# Patient Record
Sex: FEMALE | Race: BLACK OR AFRICAN AMERICAN | Employment: PART TIME | URBAN - METROPOLITAN AREA
[De-identification: names, ages, dates, MRNs, and addresses within clinical notes are randomized per-mention and may not be internally consistent; named-entity substitution may affect disease eponyms.]

---

## 2017-02-21 ENCOUNTER — TELEPHONE (OUTPATIENT)
Dept: INTERNAL MEDICINE CLINIC | Age: 57
End: 2017-02-21

## 2017-02-21 ENCOUNTER — OFFICE VISIT (OUTPATIENT)
Dept: INTERNAL MEDICINE CLINIC | Age: 57
End: 2017-02-21

## 2017-02-21 VITALS
HEART RATE: 70 BPM | BODY MASS INDEX: 34.37 KG/M2 | HEIGHT: 67 IN | DIASTOLIC BLOOD PRESSURE: 85 MMHG | RESPIRATION RATE: 19 BRPM | TEMPERATURE: 97.8 F | SYSTOLIC BLOOD PRESSURE: 138 MMHG | OXYGEN SATURATION: 98 % | WEIGHT: 219 LBS

## 2017-02-21 DIAGNOSIS — E78.2 MIXED HYPERLIPIDEMIA: ICD-10-CM

## 2017-02-21 DIAGNOSIS — M54.9 UPPER BACK PAIN: ICD-10-CM

## 2017-02-21 DIAGNOSIS — G89.18 CHEST WALL PAIN FOLLOWING SURGERY: Primary | ICD-10-CM

## 2017-02-21 DIAGNOSIS — R07.89 CHEST WALL PAIN FOLLOWING SURGERY: Primary | ICD-10-CM

## 2017-02-21 DIAGNOSIS — E55.9 VITAMIN D DEFICIENCY: ICD-10-CM

## 2017-02-21 DIAGNOSIS — R51.9 FACIAL PAIN: Primary | ICD-10-CM

## 2017-02-21 DIAGNOSIS — E03.4 HYPOTHYROIDISM DUE TO ACQUIRED ATROPHY OF THYROID: ICD-10-CM

## 2017-02-21 DIAGNOSIS — R51.9 FACIAL PAIN: ICD-10-CM

## 2017-02-21 RX ORDER — FLUTICASONE PROPIONATE 50 MCG
2 SPRAY, SUSPENSION (ML) NASAL DAILY
Qty: 1 BOTTLE | Refills: 2 | Status: SHIPPED | OUTPATIENT
Start: 2017-02-21 | End: 2018-09-06 | Stop reason: SDUPTHER

## 2017-02-21 RX ORDER — HYDROCODONE BITARTRATE AND ACETAMINOPHEN 5; 325 MG/1; MG/1
TABLET ORAL
Qty: 60 TAB | Refills: 0 | Status: SHIPPED | OUTPATIENT
Start: 2017-02-21 | End: 2017-04-19 | Stop reason: SDUPTHER

## 2017-02-21 NOTE — PROGRESS NOTES
Reviewed record in preparation for visit and have obtained necessary documentation. Identified pt with two pt identifiers(name and ).     Chief Complaint   Patient presents with    Head Pain     6 month f/u c/o of HA x 2 weeks pt states \"mother  of brain tumor\" wants HA checked     Extremity Weakness     c/o of bilateral weakness, c/o swelling and pain  under armpits x 1 month            Coordination of Care Questionnaire:  :     1) Have you been to an emergency room, urgent care clinic since your last visit? no   Hospitalized since your last visit? no             2) Have you seen or consulted any other health care providers outside of [address] [place] [address] since your last visit? no  (Include any pap smears or colon screenings in this section.)

## 2017-02-21 NOTE — PATIENT INSTRUCTIONS

## 2017-02-21 NOTE — MR AVS SNAPSHOT
Visit Information Date & Time Provider Department Dept. Phone Encounter #  
 2/21/2017 10:30 AM Austin Swann, [address] [place] [address],[address] [phone] [id number] Follow-up Instructions Return for followup pending labs. Lucy Reyna Upcoming Health Maintenance Date Due DTaP/Tdap/Td series (1 - Tdap) 6/1/1983 FOBT Q 1 YEAR AGE 50-75 6/1/2012 PAP AKA CERVICAL CYTOLOGY 3/28/2015 BREAST CANCER SCRN MAMMOGRAM 4/9/2017 Allergies as of 2/21/2017  Review Complete On: 2/21/2017 By: Austin Swann MD  
  
 Severity Noted Reaction Type Reactions Penicillins  04/25/2012   Systemic Hives Pt states I am not allergic to penicillin Percocet [Oxycodone-acetaminophen]  11/23/2012    Itching Sulfa (Sulfonamide Antibiotics)  04/25/2012   Systemic Other (comments) Mouth blistering Current Immunizations  Never Reviewed No immunizations on file. Not reviewed this visit You Were Diagnosed With   
  
 Codes Comments Chest wall pain following surgery    -  Primary ICD-10-CM: R52.12 
ICD-9-CM: 338.12 Mixed hyperlipidemia     ICD-10-CM: E78.2 ICD-9-CM: 272.2 Hypothyroidism due to acquired atrophy of thyroid     ICD-10-CM: E03.4 ICD-9-CM: 244.8, 246.8 Vitamin D deficiency     ICD-10-CM: E55.9 ICD-9-CM: 268.9 Upper back pain     ICD-10-CM: M54.9 ICD-9-CM: 724.5 Facial pain     ICD-10-CM: D64 ICD-9-CM: 776. 0 Vitals BP Pulse Temp Resp Height(growth percentile) Weight(growth percentile) 138/85 (BP 1 Location: Left arm, BP Patient Position: Sitting) 70 97.8 °F (36.6 °C) (Oral) 19 5' 7\" (1.702 m) 219 lb (99.3 kg) SpO2 BMI OB Status Smoking Status 98% 34.3 kg/m2 Hysterectomy Former Smoker BMI and BSA Data Body Mass Index Body Surface Area  
 34.3 kg/m 2 2.17 m 2 Preferred Pharmacy Pharmacy Name Phone [address] Chaudhari, [address] [facility] [phone] Your Updated Medication List  
  
   
This list is accurate as of: 17 11:59 AM.  Always use your most recent med list.  
  
  
  
  
 atorvastatin 20 mg tablet Commonly known as:  LIPITOR Take 1 Tab by mouth daily. fluticasone 50 mcg/actuation nasal spray Commonly known as:  Shirley Fryer 2 Sprays by Both Nostrils route daily. glucose blood VI test strips strip Commonly known as:  Ascensia CONTOUR Test your blood sugar 3-4 times weekly, before breakfast.  
  
 HYDROcodone-acetaminophen 5-325 mg per tablet Commonly known as:  Odalys Platt Take 1-2 tablet every 6 hours as needed for chest wall pain IBUPROFEN PO Take  by mouth. Lancets Misc Commonly known as: One Touch Rico Papa Check your blood sugar 3-4 times weekly  
  
 levothyroxine 125 mcg tablet Commonly known as:  SYNTHROID Take 2 Tabs by mouth Daily (before breakfast). For thyroid replacement. meclizine 25 mg tablet Commonly known as:  ANTIVERT Take 1/2-1 tablets three times a day as needed for vertigo. May cause sedation. Prescriptions Printed Refills HYDROcodone-acetaminophen (NORCO) 5-325 mg per tablet 0 Sig: Take 1-2 tablet every 6 hours as needed for chest wall pain  
 Class: Print Prescriptions Sent to Pharmacy Refills  
 fluticasone (FLONASE) 50 mcg/actuation nasal spray 2 Si Sprays by Both Nostrils route daily. Class: Normal  
 Pharmacy: [address] Chaudhari, [address] [facility] Katharina Navarro Ph #: [phone] Route: Both Nostrils We Performed the Following LIPID PANEL [86379 CPT(R)] METABOLIC PANEL, COMPREHENSIVE [03637 CPT(R)] REFERRAL TO PHYSICAL THERAPY [DOX48 Custom] Comments:  
 Aquatic therapy for ongoing upper back and chest wall pain since bilateral mastectomy. TSH 3RD GENERATION [40113 CPT(R)] VITAMIN D, 25 HYDROXY [id number] CPT(R)] Follow-up Instructions Return for followup pending labs. Lucy Reyna To-Do List   
 02/21/2017 Imaging:  XR SINUSES PARANASAL MIN 3 V Referral Information Referral ID Referred By Referred To  
  
 [id number] Sarah [facility] 1   
   [address], [address] Phone: [phone] Visits Status Start Date End Date 1 New Request 2/21/17 2/21/18 If your referral has a status of pending review or denied, additional information will be sent to support the outcome of this decision. Patient Instructions Headache: Care Instructions Your Care Instructions Headaches have many possible causes. Most headaches aren't a sign of a more serious problem, and they will get better on their own. Home treatment may help you feel better faster. The doctor has checked you carefully, but problems can develop later. If you notice any problems or new symptoms, get medical treatment right away. Follow-up care is a key part of your treatment and safety. Be sure to make and go to all appointments, and call your doctor if you are having problems. It's also a good idea to know your test results and keep a list of the medicines you take. How can you care for yourself at home? · Do not drive if you have taken a prescription pain medicine. · Rest in a quiet, dark room until your headache is gone. Close your eyes and try to relax or go to sleep. Don't watch TV or read. · Put a cold, moist cloth or cold pack on the painful area for 10 to 20 minutes at a time. Put a thin cloth between the cold pack and your skin. · Use a warm, moist towel or a heating pad set on low to relax tight shoulder and neck muscles. · Have someone gently massage your neck and shoulders. · Take pain medicines exactly as directed. ¨ If the doctor gave you a prescription medicine for pain, take it as prescribed.  
¨ If you are not taking a prescription pain medicine, ask your doctor if you can take an over-the-counter medicine. · Be careful not to take pain medicine more often than the instructions allow, because you may get worse or more frequent headaches when the medicine wears off. · Do not ignore new symptoms that occur with a headache, such as a fever, weakness or numbness, vision changes, or confusion. These may be signs of a more serious problem. To prevent headaches · Keep a headache diary so you can figure out what triggers your headaches. Avoiding triggers may help you prevent headaches. Record when each headache began, how long it lasted, and what the pain was like (throbbing, aching, stabbing, or dull). Write down any other symptoms you had with the headache, such as nausea, flashing lights or dark spots, or sensitivity to bright light or loud noise. Note if the headache occurred near your period. List anything that might have triggered the headache, such as certain foods (chocolate, cheese, wine) or odors, smoke, bright light, stress, or lack of sleep. · Find healthy ways to deal with stress. Headaches are most common during or right after stressful times. Take time to relax before and after you do something that has caused a headache in the past. 
· Try to keep your muscles relaxed by keeping good posture. Check your jaw, face, neck, and shoulder muscles for tension, and try relaxing them. When sitting at a desk, change positions often, and stretch for 30 seconds each hour. · Get plenty of sleep and exercise. · Eat regularly and well. Long periods without food can trigger a headache. · Treat yourself to a massage. Some people find that regular massages are very helpful in relieving tension. · Limit caffeine by not drinking too much coffee, tea, or soda. But don't quit caffeine suddenly, because that can also give you headaches.  
· Reduce eyestrain from computers by blinking frequently and looking away from the computer screen every so often. Make sure you have proper eyewear and that your monitor is set up properly, about an arm's length away. · Seek help if you have depression or anxiety. Your headaches may be linked to these conditions. Treatment can both prevent headaches and help with symptoms of anxiety or depression. When should you call for help? Call 911 anytime you think you may need emergency care. For example, call if: 
· You have signs of a stroke. These may include: 
¨ Sudden numbness, paralysis, or weakness in your face, arm, or leg, especially on only one side of your body. ¨ Sudden vision changes. ¨ Sudden trouble speaking. ¨ Sudden confusion or trouble understanding simple statements. ¨ Sudden problems with walking or balance. ¨ A sudden, severe headache that is different from past headaches. Call your doctor now or seek immediate medical care if: 
· You have a new or worse headache. · Your headache gets much worse. Where can you learn more? Go to http://brynn-shivani.info/. Enter M271 in the search box to learn more about \"Headache: Care Instructions. \" Current as of: February 19, 2016 Content Version: 11.1 © 2779-1084 [email or web address]. Care instructions adapted under license by [employer] (which disclaims liability or warranty for this information). If you have questions about a medical condition or this instruction, always ask your healthcare professional. [address] any warranty or liability for your use of this information. Ibuprofen 2-4 tablets up to three times a day as needed or aleve 1-2 twice a day as needed. Treat rhinitis with flonase 2 sprays each side once a day nasally. Introducing [facility]! Amada Zhong introduces Diana patient portal. Now you can access parts of your medical record, email your doctor's office, and request medication refills online.    
 
1. In your internet browser, go to [email or web address] 2. Click on the First Time User? Click Here link in the Sign In box. You will see the New Member Sign Up page. 3. Enter your DealCloud Access Code exactly as it appears below. You will not need to use this code after youve completed the sign-up process. If you do not sign up before the expiration date, you must request a new code. · DealCloud Access Code: [email or web address] Expires: 5/22/2017 11:58 AM 
 
4. Enter the last four digits of your Social Security Number (xxxx) and Date of Birth (mm/dd/yyyy) as indicated and click Submit. You will be taken to the next sign-up page. 5. Create a [employer] ID. This will be your DealCloud login ID and cannot be changed, so think of one that is secure and easy to remember. 6. Create a DealCloud password. You can change your password at any time. 7. Enter your Password Reset Question and Answer. This can be used at a later time if you forget your password. 8. Enter your e-mail address. You will receive e-mail notification when new information is available in [address] 9. Click Sign Up. You can now view and download portions of your medical record. 10. Click the Download Summary menu link to download a portable copy of your medical information. If you have questions, please visit the Frequently Asked Questions section of the [employer] website. Remember, DealCloud is NOT to be used for urgent needs. For medical emergencies, dial 911. Now available from your iPhone and Android! Please provide this summary of care documentation to your next provider. Your primary care clinician is listed as Hernan Henley. If you have any questions after today's visit, please call [phone].

## 2017-02-21 NOTE — PROGRESS NOTES
Janice Adhikari is a 49 y.o. female who presents for followup on medications. In 2016, TSH elevated to 22. She had been off thyroid medication and has since resumed medication taking it regularly. She has ongoing chest wall and upper back pain since bilateral mastectomy. She is no longer followed by her breast surgeon. She has been in lymphedema therapy, which has been somewhat helpful. Per patient, the lymphedema therapist recommended an MRI of her chest to see if there is scar tissue. She is also interested in therapy. Is concerned about ongoing headaches for past 2 weeks. Reports facial pain bilaterally. No posterior head pain. Took ibuprofen 400mg, no relief. Has some nasal congestion, no discolored mucous. Is concerned as mother  of glioblastoma. Normal BP. Past Medical History:   Diagnosis Date    Cancer [facility])     breast cancer    H/O total mastectomy 2015    Other ill-defined conditions(799.89)     completed radiation     Thyroid disease     hypothyroidism       Family History   Problem Relation Age of Onset    No Known Problems Mother     No Known Problems Father     No Known Problems Sister     No Known Problems Brother     Thyroid Disease Maternal Grandmother     MS Sister        Social History     Social History    Marital status: SINGLE     Spouse name: N/A    Number of children: N/A    Years of education: N/A     Occupational History    Not on file. Social History Main Topics    Smoking status: Former Smoker     Packs/day: 0.50     Quit date: 2015    Smokeless tobacco: Not on file    Alcohol use Yes      Comment: occasional    Drug use: No    Sexual activity: Not on file     Other Topics Concern    Not on file     Social History Narrative       Current Outpatient Prescriptions on File Prior to Visit   Medication Sig Dispense Refill    atorvastatin (LIPITOR) 20 mg tablet Take 1 Tab by mouth daily.  30 Tab 3    levothyroxine (SYNTHROID) 125 mcg tablet Take 2 Tabs by mouth Daily (before breakfast). For thyroid replacement. 180 Tab 3    meclizine (ANTIVERT) 25 mg tablet Take 1/2-1 tablets three times a day as needed for vertigo. May cause sedation. 30 Tab 0    glucose blood VI test strips (ASCENSIA CONTOUR) strip Test your blood sugar 3-4 times weekly, before breakfast. 50 Strip 6    Lancets (ONE TOUCH DELICA) misc Check your blood sugar 3-4 times weekly 1 Package 11     No current facility-administered medications on file prior to visit. Review of Systems  Pertinent items are noted in HPI. Objective:     Visit Vitals    /85 (BP 1 Location: Left arm, BP Patient Position: Sitting)    Pulse 70    Temp 97.8 °F (36.6 °C) (Oral)    Resp 19    Ht 5' 7\" (1.702 m)    Wt 219 lb (99.3 kg)    SpO2 98%    BMI 34.3 kg/m2     Gen: well appearing female in NAD  HEENT:   PERRL,normal conjunctiva. External ear and canals normal, TMs no opacification or erythema,  Swollen pale nasal turbinates, OP no erythema, no exudates, MMM  Neck:  Supple. Thyroid normal size, nontender, without nodules. No masses or LAD  Resp:  No wheezing, no rhonchi, no rales. Chest: no chest wall swelling or bruising, tender on light touch  CV:  RRR, normal S1S2, no murmur. Assessment/Plan:     1. Chest wall pain following surgery    - HYDROcodone-acetaminophen (NORCO) 5-325 mg per tablet; Take 1-2 tablet every 6 hours as needed for chest wall pain  Dispense: 60 Tab; Refill: 0  - REFERRAL TO PHYSICAL THERAPY    2. Mixed hyperlipidemia- Recommend AHA diet and regular cardiovascular exercise. Continue statin therapy. - METABOLIC PANEL, COMPREHENSIVE  - LIPID PANEL    3. Hypothyroidism due to acquired atrophy of thyroid- Recommend taking thyroid medication daily on empty stomach and regular follow up.    - TSH 3RD GENERATION    4. Vitamin D deficiency    - VITAMIN D, 25 HYDROXY    5. Upper back pain    - REFERRAL TO PHYSICAL THERAPY    6.  Facial pain-recommend ibuprofen for pain and flonase for rhinitis, check xray for sinus infection. - XR SINUSES PARANASAL MIN 3 V; Future    Follow-up Disposition:  Return for followup pending labs.  Alpesh Vásquez MD

## 2017-02-22 ENCOUNTER — TELEPHONE (OUTPATIENT)
Dept: INTERNAL MEDICINE CLINIC | Age: 57
End: 2017-02-22

## 2017-02-22 LAB
25(OH)D3+25(OH)D2 SERPL-MCNC: 6.5 NG/ML (ref 30–100)
ALBUMIN SERPL-MCNC: 4.7 G/DL (ref 3.5–5.5)
ALBUMIN/GLOB SERPL: 1.6 {RATIO} (ref 1.1–2.5)
ALP SERPL-CCNC: 46 IU/L (ref 39–117)
ALT SERPL-CCNC: 19 IU/L (ref 0–32)
AST SERPL-CCNC: 31 IU/L (ref 0–40)
BILIRUB SERPL-MCNC: 0.4 MG/DL (ref 0–1.2)
BUN SERPL-MCNC: 22 MG/DL (ref 6–24)
BUN/CREAT SERPL: 20 (ref 9–23)
CALCIUM SERPL-MCNC: 9.6 MG/DL (ref 8.7–10.2)
CHLORIDE SERPL-SCNC: 100 MMOL/L (ref 96–106)
CHOLEST SERPL-MCNC: 531 MG/DL (ref 100–199)
CO2 SERPL-SCNC: 24 MMOL/L (ref 18–29)
CREAT SERPL-MCNC: 1.1 MG/DL (ref 0.57–1)
GLOBULIN SER CALC-MCNC: 3 G/DL (ref 1.5–4.5)
GLUCOSE SERPL-MCNC: 102 MG/DL (ref 65–99)
HDLC SERPL-MCNC: 76 MG/DL
LDLC SERPL CALC-MCNC: 413 MG/DL (ref 0–99)
POTASSIUM SERPL-SCNC: 4.7 MMOL/L (ref 3.5–5.2)
PROT SERPL-MCNC: 7.7 G/DL (ref 6–8.5)
SODIUM SERPL-SCNC: 141 MMOL/L (ref 134–144)
TRIGL SERPL-MCNC: 209 MG/DL (ref 0–149)
TSH SERPL DL<=0.005 MIU/L-ACNC: 21.13 UIU/ML (ref 0.45–4.5)
VLDLC SERPL CALC-MCNC: 42 MG/DL (ref 5–40)

## 2017-02-23 ENCOUNTER — TELEPHONE (OUTPATIENT)
Dept: INTERNAL MEDICINE CLINIC | Age: 57
End: 2017-02-23

## 2017-02-23 NOTE — TELEPHONE ENCOUNTER
Advise patient that I reviewed the form from . This is a form that she will need to complete. The provider section asks if she is still receiving treatment for breast cancer, which she is not. She is done with treatment since bilateral mastectomy, no radiation, no chemotherapy. Having physical therapy for her chest wall pain is not active treatment, so I cannot sign this form.

## 2017-02-24 NOTE — TELEPHONE ENCOUNTER
Spoke with patient after verifying name and  regarding Dr. Librado Koo recommendations. Writer informed patient of Dr. Librado Koo recommendations. Patient given an opportunity to ask questions, repeated information, and verbalized understanding.

## 2017-02-24 NOTE — TELEPHONE ENCOUNTER
Spoke with patient after verifying name and  regarding Dr. Remi Nevarez recommendations. Writer informed patient of Dr. Remi Nevarez recommendations. Patient stated she needs an order from her PCP for water therapy for lymphedema. Will discuss with Dr. Hank Johnson. Patient given an opportunity to ask questions, repeated information, and verbalized understanding.

## 2017-02-26 NOTE — PROGRESS NOTES
Notify patient her thyroid is not controlled. She is off her thyroid medication, please ask her why? She is very low thyroid. Resume her levothyroxine 125mcg once a day. Also, her cholesterol is extremely high at 535. She is not taking her lipitor. Why not? She is at increased risk of heart attack and stroke having such high cholesterol. Advise patient to resume lipitor. Her vitamin D is very low. See if she is willing to take a loading dose of vitamin D and let me know. Follow up in 2 months with fasting labs on return.

## 2017-02-27 ENCOUNTER — TELEPHONE (OUTPATIENT)
Dept: INTERNAL MEDICINE CLINIC | Age: 57
End: 2017-02-27

## 2017-02-27 DIAGNOSIS — E03.4 HYPOTHYROIDISM DUE TO ACQUIRED ATROPHY OF THYROID: ICD-10-CM

## 2017-02-27 DIAGNOSIS — E78.2 MIXED HYPERLIPIDEMIA: Primary | ICD-10-CM

## 2017-02-27 DIAGNOSIS — E55.9 VITAMIN D DEFICIENCY: ICD-10-CM

## 2017-02-27 RX ORDER — ATORVASTATIN CALCIUM 20 MG/1
20 TABLET, FILM COATED ORAL DAILY
Qty: 30 TAB | Refills: 3 | Status: SHIPPED | OUTPATIENT
Start: 2017-02-27 | End: 2017-04-19 | Stop reason: SDUPTHER

## 2017-02-27 RX ORDER — LEVOTHYROXINE SODIUM 125 UG/1
250 TABLET ORAL
Qty: 180 TAB | Refills: 3 | Status: SHIPPED | OUTPATIENT
Start: 2017-02-27 | End: 2017-04-19 | Stop reason: SDUPTHER

## 2017-02-27 RX ORDER — ERGOCALCIFEROL 1.25 MG/1
CAPSULE ORAL
Qty: 8 CAP | Refills: 2 | Status: SHIPPED | OUTPATIENT
Start: 2017-02-27 | End: 2017-04-19 | Stop reason: SDUPTHER

## 2017-02-27 NOTE — PROGRESS NOTES
Called and spoke with patient regarding labs and PCP recommendations. Patient stated she is taking the levothyroxine and Lipitor patient admits she forgets to take them on some days. Advised patient the importance of taking her medication every day cholesterol is very high which puts her at increased risk for heart attack and stroke. Verbalized understanding. Patient agreed to taking the vitamin D loading dose would like it sent to [facility] on file. Opportunity given to ask questions patient had no further questions or concerns.

## 2017-04-19 ENCOUNTER — OFFICE VISIT (OUTPATIENT)
Dept: INTERNAL MEDICINE CLINIC | Age: 57
End: 2017-04-19

## 2017-04-19 VITALS
DIASTOLIC BLOOD PRESSURE: 82 MMHG | BODY MASS INDEX: 33.81 KG/M2 | TEMPERATURE: 98.1 F | WEIGHT: 215.4 LBS | RESPIRATION RATE: 18 BRPM | HEART RATE: 72 BPM | OXYGEN SATURATION: 97 % | SYSTOLIC BLOOD PRESSURE: 119 MMHG | HEIGHT: 67 IN

## 2017-04-19 DIAGNOSIS — E78.2 MIXED HYPERLIPIDEMIA: ICD-10-CM

## 2017-04-19 DIAGNOSIS — E55.9 VITAMIN D DEFICIENCY: ICD-10-CM

## 2017-04-19 DIAGNOSIS — H81.90 VESTIBULAR DIZZINESS, UNSPECIFIED LATERALITY: ICD-10-CM

## 2017-04-19 DIAGNOSIS — E03.4 HYPOTHYROIDISM DUE TO ACQUIRED ATROPHY OF THYROID: Primary | ICD-10-CM

## 2017-04-19 DIAGNOSIS — G89.18 CHEST WALL PAIN FOLLOWING SURGERY: ICD-10-CM

## 2017-04-19 DIAGNOSIS — R07.89 CHEST WALL PAIN FOLLOWING SURGERY: ICD-10-CM

## 2017-04-19 RX ORDER — MECLIZINE HYDROCHLORIDE 25 MG/1
TABLET ORAL
Qty: 30 TAB | Refills: 3 | Status: SHIPPED | OUTPATIENT
Start: 2017-04-19 | End: 2018-10-25

## 2017-04-19 RX ORDER — ATORVASTATIN CALCIUM 20 MG/1
20 TABLET, FILM COATED ORAL DAILY
Qty: 30 TAB | Refills: 3 | Status: CANCELLED | OUTPATIENT
Start: 2017-04-19

## 2017-04-19 RX ORDER — ATORVASTATIN CALCIUM 20 MG/1
20 TABLET, FILM COATED ORAL DAILY
Qty: 90 TAB | Refills: 3 | Status: SHIPPED | OUTPATIENT
Start: 2017-04-19 | End: 2018-10-25 | Stop reason: SDUPTHER

## 2017-04-19 RX ORDER — HYDROCODONE BITARTRATE AND ACETAMINOPHEN 5; 325 MG/1; MG/1
TABLET ORAL
Qty: 60 TAB | Refills: 0 | Status: SHIPPED | OUTPATIENT
Start: 2017-04-19 | End: 2017-05-16 | Stop reason: SDUPTHER

## 2017-04-19 RX ORDER — LEVOTHYROXINE SODIUM 125 UG/1
250 TABLET ORAL
Qty: 180 TAB | Refills: 3 | Status: SHIPPED | OUTPATIENT
Start: 2017-04-19 | End: 2018-05-04 | Stop reason: SDUPTHER

## 2017-04-19 RX ORDER — ERGOCALCIFEROL 1.25 MG/1
CAPSULE ORAL
Qty: 8 CAP | Refills: 2 | Status: ON HOLD | OUTPATIENT
Start: 2017-04-19 | End: 2020-05-26 | Stop reason: CLARIF

## 2017-04-19 NOTE — PROGRESS NOTES
Arleen Hutton is a 49 y.o. female who presents for followup. TSH was deficient. Is taking her thyroid medication about 3 days a week, will forget. Taking the thyroid medication at night. She thinks that she will remember better at 10am.   Is supposed to be on 2 tablets of the 125mcg once a day. Feeling less fatigued. Is not compliant with cholesterol medication. Does not want to take it. Cholesterol 531, . Is trying to follow diet. Vitamin D was very low, 6.5. Did not take the loading dose. Taking hydrocodone 1/2-1 tablet in evening for ongoing chest wall pain from prior radical mastectomy. Is in aquatics. Past Medical History:   Diagnosis Date    Cancer [facility])     breast cancer    H/O total mastectomy 11/30/2015    Other ill-defined conditions     completed radiation 9/12    Thyroid disease     hypothyroidism       Family History   Problem Relation Age of Onset    No Known Problems Mother     No Known Problems Father     No Known Problems Sister     No Known Problems Brother     Thyroid Disease Maternal Grandmother     MS Sister        Social History     Social History    Marital status: SINGLE     Spouse name: N/A    Number of children: N/A    Years of education: N/A     Occupational History    Not on file. Social History Main Topics    Smoking status: Former Smoker     Packs/day: 0.50     Quit date: 1/28/2015    Smokeless tobacco: Not on file    Alcohol use Yes      Comment: occasional    Drug use: No    Sexual activity: Not on file     Other Topics Concern    Not on file     Social History Narrative       Current Outpatient Prescriptions on File Prior to Visit   Medication Sig Dispense Refill    IBUPROFEN PO Take  by mouth.  fluticasone (FLONASE) 50 mcg/actuation nasal spray 2 Sprays by Both Nostrils route daily.  1 Bottle 2    glucose blood VI test strips (ASCENSIA CONTOUR) strip Test your blood sugar 3-4 times weekly, before breakfast. 50 Strip 6    Lancets (ONE TOUCH DELICA) misc Check your blood sugar 3-4 times weekly 1 Package 11     No current facility-administered medications on file prior to visit. Review of Systems  Pertinent items are noted in HPI. Objective:     Visit Vitals    /82 (BP 1 Location: Left arm, BP Patient Position: Sitting)    Pulse 72    Temp 98.1 °F (36.7 °C) (Oral)    Resp 18    Ht 5' 7\" (1.702 m)    Wt 215 lb 6.4 oz (97.7 kg)    SpO2 97%    BMI 33.74 kg/m2     Gen: well appearing female  HEENT:   PERRL,normal conjunctiva. External ear and canals normal, TMs no opacification or erythema,  OP no erythema, no exudates, MMM  Neck:  Supple. Thyroid normal size, nontender, without nodules. No masses or LAD  Resp:  No wheezing, no rhonchi, no rales. CV:  RRR, normal S1S2, no murmur. GI: soft, nontender, without masses. No hepatosplenomegaly. Extrem:  +2 pulses, no edema, warm distally      Assessment/Plan:     1. Chest wall pain following surgery    - HYDROcodone-acetaminophen (NORCO) 5-325 mg per tablet; Take 1-2 tablet every 6 hours as needed for chest wall pain  Dispense: 60 Tab; Refill: 0    2. Mixed hyperlipidemia-has not been compliant with medication or diet. Defer labs. - atorvastatin (LIPITOR) 20 mg tablet; Take 1 Tab by mouth daily. Dispense: 90 Tab; Refill: 3    3. Hypothyroidism due to acquired atrophy of thyroid-slightly more compliant since last visit with thyroid medication. Check TSH    - levothyroxine (SYNTHROID) 125 mcg tablet; Take 2 Tabs by mouth Daily (before breakfast). For thyroid replacement. Dispense: 180 Tab; Refill: 3  - TSH 3RD GENERATION    4. Vestibular dizziness, unspecified laterality    - meclizine (ANTIVERT) 25 mg tablet; Take 1/2-1 tablets three times a day as needed for vertigo. May cause sedation. Dispense: 30 Tab; Refill: 3    5. Vitamin D deficiency    - ergocalciferol (ERGOCALCIFEROL) 50,000 unit capsule;  Take one capsule twice a week  Dispense: 8 Cap; Refill: 2    Follow-up Disposition:  Return in about 3 months (around 7/19/2017) for follow up on medications.  Daphnie Tavera MD

## 2017-04-19 NOTE — LETTER
5/23/2017 3:02 PM 
 
Ms. Rosalva Adame [address] [id number] Dear Rosalva Soni: 
 
Please find your most recent results below. Resulted Orders TSH 3RD GENERATION Result Value Ref Range TSH 17.780 (H) 0.450 - 4.500 uIU/mL Narrative Performed at:  [address]  [id number] : Mary Aguiar MD, Phone:  [phone] RECOMMENDATIONS: Thyroid is only a little better. You remain low in thyroid hormone, and as we discussed, please be sure you are taking the medication every day.  Plan follow up 3 months with fasting labs. Please call me if you have any questions: [phone] Sincerely, 
 
 
Golden Webster MD

## 2017-04-19 NOTE — PATIENT INSTRUCTIONS
Change the thyroid medication to 10am.  Resume the cholesterol medication daily. Take the vitamin D loading dose.

## 2017-04-19 NOTE — MR AVS SNAPSHOT
Visit Information Date & Time Provider Department Dept. Phone Encounter #  
 4/19/2017 10:00 AM Sissy Soler, [address] [place] [address],[address] [phone] [id number] Follow-up Instructions Return in about 3 months (around 7/19/2017) for follow up on medications. Chales Minus Upcoming Health Maintenance Date Due DTaP/Tdap/Td series (1 - Tdap) 6/1/1983 FOBT Q 1 YEAR AGE 50-75 6/1/2012 PAP AKA CERVICAL CYTOLOGY 3/28/2015 BREAST CANCER SCRN MAMMOGRAM 4/9/2017 Allergies as of 4/19/2017  Review Complete On: 4/19/2017 By: Sissy Soler MD  
  
 Severity Noted Reaction Type Reactions Penicillins  04/25/2012   Systemic Hives Pt states I am not allergic to penicillin Percocet [Oxycodone-acetaminophen]  11/23/2012    Itching Sulfa (Sulfonamide Antibiotics)  04/25/2012   Systemic Other (comments) Mouth blistering Current Immunizations  Never Reviewed No immunizations on file. Not reviewed this visit You Were Diagnosed With   
  
 Codes Comments Hypothyroidism due to acquired atrophy of thyroid    -  Primary ICD-10-CM: E03.4 ICD-9-CM: 244.8, 246.8 Chest wall pain following surgery     ICD-10-CM: G89.12 
ICD-9-CM: 338.12 Mixed hyperlipidemia     ICD-10-CM: E78.2 ICD-9-CM: 272.2 Vestibular dizziness, unspecified laterality     ICD-10-CM: H81.90 ICD-9-CM: 386. 9 Vitamin D deficiency     ICD-10-CM: E55.9 ICD-9-CM: 268.9 Vitals BP Pulse Temp Resp Height(growth percentile) Weight(growth percentile) 119/82 (BP 1 Location: Left arm, BP Patient Position: Sitting) 72 98.1 °F (36.7 °C) (Oral) 18 5' 7\" (1.702 m) 215 lb 6.4 oz (97.7 kg) SpO2 BMI OB Status Smoking Status 97% 33.74 kg/m2 Hysterectomy Former Smoker BMI and BSA Data Body Mass Index Body Surface Area 33.74 kg/m 2 2.15 m 2 Preferred Pharmacy Pharmacy Name Phone  [address] [facility],[address], [address] [facility] [address] AT Florencia Mckee [phone] Your Updated Medication List  
  
   
This list is accurate as of: 4/19/17 10:24 AM.  Always use your most recent med list.  
  
  
  
  
 atorvastatin 20 mg tablet Commonly known as:  LIPITOR Take 1 Tab by mouth daily. ergocalciferol 50,000 unit capsule Commonly known as:  ERGOCALCIFEROL Take one capsule twice a week  
  
 fluticasone 50 mcg/actuation nasal spray Commonly known as:  Olya [place] 2 Sprays by Both Nostrils route daily. glucose blood VI test strips strip Commonly known as:  Ascensia CONTOUR Test your blood sugar 3-4 times weekly, before breakfast.  
  
 HYDROcodone-acetaminophen 5-325 mg per tablet Commonly known as:  Mesfin Kilts Take 1-2 tablet every 6 hours as needed for chest wall pain IBUPROFEN PO Take  by mouth. Lancets Misc Commonly known as: One Touch Reeda Oms Check your blood sugar 3-4 times weekly  
  
 levothyroxine 125 mcg tablet Commonly known as:  SYNTHROID Take 2 Tabs by mouth Daily (before breakfast). For thyroid replacement. meclizine 25 mg tablet Commonly known as:  ANTIVERT Take 1/2-1 tablets three times a day as needed for vertigo. May cause sedation. Prescriptions Printed Refills HYDROcodone-acetaminophen (NORCO) 5-325 mg per tablet 0 Sig: Take 1-2 tablet every 6 hours as needed for chest wall pain  
 Class: Print Prescriptions Sent to Pharmacy Refills  
 levothyroxine (SYNTHROID) 125 mcg tablet 3 Sig: Take 2 Tabs by mouth Daily (before breakfast). For thyroid replacement. Class: Normal  
 Pharmacy: [facility] [address] #: [phone] Route: Oral  
 meclizine (ANTIVERT) 25 mg tablet 3 Sig: Take 1/2-1 tablets three times a day as needed for vertigo. May cause sedation.   
 Class: Normal  
 Pharmacy: [facility] [address] AT [address] [place] [address] Ph #: [phone]  
 atorvastatin (LIPITOR) 20 mg tablet 3 Sig: Take 1 Tab by mouth daily. Class: Normal  
 Pharmacy: [facility] [place] [address] Ph #: [phone] Route: Oral  
 ergocalciferol (ERGOCALCIFEROL) 50,000 unit capsule 2 Sig: Take one capsule twice a week Class: Normal  
 Pharmacy: [facility] [place] [address] Ph #: [phone] We Performed the Following TSH 3RD GENERATION [35018 CPT(R)] Follow-up Instructions Return in about 3 months (around 7/19/2017) for follow up on medications. .  
  
  
Patient Instructions Change the thyroid medication to 10am.  Resume the cholesterol medication daily. Take the vitamin D loading dose. Introducing [facility]! [employer] introduces [employer] patient portal. Now you can access parts of your medical record, email your doctor's office, and request medication refills online. 1. In your internet browser, go to [email or web address] 2. Click on the First Time User? Click Here link in the Sign In box. You will see the New Member Sign Up page. 3. Enter your [employer] Access Code exactly as it appears below. You will not need to use this code after youve completed the sign-up process. If you do not sign up before the expiration date, you must request a new code. · [employer] Access Code: [email or web address] Expires: 5/22/2017 12:58 PM 
 
4. Enter the last four digits of your Social Security Number (xxxx) and Date of Birth (mm/dd/yyyy) as indicated and click Submit. You will be taken to the next sign-up page. 5. Create a [employer] ID. This will be your [employer] login ID and cannot be changed, so think of one that is secure and easy to remember. 6. Create a [facility] password. You can change your password at any time. 7. Enter your Password Reset Question and Answer. This can be used at a later time if you forget your password. 8. Enter your e-mail address. You will receive e-mail notification when new information is available in [address] 9. Click Sign Up. You can now view and download portions of your medical record. 10. Click the Download Summary menu link to download a portable copy of your medical information. If you have questions, please visit the Frequently Asked Questions section of the [employer] website. Remember, [facility] is NOT to be used for urgent needs. For medical emergencies, dial 911. Now available from your iPhone and Android! Please provide this summary of care documentation to your next provider. Your primary care clinician is listed as Albaro Ewing. If you have any questions after today's visit, please call [phone].

## 2017-04-19 NOTE — PROGRESS NOTES
Reviewed record in preparation for visit and have obtained necessary documentation. Identified pt with two pt identifiers(name and ). Coordination of Care Questionnaire:  :     1) Have you been to an emergency room, urgent care clinic since your last visit?  Yes [facility]   Hospitalized since your last visit? no             2) Have you seen or consulted any other health care providers outside of [address] [place] [address] since your last visit? no  (Include any pap smears or colon screenings in this section.)

## 2017-04-20 LAB — TSH SERPL DL<=0.005 MIU/L-ACNC: 17.78 UIU/ML (ref 0.45–4.5)

## 2017-04-20 NOTE — PROGRESS NOTES
Notify patient thyroid is only a little better. She remains low in thyroid hormone and as we discussed, she needs to take the medication every day. Plan follow up 3 months with fasting labs.

## 2017-05-01 ENCOUNTER — TELEPHONE (OUTPATIENT)
Dept: INTERNAL MEDICINE CLINIC | Age: 57
End: 2017-05-01

## 2017-05-01 NOTE — TELEPHONE ENCOUNTER
----- Message from Reeves Hamman, MD sent at 4/20/2017  1:16 PM EDT -----  Notify patient thyroid is only a little better. She remains low in thyroid hormone and as we discussed, she needs to take the medication every day. Plan follow up 3 months with fasting labs.

## 2017-05-16 ENCOUNTER — OFFICE VISIT (OUTPATIENT)
Dept: INTERNAL MEDICINE CLINIC | Age: 57
End: 2017-05-16

## 2017-05-16 VITALS
BODY MASS INDEX: 32.49 KG/M2 | SYSTOLIC BLOOD PRESSURE: 120 MMHG | WEIGHT: 207 LBS | HEIGHT: 67 IN | DIASTOLIC BLOOD PRESSURE: 79 MMHG | HEART RATE: 83 BPM | TEMPERATURE: 98 F

## 2017-05-16 DIAGNOSIS — Z85.3 HISTORY OF BREAST CANCER: ICD-10-CM

## 2017-05-16 DIAGNOSIS — G89.29 CHEST WALL PAIN, CHRONIC: Primary | ICD-10-CM

## 2017-05-16 DIAGNOSIS — F11.90 CHRONIC NARCOTIC USE: ICD-10-CM

## 2017-05-16 DIAGNOSIS — R07.89 CHEST WALL PAIN, CHRONIC: Primary | ICD-10-CM

## 2017-05-16 DIAGNOSIS — I89.0 LYMPHEDEMA: ICD-10-CM

## 2017-05-16 RX ORDER — HYDROCODONE BITARTRATE AND ACETAMINOPHEN 5; 325 MG/1; MG/1
TABLET ORAL
Qty: 60 TAB | Refills: 0 | Status: SHIPPED | OUTPATIENT
Start: 2017-05-16 | End: 2017-05-31

## 2017-05-16 NOTE — PROGRESS NOTES
Reviewed record in preparation for visit and have obtained necessary documentation. Identified pt with two pt identifiers(name and ).     Chief Complaint   Patient presents with    Breast pain     c/o of pain at mastectomy x 1 week requesting refills on hydrocodone           Coordination of Care Questionnaire:  :     1) Have you been to an emergency room, urgent care clinic since your last visit? no   Hospitalized since your last visit? no             2) Have you seen or consulted any other health care providers outside of [address] [place] [address] since your last visit? no  (Include any pap smears or colon screenings in this section.)

## 2017-05-16 NOTE — PROGRESS NOTES
Salazar Olivares is a 49 y.o. female who presents with complaints of ongoing chest wall pain since bilateral radical mastectomy in 2015. Has had lymphedema and has going to therapy. Has had aquatic therapy. Has tried NSAIDS without relief. Has been taking 1/2-1 hydrocodone a day in the evening. rx hydrocodone 5mg #60 on 4/19. She self titrated to one twice a day and is using ice packs at work. She reports aquatics helped temporarily. The right sided chest wall pain is worse, noted a swelling for 2-3 weeks. Past Medical History:   Diagnosis Date    Cancer [facility])     breast cancer    H/O total mastectomy 11/30/2015    Other ill-defined conditions     completed radiation 9/12    Thyroid disease     hypothyroidism       Family History   Problem Relation Age of Onset    No Known Problems Mother     No Known Problems Father     No Known Problems Sister     No Known Problems Brother     Thyroid Disease Maternal Grandmother     MS Sister        Social History     Social History    Marital status: SINGLE     Spouse name: N/A    Number of children: N/A    Years of education: N/A     Occupational History    Not on file. Social History Main Topics    Smoking status: Former Smoker     Packs/day: 0.50     Quit date: 1/28/2015    Smokeless tobacco: Not on file    Alcohol use Yes      Comment: occasional    Drug use: No    Sexual activity: Not on file     Other Topics Concern    Not on file     Social History Narrative       Current Outpatient Prescriptions on File Prior to Visit   Medication Sig Dispense Refill    levothyroxine (SYNTHROID) 125 mcg tablet Take 2 Tabs by mouth Daily (before breakfast). For thyroid replacement. 180 Tab 3    meclizine (ANTIVERT) 25 mg tablet Take 1/2-1 tablets three times a day as needed for vertigo. May cause sedation. 30 Tab 3    atorvastatin (LIPITOR) 20 mg tablet Take 1 Tab by mouth daily.  90 Tab 3    ergocalciferol (ERGOCALCIFEROL) 50,000 unit capsule Take one capsule twice a week 8 Cap 2    IBUPROFEN PO Take  by mouth.  fluticasone (FLONASE) 50 mcg/actuation nasal spray 2 Sprays by Both Nostrils route daily. 1 Bottle 2    glucose blood VI test strips (ASCENSIA CONTOUR) strip Test your blood sugar 3-4 times weekly, before breakfast. 50 Strip 6    Lancets (ONE TOUCH DELICA) misc Check your blood sugar 3-4 times weekly 1 Package 11     No current facility-administered medications on file prior to visit. Review of Systems  Pertinent items are noted in HPI. Objective:     Visit Vitals    /79 (BP 1 Location: Left arm, BP Patient Position: Sitting)    Pulse 83    Temp 98 °F (36.7 °C) (Oral)    Ht 5' 7\" (1.702 m)    Wt 207 lb (93.9 kg)    BMI 32.42 kg/m2     Gen: well appearing female  Resp:  No wheezing, no rhonchi, no rales. CV:  RRR, normal S1S2  Chest: bilateral mastectomy, tender swelling at right chest wall scar. Extrem:  +2 pulses, no edema, warm distally      Assessment/Plan:     1. Chest wall pain, chronic    - HYDROcodone-acetaminophen (NORCO) 5-325 mg per tablet; Take 1-2 tablet every 6 hours as needed for chest wall pain  Dispense: 60 Tab; Refill: 0    2. Lymphedema-prior lymphedema therapy       3. History of breast cancer    - US CHEST; Future    4. Chronic narcotic use- we discussed the terms of the controlled substance agreement. We discussed the importance of using the minimal amount of medication needed to improve function.     - COMPLIANCE DRUG SCREEN/PRESCRIPTION MONITORING    Follow-up Disposition:  Return for follow up as scheduled.  Stephanie Benites MD

## 2017-05-16 NOTE — LETTER
AGREEMENT for controlled medication treatment Figueroa Hooker, have agreed to a course of treatment that includes taking controlled medication. For this treatment I designate _____Dr. Garcia________________________________ as the Designated Provider.  The purpose of this agreement is to prevent misunderstandings about controlled medications I may be taking for treatment, and to comply with applicable laws. I understand this agreement is essential to the trust and confidence necessary in a provider-patient relationship and that the designated provider will treat me in accordance with the statements below. As part of my treatment I agree to the followin.  USE 
a. I will take the controlled medication as instructed by the designated provider and avoid improper use of controlled medications. b.   I will not share, sell or trade controlled medication with anyone as this is a criminal offense.  
c.   I will not use any illegal controlled substance, including marijuana, cocaine, etc. as this is a criminal offense. 2.  PROVIDERS 
a. I will only obtain controlled medication from the designated provider. b.   I will not attempt to obtain the same or similar controlled medications, such as opioid pain medication, stimulants, anti-anxiety or hypnotics from any other provider as this is a criminal offense. 
c.   I will inform the designated provider about all other licensed professionals providing medical care to me and authorize communication between all providers to coordinate care, particularly prescribing or dispensing of controlled medications. 3.  PHARMACY 
a.   I will only fill controlled medication prescriptions at the approved pharmacy as listed below. 4.  OFFICE VISITS 
a. I agree to attend scheduled office visit appointments. b.   I am aware my office visits may be monthly or as determined necessary by the designated provider. c.   I will communicate fully with the designated provider about the character and intensity of my symptoms, the effect of the symptoms on my daily life, and how well the controlled medication is helping to relieve the cause of my symptoms. 5.  REFILLS OR CALL-IN PRESCRIPTIONS OF CONTROLLED MEDICATIONS 
a. I agree that refills of my prescriptions for controlled medications will be made at the time of an office visit during regular office hours. No refills will be available during evenings or on weekends. Abusive or inappropriate behavior related to medication refills will not be tolerated. b.   I will not call the office repeatedly to inquire about my controlled medication. I understand that medications will be written on the due date and not before. Calling the office repeatedly will be considered harassment, and I may be discharged from the practice. c.   Controlled medications may not be called in for refill, but doing so is at the discretion of the designated provider. d.   I am aware that only I must  prescriptions for controlled medications at the office but the designated provider may allow a designee to  the prescription from the office under very specific circumstance that may develop. 6.  TOXICOLOGY SCREENING 
a. I agree to random urine toxicology screenings in order to comply with government and [address] [place] [address] regulations. I understand that I will be financially responsible for any charges incurred by this office, Vanita Lubin of [facility] laboratory, [employer], or [facility] for the urine toxicology screening, which may not be covered by my insurance. Failure to do so will be considered non-compliance and I may be discharged. b. In some cases an oral swab or hair sample may be substituted for a urine screen. This is at the discretion of the designated provider. I understand that I will be financially responsible for any charges incurred as well. c.   I understand that if the urine toxicology does not show my medications prescribed to me by the designated provider, or it shows any illegal substance or any other medications NOT prescribed by the designated providers, I may be discharged from this practice at the discretion of the designated provider and no further controlled medications will be prescribed or follow-up appointments scheduled. Also, if any illegal substances are detected on the urine toxicology, this information may be provided to local law enforcement. 7. PILL COUNTS 
a. I am aware I may be called at any time to come into the office for a count of all my remaining controlled medications in order to help the designated provider understand the rate at which I use my controlled medications and to more effectively adjust dosage. b. I agree that I will use my medication at a rate NO greater than the prescribed rate and that use of my medication at a greater rate will result in my being without medication for the period of time until next expected due date. c. I will bring in the containers with the medication prescribed by all providers, including the designated provider, to each office visit even if there is no medication remaining. All controlled medication will be in the original containers from the pharmacy for each medication. Failure to do so will be considered non-compliance and I may be discharged from the practice. 8.  LOSS OR THEFT OF MEDICATION 
a. I will safeguard my controlled medication from loss or theft. b.   Lost or stolen controlled medication may not be replaced. This includes a prescription that has not yet been filled at the pharmacy. c.   In the event my controlled medications are stolen or lost, I will notify the designated providers office immediately.   If such event occurs during the night, weekend or holiday, I will leave a detailed message on the answering machine or answering service at the number listed above. 
d.   I will file and produce an official police report for any effort to replace controlled medications prescribed. 9.  AGENCY COLLABORATION I authorize the designated provider and the authorized pharmacy/pharmacist to cooperate fully with any city, state, or federal law enforcement agency in the investigation of any possible misuse, sale or other diversion of my controlled medication. 10.  TREATMENT I understand that if I violate any of the conditions, my controlled medication and/or treatment will be terminated. If the violation involves obtaining controlled substances and/or dangerous drugs from another source, the incident may be reported to other medical facilities and authorities, including law enforcement. In this case, the designated provider may taper off the medication over a period of several days, as necessary, to avoid withdrawal symptoms or will suggest alternate treatment facilities. Also, a drug dependence treatment program may be recommended. 11.  AGREEMENT I agree to follow these guidelines that have been fully explained to me. All of my questions and concerns regarding treatment have been adequately answered. A copy of this document has been given to me. I agree to use ______________________________ Authorized Pharmacy located at _________________________________________________________________ Telephone ________________________________for filling ALL controlled medication prescriptions. This agreement is entered into on 5/16/2017 Patient signature__________________________________________________________ Legal Guardian signature___________________________________________________ Provider signature_________________________________________________________ Witness signature_________________________________________________________

## 2017-05-16 NOTE — MR AVS SNAPSHOT
Visit Information Date & Time Provider Department Dept. Phone Encounter #  
 5/16/2017 11:45 AM Josse Hutson, [address] [place] [address],[address] [phone] [id number] Follow-up Instructions Return for follow up as scheduled. .  
  
Your Appointments 7/19/2017 10:00 AM  
ROUTINE CARE with Josse Hutson MD  
[facility] [address]) Appt Note: 3 month follow up  
 [address] [facility] [address] [address]  
[address] [facility] [address] [place] [address] Upcoming Health Maintenance Date Due DTaP/Tdap/Td series (1 - Tdap) 6/1/1983 FOBT Q 1 YEAR AGE 50-75 6/1/2012 PAP AKA CERVICAL CYTOLOGY 3/28/2015 BREAST CANCER SCRN MAMMOGRAM 4/9/2017 INFLUENZA AGE 9 TO ADULT 8/1/2017 Allergies as of 5/16/2017  Review Complete On: 5/16/2017 By: Josse Hutson MD  
  
 Severity Noted Reaction Type Reactions Penicillins  04/25/2012   Systemic Hives Pt states I am not allergic to penicillin Percocet [Oxycodone-acetaminophen]  11/23/2012    Itching Sulfa (Sulfonamide Antibiotics)  04/25/2012   Systemic Other (comments) Mouth blistering Current Immunizations  Never Reviewed No immunizations on file. Not reviewed this visit You Were Diagnosed With   
  
 Codes Comments Chest wall pain, chronic    -  Primary ICD-10-CM: R07.89, G89.29 ICD-9-CM: 786.52, 338.29 Lymphedema     ICD-10-CM: I89.0 ICD-9-CM: 391.1 History of breast cancer     ICD-10-CM: Z85.3 ICD-9-CM: V10.3 Chronic narcotic use     ICD-10-CM: F11.90 ICD-9-CM: 305.50 Vitals BP Pulse Temp Height(growth percentile) Weight(growth percentile) BMI  
 120/79 (BP 1 Location: Left arm, BP Patient Position: Sitting) 83 98 °F (36.7 °C) (Oral) 5' 7\" (1.702 m) 207 lb (93.9 kg) 32.42 kg/m2 OB Status Smoking Status Hysterectomy Former Smoker Vitals History BMI and BSA Data Body Mass Index Body Surface Area  
 32.42 kg/m 2 2.11 m 2 Preferred Pharmacy Pharmacy Name Phone [address] [facility]  [place] [phone] Your Updated Medication List  
  
   
This list is accurate as of: 5/16/17  1:01 PM.  Always use your most recent med list.  
  
  
  
  
 atorvastatin 20 mg tablet Commonly known as:  LIPITOR Take 1 Tab by mouth daily. ergocalciferol 50,000 unit capsule Commonly known as:  ERGOCALCIFEROL Take one capsule twice a week  
  
 fluticasone 50 mcg/actuation nasal spray Commonly known as:  Layne Bough 2 Sprays by Both Nostrils route daily. glucose blood VI test strips strip Commonly known as:  Ascensia CONTOUR Test your blood sugar 3-4 times weekly, before breakfast.  
  
 HYDROcodone-acetaminophen 5-325 mg per tablet Commonly known as:  Sin Fryer Take 1-2 tablet every 6 hours as needed for chest wall pain IBUPROFEN PO Take  by mouth. Lancets Misc Commonly known as: One Touch Jannette Broach Check your blood sugar 3-4 times weekly  
  
 levothyroxine 125 mcg tablet Commonly known as:  SYNTHROID Take 2 Tabs by mouth Daily (before breakfast). For thyroid replacement. meclizine 25 mg tablet Commonly known as:  ANTIVERT Take 1/2-1 tablets three times a day as needed for vertigo. May cause sedation. Prescriptions Printed Refills HYDROcodone-acetaminophen (NORCO) 5-325 mg per tablet 0 Sig: Take 1-2 tablet every 6 hours as needed for chest wall pain  
 Class: Print We Performed the Following [address] [place] [address] Street MONITORING [[id number] Custom] Follow-up Instructions Return for follow up as scheduled. Salbador Weeks To-Do List   
 05/16/2017 Imaging:  US CHEST Introducing hospitals & HEALTH SERVICES!    
 [facility] [employer] introduces [employer] patient portal. Now you can access parts of your medical record, email your doctor's office, and request medication refills online. 1. In your internet browser, go to [email or web address] 2. Click on the First Time User? Click Here link in the Sign In box. You will see the New Member Sign Up page. 3. Enter your [email or web address] Access Code exactly as it appears below. You will not need to use this code after youve completed the sign-up process. If you do not sign up before the expiration date, you must request a new code. · [email or web address] Access Code: [email or web address] Expires: 5/22/2017 12:58 PM 
 
4. Enter the last four digits of your Social Security Number (xxxx) and Date of Birth (mm/dd/yyyy) as indicated and click Submit. You will be taken to the next sign-up page. 5. Create a [email or web address] ID. This will be your [email or web address] login ID and cannot be changed, so think of one that is secure and easy to remember. 6. Create a [email or web address] password. You can change your password at any time. 7. Enter your Password Reset Question and Answer. This can be used at a later time if you forget your password. 8. Enter your e-mail address. You will receive e-mail notification when new information is available in [address] 9. Click Sign Up. You can now view and download portions of your medical record. 10. Click the Download Summary menu link to download a portable copy of your medical information. If you have questions, please visit the Frequently Asked Questions section of the [email or web address] website. Remember, [email or web address] is NOT to be used for urgent needs. For medical emergencies, dial 911. Now available from your iPhone and Android! Please provide this summary of care documentation to your next provider. Your primary care clinician is listed as Sonny Tidwell. If you have any questions after today's visit, please call [phone].

## 2017-05-19 ENCOUNTER — HOSPITAL ENCOUNTER (OUTPATIENT)
Dept: ULTRASOUND IMAGING | Age: 57
Discharge: HOME | End: 2017-05-19
Attending: FAMILY MEDICINE
Payer: MEDICAID

## 2017-05-19 DIAGNOSIS — Z85.3 HISTORY OF BREAST CANCER: ICD-10-CM

## 2017-05-19 PROCEDURE — 76604 US EXAM CHEST: CPT

## 2017-05-20 ENCOUNTER — TELEPHONE (OUTPATIENT)
Dept: INTERNAL MEDICINE CLINIC | Age: 57
End: 2017-05-20

## 2017-05-20 NOTE — TELEPHONE ENCOUNTER
Advise patient that the ultrasound did not show a definite area of concern at the spot of her pain. I think she needs to go see her surgeon again about the continued chest wall pain before any further testing is done.

## 2017-05-22 NOTE — TELEPHONE ENCOUNTER
Informed [facility] radiology we are in receipt of the report, however, have been unable to reach patient.

## 2017-05-23 LAB — DRUGS UR: NORMAL

## 2017-05-24 ENCOUNTER — TELEPHONE (OUTPATIENT)
Dept: INTERNAL MEDICINE CLINIC | Age: 57
End: 2017-05-24

## 2017-05-24 NOTE — PROGRESS NOTES
Advise patient that she did not pass the drug test.  She is prescribed hydrocodone, but no hydrocodone was found in her system. I cannot prescribe narcotics for her pain. I can give her an alternative medication for pain.

## 2017-05-24 NOTE — TELEPHONE ENCOUNTER
Returned call to patient. Will have Dr. Edward De Leon review report and advise. Need copies of ultrasound report sent to Dr. Chris Akbar at [facility]. She has an upcoming appointment there.

## 2017-05-25 NOTE — TELEPHONE ENCOUNTER
MD Adela García LPN        Caller: Unspecified Kaylene Dodge, 10:36 AM)                     Reviewed on May 20. william tried to reach her already. Note   Advise patient that the ultrasound did not show a definite area of concern at the spot of her pain. I think she needs to go see her surgeon again about the continued chest wall pain before any further testing is done. Also, she failed drug test.  No hydrocodone in her system despite stating that she is taking it BID.  I can no longer rx narcotics for her.  Will rx a different medication for pain.

## 2017-05-31 ENCOUNTER — TELEPHONE (OUTPATIENT)
Dept: INTERNAL MEDICINE CLINIC | Age: 57
End: 2017-05-31

## 2017-05-31 RX ORDER — NABUMETONE 750 MG/1
750 TABLET, FILM COATED ORAL
Qty: 60 TAB | Refills: 2 | Status: SHIPPED | OUTPATIENT
Start: 2017-05-31 | End: 2018-10-25

## 2017-05-31 NOTE — TELEPHONE ENCOUNTER
Spoke with patient and notified of results. She states She was not taking them every day at the time of testing. States she was taking 1/2 to 1 tab every few days as needed. States she is taking more regularly now since pain has gotten worse. Explained to patient would still show up in urine testing even at that frequency, and that per Dr. Kody Perez she would not be prescribing any more narcotics for her pain. She has appointment to see surgeon today for continued chest wall pain despite normal ultrasound. Patient is requesting new rx for pain. Request sent to Dr. Kody Perez through result note.

## 2017-05-31 NOTE — PROGRESS NOTES
Spoke with patient and notified of results. She states She was not taking them every day at the time of testing. States she was taking 1/2 to 1 tab every few days as needed. States she is taking more regularly now since pain has gotten worse. Explained to patient would still show up in urine testing even at that frequency, and that per Dr. Gunnar Alston she would not be prescribing any more narcotics for her pain. She has appointment to see surgeon today for continued chest wall pain despite normal ultrasound. Patient is requesting new rx for pain. Please advise.

## 2017-12-11 ENCOUNTER — DOCUMENTATION ONLY (OUTPATIENT)
Dept: INTERNAL MEDICINE CLINIC | Age: 57
End: 2017-12-11

## 2017-12-11 NOTE — PROGRESS NOTES
Ms. Junaid Mistry dropped off Medicaid Redetermination form for Dr. Darvin Macario. Turnaround time and potential fee was discussed. Form was placed in Dr. Fransisco Suarez box.

## 2017-12-12 ENCOUNTER — TELEPHONE (OUTPATIENT)
Dept: INTERNAL MEDICINE CLINIC | Age: 57
End: 2017-12-12

## 2017-12-12 NOTE — TELEPHONE ENCOUNTER
Patient state she needs a call back to get status of forms dropped off as patient needs these completed for her to get insurance/Medicaid as she has none right now & needs to see her cancer doctors & other physicians. Please call to advise on status.  Thank you

## 2017-12-12 NOTE — TELEPHONE ENCOUNTER
Patient would like a return call about papers she dropped off in the office. Her number is [phone].         Message received & copied from [facility]

## 2017-12-12 NOTE — TELEPHONE ENCOUNTER
Called patient. Two patient identifiers verified. Advised forms completed and faxed today. Patient verbalized understanding.

## 2018-02-09 ENCOUNTER — NURSE NAVIGATOR (OUTPATIENT)
Dept: ONCOLOGY | Age: 58
End: 2018-02-09

## 2018-02-09 ENCOUNTER — OFFICE VISIT (OUTPATIENT)
Dept: ONCOLOGY | Age: 58
End: 2018-02-09

## 2018-02-09 ENCOUNTER — DOCUMENTATION ONLY (OUTPATIENT)
Dept: ONCOLOGY | Age: 58
End: 2018-02-09

## 2018-02-09 VITALS
HEIGHT: 67 IN | BODY MASS INDEX: 33.27 KG/M2 | WEIGHT: 212 LBS | RESPIRATION RATE: 20 BRPM | DIASTOLIC BLOOD PRESSURE: 83 MMHG | HEART RATE: 71 BPM | SYSTOLIC BLOOD PRESSURE: 125 MMHG | OXYGEN SATURATION: 98 % | TEMPERATURE: 97.9 F

## 2018-02-09 DIAGNOSIS — E03.4 HYPOTHYROIDISM DUE TO ACQUIRED ATROPHY OF THYROID: ICD-10-CM

## 2018-02-09 DIAGNOSIS — R07.89 CHEST WALL PAIN, CHRONIC: ICD-10-CM

## 2018-02-09 DIAGNOSIS — G89.29 CHEST WALL PAIN, CHRONIC: ICD-10-CM

## 2018-02-09 DIAGNOSIS — Z85.3 HISTORY OF BREAST CANCER: Primary | ICD-10-CM

## 2018-02-09 DIAGNOSIS — G89.18 CHEST WALL PAIN FOLLOWING SURGERY: ICD-10-CM

## 2018-02-09 DIAGNOSIS — R07.89 CHEST WALL PAIN FOLLOWING SURGERY: ICD-10-CM

## 2018-02-09 DIAGNOSIS — Z90.13 H/O MASTECTOMY, BILATERAL: ICD-10-CM

## 2018-02-09 RX ORDER — GABAPENTIN 300 MG/1
CAPSULE ORAL
Refills: 10 | COMMUNITY
Start: 2017-11-27 | End: 2018-09-06 | Stop reason: SDUPTHER

## 2018-02-09 RX ORDER — KETOTIFEN FUMARATE 0.35 MG/ML
SOLUTION/ DROPS OPHTHALMIC
Refills: 4 | COMMUNITY
Start: 2017-11-15 | End: 2018-09-06

## 2018-02-09 NOTE — PROGRESS NOTES
ONCOLOGY NURSE NAVIGATOR    Appreciate referral from Dr. Allen Beasley to see patient today  Bárbara Navas is here, referred by Dr. Opal Pizano, to establish care with Dr. Allen Beasley  She has been established for breast cancer care with [facility]/Dr. Karolina Varghese and Dr. Opal Pizano (surgery). Has received RT at [facility] (Dr. Serena Fermin) -- her past providers are no longer in network with her insurance    Bárbara Navas is 57 yo presenting with chest wall discomfort and right arm edema  She tells me she had right breast cancer first in 2012 -- had lumpectomy and RT. Cancer recurred locally in right breast in 2015 -- she then had bilateral mastectomies without reconstruction. She took hormonal therapy for a short period but is not taking it now. Did not tolerate it very well. Describes tingling and burning chest wall pain over both mastectomy sites and into axillae -- pain is greater on right side. She's had chronic fluid collection/seroma and tells me she needs frequent draining to relieve pain -- says she has seen Dr. Opal Pizano as frequently as weekly at times for palliative draining. Fluid collection is worse on right side. Dr. Cassi Stafford prescribed gabapentin 300 mg three times daily. Bárbara Navas only takes it at [facility]. Has never tried to take it as directed. Worries about being drowsy at work. She says gabapentin does not help much but it helps some. She has refills through 6/2018. She also uses heat and cold compresses, sometimes wearing a compress during hours she works. She says compression helps the pain a bit and wears a tight camisole. She is not wearing or using prostheses. Both hands and arms are swollen -- edema is greater in right hand and arm. She has full ROM in arms and hands bilat. She has seen lymphedema therapy at [address] [place] [address]. Now not sure where she can be seen due to insurance. Says Lymphotouch did not help. Insurance refused to cover compression vest garment. Bárbara Navas is single.   No children. Lives alone. Mother  one year ago. Sister in [address] has just completed chemo and RT for breast cancer and is doing well. She is tearful about her mother's death and sister's cancer. Says she keeps her feelings bottled up and tries to be strong. She works at [employer] in [address]. A van service provides transportation to her appts. She does not have a . She is in contact with her [facility] care coordinator, Olga Jerniganr, who helped her get appt with Dr. Cristóbal Snyder. She called Shant Lopez while in the office, but did not make a connection. Dr. Cristóbal Snyder recommends:  1. Referral to DR ANSHUL AGUILAR [facility] surgeon for eval and to establish care  2. RTC in one year for surveillance or sooner as needed  3. See PCP to manage analgesics and consider seeking pain mgt through pain clinic/provider (referral placed)    ONN actions/plan:    Lengthy meeting with [facility], 60 minutes, following appt with Dr. Cristóbal Snyder. Reviewed Dr. Grecia Daily recommendations. Acknowledged Alyson's pain and normalized her tears and frustration about changing providers and her pain -- she had hoped she could have fluid collection drained today. Pain impacts her ability to work at [employer]. Described ONN role  Barriers to care involve access to care impacted by new insurance -- she's overwhelmed by seeking new providers and is in pain    We agreed on the following and she received print instructions on her AVS:      1. [facility] will call her [facility] Case Coordinator, Olga Hunter, to discuss the following:  Tell Jennifer you have seen Dr. Cristóbal Snyder today. She is referring you to see a surgeon at [facility]. Seeing a pain specialist might be helpful. Will your insurance cover this? If so, who is in network? Can you continue to see your lymphedema therapist?  If not, who is in network? Would Jennifer be open to talking to me with your permission? We might have conference calls as needed?     2.   Take gabapentin as directed -- three times a day. Try to take it more than once a day as you are doing now. Let's see if this will help your pain. Continue to do the other things that are helpful, like heat, or cold packs.     3. Make an appt to see your PCP, Dr. Odalys Cintron. I can be in touch with her office nurse navigator and we can work together to help you. 4.  We toured [facility] and discussed integrative supports -- made appt for relaxation/meditation therapy on 2/15 as adjunct to manage pain. She could also benefit from counseling and perhaps, massage. Rec she consider how bereavement counseling might help her cope with grief and loss -- can refer to [facility] as desired.       5.  Mary Mullins is encouraged to set up a My Chart portal.  She can use her cell phone. 6.  She knows how to contact me. I'll aim to expedite appt with [facility] for eval of pain and fluid collection and discuss with [facility] nursing.     Josephine Orozco MS RN AOCNS

## 2018-02-09 NOTE — PROGRESS NOTES
Ajith Beach is a 56 y.o. female here today for breast cancer. States she has pain across chest, under both armpits (reports swelling). Surgery, double mastectomy was done 2 years ago.

## 2018-02-09 NOTE — PROGRESS NOTES
Pt will need navigator help  ? Insurance issues  ?  Chronic pain issues  Had breast cancer in 2015 and not on any treatment  Unclear why pt is transferring care here from [facility]

## 2018-02-09 NOTE — MR AVS SNAPSHOT
[address] [address] [address] [place] [address] 
[phone] Patient: Temitope Davis MRN: [id number] GRW:[birth year] Visit Information Date & Time Provider Department Dept. Phone Encounter #  
 2/9/2018 11:00 AM Daniel [address]  Oncology at [address] [id number] Follow-up Instructions Return in about 1 year (around 2/9/2019). Routing History Follow-up and Disposition History Upcoming Health Maintenance Date Due Pneumococcal 19-64 Highest Risk (1 of 3 - PCV13) 6/1/1981 DTaP/Tdap/Td series (1 - Tdap) 6/1/1983 FOBT Q 1 YEAR AGE 50-75 6/1/2012 PAP AKA CERVICAL CYTOLOGY 3/28/2015 BREAST CANCER SCRN MAMMOGRAM 4/9/2017 Influenza Age 5 to Adult 8/1/2017 Allergies as of 2/9/2018  Review Complete On: 2/9/2018 By: Bobby Valera DO Severity Noted Reaction Type Reactions Penicillins  04/25/2012   Systemic Hives Pt states I am not allergic to penicillin Percocet [Oxycodone-acetaminophen]  11/23/2012    Itching Sulfa (Sulfonamide Antibiotics)  04/25/2012   Systemic Other (comments) Mouth blistering Current Immunizations  Reviewed on 2/9/2018 No immunizations on file. Reviewed by Godfrey Gaucher, LPN on 5/1/6130 at  3:45 AM  
 Reviewed by Godfrey Gaucher, LPN on 2/6/2511 at  6:01 AM  
You Were Diagnosed With   
  
 Codes Comments History of breast cancer    -  Primary ICD-10-CM: Z85.3 ICD-9-CM: V10.3 Chest wall pain following surgery     ICD-10-CM: R07.89, G89.18 
ICD-9-CM: 786.59, 338.18 Chest wall pain, chronic     ICD-10-CM: R07.89, G89.29 ICD-9-CM: 786.52, 338.29   
 H/O mastectomy, bilateral     ICD-10-CM: Z90.13 ICD-9-CM: V45.71 Hypothyroidism due to acquired atrophy of thyroid     ICD-10-CM: E03.4 ICD-9-CM: 244.8, 246.8 Vitals BP Pulse Temp Resp Height(growth percentile) Weight(growth percentile) 125/83 71 97.9 °F (36.6 °C) (Oral) 20 5' 7\" (1.702 m) 212 lb (96.2 kg) SpO2 BMI OB Status Smoking Status 98% 33.2 kg/m2 Hysterectomy Former Smoker Vitals History BMI and BSA Data Body Mass Index Body Surface Area  
 33.2 kg/m 2 2.13 m 2 Preferred Pharmacy Pharmacy Name Phone [facility] [address] [facility], [address] [place] [address] Your Updated Medication List  
  
   
This list is accurate as of: 2/9/18 12:07 PM.  Always use your most recent med list.  
  
  
  
  
 atorvastatin 20 mg tablet Commonly known as:  LIPITOR Take 1 Tab by mouth daily. ergocalciferol 50,000 unit capsule Commonly known as:  ERGOCALCIFEROL Take one capsule twice a week  
  
 fluticasone 50 mcg/actuation nasal spray Commonly known as:  Cristina Golds 2 Sprays by Both Nostrils route daily. gabapentin 300 mg capsule Commonly known as:  NEURONTIN  
TAKE 1 C PO   TID  
  
 glucose blood VI test strips strip Commonly known as:  Ascensia CONTOUR Test your blood sugar 3-4 times weekly, before breakfast.  
  
 ketotifen 0.025 % (0.035 %) ophthalmic solution Commonly known as:  ZADITOR PLACE 1 DROP OU BID Lancets Misc Commonly known as: One Touch Sheryl Benes Check your blood sugar 3-4 times weekly  
  
 levothyroxine 125 mcg tablet Commonly known as:  SYNTHROID Take 2 Tabs by mouth Daily (before breakfast). For thyroid replacement. meclizine 25 mg tablet Commonly known as:  ANTIVERT Take 1/2-1 tablets three times a day as needed for vertigo. May cause sedation. nabumetone 750 mg tablet Commonly known as:  RELAFEN Take 1 Tab by mouth two (2) times daily as needed for Pain. We Performed the Following REFERRAL TO BREAST SURGERY [REF10 Custom] Comments:  
 Please evaluate patient for prior reyes surgery pt / b/l mast 2015 with fluid? ?  Chronic chest wall pain/ [facility] pt  
 Insurance issues and now coming here/ likely needs pain clinic ?? REFERRAL TO ONCOLOGY NURSE NAVIGATOR [[id number] Custom] Comments:  
 Please evaluate patient for hx of breast cancer/ chronic pain/ ? Insurance issues and came here REFERRAL TO PAIN CLINIC [USN36 Custom] Comments:  
 Breast cancer b/l mast / chronic chest wall pain Follow-up Instructions Return in about 1 year (around 2019). Referral Information Referral ID Referred By Referred To  
  
 [id number] Amari Lopez Not Available Visits Status Start Date End Date 1 New Request 18 If your referral has a status of pending review or denied, additional information will be sent to support the outcome of this decision. Referral ID Referred By Referred To  
 [id number] Amari Lopez Not Available Visits Status Start Date End Date 1 New Request 18 If your referral has a status of pending review or denied, additional information will be sent to support the outcome of this decision. Referral ID Referred By Referred To  
 [id number] Amari Lopez Not Available Visits Status Start Date End Date 1 New Request 18 If your referral has a status of pending review or denied, additional information will be sent to support the outcome of this decision. Patient Instructions Kristian Weber, It was good to meet with you today. We agreed to do the followin. You will call your [address] Case Coordinator, Gabe Oliveira. Tell Jennifer you have seen Dr. Ilsa Perez today. She is referring you to see a surgeon at [facility]. Seeing a pain specialist might be helpful. Will your insurance cover this? If so, who is in network? Can you continue to see your lymphedema therapist?  If not, who is in network? Would Jennifer be open to talking to me with your permission? We might have conference calls as needed? 2.  Take gabapentin as directed -- three times a day. Try to take it more than once a day as you are doing now. Let's see if this will help your pain. Continue to do the other things that are helpful, like heat, or cold packs. 3.  Make an appt to see your PCP, Dr. Walter Stanley. I can be in touch with her office nurse navigator and we can work together to help you. Call me anytime. You can also reach me and your [facility] providers through My Chart. I encourage you to set up a portal. 
 
Best, Azalea Stover Givens Job Alvarez 87 RN Mert Garcia 
[phone] Introducing [facility] & HEALTH SERVICES! [facility] introduces TapCanvas patient portal. Now you can access parts of your medical record, email your doctor's office, and request medication refills online. 1. In your internet browser, go to https://Access Media 3. Erly/Permabit Technologyt 2. Click on the First Time User? Click Here link in the Sign In box. You will see the New Member Sign Up page. 3. Enter your TapCanvas Access Code exactly as it appears below. You will not need to use this code after youve completed the sign-up process. If you do not sign up before the expiration date, you must request a new code. · TapCanvas Access Code: [email or web address] Expires: 5/10/2018 12:07 PM 
 
4. Enter the last four digits of your Social Security Number (xxxx) and Date of Birth (mm/dd/yyyy) as indicated and click Submit. You will be taken to the next sign-up page. 5. Create a IDxt ID. This will be your IDxt login ID and cannot be changed, so think of one that is secure and easy to remember. 6. Create a TapCanvas password. You can change your password at any time. 7. Enter your Password Reset Question and Answer. This can be used at a later time if you forget your password. 8. Enter your e-mail address. You will receive e-mail notification when new information is available in [address] 9. Click Sign Up. You can now view and download portions of your medical record. 10. Click the Download Summary menu link to download a portable copy of your medical information. If you have questions, please visit the Frequently Asked Questions section of the [employer] website. Remember, [employer] is NOT to be used for urgent needs. For medical emergencies, dial 911. Now available from your iPhone and Android! Please provide this summary of care documentation to your next provider. Your primary care clinician is listed as Gia Balderas. If you have any questions after today's visit, please call [phone].

## 2018-02-09 NOTE — PROGRESS NOTES
[facility] [place] [address] [address], [address], [place]: [phone]  F: [phone]    Reason for Visit:   Janes Wade is a 56 y.o. female who is seen in consultation at the request of Dr. Jerome Wynn for evaluation of history of breast cancer. Treatment History:   [facility] pt  Hx of b/l mastectomy 2015  Adjuvant hormonal therapy stopped due to intolerance. No chemo. STAGE:  T2N0 ER+ HER2 negative/ oncotype low risk x 2    History of Present Illness:   Pt seen today in office consult for history of breast cancer in 2012 and 2015 and not on any therapy for breast cancer due to intolerance of adjuvant hormonal therapy. Last seen by [facility] 6/17 and was on yearly visits with med/onc. Pt c/o chest wall pain chronic. CT chest 2012 negative. Hx of \"fluid drawn off by surgery last 10/17\". Needs a new surgeon now as surgery was following her chronically. Pt has been on different meds for chronic pain and states she needs help with this. Has ? Not seen pain clinic. Pt sees PCP but PCP has stopped giving pt pain meds. Pt is here alone today. Has not had any cancer recurrence. Labs per PCP.        Past Medical History:   Diagnosis Date    Cancer [facility])     breast cancer    H/O total mastectomy 11/30/2015    Other ill-defined conditions(799.89)     completed radiation 9/12    Thyroid disease     hypothyroidism      Past Surgical History:   Procedure Laterality Date    HX GYN      hysterectomy; TBL    HX HEENT      tonsillectomy    HX ORTHOPAEDIC      right knee ACL    HX OTHER SURGICAL      lumptectomy right breast      Social History   Substance Use Topics    Smoking status: Former Smoker     Packs/day: 0.50     Quit date: 1/28/2015    Smokeless tobacco: Never Used    Alcohol use Yes      Comment: occasional      Family History   Problem Relation Age of Onset    No Known Problems Mother     No Known Problems Father     No Known Problems Sister    Clinton Porter No Known Problems Brother     Thyroid Disease Maternal Grandmother     MS Sister      Current Outpatient Prescriptions   Medication Sig    gabapentin (NEURONTIN) 300 mg capsule TAKE 1 C PO   TID    ketotifen (ZADITOR) 0.025 % (0.035 %) ophthalmic solution PLACE 1 DROP OU BID    levothyroxine (SYNTHROID) 125 mcg tablet Take 2 Tabs by mouth Daily (before breakfast). For thyroid replacement.  meclizine (ANTIVERT) 25 mg tablet Take 1/2-1 tablets three times a day as needed for vertigo. May cause sedation.  atorvastatin (LIPITOR) 20 mg tablet Take 1 Tab by mouth daily.  ergocalciferol (ERGOCALCIFEROL) 50,000 unit capsule Take one capsule twice a week    fluticasone (FLONASE) 50 mcg/actuation nasal spray 2 Sprays by Both Nostrils route daily.  glucose blood VI test strips (ASCENSIA CONTOUR) strip Test your blood sugar 3-4 times weekly, before breakfast.    Lancets (ONE TOUCH DELICA) misc Check your blood sugar 3-4 times weekly    nabumetone (RELAFEN) 750 mg tablet Take 1 Tab by mouth two (2) times daily as needed for Pain. No current facility-administered medications for this visit. Allergies   Allergen Reactions    Penicillins Hives     Pt states I am not allergic to penicillin    Percocet [Oxycodone-Acetaminophen] Itching    Sulfa (Sulfonamide Antibiotics) Other (comments)     Mouth blistering        Review of Systems: A complete review of systems was obtained, negative except as described above.     Physical Exam:     Visit Vitals    /83    Pulse 71    Temp 97.9 °F (36.6 °C) (Oral)    Resp 20    Ht 5' 7\" (1.702 m)    Wt 212 lb (96.2 kg)    SpO2 98%    BMI 33.2 kg/m2     ECOG PS: 1  General: No distress  Eyes: PERRLA, anicteric sclerae  HENT: Atraumatic, OP clear  Neck: Supple  Lymphatic: No cervical, supraclavicular  Respiratory: CTAB, normal respiratory effort  CV: Normal rate, regular rhythm, no murmurs, no peripheral edema  GI: Soft, nontender, nondistended  MS: Normal gait and station. Skin: warm, dry  Psych: Alert, oriented, appropriate affect, normal judgment/insight  Breast b/l mastectomy with small amount of fluid at right scar line, very tender to light palpation diffusely    Results:   Per PCP  Lab Results   Component Value Date/Time    WBC 6.2 11/09/2016 10:52 AM    HGB 12.7 11/09/2016 10:52 AM    HCT 40.0 11/09/2016 10:52 AM    PLATELET 429 (H) 10/75/7357 10:52 AM    MCV 90 11/09/2016 10:52 AM    ABS. NEUTROPHILS 4.7 07/16/2015 06:42 PM     Lab Results   Component Value Date/Time    Sodium 141 02/21/2017 11:25 AM    Potassium 4.7 02/21/2017 11:25 AM    Chloride 100 02/21/2017 11:25 AM    CO2 24 02/21/2017 11:25 AM    Glucose 102 (H) 02/21/2017 11:25 AM    BUN 22 02/21/2017 11:25 AM    Creatinine 1.10 (H) 02/21/2017 11:25 AM    GFR est AA 66 02/21/2017 11:25 AM    GFR est non-AA 57 (L) 02/21/2017 11:25 AM    Calcium 9.6 02/21/2017 11:25 AM     Lab Results   Component Value Date/Time    Bilirubin, total 0.4 02/21/2017 11:25 AM    ALT (SGPT) 19 02/21/2017 11:25 AM    AST (SGOT) 31 02/21/2017 11:25 AM    Alk. phosphatase 46 02/21/2017 11:25 AM    Protein, total 7.7 02/21/2017 11:25 AM    Albumin 4.7 02/21/2017 11:25 AM    Globulin 4.0 07/16/2015 06:42 PM         Records reviewed and summarized above. Pathology report(s) reviewed above. Radiology report(s) reviewed above. Assessment/PLAN:       1) History of breast cancer ER+ HER2 negative 2012 and 2015 treated at [address] [place] [address] with hx of b/l mastectomy 2015  oncotype low risk both times. No chemo done. Could not tolerate adjuvant hormonal therapy. Lots of records reviewed today. History reviewed with pt today. Pt is here today due to chronic chest wall pain. Pt was following with surgery and sent here due to insurance change. Pt is not on any adjuvant therapy due to intolerance. Last seen by med/onc 6/17 and is seen yearly. Pt was getting pain meds from PCP but is not now.   Will send pt to surgery here for eval since small amount of fluid on exam today on right mastectomy scar. Pt likely needs a pain clinic eval.  Discussed this in detail today. Not giving pt any pain meds today. Labs and routine care per PCP. 2)chronic chest wall pain since mastectomy. Refer to surgery and pain clinic. 3) transferring care here due to insurance issues. Navigator support today. F/u here in a year or sooner if needed. Pt advised to call anytime with questions. I appreciate the opportunity to participate in [facility] care.     Signed By: Jericho Gautam DO

## 2018-02-09 NOTE — PATIENT INSTRUCTIONS
Mary Mullins,    It was good to meet with you today. We agreed to do the followin. You will call your [facility] Case Coordinator, Katherin Asif. Tell Jennifer you have seen Dr. Ramesh Bradley today. She is referring you to see a surgeon at [facility]. Seeing a pain specialist might be helpful. Will your insurance cover this? If so, who is in network? Can you continue to see your lymphedema therapist?  If not, who is in network? Would Jennifer be open to talking to me with your permission? We might have conference calls as needed? 2. Take gabapentin as directed -- three times a day. Try to take it more than once a day as you are doing now. Let's see if this will help your pain. Continue to do the other things that are helpful, like heat, or cold packs. 3.  Make an appt to see your PCP, Dr. Odalys Cintron. I can be in touch with her office nurse navigator and we can work together to help you. Call me anytime. You can also reach me and your [facility] providers through My Chart.   I encourage you to set up a portal.    Lisette Patel 87 RN AOCNS  [phone]

## 2018-02-12 ENCOUNTER — TELEPHONE (OUTPATIENT)
Dept: ONCOLOGY | Age: 58
End: 2018-02-12

## 2018-02-12 NOTE — TELEPHONE ENCOUNTER
HIPAA verified. RN requested that patient contact insurance customer service # to obtain list of pain clinic providers that participate with insurance due to limited participation. Patient stated that she would contact  2/13/18 and return call to RN. Our staff will facilitate appt and record transfer.

## 2018-02-13 ENCOUNTER — DOCUMENTATION ONLY (OUTPATIENT)
Dept: ONCOLOGY | Age: 58
End: 2018-02-13

## 2018-02-13 NOTE — TELEPHONE ENCOUNTER
Patient called to give the name of a pain management physician that is in network with her insurance. Patient stated Dr. Theresa Sanchez is listed in network. If any questions return patient's call to discuss [phone].   nick

## 2018-02-13 NOTE — TELEPHONE ENCOUNTER
Call to pain management for referral appt. Per office:  notes need to be faxed with referral for review and office will call patient.       Fax:  [phone]

## 2018-02-13 NOTE — PROGRESS NOTES
ONCOLOGY NURSE NAVIGATOR    Returned call to Alyson Baker's care coordinator with [employer]. Caroline Amezquita called me at Alyson's request.    Caroline Amezquita confirms that she's worked with Rhina Mcmullen to identify pain mgt specialist-- Dr. Osman House -- in [facility] confirms that Alyson's PCP and lymphedema therapist are in network    Thanked Caroline Amezquita for her assistance to [address]  1. Will see Rhina Mcmullen on 2/15 -- she is scheduled for relaxation/meditation therapy in [facility] as adjunct to pain mgt  2. Contact [facility] to expedite appt with breast surgeon to establish care  3.   Yadira Briggs to follow up with care with PCP and lymphedema therapy    Daneglo Jimenez MS RN AOCNS

## 2018-02-13 NOTE — TELEPHONE ENCOUNTER
HIPAA verified. Advised that pain management referral info faxed. Patient stated needs to follow with [facility]. Advised would send message to DR ANSHUL AGUILAR [facility] for follow up. To call with any questions.

## 2018-02-15 ENCOUNTER — NURSE NAVIGATOR (OUTPATIENT)
Dept: ONCOLOGY | Age: 58
End: 2018-02-15

## 2018-02-16 NOTE — PROGRESS NOTES
ONCOLOGY NURSE NAVIGATOR    Missael Brownlee had session with relaxation/meditation therapist in [facility] today. Experienced some relief of pain in today's therapy. Verbalizes less stress, feels good about progress establishing care at [facility]. Has identified a pain mgt expert provider in network  -- waiting for scheduled appt. Can access continuing lymphedema care from the therapist she is currently established with. Has appt with Dr. Carolina Jaramillo on 2/19.     Almaz Gomes MS RN AOCNS

## 2018-02-19 ENCOUNTER — TELEPHONE (OUTPATIENT)
Dept: SURGERY | Age: 58
End: 2018-02-19

## 2018-02-19 ENCOUNTER — DOCUMENTATION ONLY (OUTPATIENT)
Dept: SURGERY | Age: 58
End: 2018-02-19

## 2018-02-19 ENCOUNTER — OFFICE VISIT (OUTPATIENT)
Dept: SURGERY | Age: 58
End: 2018-02-19

## 2018-02-19 ENCOUNTER — DOCUMENTATION ONLY (OUTPATIENT)
Dept: ONCOLOGY | Age: 58
End: 2018-02-19

## 2018-02-19 ENCOUNTER — NURSE NAVIGATOR (OUTPATIENT)
Dept: ONCOLOGY | Age: 58
End: 2018-02-19

## 2018-02-19 VITALS
SYSTOLIC BLOOD PRESSURE: 129 MMHG | DIASTOLIC BLOOD PRESSURE: 88 MMHG | WEIGHT: 212 LBS | HEIGHT: 67 IN | HEART RATE: 65 BPM | BODY MASS INDEX: 33.27 KG/M2

## 2018-02-19 DIAGNOSIS — Z85.3 HISTORY OF BREAST CANCER: Primary | ICD-10-CM

## 2018-02-19 DIAGNOSIS — R07.89 CHEST WALL PAIN, CHRONIC: ICD-10-CM

## 2018-02-19 DIAGNOSIS — R07.89 CHEST WALL PAIN FOLLOWING SURGERY: ICD-10-CM

## 2018-02-19 DIAGNOSIS — Z90.13 H/O MASTECTOMY, BILATERAL: ICD-10-CM

## 2018-02-19 DIAGNOSIS — G89.29 CHEST WALL PAIN, CHRONIC: ICD-10-CM

## 2018-02-19 DIAGNOSIS — G89.18 CHEST WALL PAIN FOLLOWING SURGERY: ICD-10-CM

## 2018-02-19 DIAGNOSIS — I89.0 LYMPHEDEMA: ICD-10-CM

## 2018-02-19 DIAGNOSIS — Z90.13 H/O MASTECTOMY, BILATERAL: Primary | ICD-10-CM

## 2018-02-19 NOTE — PROGRESS NOTES
The patient's office note from today was faxed to the pain specialist Dr. Elizabeth Bergman. His fax number is [phone] and confirmation was received.

## 2018-02-19 NOTE — PATIENT INSTRUCTIONS
Mastectomy: What to Expect at [facility]  Right after the surgery you will probably feel weak, and you may feel sore for 2 to 3 days. You may have a pulling or stretching sensation near or under your arm. You may also have itching, tingling, and throbbing in the area. This will get better in a few days. You will probably have a plastic or rubber tube, called a drain, to collect fluid from under the affected arm on the side of the surgery. Your doctor will remove this when the fluid buildup slows. This can be in a few days or even several weeks. You may be able to go back to your normal routine or return to work in several weeks, but it may take longer. How long it takes you to recover will depend on the type of surgery you had. It also depends on whether you had breast reconstruction at the same time, or if you need other treatment. Your doctor or nurse will be able to give you an idea of what you can expect. When you find out that you have cancer, you may feel many emotions and may need some help coping. This is common. Seek out family, friends, and counselors for support. You also can do things at home to make yourself feel better while you go through treatment. Call the [employer] (6-931.906.1050) or visit its website at [address] for more information. This care sheet gives you a general idea about how long it will take for you to recover. But each person recovers at a different pace. Follow the steps below to get better as quickly as possible. How can you care for yourself at home? Activity  ? · Rest when you feel tired. Getting enough sleep will help you recover. After any activity, rest and raise your affected arm for a period of time equal to your activity time. ? · Try to walk each day. Start by walking a little more than you did the day before. Bit by bit, increase the amount you walk. Walking boosts blood flow and helps prevent pneumonia and constipation.    ? · Avoid strenuous activities, such as biking, jogging, weightlifting, or aerobic exercise, until your doctor says it is okay. This includes housework, especially if you have to use your affected arm. You will probably be able to do your normal activities in 3 to 6 weeks. Avoid repeated motions with your affected arm, such as weed pulling, window cleaning, or vacuuming, for 6 months. ? · Avoid lifting anything over 10 to 15 pounds for 4 to 6 weeks. This may include a child, grocery bags, a heavy briefcase or backpack, cat litter or dog food bags, or a vacuum . ? · Ask your doctor when you can drive again. ? · You will probably be able to go back to work or your normal routine in 3 to 6 weeks. This depends on the type of work you do and any further treatment. ? · Your doctor will let you know how soon you can take showers or baths. Diet  ? · You can eat your normal diet. If your stomach is upset, try bland, low-fat foods like plain rice, broiled chicken, toast, and yogurt. ? · Drink plenty of fluids (unless your doctor tells you not to). ? · You may notice that your bowels are not regular right after your surgery. This is common. Try to avoid constipation and straining with bowel movements. Take a fiber supplement such as Citrucel or Metamucil every day. If you have not had a bowel movement after a couple of days, take a mild laxative like Milk of Magnesia or a stool softener like Colace. Medicines  ? · Your doctor will tell you if and when you can restart your medicines. He or she will also give you instructions about taking any new medicines. ? · If you take blood thinners, such as warfarin (Coumadin), clopidogrel (Plavix), or aspirin, be sure to talk to your doctor. He or she will tell you if and when to start taking those medicines again. Make sure that you understand exactly what your doctor wants you to do. ? · Take pain medicines exactly as directed.   ¨ If the doctor gave you a prescription medicine for pain, take it as prescribed. ¨ If you are not taking a prescription pain medicine, ask your doctor if you can take an over-the-counter medicine. ? · If your doctor prescribed antibiotics, take them as directed. Do not stop taking them just because you feel better. You need to take the full course of antibiotics. ? · If you think your pain medicine is making you sick to your stomach:  ¨ Take your medicine after meals (unless your doctor has told you not to). ¨ Ask your doctor for a different pain medicine. Incision care  ? · You will have a dressing over the cut (incision). A dressing helps the incision heal and protects it. Your doctor will tell you how to take care of this. ? · You may be wearing a special bra (surgi-bra) that holds your dressing in place after the surgery. Your doctor will tell you when you can stop wearing the bra. ?Arm exercises  ? · If you had any lymph nodes removed from under your arm, your doctor will advise you to do arm exercises. Do not do the exercises until your doctor says it is okay. Ice and elevation  ? · Do not use ice for swelling or pain. ? · Prop up your arm on a pillow when you sit or lie down. Try to keep your arm above the level of your heart. This will help reduce swelling. Other instructions  ? · You may have one or more drains in your surgery site. Your doctor will tell you how to take care of them. Follow-up care is a key part of your treatment and safety. Be sure to make and go to all appointments, and call your doctor if you are having problems. It's also a good idea to know your test results and keep a list of the medicines you take. When should you call for help? Call 911 anytime you think you may need emergency care. For example, call if:  ? · You passed out (lost consciousness). ? · You have chest pain, are short of breath, or cough up blood.    ?Call your doctor now or seek immediate medical care if:  ? · You are sick to your stomach or cannot drink fluids. ? · You cannot pass stools or gas. ? · You have pain that does not get better after you take your pain medicine. ? · You have loose stitches, or your incision comes open. ? · Bright red blood has soaked through the bandage over your incision. ? · You have signs of a blood clot in your leg (called a deep vein thrombosis), such as:  ¨ Pain in your calf, back of the knee, thigh, or groin. ¨ Redness or swelling in your leg. ? · You have signs of infection, such as:  ¨ Increased pain, swelling, warmth, or redness. ¨ Red streaks leading from the incision. ¨ Pus draining from the incision. ¨ A fever. ? Watch closely for changes in your health, and be sure to contact your doctor if:  ? · You have any problems. ? · You have new or worse swelling or pain in your arm. Where can you learn more? Go to http://brynn-shivani.info/. Enter A267 in the search box to learn more about \"Mastectomy: What to Expect at Home. \"  Current as of: May 12, 2017  Content Version: 11.4  © 5078-0431 [employer]. Care instructions adapted under license by [employer] (which disclaims liability or warranty for this information). If you have questions about a medical condition or this instruction, always ask your healthcare professional. [address] any warranty or liability for your use of this information.

## 2018-02-19 NOTE — PROGRESS NOTES
HISTORY OF PRESENT ILLNESS  Briana Smith is a 56 y.o. female. HPI NEW Patient presents for consultation at the request of Dr. Lucretia Pickett for bilateral chest soreness s/p mastectomies. The patient states that she also feels fluid build up. She has a history of RIGHT breast cancer in 2012. Had a lumpectomy and xrt in 2012. Took antihormonal pill for 3 years. RIGHT breast cancer recurred in 2015. She then had bilateral mastectomies. No reconstruction. Dr. Oneil Philippe was her breast surgeon and Dr. Jessica Schumacher was her med onc. She now has to switch to [facility] for her care due to insurance. She states Dr. Ravi Petersen would aspirate her mastectomy seromas but it didn't really help the pain. Medical records have been scanned into this chart. FH:   - Sister diagnosed with breast cancer at age 54. - Maternal aunt diagnosed with breast cancer in her 57-65's. Past Medical History:   Diagnosis Date    Arthritis     Cancer [facility])     breast cancer    H/O total mastectomy 11/30/2015    Hypercholesterolemia     Other ill-defined conditions(799.89)     completed radiation 9/12    Thyroid disease     hypothyroidism     Past Surgical History:   Procedure Laterality Date    HX BREAST LUMPECTOMY Right 2012    HX GYN      hysterectomy; TBL    HX HEENT      tonsillectomy    HX MASTECTOMY Bilateral 2015    HX ORTHOPAEDIC      right knee ACL    HX OTHER SURGICAL      lumptectomy right breast     Social History     Social History    Marital status: SINGLE     Spouse name: N/A    Number of children: N/A    Years of education: N/A     Occupational History    Not on file.      Social History Main Topics    Smoking status: Former Smoker     Packs/day: 0.50     Quit date: 1/28/2015    Smokeless tobacco: Never Used    Alcohol use Yes      Comment: occasional    Drug use: No    Sexual activity: Not on file     Other Topics Concern    Not on file     Social History Narrative     OB History     Obstetric Comments Menarche:  17. LMP: 30's. # of Children:  0. Age at Delivery of First Child:  n/a. Hysterectomy/oophorectomy:  YES/YES. Breast Bx:  yes. Hx of Breast Feeding:  no. BCP:  no. Hormone therapy:  no.               Current Outpatient Prescriptions:     gabapentin (NEURONTIN) 300 mg capsule, TAKE 1 C PO   TID, Disp: , Rfl: 10    ketotifen (ZADITOR) 0.025 % (0.035 %) ophthalmic solution, PLACE 1 DROP OU BID, Disp: , Rfl: 4    levothyroxine (SYNTHROID) 125 mcg tablet, Take 2 Tabs by mouth Daily (before breakfast). For thyroid replacement. , Disp: 180 Tab, Rfl: 3    meclizine (ANTIVERT) 25 mg tablet, Take 1/2-1 tablets three times a day as needed for vertigo. May cause sedation. , Disp: 30 Tab, Rfl: 3    atorvastatin (LIPITOR) 20 mg tablet, Take 1 Tab by mouth daily. , Disp: 90 Tab, Rfl: 3    ergocalciferol (ERGOCALCIFEROL) 50,000 unit capsule, Take one capsule twice a week, Disp: 8 Cap, Rfl: 2    fluticasone (FLONASE) 50 mcg/actuation nasal spray, 2 Sprays by Both Nostrils route daily. , Disp: 1 Bottle, Rfl: 2    nabumetone (RELAFEN) 750 mg tablet, Take 1 Tab by mouth two (2) times daily as needed for Pain., Disp: 60 Tab, Rfl: 2  Allergies   Allergen Reactions    Penicillins Hives     Pt states I am not allergic to penicillin    Percocet [Oxycodone-Acetaminophen] Itching    Sulfa (Sulfonamide Antibiotics) Other (comments)     Mouth blistering       Review of Systems   Constitutional: Negative. HENT: Negative. Eyes: Negative. Respiratory: Negative. Cardiovascular: Negative. Gastrointestinal: Negative. Genitourinary: Negative. Musculoskeletal: Negative. Skin: Negative. Neurological: Negative. Endo/Heme/Allergies: Negative. Psychiatric/Behavioral: Negative. Physical Exam   Pulmonary/Chest:       Bilateral mastectomy incisions well healed. No masses. No adenopathy. On us, bilateral small lateral chronic seromas.     Patient tender to deep palpation bilateral lat dorsi muscles, bilateral pec major muscles and ribs. Nursing note and vitals reviewed. ASSESSMENT and PLAN    ICD-10-CM ICD-9-CM    1. History of breast cancer Z85.3 V10.3    2. H/O mastectomy, bilateral Z90.13 V45.71      57 yo female s/p bilateral mastectomies with chronic pain, seromas. Her pain is musculoskeletal and since prior aspirations of these small seromas didn't help her pain before will not aspirate today. I agree with pain consult per Dr. Harris Wu. She also needs pt and lymphedema clinic. I didn't give her any narcotics today. Recommend heat, nsaids. F/u in 6 months.

## 2018-02-19 NOTE — Clinical Note
Agree with pain consult. Will let you guys take care of that. We will take care of pt and lymphedema clinic.

## 2018-02-19 NOTE — TELEPHONE ENCOUNTER
The patient called stating she is getting \"a run around\" from the pain management doctor's office despite 's office faxing them her paperwork so she can setup an appointment. She requested that Dr. Michael Gtz call them as well. I instructed the patient that I would be happy to fax them her office visit note describing her visit today. The patient feels that would be helpful. Dr Michael Gtz made aware of the above.

## 2018-02-19 NOTE — MR AVS SNAPSHOT
[address] [address] [address] [place] [address] 
[phone] Patient: Ceci Nurse MRN: [id number] [facility]:[id number] Visit Information Date & Time Provider Department Dept. Phone Encounter #  
 2/19/2018 10:40 AM Madonna Deluna MD [address] at [address] [place] [address] [id number] Your Appointments 8/20/2018 11:20 AM  
Follow Up with MD [address] at [facility] Appt Note: 6 month follow up Cp$0 2/19/18 TT  
 [address] [place]  
  
   
 [address] Upcoming Health Maintenance Date Due Pneumococcal 19-64 Highest Risk (1 of 3 - PCV13) 6/1/1981 DTaP/Tdap/Td series (1 - Tdap) 6/1/1983 FOBT Q 1 YEAR AGE 50-75 6/1/2012 PAP AKA CERVICAL CYTOLOGY 3/28/2015 BREAST CANCER SCRN MAMMOGRAM 4/9/2017 Influenza Age 5 to Adult 8/1/2017 Allergies as of 2/19/2018  Review Complete On: 2/19/2018 By: Nataly Lopez RN Severity Noted Reaction Type Reactions Penicillins  04/25/2012   Systemic Hives Pt states I am not allergic to penicillin Percocet [Oxycodone-acetaminophen]  11/23/2012    Itching Sulfa (Sulfonamide Antibiotics)  04/25/2012   Systemic Other (comments) Mouth blistering Current Immunizations  Reviewed on 2/9/2018 No immunizations on file. Not reviewed this visit Vitals BP Pulse Height(growth percentile) Weight(growth percentile) BMI OB Status 129/88 65 5' 7\" (1.702 m) 212 lb (96.2 kg) 33.2 kg/m2 Hysterectomy Smoking Status Former Smoker Vitals History BMI and BSA Data Body Mass Index Body Surface Area  
 33.2 kg/m 2 2.13 m 2 Preferred Pharmacy Pharmacy Name Phone [id number] [facility], [address] [place] [address] Your Updated Medication List  
  
   
This list is accurate as of: 2/19/18 10:51 AM.  Always use your most recent med list.  
  
  
  
  
 atorvastatin 20 mg tablet Commonly known as:  LIPITOR Take 1 Tab by mouth daily. ergocalciferol 50,000 unit capsule Commonly known as:  ERGOCALCIFEROL Take one capsule twice a week  
  
 fluticasone 50 mcg/actuation nasal spray Commonly known as:  [place] Pickup 2 Sprays by Both Nostrils route daily. gabapentin 300 mg capsule Commonly known as:  NEURONTIN  
TAKE 1 C PO   TID  
  
 ketotifen 0.025 % (0.035 %) ophthalmic solution Commonly known as:  ZADITOR PLACE 1 DROP OU BID  
  
 levothyroxine 125 mcg tablet Commonly known as:  SYNTHROID Take 2 Tabs by mouth Daily (before breakfast). For thyroid replacement. meclizine 25 mg tablet Commonly known as:  ANTIVERT Take 1/2-1 tablets three times a day as needed for vertigo. May cause sedation. nabumetone 750 mg tablet Commonly known as:  RELAFEN Take 1 Tab by mouth two (2) times daily as needed for Pain. Patient Instructions Mastectomy: What to Expect at [facility] Your Recovery Right after the surgery you will probably feel weak, and you may feel sore for 2 to 3 days. You may have a pulling or stretching sensation near or under your arm. You may also have itching, tingling, and throbbing in the area. This will get better in a few days. You will probably have a plastic or rubber tube, called a drain, to collect fluid from under the affected arm on the side of the surgery. Your doctor will remove this when the fluid buildup slows. This can be in a few days or even several weeks. You may be able to go back to your normal routine or return to work in several weeks, but it may take longer. How long it takes you to recover will depend on the type of surgery you had.  It also depends on whether you had breast reconstruction at the same time, or if you need other treatment. Your doctor or nurse will be able to give you an idea of what you can expect. When you find out that you have cancer, you may feel many emotions and may need some help coping. This is common. Seek out family, friends, and counselors for support. You also can do things at home to make yourself feel better while you go through treatment. Call the [facility] (2-397.282.9785) or visit its website at [address] for more information. This care sheet gives you a general idea about how long it will take for you to recover. But each person recovers at a different pace. Follow the steps below to get better as quickly as possible. How can you care for yourself at home? Activity ? · Rest when you feel tired. Getting enough sleep will help you recover. After any activity, rest and raise your affected arm for a period of time equal to your activity time. ? · Try to walk each day. Start by walking a little more than you did the day before. Bit by bit, increase the amount you walk. Walking boosts blood flow and helps prevent pneumonia and constipation. ? · Avoid strenuous activities, such as biking, jogging, weightlifting, or aerobic exercise, until your doctor says it is okay. This includes housework, especially if you have to use your affected arm. You will probably be able to do your normal activities in 3 to 6 weeks. Avoid repeated motions with your affected arm, such as weed pulling, window cleaning, or vacuuming, for 6 months. ? · Avoid lifting anything over 10 to 15 pounds for 4 to 6 weeks. This may include a child, grocery bags, a heavy briefcase or backpack, cat litter or dog food bags, or a vacuum . ? · Ask your doctor when you can drive again. ? · You will probably be able to go back to work or your normal routine in 3 to 6 weeks. This depends on the type of work you do and any further treatment. ? · Your doctor will let you know how soon you can take showers or baths. Diet 
? · You can eat your normal diet. If your stomach is upset, try bland, low-fat foods like plain rice, broiled chicken, toast, and yogurt. ? · Drink plenty of fluids (unless your doctor tells you not to). ? · You may notice that your bowels are not regular right after your surgery. This is common. Try to avoid constipation and straining with bowel movements. Take a fiber supplement such as Citrucel or Metamucil every day. If you have not had a bowel movement after a couple of days, take a mild laxative like Milk of Magnesia or a stool softener like Colace. Medicines ? · Your doctor will tell you if and when you can restart your medicines. He or she will also give you instructions about taking any new medicines. ? · If you take blood thinners, such as warfarin (Coumadin), clopidogrel (Plavix), or aspirin, be sure to talk to your doctor. He or she will tell you if and when to start taking those medicines again. Make sure that you understand exactly what your doctor wants you to do. ? · Take pain medicines exactly as directed. ¨ If the doctor gave you a prescription medicine for pain, take it as prescribed. ¨ If you are not taking a prescription pain medicine, ask your doctor if you can take an over-the-counter medicine. ? · If your doctor prescribed antibiotics, take them as directed. Do not stop taking them just because you feel better. You need to take the full course of antibiotics. ? · If you think your pain medicine is making you sick to your stomach: 
¨ Take your medicine after meals (unless your doctor has told you not to). ¨ Ask your doctor for a different pain medicine. Incision care ? · You will have a dressing over the cut (incision). A dressing helps the incision heal and protects it. Your doctor will tell you how to take care of this.   
? · You may be wearing a special bra (surgi-bra) that holds your dressing in place after the surgery. Your doctor will tell you when you can stop wearing the bra. ?Arm exercises ? · If you had any lymph nodes removed from under your arm, your doctor will advise you to do arm exercises. Do not do the exercises until your doctor says it is okay. Ice and elevation ? · Do not use ice for swelling or pain. ? · Prop up your arm on a pillow when you sit or lie down. Try to keep your arm above the level of your heart. This will help reduce swelling. Other instructions ? · You may have one or more drains in your surgery site. Your doctor will tell you how to take care of them. Follow-up care is a key part of your treatment and safety. Be sure to make and go to all appointments, and call your doctor if you are having problems. It's also a good idea to know your test results and keep a list of the medicines you take. When should you call for help? Call 911 anytime you think you may need emergency care. For example, call if: 
? · You passed out (lost consciousness). ? · You have chest pain, are short of breath, or cough up blood. ?Call your doctor now or seek immediate medical care if: 
? · You are sick to your stomach or cannot drink fluids. ? · You cannot pass stools or gas. ? · You have pain that does not get better after you take your pain medicine. ? · You have loose stitches, or your incision comes open. ? · Bright red blood has soaked through the bandage over your incision. ? · You have signs of a blood clot in your leg (called a deep vein thrombosis), such as: 
¨ Pain in your calf, back of the knee, thigh, or groin. ¨ Redness or swelling in your leg. ? · You have signs of infection, such as: 
¨ Increased pain, swelling, warmth, or redness. ¨ Red streaks leading from the incision. ¨ Pus draining from the incision. ¨ A fever. ? Watch closely for changes in your health, and be sure to contact your doctor if: 
? · You have any problems. ? · You have new or worse swelling or pain in your arm. Where can you learn more? Go to http://brynn-shivani.info/. Enter D444 in the search box to learn more about \"Mastectomy: What to Expect at Home. \" Current as of: May 12, 2017 Content Version: 11.4 © 7078-0703 [employer]. Care instructions adapted under license by [employer] (which disclaims liability or warranty for this information). If you have questions about a medical condition or this instruction, always ask your healthcare professional. [address] any warranty or liability for your use of this information. Introducing [facility]! [employer] [place] introduces The [employer] patient portal. Now you can access parts of your medical record, email your doctor's office, and request medication refills online. 1. In your internet browser, go to [email or web address] 2. Click on the First Time User? Click Here link in the Sign In box. You will see the New Member Sign Up page. 3. Enter your [employer] Access Code exactly as it appears below. You will not need to use this code after youve completed the sign-up process. If you do not sign up before the expiration date, you must request a new code. · The [employer] Access Code: [email or web address] Expires: 5/10/2018 12:07 PM 
 
4. Enter the last four digits of your Social Security Number (xxxx) and Date of Birth (mm/dd/yyyy) as indicated and click Submit. You will be taken to the next sign-up page. 5. Create a Placestert ID. This will be your The [employer] login ID and cannot be changed, so think of one that is secure and easy to remember. 6. Create a [employer] password. You can change your password at any time. 7. Enter your Password Reset Question and Answer. This can be used at a later time if you forget your password. 8. Enter your e-mail address.  You will receive e-mail notification when new information is available in [employer]ura!. 9. Click Sign Up. You can now view and download portions of your medical record. 10. Click the Download Summary menu link to download a portable copy of your medical information. If you have questions, please visit the Frequently Asked Questions section of the [employer]! website. Remember, [employer]! is NOT to be used for urgent needs. For medical emergencies, dial 911. Now available from your iPhone and Android! Please provide this summary of care documentation to your next provider. Your primary care clinician is listed as [place]. If you have any questions after today's visit, please call [phone].

## 2018-02-19 NOTE — PROGRESS NOTES
Patient to clinic with request for follow up pain management. Advised info faxed complete to Dr. David Neil 2/12/18 and that office was to call for appt after record [phone]. Stated that Dr. Michael Gtz was referring to [facility]. Support given and to call our office if any questions.   Verbalized understanding and thanked for assist.

## 2018-02-19 NOTE — PROGRESS NOTES
ONCOLOGY NURSE NAVIGATOR    Supportive meeting with Rhina Mcmullen today following her appt with Dr. Robert Fierro. She also had a massage today in the [facility]. Rhina Mcmullen states she's feeling good support and attention to her problems from [facility] providers. She felt \"reassurance\" from Dr. Heather Arias exam today --  is eager to continue to work with lymphedema therapy (and is okay with changing to a BS therapist) and glad Dia Delvalle are in agreement about how Rhina Mcmullen might benefit from seeing a pain mgt specialist.   She is waiting for appt with Dr. Osman House and intends to call his office today to expedite scheduling her appt. She tells me she feels her chest wall pain is better managed now that she is taking gabapentin as prescribed -- three times daily. She's not troubled with the grogginess that she had feared. Is able to work 4-5 hours pain free at [employer] after taking gabapentin in early morning. Then takes 2nd tablet in afternoon and 3rd tablet at HS. Has pain between doses but better overall relief over 24hours than before she took all three doses. Has follow up appt with relaxation therapist scheduled and reports she is using his tapes with good effect at home each morning. Is satisfied with plan for follow up with Dia Fierro and Genny Perez. Referral to BS lymphedema clinic placed per Dr. Robert Fierro. Rhina Mcmullen is working with great effectiveness with her [employer] as she transitions care to [facility]. Yadira Briggs to contact me anytime for support or direction to resources.       Dangelo Jimenez MS RN AOCNS

## 2018-03-09 ENCOUNTER — HOSPITAL ENCOUNTER (OUTPATIENT)
Dept: PHYSICAL THERAPY | Age: 58
Discharge: HOME | End: 2018-03-09
Payer: MEDICAID

## 2018-03-09 VITALS — HEIGHT: 67 IN | BODY MASS INDEX: 33.21 KG/M2 | WEIGHT: 211.6 LBS

## 2018-03-09 PROCEDURE — 97110 THERAPEUTIC EXERCISES: CPT

## 2018-03-09 PROCEDURE — 97161 PT EVAL LOW COMPLEX 20 MIN: CPT

## 2018-03-09 PROCEDURE — 97140 MANUAL THERAPY 1/> REGIONS: CPT

## 2018-03-09 NOTE — PROGRESS NOTES
[facility]  Physical Therapy Lymphedema Clinic  [address] [facility], [address] [facility], [facility] [address].    INITIAL EVALUATION    NAME: Avon Brittle AGE: 56 y.o. 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 She reports having bilateral chest and axillary swelling and occasionally proximal R UE swelling for the past 2 years. She has chronic pain and hypersensitivity in the chest region but verbalizes 0/10 pain at rest in the clinic today. The pain is usually aggravated when working at [employer] since she performs repetitive and lifting activities. Lymphedema Life Impact Scale: Score of 41 and impairment percentage of 60%. See scanned document.   Past Medical History:   Past Medical History:   Diagnosis Date    Arthritis     Cancer ([facility] [address]     breast cancer    H/O total mastectomy 11/30/2015    Hypercholesterolemia     Other ill-defined conditions(799.89)     completed radiation 9/12    Thyroid disease     hypothyroidism     Past Surgical History:   Procedure Laterality Date    HX BREAST LUMPECTOMY Right 2012    HX GYN      hysterectomy; TBL    HX HEENT      tonsillectomy    HX MASTECTOMY Bilateral 2015    HX ORTHOPAEDIC      right knee ACL    HX OTHER SURGICAL      lumptectomy right breast     Current Medications:    Current Outpatient Prescriptions   Medication Sig    gabapentin (NEURONTIN) 300 mg capsule TAKE 1 C PO   TID    levothyroxine (SYNTHROID) 125 mcg tablet Take 2 Tabs by mouth Daily (before breakfast). For thyroid replacement.  atorvastatin (LIPITOR) 20 mg tablet Take 1 Tab by mouth daily. No current facility-administered medications for this encounter. Allergies: Allergies   Allergen Reactions    Penicillins Hives     Pt states I am not allergic to penicillin    Percocet [Oxycodone-Acetaminophen] Itching    Sulfa (Sulfonamide Antibiotics) Other (comments)     Mouth blistering      Prior Level of Function/Social/Work History/Personal factors and/or comorbidities impacting plan of care: Patient lives alone in an apartment. She has family in the area, including a father and a sister. She works 40 hours per week at [employer]. She does not have transportation and relies on others for rides to and from work and a transportation service to and from MD appointments. She is independent in the home but has difficulty with vaccuming and cleaning the bath tub. Living Situation: Lives alone in a second floor apartment. Approximately 15 steps with a rail to enter apartment. Trainable Caregiver?: Father and sister in the area. Sister is finishing treatment for breast cancer. Self-care/ADLs: Modified independent with ADL's - increased time for dressing UE. Mobility: Independent in the community. No falls. Sleeping Arrangement:  In a bed in supine or sidelying. Will occasionally prop her R UE on a pillow. Adaptive Equipment Owned: None   Other: N/A  Previous Therapy:  Patient was seen in the Lymphedema Clinic at [facility] - unable to remember the dates. Finished approximately 6 months ago and then went to Aquatic Therapy. Compression/Lymphedema Equipment:  Flexitouch Vasopneumatic Pump, off the shelf compression tank top. SUBJECTIVE:     Patients goals for therapy:  \"Help fluid to flow\"    OBJECTIVE DATA SUMMARY:   EXAMINATION/PRESENTATION/DECISION MAKING:   Pain:  Pain Scale 1: Numeric (0 - 10)     Pain Intensity 1: 0 - reports an increase to 5/10 with shoulder flexion. \"stretching and irritation feeling\"     Pain Location 1: Chest (axillary region)     Pain Orientation 1: Left, Right     Patient Stated Pain Goal: 0       Self-Care and ADLs:  Grooming: Modified independent  Bathing: Modified independent    UB Dressing: Modified independent - increased time. LB Dressing: Modified independent    Don/Doff Shoes/Socks: Modified independent  Toileting: Modified independent    Other: N/A      Skin and Tissue Assessment:  Dermal Status:  [x]   Intact [x]  Dry - bilateral arms   []  Tenuous [] Flaky   []  Wound/lesion present [x]  Scars - bilateral chests. Healed. []  Dermatitis    Texture/Consistency:  [x]  Boggy - bilateral axillary region []  Pitting Edema   []  Brawny []  Combination   []  Fibrotic/Woody    Pigmentation/Color Change:  []  Normal []  Hemosiderin   []  Red []  Erythematous   []  Hyperpigmented []  Hyperlipodermatosclerosis   Anomalies:  []  Lymphorrhea []  Vesicles   []  Petechiae []  Warty Vercusis   []  Bullae []  Papilloma   Circulatory:  []  ALICE []  Varicosities:   []  Pulses []  Vascular studies ruled out DVT in past   []  DVT History    Nails:  [x]   Normal  []   Fungus  Stemmers Sign: negative  Height:  Height: 5' 7\" (170.2 cm)  Weight:  Weight: 96 kg (211 lb 9.6 oz)   BMI:  BMI (calculated): 33.1  (36 or greater: adversely affecting lymphedema)  Wound/Ulcer:  N/A      Wound Pain:   N/A  Volumetric Measurements:   Right:  5963. 16 mL Left:  2879.37 mL   % Difference: -0.95% Dominance: right handed   (See scanned graph)  Range of Motion: within functional limits all extremities. Strength: Within functional limits. Sensation:    [] Intact    [x] Impaired - reports numbness in axillary region but intact to light touch.    Mobility:    Bed/Chair Mobility:  Independent Transfers:  Independent    Sitting Balance:  Normal Standing Balance:  Good   Gait:  Independent in the community Wheelchair Mobility:  N/A   Endurance:  Good Stairs:  Modified independent - ascends/descends a flight of stairs to her second floor apartment with rail. No difficulty per patient. Safety:  Patient is alert and oriented:  yes   Safety awareness:  Good   Fall Risk?:  Low   Patient given written fall prevention handout: Yes   Precautions:  Standard lymphedema precautions to include avoiding blood pressure readings, injections and IVs or other procedures/acts that could lead to broken skin on affected area, and avoiding excessive heat, resistive activity or altitude without compression garment       Based on the above components, the patient evaluation is determined to be of the following complexity level: LOW     Evaluation Time: 9:00 - 9:45 am    TREATMENT PROVIDED:   1. Treatment description:  Therapeutic Exercise and Procedure: Patient instructed in activity restrictions and exercise guidelines. Therapist demonstrated deep abdominal breathing and a remedial lymphedema range of motion exercise program to be done in sitting. Patient was able to complete the routine times 5 reps and deep abdominal breathing with fair performance. Patient has been asked to complete breathing exercises and the decongestive exercise routine daily. Encouraged patient to continue a walking program up to 25 minutes per day and lie down and elevate the R UE on a pillow several times per day if able. Encouraged patient to limit lifting and repetitive activities at work as able. Treatment time:  10:15 - 10:30 am  Minutes: 15    2. Treatment description:  Manual Therapy: Patient instructed in skin care principles and anatomy of the lymphatic system.   Therapist able to demonstrate manual lymphatic drainage techniques for the drainage of RUE/trunk/axillary regions and skin care protocol: Discussed importance of daily skin care and lotions. Initiated education on self MLD while applying Eucerin lotion to patient's B UE's using MLD principles. Initiated discussion with patient regarding different compression options, including a compression arm sleeve/gauntlet for high risk activities, such as lifting and repetitive activities at work, compression for trunk such as Wear Ease compression shirt vs vest, Peterson Vest, etc. Will try to secure a vendor and compression coverage by insurance as patient recently had a change in insurance. Encouraged patient to use the Flexitouch vasopneumatic pump up to an hour per day as tolerated. She received the pump and home training while treated in a different Lymphedema Clinic but reports that she has been noncompliant with pump use. Treatment time:  9:45 -10:15 am  Minutes: 30    ASSESSMENT:   Janes Wade is a 56 y.o. female who presents with R UE/trunk/axillary secondary lymphedema s/p bilateral mastectomies in 2015. Patient will benefit from complete decongestive therapy (CDT) including manual lymphatic drainage (MLD) technique, short-stretch textile bandages/compression system to decongest limb, and kinesiotaping as appropriate. Patient will receive instruction in proper skin care to recognize signs/symptoms of and prevent infection, therapeutic exercise, and self-MLD for independent home program and restorative lymphatic performance. This care is medically necessary due to the infection risk with lymphedema and to improve functional activities. CDT is necessary to resolve swelling to allow patient to return to wearing normal clothes/footwear and prevent worsening of symptoms, such as venous stasis ulcerations, infections, or hospitalizations. Patient will be independent with home program strategies to allow improved ADL ability and mobility and to allow patient to return to greatest functional independence. Rehabilitation potential is considered to be Fair.   Factors which may influence rehabilitation potential include scarring from mastectomies, chronic chest wall pain, noncompliance with vasopneumatic pump/pain medicine, job which consists of lifting and repetitive activity, etc.  Patient will benefit from 4 to 8 physical therapy visits over 12 weeks to optimize improvement in these areas. PLAN OF CARE:   Recommendations and Planned Interventions:  Manual lymph drainage/complete decongestive therapy  Multi-layer compression bandaging (short-stretch)  Compression garment fitting/provision  Lymphedema therapeutic exercise  AROM/PROM/Strength/Coordination  Self-care training  Functional mobility training  Education in skin care and lymphedema precautions  Self-MLD education per home program  Self-bandaging education per home program  Caregiver education as needed  Wound care as needed     GOALS  Short term goals  Time frame: to be met by 4/13/2018  1. Patient will demonstrate knowledge of signs/symptoms of infections/cellulitis and be independent in skin care to prevent cellulitis. 2.  Patient will demonstrate independence in lymphedema home program of therapeutic exercises to improve circulation and decongest limb to improve ADLs. 3.  Patient will participate in the selection process to allow ordering of home compression system (daytime, nighttime garments and pump as needed). Long term goals  Time frame: to be met by 6/4/2018  1. Pt will show improvement in Lymphedema Life Impact Scale by decreasing impairment percentage to under 45% and thus allow improvement in patient's quality of life. 2.  Patient will be independent with don/doff of compression system and use in order to prevent reaccumulation of fluid at discharge. 3.  Pt will be independent in self-MLD and show stable limb volumes showing decongestion and pt. ready for transition to independent restorative phase of lymphedema therapy.      Patient has participated in goal setting and agrees to work toward plan of care.  Patient was instructed to call if questions or concerns arise. Thank you for this referral.  June Weber, PT, CLT   Time Calculation: 90 mins    TREATMENT PLAN EFFECTIVE DATES:   3/9/2018 TO 6/4/2018  I have read the above plan of care for Yina Irene. I certify the above prescribed services are required by this patient and are medically necessary.   The above plan of care has been developed in conjunction with the lymphedema/physical therapist.       Physician Signature: ____________________________________Date:______________      Deana Jean MD

## 2018-03-12 ENCOUNTER — DOCUMENTATION ONLY (OUTPATIENT)
Dept: SURGERY | Age: 58
End: 2018-03-12

## 2018-03-12 NOTE — PROGRESS NOTES
Faxed signed orders to [facility], per request. Fax # [phone]. Fax confirmation received.

## 2018-03-16 ENCOUNTER — APPOINTMENT (OUTPATIENT)
Dept: PHYSICAL THERAPY | Age: 58
End: 2018-03-16
Payer: MEDICAID

## 2018-03-23 ENCOUNTER — HOSPITAL ENCOUNTER (OUTPATIENT)
Dept: PHYSICAL THERAPY | Age: 58
Discharge: HOME | End: 2018-03-23
Payer: MEDICAID

## 2018-03-23 VITALS — BODY MASS INDEX: 33.06 KG/M2 | WEIGHT: 210.6 LBS | HEIGHT: 67 IN

## 2018-03-23 PROCEDURE — 97140 MANUAL THERAPY 1/> REGIONS: CPT

## 2018-03-23 PROCEDURE — 97110 THERAPEUTIC EXERCISES: CPT

## 2018-03-23 NOTE — PROGRESS NOTES
[facility]  Physical Therapy Lymphedema Clinic  [address] [facility], [address] [place] [address].    LYmphedema Therapy  Visit: 2    [x]                  Daily note               []                 30 day/10th visit progress note    NAME: Vonnie Walton  DATE: 3/23/2018    GOALS    Short term goals  Time frame: to be met by 4/13/2018  1. Patient will demonstrate knowledge of signs/symptoms of infections/cellulitis and be independent in skin care to prevent cellulitis. Patient is performing daily skin care using MLD principles. Continued education on signs and symptoms of cellulitis. 2.  Patient will demonstrate independence in lymphedema home program of therapeutic exercises to improve circulation and decongest limb to improve ADLs. Patient has been educated in the Active ROM routine and has been performing the routine daily. Educated patient this visit in the Stick Routine with modifications and has the written HEP to follow. 3.  Patient will participate in the selection process to allow ordering of home compression system (daytime, nighttime garments and pump as needed). Patient has a Flexitouch Vasopneumatic Pump which she received during treatment at another Lymphedema Clinic. Once she retrieves the pump from her father's house, she will continue to use the pump as instructed in the home training session. Patient was measured this visit for: a Juzo Soft arm sleeve and gauntlet, Wear Ease compression shirt, and Peterson Bilateral Mastectomy Pad. Will order from the vendor, [employer]. Goal met 3/23/2018.     Long term goals  Time frame: to be met by 6/4/2018  1. Pt will show improvement in Lymphedema Life Impact Scale by decreasing impairment percentage to under 45% and thus allow improvement in patient's quality of life. 2.  Patient will be independent with don/doff of compression system and use in order to prevent reaccumulation of fluid at discharge.  Patient was able to don/doff a Juzo Soft arm sleeve with verbal cues in the clinic. Will continue education when patient brings garments to the clinic for fitting. 3.  Pt will be independent in self-MLD and show stable limb volumes showing decongestion and pt. ready for transition to independent restorative phase of lymphedema therapy. Full volumetric measurements taken today demonstrated a gain of 4 ml in the involved extremity and a gain of 101 ml in the uninvolved extremity since the initial evaluation on 3/9/2018. SUBJECTIVE REPORT: Patient reports that she has been feeling much better after her initial evaluation in the Lymphedema Clinic. She reports that she is not having any pain and is taking less pain medicine. She feels more optimistic about her health situation and that she is more in control of her life. Pain:  Pain Scale 1: Numeric (0 - 10)     Pain Intensity 1: 0     Gait:    [x]  Independent gait without any assistive device for community distances  ADLs:  Patient is independent with ADL's with increased time as needed. Treatment Response: Patient has been performing daily skin care and a lymphedema exercise routine since her last visit. She is pain free and reports feeling more in control of her health situation. Patient demonstrated stable limb volumes since her last visit but continues to have axillary/upper trunk swelling. [x]   Patient reviewed packet received at evaluation  [x]   Patient completed home program as prescribed  []   Patient not fully compliant with home program to date  [x]   Patient waiting on compression garment arrival - ordered garments from the vendor today. Function:   [x]   Patient requiring less assistance with completion of home program  []   ADLs are requiring less assistance   [x]   Patient able to return to work/leisure activities - works at [employer] with occasional lifting and repetitive activities required.   Lymphedema Life Impact Scale: scores with % impairment  Weight:   TREATMENT AND OBJECTIVE DATA SUMMARY:   Patient/Family Education:      Educated in skin care: [x]   Skin care products  []   Hygiene  [x]  Prevention of cellulitis  []   Wound care     Educated in exercise: [x]   Walking program  []   Ceci ball routine  [x]   Stick routine  [x]   ROM routine     Instructed in self MLD: Patient was educated in self MLD and has the written instructions to follow. Encouraged patient to complete one to two times per day. Instructed in don/doff of compression system:   []   Multi layer bandage (MLB) donning principles and wear precautions  [x]   Day garments - patient was able to practice donning and doffing an arm sleeve in the clinic. Will continue education when garments are brought to the clinic for fitting. []   Night garments     Therapeutic Activity 0 minutes   Treatment time: N/A  Functional Mobility: Independent in the community. No assistive device. Fall risk: Low fall risk     Therapeutic Exercise/Procedure 15 minutes   Treatment time: 9:00 - 9:15 am  Ceci ball exercise program: Deferred   Stick exercise program: Demonstration by therapist of written routine. Patient performed each x 3 to 5 reps. Patient has the written HEP to follow. Free exercises/ROM: Patient has been educated in the Active ROM routine and has the written HEP to follow. She has been performing the routine daily. Home program: Patient to perform daily to BID:  [x]   Skin care  [x]   Deep abdominal breathing  [x]   Exercise routine  [x]   Walking program  [x]   Rest in supine   []   Compression bandage  []   Compression garments  [x]   Vasopneumatic device - up to an hour per day as tolerated.  []   Wound care  [x]   Self MLD  []   Bring supplies to each therapy visit  []   Purchase necessary supplies  []   Weight loss program  [x]   Bring garment to the clinic for fitting.        Rationale: Exercise will increase the lymph angiomotoricity and tissue pressure of the skin and thus decrease swelling. Modalities 0 minutes   Treatment time: N/A  Vasopneumatic pump: Patient has a Flexitouch Vasopneumatic Pump which she received during treatment at a different Lymphedema Clinic. Patient was encouraged to retrieve the pump from her father's house and continue use of the pump with the arm and trunk component to assist with trunk clearance and for long term lymphedema management. Patient has received the home training in the past and verbalizes that she does not need additional home training. She is to use the pump up to an hour per day as tolerated. Manual Lymphatic Drainage (MLD) 75 minutes   Treatment time: 7:45 - 9:00 am  Area to decongest:    [x] UE          [x]  Right     [x]  Left      [x] Trunk      [] LE             []  Right     []  Left      [] Trunk         Sequence used and effectiveness: [] Secondary/Primary sequence for upper extremity with / without trunk involvement    [] Secondary/Primary sequence for lower extremities with / without trunk involvement     [] Modifications were made to manual lymph drainage sequence to exclude cervical techniques secondary to patient's age    [x] Self MLD sequence/techniques/hand strokes - initiated education on self MLD with cervical techniques, bilateral axillary region, and AAA omitted. Patient has the written instructions to follow. Skin/wound care/debridement:    Upper/Lower extremity compression: Patient was measured for the following garments:  1. Juzo Soft 2001 arm sleeve, size 4 max, long, 20 to 30 mmhg. 2. Juzo Soft 2001 gauntlet, size medium, 20 to 30 mmhg. 3. Wear Ease Compression Shirt, size 2X. 4. Peterson double mastectomy pad, size medium. The garments will be ordered from the vendor, [employer], and patient will bring them to the clinic for fitting. Initiated education on precautions, wear schedule, and donning/doffing of the garments.      Kinesiotaping:    Girth/Volume measurement: Full volumetric measurements taken today demonstrated a gain of 4 ml in the involved extremity and a gain of 101 ml in the uninvolved extremity since the initial evaluation on 3/9/2018. TOTAL TREATMENT 90 mins     Circumferential Limb Measurements:  Full volumetric measurements were taken today. See above. ASSESSMENT:   Treatment effectiveness and tolerance: Patient is in good spirits today. She feels more in control of her life and her medical situation. She is having 0/10 pain and demonstrates stable volumetric measurements since the initial evaluation on 3/9/2018. She has been performing daily skin care, Active ROM routine, and walking program. She was measured for compression garments this visit with her main issue being axillary/upper trunk swelling at this time. Patient is to begin using the vasopneumatic pump with the arm and trunk component to assist with trunk clearing prior to returning to the clinic. Progress toward goals: Patient has met STG # 3. See goal section of note. PLAN OF CARE:   Changes to the plan of care: Measured patient for garments and order was sent to the vendor, Educated patient in Self MLD and the Stick Routine.     Frequency: []  2 times a week  []  Weekly  [x]  Biweekly  []  Monthly       June Gus, PT, CLT

## 2018-04-06 ENCOUNTER — HOSPITAL ENCOUNTER (OUTPATIENT)
Dept: PHYSICAL THERAPY | Age: 58
Discharge: HOME | End: 2018-04-06
Payer: MEDICAID

## 2018-04-06 PROCEDURE — 97140 MANUAL THERAPY 1/> REGIONS: CPT

## 2018-04-06 PROCEDURE — 97110 THERAPEUTIC EXERCISES: CPT

## 2018-04-06 NOTE — PROGRESS NOTES
[address] [place] [address]  Physical Therapy Lymphedema Clinic  [address], [address] [facility], [facility] 22.    LYmphedema Therapy  Visit: 3    []                  Daily note               [x]                 30 day Reassessment/10th visit progress note    NAME: Aris Host  DATE: 4/6/2018    GOALS    Short term goals  Time frame: to be met by 4/13/2018  1. Patient will demonstrate knowledge of signs/symptoms of infections/cellulitis and be independent in skin care to prevent cellulitis. Patient is performing daily skin care using MLD principles. Patient is aware of the signs and symptoms of cellulitis. Goal Met 4/6/18.  2.  Patient will demonstrate independence in lymphedema home program of therapeutic exercises to improve circulation and decongest limb to improve ADLs. Patient has been educated in the Active ROM routine,Stick Routine with modifications, and the [employer] routine and has the written HEP to follow. Patient also issued the Axillary Web Stretching handout as today noted to have some areas of sensitivity in the R axilla and chest that present like cording. Patient instructed to work on these through out the day as able. Will extend this goal to 6/4/18 to make sure that she is able to perform routines independently. 3.  Patient will participate in the selection process to allow ordering of home compression system (daytime, nighttime garments and pump as needed). Patient has a Flexitouch Vasopneumatic Pump which she received during treatment at another Lymphedema Clinic. Once she retrieves the pump from her father's house, she will continue to use the pump as instructed in the home training session. Patient was measured this visit for: a Juzo Soft arm sleeve and gauntlet, Wear Ease compression shirt, and Peterson Bilateral Mastectomy Pad. Will order from the vendor, [employer]. Goal met 3/23/2018.     Long term goals  Time frame: to be met by 6/4/2018  1.   Pt will show improvement in Lymphedema Life Impact Scale by decreasing impairment percentage to under 45% and thus allow improvement in patient's quality of life. Patient scored a 26 on the LLIS with a percent impairment of 38%. Goal Met 4/6/18  2. Patient will be independent with don/doff of compression system and use in order to prevent reaccumulation of fluid at discharge. Patient was able to don/doff a Juzo Soft arm sleeve with verbal cues in the clinic last visit. Will continue education when patient brings garments to the clinic for fitting. 3.  Pt will be independent in self-MLD and show stable limb volumes showing decongestion and pt. ready for transition to independent restorative phase of lymphedema therapy. Full volumes taken today to reveal that patient has lost 237 ml in the R UE and 204 ml L UE since evaluation on 3/09/18. Patient has lost 241 ml on the R UE and 305 ml on the L UE since volumes taken on last visit. Patient's percent difference is -2.25%. Patient has Self MLD packet and today noted that she needed increased cues and demonstration to perform effectively. Patient encouraged to try to get transportation set up so that she can get her Flexitouch pump back from a family members home so that she can utilize it daily. SUBJECTIVE REPORT:Patient reports that she feels that the swelling under her R axilla continues to be affecting her daily as well as some firmness at the incision on the R laterally. Patient did have some tenderness and firmness presenting similar to axillary web cording so she received stretching and also given handout on how to work on stretching this area. Patient reports at her job she has to lift a lot of heavy items so discussed reducing the size and weight of the items she has to move to reduce risk of increased swelling. Patient has not received her garments that were ordered on 3/23/18 so vendor contacted to see when items will arrive.  Today we were told they did not receive the fax, despite fax confirmation that they did received it. [employer] stated that it would be 15 days or after before the patient receives her garments. Patient to call the clinic when she receives items for fitting. Pain: 0/10 (some soreness with stretching in the axilla)  Gait:    [x]  Independent gait without any assistive device for community distances  ADLs:  Patient is independent with ADL's with increased time as needed. Treatment Response: Patient has been working on her home program and her volumes are down bilaterally. Patient continues to report the swelling is most significant B axilla (R > L ) and her trunk. Patient urged to try to get back to using her Flexitouch vasopneumatic pump as soon as possible as it will be able to address these areas more effectively that her Self MLD at this time. [x]   Patient reviewed packet received at evaluation  [x]   Patient completed home program as prescribed  []   Patient not fully compliant with home program to date  [x]   Patient waiting on compression garment arrival - ordered garments from [employer].   Function:   [x]   Patient requiring less assistance with completion of home program  []   ADLs are requiring less assistance   [x]   Patient able to return to work/leisure activities-works at [employer] with occasional lifting and repetitive activities required. Lymphedema Life Impact Scale: Patient scored a 26 on the LLIS with a percent impairment of 38%. Weight: 211.6 lbs (issued patient the recommended food guide for lymphedema handout today)   TREATMENT AND OBJECTIVE DATA SUMMARY:   Patient/Family Education:      Educated in skin care: [x]   Skin care products  []   Hygiene  [x]  Prevention of cellulitis  []   Wound care     Educated in exercise: [x]   Walking program  [x]   Ceci ball routine  [x]   Stick routine  [x]   ROM routine     Instructed in self MLD: Patient was educated in self MLD and has the written instructions to follow. Encouraged patient to complete one to two times per day. Instructed in don/doff of compression system:   []   Multi layer bandage (MLB) donning principles and wear precautions  [x]   Day garments - Will continue education when garments are brought to the clinic for fitting. []   Night garments     Therapeutic Activity 0 minutes   Treatment time: N/A  Functional Mobility: Independent in the community. No assistive device. Fall risk: Low fall risk     Therapeutic Exercise/Procedure 10 minutes   Treatment time: 9:10 - 9:20 am  Ceci ball exercise program: Patient performed the [employer] [place] Routine x 5 reps with only difficulty with the waist circles requiring her more time to perform and reduced to 3 reps. Patient will try to obtain a ball for use in the home to perform as she liked this routine. Stick exercise program: Patient has the written HEP to follow. Free exercises/ROM: Patient has been educated in the Active ROM routine and has the written HEP to follow. She has been performing the routine daily. Today added the [employer] packet for patient to use for stretching. Home program: Patient to perform daily to BID:  [x]   Skin care  [x]   Deep abdominal breathing  [x]   Exercise routine  [x]   Walking program  [x]   Rest in supine   []   Compression bandage  [x]   Compression garments (order placed awaiting arrival)  [x]   Vasopneumatic device - up to an hour per day as tolerated.  []   Wound care  [x]   Self MLD  []   Bring supplies to each therapy visit  []   Purchase necessary supplies  [x]   Weight loss program  [x]   Bring garment to the clinic for fitting. Rationale: Exercise will increase the lymph angiomotoricity and tissue pressure of the skin and thus decrease swelling. Modalities 0 minutes   Treatment time: N/A  Vasopneumatic pump: Patient has a Flexitouch Vasopneumatic Pump which she received during treatment at a different Lymphedema Clinic.  Patient was encouraged to retrieve the pump from her father's house and continue use of the pump with the arm and trunk component to assist with trunk clearance and for long term lymphedema management. Patient has received the home training in the past and verbalizes that she does not need additional home training. She is to use the pump up to an hour per day as tolerated. Manual Lymphatic Drainage (MLD) 70 minutes   Treatment time: 8:00am-9:10am  Area to decongest:    [x] UE          [x]  Right     [x]  Left      [x] Trunk   Sequence used and effectiveness: [x] Secondary  sequence for upper extremity with  trunk involvement (focused on the R UE and lateral trunk)    [x] Modifications were made to manual lymph drainage sequence to exclude cervical techniques     [x] Self MLD sequence/techniques/hand strokes - initiated education on self MLD with cervical techniques, bilateral axillary region, and AAA omitted. Patient has the written instructions to follow. Today had patient demonstrate techniques and she needed increased correcting for hand placement and techniques. Encouraged patient to try to get her Flexitouch pump that is at a family member's home so that she can use it once to twice daily as it will be more effective than her Self MLD at this time and better address her trunk/axilla swelling. Skin/wound care/debridement:    Upper/Lower extremity compression: Patient was measured for the following garments, but they have not arrived to date:  1. [address] arm sleeve, size 4 max, long, 20 to 30 mmhg. 2. Juzo Soft 2001 gauntlet, size medium, 20 to 30 mmhg. 3. Wear Ease Compression Shirt, size 2X. 4. Peterson double mastectomy pad, size medium. The garments will be ordered from the vendor, [employer], and patient will bring them to the clinic for fitting when they arrive. Initiated education on precautions, wear schedule, and donning/doffing of the garments.     Issued a Opti Flow T pad today for patient to use in the axilla to improve swelling. Kinesiotaping: Patient was kinesio taped with \"Y\" going from axilla to inguinal nodes and from anterior shoulder posteriorly to assist with drainage of the R axilla. Patient educated in how to safely remove tape. Girth/Volume measurement: Full volumes taken today to reveal that patient has lost 237 ml in the R UE and 204 ml L UE since evaluation on 3/09/18. Patient has lost 241 ml on the R UE and 305 ml on the L UE since volumes taken on last visit. Patient's percent difference is -2.25%. TOTAL TREATMENT 80 mins     Circumferential Limb Measurements:  Full volumetric measurements were taken today. See above. Girths:  Axilla/Upper Chest: 108 cm  Incision Line/Chest: 103.4 cm  Waist:114 cm  ASSESSMENT:   Treatment effectiveness and tolerance: Patient has made gains with reduction of volumes and reports working on her home program daily. Patient's garments have not arrived as the there was an issue with the order and now not expected for another 15 days. Patient encouraged to continue home program and try to get her Flexitouch from her father's home so that she can use it daily. Will continue to benefit from CDT to work toward remaining goals. Patient to return to the clinic when she receives her products versus sooner if she is having any concerns. Progress toward goals: Patient has met STG # 1+3 and LTG 1. Will extend STG #2 to 6/4/18. See goal section of note. PLAN OF CARE:   Changes to the plan of care: Continue with plan of care and await arrival of compression garments.     Frequency: []  2 times a week  []  Weekly  [x]  Biweekly (Patient to call when her garments arrive)  [x]  Monthly       Paul Lechuga, PTA, CLT

## 2018-04-09 ENCOUNTER — DOCUMENTATION ONLY (OUTPATIENT)
Dept: SURGERY | Age: 58
End: 2018-04-09

## 2018-04-09 NOTE — PROGRESS NOTES
Faxed signed CMN for lymphedema compression device supplies to [facility], per request, fax # [phone]. Fax confirmation received.

## 2018-04-20 ENCOUNTER — APPOINTMENT (OUTPATIENT)
Dept: PHYSICAL THERAPY | Age: 58
End: 2018-04-20
Payer: MEDICAID

## 2018-05-01 ENCOUNTER — HOSPITAL ENCOUNTER (OUTPATIENT)
Dept: PHYSICAL THERAPY | Age: 58
Discharge: HOME | End: 2018-05-01
Payer: MEDICAID

## 2018-05-01 PROCEDURE — 97140 MANUAL THERAPY 1/> REGIONS: CPT

## 2018-05-01 PROCEDURE — 97110 THERAPEUTIC EXERCISES: CPT

## 2018-05-01 NOTE — PROGRESS NOTES
[facility]  Physical Therapy Lymphedema Clinic  [address] [facility], [address] [place] [address] 22.    LYmphedema Therapy  Visit: 4    []                  Daily note               [x]                 30 day Reassessment/10th visit progress note    NAME: Kelsea Shannon  DATE: 5/1/2018    GOALS    Short term goals  Time frame: to be met by 4/13/2018  1. Patient will demonstrate knowledge of signs/symptoms of infections/cellulitis and be independent in skin care to prevent cellulitis. Patient is performing daily skin care using MLD principles. Patient is aware of the signs and symptoms of cellulitis. Goal Met 4/6/18.  2.  Patient will demonstrate independence in lymphedema home program of therapeutic exercises to improve circulation and decongest limb to improve ADLs. Patient has been educated in the Active ROM routine, Stick Routine with modifications, and the [employer] Routine and has the written HEP to follow. Patient has been educated in the Axillary Web Stretching handout for areas of sensitivity in the R axilla and chest that present like cording. Patient instructed to work on these throughout the day as able. Will extend this goal to 6/4/18 to make sure that she is able to perform routines independently. Patient continues to require education this visit on importance of being consistent with her home program to be successful with the CDT program.  3.  Patient will participate in the selection process to allow ordering of home compression system (daytime, nighttime garments and pump as needed). Patient has a Flexitouch Vasopneumatic Pump which she received during treatment at another Lymphedema Clinic. Once she retrieves the pump from her father's house, she will continue to use the pump as instructed in the home training session.  Patient was measured this visit for: a Juzo Soft arm sleeve and gauntlet, Wear Ease compression shirt, and Peterson Bilateral Mastectomy Pad. Will order from the vendor, [employer]. Goal met 3/23/2018.     Long term goals  Time frame: to be met by 6/4/2018  1. Pt will show improvement in Lymphedema Life Impact Scale by decreasing impairment percentage to under 45% and thus allow improvement in patient's quality of life. Patient scored a 26 on the LLIS with a percent impairment of 38%. Goal Met 4/6/18  2. Patient will be independent with don/doff of compression system and use in order to prevent reaccumulation of fluid at discharge. Patient is able to independently don and doff and demonstrate proper use of day time compression garments this visit. Goal met 5/1/2018. 3.  Pt will be independent in self-MLD and show stable limb volumes showing decongestion and pt. ready for transition to independent restorative phase of lymphedema therapy. Full volumes taken today demonstrate a loss of 69 ml in the involved extremity and a loss of 109 ml in the uninvolved extremity with a percentage difference of - 0.95% since the initial evaluation on 3/9/2018. Patient has been educated in self MLD and has the written instructions to follow. Continued education on self MLD technique this visit. SUBJECTIVE REPORT: Patient reports that she was fired and is no longer working at [employer]. She has been staying with her father and helping him as needed since he recently had a stroke. She reports that she has not had time to perform her home lymphedema program but feels that the swelling in the axillary region has improved. Pain: 0/10 - no pain reported today. Gait:    [x]  Independent gait without any assistive device for community distances  ADLs:  Patient is independent with ADL's. Treatment Response: Patient has not been working on her home program and her volumes are up bilaterally. Patient continues to report the swelling is most significant B axilla (R > L ) and her trunk but feels that the swelling has improved.  Continue to educate patient on importance of using the Flexitouch vasopneumatic pump as it will be able to address these areas more effectively than her self MLD at this time. [x]   Patient reviewed packet received at evaluation  []   Patient completed home program as prescribed  [x]   Patient not fully compliant with home program to date  [x]   Patient received garments from [employer] and brought them to the clinic for fitting. 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 Lymphedema Life Impact Scale: Patient scored a 47 on the LLIS with a percent impairment of 69% this visit despite reports of decreased axillary swelling and pain free UE ROM. Weight: 212.8 lbs - food guide for lymphedema handout given to patient in a previous visit  TREATMENT AND OBJECTIVE DATA SUMMARY:   Patient/Family Education:      Educated in skin care: [x]   Skin care products  [x]   Hygiene  [x]  Prevention of cellulitis  []   Wound care     Educated in exercise: [x]   Walking program  [x]   Ceci ball routine  [x]   Stick routine  [x]   ROM routine     Instructed in self MLD: Patient has been educated in self MLD and has the written instructions to follow. Reviewed self MLD this visit and encouraged self MLD one to two times per day. Instructed in don/doff of compression system:   []   Multi layer bandage (MLB) donning principles and wear precautions  [x]   Day garments - Patient was able to independently don and doff the arm sleeve and gauntlet in the clinic and plans to purchase donning gloves in the community. She was independent with donning and doffing the J[employer]ak bilateral mastectomy pad and the Wear Ease Compression Shirt. Continued education in using the Optiflow T pad for control of axillary swelling.     []   Night garments Therapeutic Activity 0 minutes   Treatment time: N/A  Functional Mobility: Independent in the community. No assistive device. Fall risk: Low fall risk     Therapeutic Exercise/Procedure 10 minutes   Treatment time: 1:20 - 1:30 pm  Ceci ball exercise program: Patient has been educated in the [employer] Routine with modifications and plans to obtain a ball for use in the home. Stick exercise program: Patient has been educated in the Stick Routine and has the written HEP to follow. Free exercises/ROM: Patient performed the Active ROM routine X 5 reps in the clinic with arm sleeve and gauntlet donned without any issues with fit or comfort. Patient has been educated in the Axillary Web Syndrome stretches and has the written instructions to follow. Home program: Patient to perform daily to BID:  [x]   Skin care  [x]   Deep abdominal breathing  [x]   Exercise routine  [x]   Walking program  [x]   Rest in supine   []   Compression bandage  [x]   Compression garments - patient to wear the arm sleeve and gauntlet daily as tolerated and remove nightly. Patient to wear the compression shirt and bilateral mastectomy pad daily and at night as tolerated. [x]   Vasopneumatic device - up to an hour per day as tolerated.  []   Wound care  [x]   Self MLD  []   Bring supplies to each therapy visit  []   Purchase necessary supplies  [x]   Weight loss program         Rationale: Exercise will increase the lymph angiomotoricity and tissue pressure of the skin and thus decrease swelling. Modalities 0 minutes   Treatment time: N/A  Vasopneumatic pump: Patient has a Flexitouch Vasopneumatic Pump which she received during treatment at a different Lymphedema Clinic. Patient is not currently using the vasopneumatic pump to assist with trunk and UE clearance abilities for long term lymphedema management. Patient has received the home training in the past and verbalizes that she does not need additional home training. Encouraged daily use of the pump up to once or twice per day as tolerated. Manual Lymphatic Drainage (MLD) 80 minutes   Treatment time: 12:00 - 1:20 pm  Area to decongest:    [x] UE          [x]  Right     [x]  Left      [x] Trunk   Sequence used and effectiveness: [x] Secondary  sequence for upper extremity with  trunk involvement (focused on the R UE and lateral trunk)    [x] Modifications were made to manual lymph drainage sequence to exclude cervical techniques     [x] Self MLD sequence/techniques/hand strokes - initiated education on self MLD with cervical techniques, bilateral axillary region, and AAA omitted. Reviewed self MLD again this visit and patient has the  written instructions to follow. Encouraged patient to use the Flexitouch pump  once to twice daily as it will be more effective than her Self MLD at this time and better address her trunk/axilla swelling. Skin/wound care/debridement: Bilateral axillary swelling (R > L)   Upper/Lower extremity compression: Patient received the garments from the vendor, [employer] and brought them to the clinic for fitting. 1. Juzo Soft 2001 arm sleeve, size 4 max, long, 20 to 30 mmhg. 2. Juzo Soft 2001 gauntlet, size medium, 20 to 30 mmhg. 3. Wear Ease Compression Shirt, size 2X. 4. Peterson double mastectomy pad, size medium. The arm sleeve and gauntlet fit well and were comfortable to the patient. Patient received 4 sets of day time garments although only 2 sets were ordered. Will return two sets of day time garments to the vendor. Patient will keep one 2X Wear Ease Shirt to wear with the Peterson double mastectomy pad, while sleeping and will exchange the other Wear Ease Shirt for a 1X to wear without the mastectomy pad. Continued education in the use of the Opti Flow T pad for compression for B axillary swelling.      Patient was educated in precautions, wear schedule, and donning/doffing of the garments and was able to verbalize the instructions in the clinic. Kinesiotaping: Deferred this visit. Girth/Volume measurement: Full volumes taken today demonstrate a loss of 69 ml in the involved extremity and a loss of 109 ml in the uninvolved extremity with a percentage difference of -0.95% since the initial evaluation on 3/9/2018. TOTAL TREATMENT 90 mins     Circumferential Limb Measurements:  Full volumetric measurements were taken today. See above. 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 She admits to not being compliant with her home program consisting of exercise, self MLD, and use of the vasopneumatic pump and did present with an increase in B UE volumes. Continued education in the importance of being compliant with her home program to be successful with the CDT program.  Patient received her garments from the vendor and the arm sleeve, gauntlet, and the bilateral mastectomy pad fit well and are comfortable to the patient. Will make an exchange for a smaller Wear Ease Compression Shirt. Progress toward goals: Patient has met STG # 1+3 and LTG 1 + 2. Will extend STG #2 to 6/4/18. See goal section of note. PLAN OF CARE:   Changes to the plan of care: Garment fitting completed in the clinic, continued education in self MLD during the MLD portion of the session. Frequency: []  2 times a week  []  Weekly  [x]  Biweekly - to return in 2 to 3 weeks to assess the fit and effectiveness of the garments and vasopneumatic pump.    []  Monthly       Natalya Calabrese, PT, CLT

## 2018-05-04 DIAGNOSIS — E03.4 HYPOTHYROIDISM DUE TO ACQUIRED ATROPHY OF THYROID: ICD-10-CM

## 2018-05-04 RX ORDER — LEVOTHYROXINE SODIUM 125 UG/1
TABLET ORAL
Qty: 60 TAB | Refills: 0 | Status: SHIPPED | OUTPATIENT
Start: 2018-05-04 | End: 2018-09-06 | Stop reason: SDUPTHER

## 2018-05-22 ENCOUNTER — HOSPITAL ENCOUNTER (OUTPATIENT)
Dept: PHYSICAL THERAPY | Age: 58
Discharge: HOME | End: 2018-05-22
Payer: MEDICAID

## 2018-05-22 NOTE — PROGRESS NOTES
[facility]  Physical Therapy Lymphedema Clinic  [address] [facility], [address] [place] [address], [facility] 22.    Lymphedema Therapy      5/22/2018:  Abelardo Figueroa was not seen on this date for physical therapy for the following reasons:    [x]      Patient called to cancel the visit for the following reasons: had another conflict. []      Patient missed the visit and did not call to cancel.     Shruthi Espinosa, PT, CLT

## 2018-06-05 ENCOUNTER — HOSPITAL ENCOUNTER (OUTPATIENT)
Dept: PHYSICAL THERAPY | Age: 58
Discharge: HOME | End: 2018-06-05
Payer: MEDICAID

## 2018-06-05 VITALS — HEIGHT: 67 IN | WEIGHT: 212.2 LBS | BODY MASS INDEX: 33.3 KG/M2

## 2018-06-05 PROCEDURE — 97140 MANUAL THERAPY 1/> REGIONS: CPT

## 2018-06-05 NOTE — PROGRESS NOTES
[address] [place] [address]  Physical Therapy Lymphedema Clinic  [address], [address] [facility], [facility] 22.    LYmphedema Therapy  Visit: 5    []                  Daily note               [x]                 30 day Reassessment/10th visit progress note with Discharge    NAME: Keely Camargo  DATE: 6/5/2018    GOALS    Short term goals  Time frame: to be met by 4/13/2018  1. Patient will demonstrate knowledge of signs/symptoms of infections/cellulitis and be independent in skin care to prevent cellulitis. Patient is performing daily skin care using MLD principles. Patient is aware of the signs and symptoms of cellulitis. Goal Met 4/6/18.  2.  Patient will demonstrate independence in lymphedema home program of therapeutic exercises to improve circulation and decongest limb to improve ADLs. Patient has been educated in the Active ROM routine, Stick Routine with modifications, and the [employer] Routine and is performing a routine daily. Patient has been educated in the Axillary Web Stretching handout for areas of sensitivity in the R axilla and chest that present like cording. Patient instructed to work on these throughout the day as able. Goal met 6/5/2018. 3.  Patient will participate in the selection process to allow ordering of home compression system (daytime, nighttime garments and pump as needed). Patient has a Flexitouch Vasopneumatic Pump which she received during treatment at another Lymphedema Clinic. Once she retrieves the pump from her father's house, she will continue to use the pump as instructed in the home training session. Patient was measured this visit for: a Juzo Soft arm sleeve and gauntlet, Wear Ease compression shirt, and Peterson Bilateral Mastectomy Pad. Will order from the vendor, [employer]. Goal met 3/23/2018.     Long term goals  Time frame: to be met by 6/4/2018  1.   Pt will show improvement in Lymphedema Life Impact Scale by decreasing impairment percentage to under 45% and thus allow improvement in patient's quality of life. Patient scored a 26 on the LLIS with a percent impairment of 38%. Goal Met 4/6/18  2. Patient will be independent with don/doff of compression system and use in order to prevent reaccumulation of fluid at discharge. Patient is able to independently don and doff and demonstrate proper use of day time compression garments this visit. Goal met 5/1/2018. 3.  Pt will be independent in self-MLD and show stable limb volumes showing decongestion and pt. ready for transition to independent restorative phase of lymphedema therapy. Full volumes taken today demonstrated a loss of 44 ml in the involved extremity and a gain of 71 ml in the uninvolved extremity with a percentage difference of -4.82% since the initial evaluation on 3/9/2018. Goal met 6/5/2018. SUBJECTIVE REPORT: Patient reports that she feels like she is in control of her life. She is not having any pain but feels occasional tightness across her chest. She is performing exercises and stretches daily. She is compliant with use of the bilateral mastectomy pad and Optiflow T pad which has decreased swelling in the axillary and chest region. She is not wearing the arm sleeve or the gauntlet daily since she feels that her swelling is controlled. Pain: 0/10 - no pain reported today. Gait:    [x]  Independent gait without any assistive device for community distances. ADLs:  Patient is independent with ADL's. Treatment Response: Patient demonstrated a loss of 44 ml in the involved extremity and a gain of 71 ml in the uninvolved extremity since the initial evaluation. Patient has been compliant with use of the bilateral mastectomy pad and Optiflow T pad as needed but is not wearing the arm sleeve or the gauntlet daily or with high risk activities. She is using the vaso-pneumatic pump approximately two times per week and will alternate between the right and the left arm. Patient reports that she has been doing a lot of lifting around the house lately but has not noticed any increase in UE swelling. [x]   Patient reviewed packet received at evaluation  []   Patient completed home program as prescribed  [x]   Patient not fully compliant with home program to date  [x]   Patient received the Wear Ease Compression Shirts. The shirts fit well in the abdomen and across the chest with the bilateral mastectomy pad in place. 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 Active in the home. Weight: 212.2 lbs this visit. TREATMENT AND OBJECTIVE DATA SUMMARY:   Patient/Family Education:      Educated in skin care: [x]   Skin care products  [x]   Hygiene  [x]  Prevention of cellulitis  []   Wound care     Educated in exercise: [x]   Walking program  [x]   Ceci ball routine  [x]   Stick routine  [x]   ROM routine     Instructed in self MLD: Patient has been educated in self MLD and has the written instructions to follow. Patient to perform self MLD one to two times per day. Instructed in don/doff of compression system:   []   Multi layer bandage (MLB) donning principles and wear precautions  [x]   Day garments - Patient is independent in donning and doffing day time garments and the [employer]ak bilateral mastectomy pad, Optiflow T pad, and the Wear Ease Compression Shirt and is aware of proper use of the garments. []   Night garments     Therapeutic Activity 0 minutes   Treatment time: N/A  Functional Mobility: Independent in the community. No assistive device. Fall risk: Low fall risk     Therapeutic Exercise/Procedure 0 minutes   Treatment time: N/A  Ceci ball exercise program: Patient has been educated in the [employer] Routine and has the written HEP to follow. Stick exercise program: Patient has been educated in the Stick Routine and has the written HEP to follow. Free exercises/ROM: Patient has been educated in the Active ROM routine and has the written HEP to follow. Patient has been educated in the axillary web stretches and has the written instructions to follow. Home program: Patient to perform daily to BID:  [x]   Skin care  [x]   Deep abdominal breathing  [x]   Exercise routine  [x]   Walking program  [x]   Rest in supine   []   Compression bandage  [x]   Compression garments - patient to wear the arm sleeve and gauntlet daily as tolerated and remove nightly. Patient to wear the compression shirt and bilateral mastectomy pad daily and/or  nightly as tolerated. [x]   Vasopneumatic device - up to an hour per day as tolerated.  []   Wound care  [x]   Self MLD  []   Bring supplies to each therapy visit  []   Purchase necessary supplies  [x]   Weight loss program         Rationale: Exercise will increase the lymph angiomotoricity and tissue pressure of the skin and thus decrease swelling. Modalities 0 minutes   Treatment time: N/A  Vasopneumatic pump: Patient is using the Flexitouch Vasopneumatic Pump which she received during treatment at a different Lymphedema Clinic approximately two times per week. She alternates pump use between the right and the left arm. Educated patient in benefits of using the vasopneumatic pump daily as tolerated to assist with trunk and UE clearance abilities for long term lymphedema management.       Manual Lymphatic Drainage (MLD) 30 minutes   Treatment time: 7:45 - 8:15 am  Area to decongest:    [x] UE          [x]  Right     [x]  Left      [x] Trunk   Sequence used and effectiveness: [] Secondary  sequence for upper extremity with  trunk involvement     [x] Modifications were made to manual lymph drainage sequence to exclude cervical techniques     [x] Self MLD sequence/techniques/hand strokes - Patient has been educated in self MLD with cervical techniques, bilateral axillary region, and AAA omitted and patient has the written instructions to follow. Skin/wound care/debridement: Less bilateral axillary swelling (R > L) noted. Upper/Lower extremity compression: Patient has received the following garments from the vendor, [employer] and has been educated in wear schedule, care of, and precautions of the products. 1. Juzo Soft 2001 arm sleeve, size 4 max, long, 20 to 30 mmhg. 2. Juzo Soft 2001 gauntlet, size medium, 20 to 30 mmhg. 3. Wear Ease Compression Shirt, size 1X. 4. Peterson double mastectomy pad, size medium. The garments fit well and are comfortable to the patient. Continued education on importance of wearing the arm sleeve and gauntlet, especially with high risk activities, as patient's uninvolved extremity demonstrated an increase of 71 ml since the initial evaluation. Kinesiotaping: Deferred this visit. Girth/Volume measurement: Full volumes taken today demonstrated a loss of 44 ml in the involved extremity and a gain of 71 ml in the uninvolved extremity with a percentage difference of -4.82% since the initial evaluation on 3/9/2018. TOTAL TREATMENT 30 mins     Circumferential Limb Measurements:  Full volumetric measurements were taken today. See above. ASSESSMENT:   Treatment effectiveness and tolerance: Patient has been more compliant with her home program of daily exercise and skin care since her last visit. She has been educated in importance of wearing the arm sleeve and gauntlet daily or at least with high risk activities and daily use of the vaso-pneumatic pump as tolerated. Bilateral axillary swelling has improved with daily use of the Jovipak double mastectomy pad and PRN use of the Optiflow T pad. Patient will be discharged to the restorative phase of the CDT program as she has the tools to be successful with the program. She will return in 6 months.     Progress toward goals: Patient has met all STG's and LTG's. PLAN OF CARE:   Changes to the plan of care: Patient to continue with her home program. Patient will return to the Lymphedema Clinic for a 6 month evaluation or call the clinic sooner with any issues. Frequency: []  2 times a week  []  Weekly  []  Monthly  [x]  6 month reevaluation       Ted Gonsalez PT, CLT    TREATMENT PLAN EFFECTIVE DATES:   6/5/2018 TO 6/5/2018  I have read the above plan of care for [place]. I certify the above prescribed services are required by this patient and are medically necessary.   The above plan of care has been developed in conjunction with the lymphedema/physical therapist.   Physician Signature: ____________________________________________________    Date: _________________________________________________________________

## 2018-06-06 ENCOUNTER — DOCUMENTATION ONLY (OUTPATIENT)
Dept: SURGERY | Age: 58
End: 2018-06-06

## 2018-06-06 NOTE — PROGRESS NOTES
Faxed signed updated plan of care for lymphedema clinic, per request, to fax # [phone]. Fax confirmation received.

## 2018-06-11 ENCOUNTER — NURSE NAVIGATOR (OUTPATIENT)
Dept: ONCOLOGY | Age: 58
End: 2018-06-11

## 2018-06-11 ENCOUNTER — TELEPHONE (OUTPATIENT)
Dept: ONCOLOGY | Age: 58
End: 2018-06-11

## 2018-06-11 NOTE — TELEPHONE ENCOUNTER
ONCOLOGY NURSE NAVIGATOR    Returned call to [facility], left a VM asking for her to call me back    Shruthi Whalen MS RN AOCNS

## 2018-06-11 NOTE — PROGRESS NOTES
ONCOLOGY NURSE NAVIGATOR    Met with Jojo Shirley today -- she had appt for massage in [address]    She has lost her job at [employer] -- she was fired as she was unable to lift and meet the physical demands of the job. She says [employer] tried to fire her at the time of her breast cancer dx and she consulted [address] [place] [address] and was able to save her job -- employer made ADA accommodations. She knows how to contact [address] [place] [address] but isn't sure she wants to pursue getting her old job back because \"I feel so much healthier not straining myself at work\". Chest wall musculoskeletal pain with associated chronic seromas (that have needed occasional drainage) and lymphedema symptoms are lessened in the past several weeks not working. She wonders if she is a eligible to apply for SSDI. She is not opposed to seeking employment if she can find work that won't worsen her symptoms. Rec's unemployment benefits from [employer] -- knows this can extend potentially for a year. She has Medicaid [facility]     We discussed the followin. Will refer her to [employer] for screening for SSDI eligibility. Gave her SSA booklet on SSDI. 2.  Gave booklets from [employer] Care[employer] (www.cancerandcareers. org) -- rights and job search and workplace resources    3. Consider seeking financial counseling from [address] [place] [address] if not legal counseling to seek ADA accommodations from employer.       4.  Surveillance with Dr. Mark Miller -- next appt in august.  Stay in contact with me as needed    Linda Roberson MS RN AOCNS

## 2018-09-06 ENCOUNTER — OFFICE VISIT (OUTPATIENT)
Dept: INTERNAL MEDICINE CLINIC | Age: 58
End: 2018-09-06

## 2018-09-06 VITALS
RESPIRATION RATE: 18 BRPM | HEIGHT: 67 IN | HEART RATE: 73 BPM | OXYGEN SATURATION: 98 % | DIASTOLIC BLOOD PRESSURE: 76 MMHG | SYSTOLIC BLOOD PRESSURE: 117 MMHG | BODY MASS INDEX: 34 KG/M2 | WEIGHT: 216.6 LBS | TEMPERATURE: 98.5 F

## 2018-09-06 DIAGNOSIS — E03.4 HYPOTHYROIDISM DUE TO ACQUIRED ATROPHY OF THYROID: Primary | ICD-10-CM

## 2018-09-06 DIAGNOSIS — Z23 ENCOUNTER FOR IMMUNIZATION: ICD-10-CM

## 2018-09-06 DIAGNOSIS — E78.2 MIXED HYPERLIPIDEMIA: ICD-10-CM

## 2018-09-06 DIAGNOSIS — Z12.11 SCREENING FOR COLON CANCER: ICD-10-CM

## 2018-09-06 DIAGNOSIS — Z00.00 ROUTINE MEDICAL EXAM: ICD-10-CM

## 2018-09-06 DIAGNOSIS — E55.9 VITAMIN D DEFICIENCY: ICD-10-CM

## 2018-09-06 RX ORDER — FLUTICASONE PROPIONATE 50 MCG
2 SPRAY, SUSPENSION (ML) NASAL DAILY
Qty: 1 BOTTLE | Refills: 5 | Status: SHIPPED | OUTPATIENT
Start: 2018-09-06 | End: 2018-10-25 | Stop reason: SDUPTHER

## 2018-09-06 RX ORDER — LEVOTHYROXINE SODIUM 125 UG/1
TABLET ORAL
Qty: 60 TAB | Refills: 11 | Status: SHIPPED | OUTPATIENT
Start: 2018-09-06 | End: 2018-10-25 | Stop reason: SDUPTHER

## 2018-09-06 RX ORDER — ERGOCALCIFEROL 1.25 MG/1
CAPSULE ORAL
Qty: 8 CAP | Refills: 2 | Status: CANCELLED | OUTPATIENT
Start: 2018-09-06

## 2018-09-06 RX ORDER — GABAPENTIN 300 MG/1
CAPSULE ORAL
Qty: 90 CAP | Refills: 11 | Status: SHIPPED | OUTPATIENT
Start: 2018-09-06 | End: 2018-10-25 | Stop reason: SDUPTHER

## 2018-09-06 NOTE — PROGRESS NOTES
Reviewed record in preparation for visit and have obtained necessary documentation. Identified pt with two pt identifiers(name and ). Chief Complaint   Patient presents with    Breast Problem     states swelling and tightness bilateral breast    Annual Exam     Pt non fasting       Health Maintenance Due   Topic Date Due    DTaP/Tdap/Td  (1 - Tdap) 1983    Stool testing for trace blood  2012    Cervical Cancer Screening  2015    Breast Cancer Screening  2017    Flu Vaccine  2018       Ms. Mohan Faith has a reminder for a \"due or due soon\" health maintenance. I have asked that she discuss this further with her primary care provider for follow-up on this health maintenance. Coordination of Care Questionnaire:  :     1) Have you been to an emergency room, urgent care clinic since your last visit? no   Hospitalized since your last visit? no             2) Have you seen or consulted any other health care providers outside of [address] [place] [address] since your last visit? no  (Include any pap smears or colon screenings in this section.)    3) In the event something were to happen to you and you were unable to speak on your behalf, do you have an Advance Directive/ Living Will in place stating your wishes? NO    Do you have an Advance Directive on file? no      4) Are you interested in receiving information on Advance Directives? NO    Patient is accompanied by self I have received verbal consent from Gabby Martinez to discuss any/all medical information while they are present in the room.

## 2018-09-06 NOTE — MR AVS SNAPSHOT
[address] [facility] 
[phone] Patient: Nicole Koenig MRN: [id number] PYM:[birth year] Visit Information Date & Time Provider Department Dept. Phone Encounter #  
 9/6/2018  1:30 PM Lety Bravo, [address] [facility] [phone] [id number] Follow-up Instructions Return for follow up pending labs and annual.  
  
Your Appointments 9/6/2018  1:30 PM  
ROUTINE CARE with Lety Bravo MD  
[facility] Appt Note: Routine F/U & discuss Mastectomy pain, limitations, poss. disability//BAM-9/4/18; 09/05/18-CONFIRMED APPT. ... [facility] [address] [place] [address] [facility] [id number]  
  
   
 [address] [facility] [address] [facility]  
  
    
 9/17/2018  1:30 PM  
Follow Up with [facility] Irma Mariee MD  
[address] at [facility]) Appt Note: 6 month follow up Cp$0 2/19/18 TT; 6 month follow up Cp?/ST pt rescheduled on 8/20/18  
 [address] [facility] [address]  
  
   
 [place] [address] Upcoming Health Maintenance Date Due DTaP/Tdap/Td series (1 - Tdap) 6/1/1983 FOBT Q 1 YEAR AGE 50-75 6/1/2012 PAP AKA CERVICAL CYTOLOGY 3/28/2015 BREAST CANCER SCRN MAMMOGRAM 4/9/2017 Influenza Age 5 to Adult 8/1/2018 Allergies as of 9/6/2018  Review Complete On: 9/6/2018 By: Bernadine Reis LPN Severity Noted Reaction Type Reactions Penicillins  04/25/2012   Systemic Hives Pt states I am not allergic to penicillin Percocet [Oxycodone-acetaminophen]  11/23/2012    Itching Sulfa (Sulfonamide Antibiotics)  04/25/2012   Systemic Other (comments) Mouth blistering Current Immunizations  Reviewed on 9/6/2018 Name Date Tdap 9/6/2018  Reviewed by Bernadine Reis LPN on 6/0/1535 at  3:16 PM  
 You Were Diagnosed With   
  
 Codes Comments Hypothyroidism due to acquired atrophy of thyroid    -  Primary ICD-10-CM: E03.4 ICD-9-CM: 244.8, 246.8 Vitamin D deficiency     ICD-10-CM: E55.9 ICD-9-CM: 268.9 Encounter for immunization     ICD-10-CM: R21 ICD-9-CM: V03.89 Routine medical exam     ICD-10-CM: Z00.00 ICD-9-CM: V70.0 Mixed hyperlipidemia     ICD-10-CM: E78.2 ICD-9-CM: 272.2 Screening for colon cancer     ICD-10-CM: Z12.11 ICD-9-CM: V76.51 Vitals BP Pulse Temp Resp Height(growth percentile) Weight(growth percentile) 117/76 (BP 1 Location: Left arm, BP Patient Position: Sitting) 73 98.5 °F (36.9 °C) (Oral) 18 5' 7\" (1.702 m) 216 lb 9.6 oz (98.2 kg) SpO2 BMI OB Status Smoking Status 98% 33.92 kg/m2 Hysterectomy Former Smoker BMI and BSA Data Body Mass Index Body Surface Area  
 33.92 kg/m 2 2.15 m 2 Preferred Pharmacy Pharmacy Name Phone [facility]/PHARMACY #[id number] [facility] [phone] Your Updated Medication List  
  
   
This list is accurate as of 9/6/18  1:20 PM.  Always use your most recent med list.  
  
  
  
  
 atorvastatin 20 mg tablet Commonly known as:  LIPITOR Take 1 Tab by mouth daily. ergocalciferol 50,000 unit capsule Commonly known as:  ERGOCALCIFEROL Take one capsule twice a week  
  
 fluticasone 50 mcg/actuation nasal spray Commonly known as:  [facility] 2 Sprays by Both Nostrils route daily. gabapentin 300 mg capsule Commonly known as:  NEURONTIN  
TAKE 1 C PO   TID  
  
 levothyroxine 125 mcg tablet Commonly known as:  SYNTHROID  
take 2 tablets by mouth every morning before BREAKFAST FOR THYROID REPLACEMENT AS DIRECTED  
  
 meclizine 25 mg tablet Commonly known as:  ANTIVERT Take 1/2-1 tablets three times a day as needed for vertigo. May cause sedation. nabumetone 750 mg tablet Commonly known as:  RELAFEN Take 1 Tab by mouth two (2) times daily as needed for Pain. Prescriptions Sent to Pharmacy Refills  
 levothyroxine (SYNTHROID) 125 mcg tablet 11 Sig: take 2 tablets by mouth every morning before BREAKFAST FOR THYROID REPLACEMENT AS DIRECTED Class: Normal  
 Pharmacy: [facility]/pharmacy #[id number]- [id number] [facility] Ph #: [phone]  
 gabapentin (NEURONTIN) 300 mg capsule 11 Sig: TAKE 1 C PO   TID Class: Normal  
 Pharmacy: [facility]/pharmacy #[id number]- [id number] [facility] Ph #: [phone]  
 fluticasone (FLONASE) 50 mcg/actuation nasal spray 5 Si Sprays by Both Nostrils route daily. Class: Normal  
 Pharmacy: [place] [address], [address] [place] [address] Ph #: [phone] Route: Both Nostrils We Performed the Following REFERRAL TO GASTROENTEROLOGY [PZA57 Custom] TETANUS, DIPHTHERIA TOXOIDS AND ACELLULAR PERTUSSIS VACCINE (TDAP), IN INDIVIDS. >=7, IM [id number] CPT(R)] Follow-up Instructions Return for follow up pending labs and annual.  
  
To-Do List   
 2018 Lab:  CBC W/O DIFF   
  
 2018 Lab:  LIPID PANEL   
  
 2018 Lab:  METABOLIC PANEL, COMPREHENSIVE   
  
 2018 Lab:  TSH 3RD GENERATION   
  
 2018 Lab:  URINALYSIS W/ RFLX MICROSCOPIC   
  
 2018 Lab:  VITAMIN D, 25 HYDROXY   
  
 2018 8:30 AM  
  Appointment with Chris Wilburn PT at [address] ([phone]) Referral Information Referral ID Referred By Referred To  
  
 [id number] [facility] [address] [facility] 202 [place], 200 [facility] Visits Status Start Date End Date 1 New Request 18  If your referral has a status of pending review or denied, additional information will be sent to support the outcome of this decision. Introducing [facility] HE[facility]! [employer] introduces [employer] patient portal. Now you can access parts of your medical record, email your doctor's office, and request medication refills online. 1. In your internet browser, go to [email or web address] 2. Click on the First Time User? Click Here link in the Sign In box. You will see the New Member Sign Up page. 3. Enter your [employer] Access Code exactly as it appears below. You will not need to use this code after youve completed the sign-up process. If you do not sign up before the expiration date, you must request a new code. · [employer] Access Code: [email or web address] Expires: 12/5/2018  1:18 PM 
 
4. Enter the last four digits of your Social Security Number (xxxx) and Date of Birth (mm/dd/yyyy) as indicated and click Submit. You will be taken to the next sign-up page. 5. Create a [employer] ID. This will be your [employer] login ID and cannot be changed, so think of one that is secure and easy to remember. 6. Create a [employer] password. You can change your password at any time. 7. Enter your Password Reset Question and Answer. This can be used at a later time if you forget your password. 8. Enter your e-mail address. You will receive e-mail notification when new information is available in [address] 9. Click Sign Up. You can now view and download portions of your medical record. 10. Click the Download Summary menu link to download a portable copy of your medical information. If you have questions, please visit the Frequently Asked Questions section of the [employer] website. Remember, [employer] is NOT to be used for urgent needs. For medical emergencies, dial 911. Now available from your iPhone and Android! Please provide this summary of care documentation to your next provider. Your primary care clinician is listed as Geno Correa.  If you have any questions after today's visit, please call [phone].

## 2018-09-06 NOTE — PROGRESS NOTES
Carmina Vazquez is a 66 y.o. female who presents for follow up. Last seen May 2017    Patient continues to report chest wall pain since bilateral radical mastectomy in 2015. Has had lymphedema and has going to therapy. Has had aquatic therapy. Has tried NSAIDS without relief. Prior hydrocodone and did not pass UDS, no medication detected on repeated testing. Saw DR. Marly Lobato in February, recommended heat, NSAIDS for her pain. Followed by Dr. Kami Sanchez, oncology, for breast cancer history. No hormonal therapy, not tolerated. Taking gabapentin 300mg TID, for \"chest wall spasms\", helpful. Not working. She feels that she cannot work due to her ongoing chest wall pain and is applying for disability. Not consistent with thyroid medication. Has levothyroxine 125mcg 2 tablets a day from Dr. Emily Arzola since 2015. Taking 3 days a week. Weight gain 207# in may 2017 to 216#. Denies fatigue. Reports that she is very active. Resumed school 9:30am to 2pm. Caring for father.      Not compliant with cholesterol medication, atorvastatin, for 3 months. She states that she has no side effects, but forgets to take it. Cholesterol 531, . Is trying to follow diet.       Vitamin D was very low, 6.5. Took loading dose of vitamin, off vitamin D now. Increased dietary vitamin D. Distant colonoscopy.        Past Medical History:   Diagnosis Date    Arthritis     Cancer [facility])     breast cancer    H/O total mastectomy 11/30/2015    Hypercholesterolemia     Other ill-defined conditions(799.89)     completed radiation 9/12    Thyroid disease     hypothyroidism       Family History   Problem Relation Age of Onset    Cancer Mother      brain cancer    No Known Problems Father     Breast Cancer Sister 54    No Known Problems Brother     Thyroid Disease Maternal Grandmother     MS Sister     Breast Cancer Maternal Aunt      60-70's       Social History     Social History    Marital status: SINGLE     Spouse name: N/A    Number of children: N/A    Years of education: N/A     Occupational History    Not on file. Social History Main Topics    Smoking status: Former Smoker     Packs/day: 0.50     Quit date: 1/28/2015    Smokeless tobacco: Never Used    Alcohol use Yes      Comment: occasional    Drug use: No    Sexual activity: Not Currently     Partners: Male     Birth control/ protection: None     Other Topics Concern    Not on file     Social History Narrative       Current Outpatient Prescriptions on File Prior to Visit   Medication Sig Dispense Refill    meclizine (ANTIVERT) 25 mg tablet Take 1/2-1 tablets three times a day as needed for vertigo. May cause sedation. 30 Tab 3    atorvastatin (LIPITOR) 20 mg tablet Take 1 Tab by mouth daily. 90 Tab 3    [DISCONTINUED] gabapentin (NEURONTIN) 300 mg capsule TAKE 1 C PO   TID  10    nabumetone (RELAFEN) 750 mg tablet Take 1 Tab by mouth two (2) times daily as needed for Pain. 60 Tab 2    ergocalciferol (ERGOCALCIFEROL) 50,000 unit capsule Take one capsule twice a week 8 Cap 2     No current facility-administered medications on file prior to visit. Review of Systems  Pertinent items are noted in HPI. Objective:     Visit Vitals    /76 (BP 1 Location: Left arm, BP Patient Position: Sitting)    Pulse 73    Temp 98.5 °F (36.9 °C) (Oral)    Resp 18    Ht 5' 7\" (1.702 m)    Wt 216 lb 9.6 oz (98.2 kg)    SpO2 98%    BMI 33.92 kg/m2     Gen: well appearing female  HEENT:   PERRL,normal conjunctiva. External ear and canals normal, TMs no opacification or erythema,  OP no erythema, no exudates, MMM  Neck:  No masses or LAD  Resp:  No wheezing, no rhonchi, no rales. CV:  RRR, normal S1S2, no murmur. GI: soft, nontender, without masses. No hepatosplenomegaly. Extrem:  +2 pulses, no edema, warm distally      Assessment/Plan:       ICD-10-CM ICD-9-CM    1.  Hypothyroidism due to acquired atrophy of thyroid E03.4 244.8 levothyroxine (SYNTHROID) 125 mcg tablet     246.8 TSH 3RD GENERATION   2. Vitamin D deficiency E55.9 268.9 VITAMIN D, 25 HYDROXY   3. Encounter for immunization Z23 V03.89 TETANUS, DIPHTHERIA TOXOIDS AND ACELLULAR PERTUSSIS VACCINE (TDAP), IN INDIVIDS. >=7, IM   4. Routine medical exam F37.39 U00.6 METABOLIC PANEL, COMPREHENSIVE      CBC W/O DIFF      LIPID PANEL      VITAMIN D, 25 HYDROXY      URINALYSIS W/ RFLX MICROSCOPIC      TSH 3RD GENERATION   5. Mixed hyperlipidemia E78.2 272.2 LIPID PANEL   6. Screening for colon cancer Z12.11 V76.51 REFERRAL TO GASTROENTEROLOGY   Recommend taking thyroid medication daily on empty stomach and regular follow up. Recommend AHA diet. Continue statin therapy.      Follow-up Disposition:  Return for follow up pending labs and annual.    Tawnya Rao MD

## 2018-09-14 NOTE — PATIENT INSTRUCTIONS
Body Mass Index: Care Instructions  Your Care Instructions    Body mass index (BMI) can help you see if your weight is raising your risk for health problems. It uses a formula to compare how much you weigh with how tall you are. · A BMI lower than 18.5 is considered underweight. · A BMI between 18.5 and 24.9 is considered healthy. · A BMI between 25 and 29.9 is considered overweight. A BMI of 30 or higher is considered obese. If your BMI is in the normal range, it means that you have a lower risk for weight-related health problems. If your BMI is in the overweight or obese range, you may be at increased risk for weight-related health problems, such as high blood pressure, heart disease, stroke, arthritis or joint pain, and diabetes. If your BMI is in the underweight range, you may be at increased risk for health problems such as fatigue, lower protection (immunity) against illness, muscle loss, bone loss, hair loss, and hormone problems. BMI is just one measure of your risk for weight-related health problems. You may be at higher risk for health problems if you are not active, you eat an unhealthy diet, or you drink too much alcohol or use tobacco products. Follow-up care is a key part of your treatment and safety. Be sure to make and go to all appointments, and call your doctor if you are having problems. It's also a good idea to know your test results and keep a list of the medicines you take. How can you care for yourself at home? · Practice healthy eating habits. This includes eating plenty of fruits, vegetables, whole grains, lean protein, and low-fat dairy. · If your doctor recommends it, get more exercise. Walking is a good choice. Bit by bit, increase the amount you walk every day. Try for at least 30 minutes on most days of the week. · Do not smoke. Smoking can increase your risk for health problems. If you need help quitting, talk to your doctor about stop-smoking programs and medicines. These can increase your chances of quitting for good. · Limit alcohol to 2 drinks a day for men and 1 drink a day for women. Too much alcohol can cause health problems. If you have a BMI higher than 25  · Your doctor may do other tests to check your risk for weight-related health problems. This may include measuring the distance around your waist. A waist measurement of more than 40 inches in men or 35 inches in women can increase the risk of weight-related health problems. · Talk with your doctor about steps you can take to stay healthy or improve your health. You may need to make lifestyle changes to lose weight and stay healthy, such as changing your diet and getting regular exercise. If you have a BMI lower than 18.5  · Your doctor may do other tests to check your risk for health problems. · Talk with your doctor about steps you can take to stay healthy or improve your health. You may need to make lifestyle changes to gain or maintain weight and stay healthy, such as getting more healthy foods in your diet and doing exercises to build muscle. Where can you learn more? Go to http://brynn-shivani.info/. Enter S176 in the search box to learn more about \"Body Mass Index: Care Instructions. \"  Current as of: October 13, 2016  Content Version: 11.4  © 3696-6633 Healthwise, Incorporated. Care instructions adapted under license by [employer] (which disclaims liability or warranty for this information). If you have questions about a medical condition or this instruction, always ask your healthcare professional. [address] any warranty or liability for your use of this information.

## 2018-10-01 ENCOUNTER — OFFICE VISIT (OUTPATIENT)
Dept: SURGERY | Age: 58
End: 2018-10-01

## 2018-10-01 VITALS
HEART RATE: 80 BPM | DIASTOLIC BLOOD PRESSURE: 78 MMHG | BODY MASS INDEX: 33.43 KG/M2 | WEIGHT: 213 LBS | SYSTOLIC BLOOD PRESSURE: 134 MMHG | HEIGHT: 67 IN

## 2018-10-01 DIAGNOSIS — Z85.3 HISTORY OF BREAST CANCER: Primary | ICD-10-CM

## 2018-10-01 DIAGNOSIS — M94.0 COSTOCHONDRITIS: ICD-10-CM

## 2018-10-01 DIAGNOSIS — Z90.13 H/O MASTECTOMY, BILATERAL: ICD-10-CM

## 2018-10-01 NOTE — MR AVS SNAPSHOT
[address] [facility] G1 [address] [place] [address] 
[phone] Patient: Shaylee Henriquez MRN: [id number] Visit Information Date & Time Provider Department Dept. Phone Encounter #  
 10/1/2018  3:55  MD [address] at [facility]  Upcoming Health Maintenance Date Due Shingrix Vaccine Age 50> (1 of 2) 6/1/2012 FOBT Q 1 YEAR AGE 50-75 6/1/2012 PAP AKA CERVICAL CYTOLOGY 3/28/2015 BREAST CANCER SCRN MAMMOGRAM 4/9/2017 Influenza Age 5 to Adult 3/31/2019* DTaP/Tdap/Td series (2 - Td) 9/6/2028 *Topic was postponed. The date shown is not the original due date. Allergies as of 10/1/2018  Review Complete On: 10/1/2018 By: Nely Morris RN Severity Noted Reaction Type Reactions Penicillins  04/25/2012   Systemic Hives Pt states I am not allergic to penicillin Percocet [Oxycodone-acetaminophen]  11/23/2012    Itching Sulfa (Sulfonamide Antibiotics)  04/25/2012   Systemic Other (comments) Mouth blistering Current Immunizations  Reviewed on 9/6/2018 Name Date Tdap 9/6/2018 Not reviewed this visit Vitals BP Pulse Height(growth percentile) Weight(growth percentile) BMI OB Status 134/78 80 5' 7\" (1.702 m) 213 lb (96.6 kg) 33.36 kg/m2 Hysterectomy Smoking Status Former Smoker BMI and BSA Data Body Mass Index Body Surface Area  
 33.36 kg/m 2 2.14 m 2 Preferred Pharmacy Pharmacy Name Phone [id number] [phone] Your Updated Medication List  
  
   
This list is accurate as of 10/1/18  4:49 PM.  Always use your most recent med list.  
  
  
  
  
 atorvastatin 20 mg tablet Commonly known as:  LIPITOR Take 1 Tab by mouth daily. ergocalciferol 50,000 unit capsule Commonly known as:  ERGOCALCIFEROL Take one capsule twice a week  
  
 fluticasone 50 mcg/actuation nasal spray Commonly known as:  [place] 2 Sprays by Both Nostrils route daily. gabapentin 300 mg capsule Commonly known as:  NEURONTIN  
TAKE 1 C PO   TID  
  
 levothyroxine 125 mcg tablet Commonly known as:  SYNTHROID  
take 2 tablets by mouth every morning before BREAKFAST FOR THYROID REPLACEMENT AS DIRECTED  
  
 meclizine 25 mg tablet Commonly known as:  ANTIVERT Take 1/2-1 tablets three times a day as needed for vertigo. May cause sedation. nabumetone 750 mg tablet Commonly known as:  RELAFEN Take 1 Tab by mouth two (2) times daily as needed for Pain. To-Do List   
 12/04/2018 8:30 AM  
  Appointment with Kayy Prescott PT at [address] ([phone]) Patient Instructions Breast Cancer: Care Instructions Your Care Instructions Breast cancer occurs when abnormal cells grow out of control in the breast. These cancer cells can spread within the breast, to nearby lymph nodes and other tissues, and to other parts of the body. Being treated for cancer can weaken your body, and you may feel very tired. Get the rest your body needs so you can feel better. Finding out that you have cancer is scary. You may feel many emotions and may need some help coping. Seek out family, friends, and counselors for support. You also can do things at home to make yourself feel better while you go through treatment. Call the [employer] (0-567.312.5647) or visit its website at [employer] [employer]. Videodeclasse.com for more information. Follow-up care is a key part of your treatment and safety. Be sure to make and go to all appointments, and call your doctor if you are having problems. It's also a good idea to know your test results and keep a list of the medicines you take. How can you care for yourself at home? · Take your medicines exactly as prescribed. Call your doctor if you think you are having a problem with your medicine. You may get medicine for nausea and vomiting if you have these side effects. · Follow your doctor's instructions to relieve pain. Pain from cancer and surgery can almost always be controlled. Use pain medicine when you first notice pain, before it becomes severe. · Eat healthy food. If you do not feel like eating, try to eat food that has protein and extra calories to keep up your strength and prevent weight loss. Drink liquid meal replacements for extra calories and protein. Try to eat your main meal early. · Get some physical activity every day, but do not get too tired. Keep doing the hobbies you enjoy as your energy allows. · Do not smoke. Smoking can make your cancer worse. If you need help quitting, talk to your doctor about stop-smoking programs and medicines. These can increase your chances of quitting for good. · Take steps to control your stress and workload. Learn relaxation techniques. ¨ Share your feelings. Stress and tension affect our emotions. By expressing your feelings to others, you may be able to understand and cope with them. ¨ Consider joining a support group. Talking about a problem with your spouse, a good friend, or other people with similar problems is a good way to reduce tension and stress. ¨ Express yourself through art. Try writing, crafts, dance, or art to relieve stress. Some dance, writing, or art groups may be available just for people who have cancer. ¨ Be kind to your body and mind. Getting enough sleep, eating a healthy diet, and taking time to do things you enjoy can contribute to an overall feeling of balance in your life and can help reduce stress. ¨ Get help if you need it. Discuss your concerns with your doctor or counselor. · If you are vomiting or have diarrhea: ¨ Drink plenty of fluids (enough so that your urine is light yellow or clear like water) to prevent dehydration. Choose water and other caffeine-free clear liquids. If you have kidney, heart, or liver disease and have to limit fluids, talk with your doctor before you increase the amount of fluids you drink. ¨ When you are able to eat, try clear soups, mild foods, and liquids until all symptoms are gone for 12 to 48 hours. Other good choices include dry toast, crackers, cooked cereal, and gelatin dessert, such as Jell-O. · If you have not already done so, prepare a list of advance directives. Advance directives are instructions to your doctor and family members about what kind of care you want if you become unable to speak or express yourself. When should you call for help? Call 911 anytime you think you may need emergency care. For example, call if: 
  · You passed out (lost consciousness).  
 Call your doctor now or seek immediate medical care if: 
  · You have a fever.  
  · You have abnormal bleeding.  
  · You think you have an infection.  
  · You have new or worse pain.  
  · You have new symptoms, such as a cough, belly pain, vomiting, diarrhea, or a rash.  
 Watch closely for changes in your health, and be sure to contact your doctor if: 
  · You are much more tired than usual.  
  · You have swollen glands in your armpits, groin, or neck.  
  · You do not get better as expected. Where can you learn more? Go to http://brynn-shivani.info/. Enter V321 in the search box to learn more about \"Breast Cancer: Care Instructions. \" Current as of: May 12, 2017 Content Version: 11.7 © 8878-7516 [employer]. Care instructions adapted under license by [employer] (which disclaims liability or warranty for this information). If you have questions about a medical condition or this instruction, always ask your healthcare professional. [address] any warranty or liability for your use of this information. Introducing [facility]! [facility] introduces ContentWatch patient portal. Now you can access parts of your medical record, email your doctor's office, and request medication refills online. 1. In your internet browser, go to [email or web address] "[employer] 2. Click on the First Time User? Click Here link in the Sign In box. You will see the New Member Sign Up page. 3. Enter your ContentWatch Access Code exactly as it appears below. You will not need to use this code after youve completed the sign-up process. If you do not sign up before the expiration date, you must request a new code. · ContentWatch Access Code: [email or web address] Expires: 12/5/2018  1:18 PM 
 
4. Enter the last four digits of your Social Security Number (xxxx) and Date of Birth (mm/dd/yyyy) as indicated and click Submit. You will be taken to the next sign-up page. 5. Create a ContentWatch ID. This will be your ContentWatch login ID and cannot be changed, so think of one that is secure and easy to remember. 6. Create a ContentWatch password. You can change your password at any time. 7. Enter your Password Reset Question and Answer. This can be used at a later time if you forget your password. 8. Enter your e-mail address. You will receive e-mail notification when new information is available in [address] 9. Click Sign Up. You can now view and download portions of your medical record. 10. Click the Download Summary menu link to download a portable copy of your medical information. If you have questions, please visit the Frequently Asked Questions section of the ContentWatch website. Remember, ContentWatch is NOT to be used for urgent needs. For medical emergencies, dial 911. Now available from your iPhone and Android! Please provide this summary of care documentation to your next provider. Your primary care clinician is listed as Melody Mazariegos. If you have any questions after today's visit, please call [phone].

## 2018-10-01 NOTE — PATIENT INSTRUCTIONS
Breast Cancer: Care Instructions  Your Care Instructions    Breast cancer occurs when abnormal cells grow out of control in the breast. These cancer cells can spread within the breast, to nearby lymph nodes and other tissues, and to other parts of the body. Being treated for cancer can weaken your body, and you may feel very tired. Get the rest your body needs so you can feel better. Finding out that you have cancer is scary. You may feel many emotions and may need some help coping. Seek out family, friends, and counselors for support. You also can do things at home to make yourself feel better while you go through treatment. Call the [facility] (7-756.684.9247) or visit its website at [employer] for more information. Follow-up care is a key part of your treatment and safety. Be sure to make and go to all appointments, and call your doctor if you are having problems. It's also a good idea to know your test results and keep a list of the medicines you take. How can you care for yourself at home? · Take your medicines exactly as prescribed. Call your doctor if you think you are having a problem with your medicine. You may get medicine for nausea and vomiting if you have these side effects. · Follow your doctor's instructions to relieve pain. Pain from cancer and surgery can almost always be controlled. Use pain medicine when you first notice pain, before it becomes severe. · Eat healthy food. If you do not feel like eating, try to eat food that has protein and extra calories to keep up your strength and prevent weight loss. Drink liquid meal replacements for extra calories and protein. Try to eat your main meal early. · Get some physical activity every day, but do not get too tired. Keep doing the hobbies you enjoy as your energy allows. · Do not smoke. Smoking can make your cancer worse. If you need help quitting, talk to your doctor about stop-smoking programs and medicines.  These can increase your chances of quitting for good. · Take steps to control your stress and workload. Learn relaxation techniques. ¨ Share your feelings. Stress and tension affect our emotions. By expressing your feelings to others, you may be able to understand and cope with them. ¨ Consider joining a support group. Talking about a problem with your spouse, a good friend, or other people with similar problems is a good way to reduce tension and stress. ¨ Express yourself through art. Try writing, crafts, dance, or art to relieve stress. Some dance, writing, or art groups may be available just for people who have cancer. ¨ Be kind to your body and mind. Getting enough sleep, eating a healthy diet, and taking time to do things you enjoy can contribute to an overall feeling of balance in your life and can help reduce stress. ¨ Get help if you need it. Discuss your concerns with your doctor or counselor. · If you are vomiting or have diarrhea:  ¨ Drink plenty of fluids (enough so that your urine is light yellow or clear like water) to prevent dehydration. Choose water and other caffeine-free clear liquids. If you have kidney, heart, or liver disease and have to limit fluids, talk with your doctor before you increase the amount of fluids you drink. ¨ When you are able to eat, try clear soups, mild foods, and liquids until all symptoms are gone for 12 to 48 hours. Other good choices include dry toast, crackers, cooked cereal, and gelatin dessert, such as Jell-O.  · If you have not already done so, prepare a list of advance directives. Advance directives are instructions to your doctor and family members about what kind of care you want if you become unable to speak or express yourself. When should you call for help? Call 911 anytime you think you may need emergency care.  For example, call if:    · You passed out (lost consciousness).    Call your doctor now or seek immediate medical care if:    · You have a fever.     · You have abnormal bleeding.     · You think you have an infection.     · You have new or worse pain.     · You have new symptoms, such as a cough, belly pain, vomiting, diarrhea, or a rash.    Watch closely for changes in your health, and be sure to contact your doctor if:    · You are much more tired than usual.     · You have swollen glands in your armpits, groin, or neck.     · You do not get better as expected. Where can you learn more? Go to http://brynn-shivani.info/. Enter V321 in the search box to learn more about \"Breast Cancer: Care Instructions. \"  Current as of: May 12, 2017  Content Version: 11.7  © 8676-7263 [employer]. Care instructions adapted under license by [employer] (which disclaims liability or warranty for this information). If you have questions about a medical condition or this instruction, always ask your healthcare professional. [address] any warranty or liability for your use of this information.

## 2018-10-01 NOTE — PROGRESS NOTES
HISTORY OF PRESENT ILLNESS  Tu Celaya is a 66 y.o. female. HPI ESTABLISHED patient here for 6 month follow up for breast cancer . She has a history of RIGHT breast cancer in 2012. Had a lumpectomy and xrt in 2012. Took antihormonal pill for 3 years. RIGHT breast cancer recurred in 2015. She then had bilateral mastectomies. No reconstruction. Dr. Linda Morgan was her breast surgeon and Dr. Jolynn Arriola was her med onc. She presents today with complaints of bilateral axilla and shoulder pain. She was being seen by lymphedema clinic and was improving until she started back going to school and carrying books and missing her 6 month follow up with lymphedema clinic. She is concerned with what type of job she will ever have with her pain issues in her breast and axilla areas. FH:   - Sister diagnosed with breast cancer at age 54. - Maternal aunt diagnosed with breast cancer in her 57-65's  Review of Systems   All other systems reviewed and are negative. Physical Exam   Pulmonary/Chest: Right breast exhibits tenderness. Left breast exhibits tenderness. Bilateral breasts surgically absent  No masses  No adenopathy  No lymphedema. Nursing note and vitals reviewed. ASSESSMENT and PLAN    ICD-10-CM ICD-9-CM    1. History of breast cancer Z85.3 V10.3    2. H/O mastectomy, bilateral Z90.13 V45.71    3. Costochondritis M94.0 733.6      - pain is musculoskeletal  - recommend heat, nsaids, restart PT.   - f/u in 6 months.

## 2018-10-09 PROBLEM — M94.0 COSTOCHONDRITIS: Status: ACTIVE | Noted: 2018-10-09

## 2018-10-15 ENCOUNTER — TELEPHONE (OUTPATIENT)
Dept: INTERNAL MEDICINE CLINIC | Age: 58
End: 2018-10-15

## 2018-10-15 DIAGNOSIS — Z90.13 H/O MASTECTOMY, BILATERAL: ICD-10-CM

## 2018-10-15 DIAGNOSIS — M94.0 COSTOCHONDRITIS: Primary | ICD-10-CM

## 2018-10-15 NOTE — TELEPHONE ENCOUNTER
#[phone] pt would like a cpe sooner than what is available. Please call to schedule. Pt will be in class until 3 pm.  Please call AFTER 3 PM.  Thanks so much.

## 2018-10-15 NOTE — TELEPHONE ENCOUNTER
Pt returning call to Zayda Villegas.  Best contact [phone]         Message received & copied from Josie Allen

## 2018-10-17 NOTE — TELEPHONE ENCOUNTER
Pt requests to reschedule physical that was scheduled for tomorrow for Friday or the next available day this month. Best contact number is [phone].        Message received & copied from [facility]

## 2018-10-17 NOTE — TELEPHONE ENCOUNTER
Spoke with patient. Two pt identifiers confirmed. Patient scheduled to see Dr. Quintin Goodwin on 10/25/18 at 11:45am.  Patient advised if anything changes or if unable to keep this appointment to call the office  Pt verbalized understanding of information discussed w/ no further questions at this time.

## 2018-10-23 ENCOUNTER — NURSE NAVIGATOR (OUTPATIENT)
Dept: ONCOLOGY | Age: 58
End: 2018-10-23

## 2018-10-23 ENCOUNTER — HOSPITAL ENCOUNTER (OUTPATIENT)
Dept: PHYSICAL THERAPY | Age: 58
Discharge: HOME | End: 2018-10-23
Payer: MEDICAID

## 2018-10-23 VITALS — WEIGHT: 216.6 LBS | HEIGHT: 67 IN | BODY MASS INDEX: 34 KG/M2

## 2018-10-23 PROCEDURE — 97140 MANUAL THERAPY 1/> REGIONS: CPT

## 2018-10-23 PROCEDURE — 97161 PT EVAL LOW COMPLEX 20 MIN: CPT

## 2018-10-23 PROCEDURE — 97110 THERAPEUTIC EXERCISES: CPT

## 2018-10-23 NOTE — PROGRESS NOTES
[address] [place] [address]  Physical Therapy Lymphedema Clinic  [address] [facility], [address], [facility] 22.    INITIAL EVALUATION    NAME: Nader Bazzi AGE: 66 y.o. 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 Patient was treated in the [facility] from 3/9/2018 until 6/5/2018 for bilateral chest and axillary swelling and intermittent proximal R UE swelling for the past 2 years. Patient returns to the clinic today with increased R > L axillary swelling and right anterior chest pain with activity and UE movement. 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  Patient reports that she has been inconsistent with use of the vaso-pneumatic pump and compression garments for the several weeks. Lymphedema Life Impact Scale: Score of 38 and impairment percentage of 56%. See scanned document.   Past Medical History:   Past Medical History:   Diagnosis Date    Arthritis     Cancer ([facility] [address]     breast cancer    H/O total mastectomy 11/30/2015    Hypercholesterolemia     Other ill-defined conditions(799.89)     completed radiation 9/12    Thyroid disease     hypothyroidism     Past Surgical History:   Procedure Laterality Date    HX BREAST LUMPECTOMY Right 2012    HX GYN hysterectomy; TBL    HX HEENT      tonsillectomy    HX MASTECTOMY Bilateral 2015    HX ORTHOPAEDIC      right knee ACL    HX OTHER SURGICAL      lumptectomy right breast     Current Medications:    Current Outpatient Prescriptions   Medication Sig    gabapentin (NEURONTIN) 300 mg capsule TAKE 1 C PO   TID    levothyroxine (SYNTHROID) 125 mcg tablet Take 2 Tabs by mouth Daily (before breakfast). For thyroid replacement.  atorvastatin (LIPITOR) 20 mg tablet  Fluticasone (Flonase) 50 mcg spray  Nabumetone(Relafen) 750 mg tablet  Meclizine(Antivert) 25 mg tablet  Ergocalciferol 50,000 unit capsule Take 1 Tab by mouth daily. 2 sprays to the nostrils as needed  Take 1 tablet by mouth two times per day as needed  Take 1/2 to 1 tablet three times per day as needed  Take 1 tablet twice a week. No current facility-administered medications for this encounter. Allergies: Allergies   Allergen Reactions    Penicillins Hives     Pt states I am not allergic to penicillin    Percocet [Oxycodone-Acetaminophen] Itching    Sulfa (Sulfonamide Antibiotics) Other (comments)     Mouth blistering      Prior Level of Function/Social/Work History/Personal factors and/or comorbidities impacting plan of care: Patient lives in a second floor apartment with her niece. The niece will assist with household chores as the patient reports having difficulty with vacuuming and carrying a laundry basket. She has family in the area, including a father (diagnosed with stage IV cancer) and a sister. She is working part time at [employer] and going to school for a CNA certification. Patient does not drive. Living Situation: Lives with a niece in a second floor apartment. Approximately 15 steps with a rail to enter apartment. Trainable Caregiver?: Possibly the niece. Self-care/ADLs: Modified independent with ADL's - increased time for upper body dressing.  Difficulty with household chores, such as vacuuming, scrubbing the floors, and carrying the laundry basket. Mobility: Independent in the community. No falls. Sleeping Arrangement:  In a bed in supine or sidelying. Will occasionally prop her R UE on a pillow. Adaptive Equipment Owned: None   Other: N/A  Previous Therapy:  Previous treatment in the [address] [place] [address] and more recently in the [facility] from 3/9/2018 - 6/5/2018. Compression/Lymphedema Equipment:  Flexitouch Vasopneumatic Pump, Juzo Soft arm sleeve and gauntlet, Peterson Hernán bilateral mastectomy pad, Optiflow T pad, Wear Ease Compression Shirt. SUBJECTIVE:     Patients goals for therapy: \"Reduce swelling in my arm pits and have less pain across my chest\"    OBJECTIVE DATA SUMMARY:   EXAMINATION/PRESENTATION/DECISION MAKING:   Pain:  Pain Scale 1: Numeric (0 - 10)  Patient reports 0/10 pain at rest in the clinic. C/o stretching across the chest with exercise and right anterior chest is tender to the touch. Pain Intensity 1: 0 -        Self-Care and ADLs:  Grooming: Modified independent  Bathing: Modified independent    UB Dressing: Modified independent - increased time. LB Dressing: Modified independent    Don/Doff Shoes/Socks: Modified independent  Toileting: Modified independent    Other: N/A      Skin and Tissue Assessment:  Dermal Status:  [x]   Intact [x]  Dry - bilateral arms   []  Tenuous [] Flaky   []  Wound/lesion present [x]  Scars - bilateral chests. Healed.     []  Dermatitis    Texture/Consistency:  [x]  Boggy - bilateral axillary region R > L []  Pitting Edema   []  Brawny []  Combination   [x]  Fibrotic/Woody  - anterior chest    Pigmentation/Color Change:  [x]  Normal []  Hemosiderin   []  Red []  Erythematous   []  Hyperpigmented []  Hyperlipodermatosclerosis   Anomalies:  []  Lymphorrhea []  Vesicles   []  Petechiae []  Warty Vercusis   []  Bullae []  Papilloma   Circulatory:  []  ALICE []  Varicosities:   []  Pulses []  Vascular studies ruled out DVT in past   []  DVT History    Nails:  [x]   Normal  []   Fungus  Stemmers Sign: negative  Height:  Height: 5' 7\" (170.2 cm)  Weight:  Weight: 98.2 kg (216 lb 9.6 oz)   BMI:  BMI (calculated): 33.9  (36 or greater: adversely affecting lymphedema)  Wound/Ulcer:  N/A      Wound Pain:   N/A  Volumetric Measurements:   Right:  2809. 32 mL          (2852. 16 mL on 3/9/2018) Left: 2910. 12 mL (2879.37 mL on 3/9/2018)   % Difference: -3.46% Dominance: right handed   (See scanned graph)  Range of Motion: within functional limits all extremities with B shoulder flexion to approximately 180 degrees. Strength: Within functional limits. Sensation:    [] Intact    [x] Impaired - reports numbness in bilateral axillary regions but intact to light touch. Mobility:    Bed/Chair Mobility:  Independent  Transfers:  Independent    Sitting Balance:  Normal Standing Balance:  Good   Gait:  Independent in the community Wheelchair Mobility:  N/A   Endurance:  Good Stairs:  Modified independent - ascends/descends approximately 15 stairs with rail to her apartment. No difficulty per patient. Safety:  Patient is alert and oriented:  yes   Safety awareness:  Good   Fall Risk?:  Low   Patient given written fall prevention handout: Yes   Precautions:  Standard lymphedema precautions to include avoiding blood pressure readings, injections and IVs or other procedures/acts that could lead to broken skin on affected area, and avoiding excessive heat, resistive activity or altitude without compression garment       Based on the above components, the patient evaluation is determined to be of the following complexity level: LOW     Evaluation Time: 8:15 - 8:30 am    TREATMENT PROVIDED:   1. Treatment description:  Therapeutic Exercise and Procedure: Patient instructed in activity restrictions and exercise guidelines.   Continued education in importance of deep abdominal breathing and a remedial lymphedema range of motion exercise program to be done in sitting. Patient has been asked to complete breathing exercises and the decongestive exercise routine daily. Patient was educated in the \"T\" and \"Y\" stretch in supine and the butterfly stretch in sitting. Encouraged patient to initiate a walking program up to 25 minutes per day as tolerated. Treatment time:  9:25 - 9:35 am  Minutes: 10    2. Treatment description:  Manual Therapy: Patient instructed in skin care principles and anatomy of the lymphatic system. Therapist able to demonstrate manual lymphatic drainage techniques for the drainage of R > L axillary regions and R UE and skin care protocol: Continued education in daily skin care with a low Ph lotion using MLD technique. Continued education in the B UE MLD sequence with cervical techniques, axillary LNN, and  AAA omitted during the MLD portion of the treatment session. Continued education in prevention and treatment of skin injuries and signs and symptoms of cellulitis. Initiated discussion on compression system with patient instructed to be compliant with use of the Flexitouch vaso-pneumatic pump,  arm sleeve and gauntlet with high risk activities, and compression shirt with bilateral mastectomy pad vs optiflow T pad as tolerated for axillary swelling. Provided patient with 2 small chip bags for B axillary swelling to wear daily as tolerated as patient is concerned that the Optiflow T pad and the bilateral mastectomy pad are too bulky under her clothes. One piece of Y shaped kinesiotape was placed from below the right axilla toward the right groin with patient instructed in proper removal of kinesiotape with any adverse skin reactions. Kinesiotape has been applied during previous treatment in another Lymphedema Clinic without any skin issues per patient.     Treatment time:  8:30 - 9:25 am  Minutes: 55    ASSESSMENT:   Samanta Anne is a 66 y.o. female who presents with R > L axillary and chest lymphedema, stage 2, and UE lymphedema, stage 1, s/p bilateral mastectomies in 2075 complicated by discomfort and fibrosis in the anterior chest.  Patient will benefit from complete decongestive therapy (CDT) including manual lymphatic drainage (MLD) technique, short-stretch textile bandages/compression system to decongest limb, and kinesiotaping as appropriate. Patient will receive instruction in proper skin care to recognize signs/symptoms of and prevent infection, therapeutic exercise, and self-MLD for independent home program and restorative lymphatic performance. This care is medically necessary due to the infection risk with lymphedema and to improve functional activities. CDT is necessary to resolve swelling to allow patient to return to wearing normal clothes/footwear and prevent worsening of symptoms, such as venous stasis ulcerations, infections, or hospitalizations. Patient will be independent with home program strategies to allow improved ADL ability and mobility and to allow patient to return to greatest functional independence. Rehabilitation potential is considered to be Fair. Factors which may influence rehabilitation potential include scarring from mastectomies, chest wall tightness, noncompliance with vasopneumatic pump and compression products, etc.  Patient will benefit from 4 to 8 physical therapy visits over 12 weeks to optimize improvement in these areas. PLAN OF CARE:   Recommendations and Planned Interventions:  Manual lymph drainage/complete decongestive therapy  Multi-layer compression bandaging (short-stretch)  Compression garment fitting/provision  Lymphedema therapeutic exercise  AROM/PROM/Strength/Coordination  Self-care training  Functional mobility training  Education in skin care and lymphedema precautions  Self-MLD education per home program  Self-bandaging education per home program  Caregiver education as needed  Wound care as needed     GOALS  Short term goals  Time frame: to be met by 11/27/2018  1.   Patient will demonstrate knowledge of signs/symptoms of infections/cellulitis and be independent in skin care to prevent cellulitis. 2.  Patient will demonstrate independence in lymphedema home program of therapeutic exercises/stretches to improve circulation and decongest limb/axillary region to improve ADLs. 3.  Patient will participate in the selection process to allow ordering of home compression system  (daytime, nighttime garments and pump as needed) and/or be compliant with use of the home compression system. Long term goals  Time frame: to be met by 1/18/2018  1. Pt will show improvement in Lymphedema Life Impact Scale by decreasing impairment percentage to under 45% and thus allow improvement in patient's quality of life. 2.  Patient will be independent with don/doff of compression system and use in order to prevent reaccumulation of fluid at discharge. 3.  Pt will be independent in self-MLD and show stable limb volumes showing decongestion and pt. ready for transition to independent restorative phase of lymphedema therapy. Patient has participated in goal setting and agrees to work toward plan of care. Patient was instructed to call if questions or concerns arise. Thank you for this referral.  Emely Draper, PT, CLT   Time Calculation: 80 mins    TREATMENT PLAN EFFECTIVE DATES:   10/23/2018 TO 1/18/2018  I have read the above plan of care for Becca Chappell. I certify the above prescribed services are required by this patient and are medically necessary.   The above plan of care has been developed in conjunction with the lymphedema/physical therapist.       Physician Signature: ____________________________________Date:______________      Azeb Snider MD

## 2018-10-24 ENCOUNTER — DOCUMENTATION ONLY (OUTPATIENT)
Dept: SURGERY | Age: 58
End: 2018-10-24

## 2018-10-25 ENCOUNTER — OFFICE VISIT (OUTPATIENT)
Dept: INTERNAL MEDICINE CLINIC | Age: 58
End: 2018-10-25

## 2018-10-25 ENCOUNTER — CLINICAL SUPPORT (OUTPATIENT)
Dept: INTERNAL MEDICINE CLINIC | Age: 58
End: 2018-10-25

## 2018-10-25 VITALS
OXYGEN SATURATION: 100 % | BODY MASS INDEX: 33.97 KG/M2 | HEIGHT: 67 IN | SYSTOLIC BLOOD PRESSURE: 117 MMHG | RESPIRATION RATE: 16 BRPM | DIASTOLIC BLOOD PRESSURE: 71 MMHG | TEMPERATURE: 97.8 F | HEART RATE: 70 BPM | WEIGHT: 216.4 LBS

## 2018-10-25 VITALS
HEART RATE: 73 BPM | RESPIRATION RATE: 16 BRPM | BODY MASS INDEX: 33.9 KG/M2 | WEIGHT: 216 LBS | TEMPERATURE: 98.2 F | SYSTOLIC BLOOD PRESSURE: 114 MMHG | HEIGHT: 67 IN | OXYGEN SATURATION: 97 % | DIASTOLIC BLOOD PRESSURE: 75 MMHG

## 2018-10-25 DIAGNOSIS — E03.4 HYPOTHYROIDISM DUE TO ACQUIRED ATROPHY OF THYROID: ICD-10-CM

## 2018-10-25 DIAGNOSIS — Z11.9 SCREENING EXAMINATION FOR INFECTIOUS DISEASE: ICD-10-CM

## 2018-10-25 DIAGNOSIS — E78.2 MIXED HYPERLIPIDEMIA: ICD-10-CM

## 2018-10-25 DIAGNOSIS — Z11.1 SCREENING FOR TUBERCULOSIS: ICD-10-CM

## 2018-10-25 DIAGNOSIS — J00 ACUTE RHINITIS: ICD-10-CM

## 2018-10-25 DIAGNOSIS — Z23 ENCOUNTER FOR IMMUNIZATION: Primary | ICD-10-CM

## 2018-10-25 DIAGNOSIS — E55.9 VITAMIN D DEFICIENCY: ICD-10-CM

## 2018-10-25 DIAGNOSIS — Z00.00 ROUTINE MEDICAL EXAM: ICD-10-CM

## 2018-10-25 DIAGNOSIS — Z23 ENCOUNTER FOR IMMUNIZATION: ICD-10-CM

## 2018-10-25 DIAGNOSIS — Z00.00 ROUTINE MEDICAL EXAM: Primary | ICD-10-CM

## 2018-10-25 RX ORDER — LEVOTHYROXINE SODIUM 125 UG/1
TABLET ORAL
Qty: 180 TAB | Refills: 3 | Status: ON HOLD | OUTPATIENT
Start: 2018-10-25 | End: 2020-05-26 | Stop reason: CLARIF

## 2018-10-25 RX ORDER — ATORVASTATIN CALCIUM 20 MG/1
20 TABLET, FILM COATED ORAL DAILY
Qty: 90 TAB | Refills: 3 | Status: ON HOLD | OUTPATIENT
Start: 2018-10-25 | End: 2020-05-26 | Stop reason: CLARIF

## 2018-10-25 RX ORDER — FLUTICASONE PROPIONATE 50 MCG
2 SPRAY, SUSPENSION (ML) NASAL DAILY
Qty: 3 BOTTLE | Refills: 3 | Status: ON HOLD | OUTPATIENT
Start: 2018-10-25 | End: 2020-05-26 | Stop reason: CLARIF

## 2018-10-25 RX ORDER — GABAPENTIN 300 MG/1
CAPSULE ORAL
Qty: 270 CAP | Refills: 3 | Status: SHIPPED | OUTPATIENT
Start: 2018-10-25 | End: 2019-10-15 | Stop reason: DRUGHIGH

## 2018-10-25 RX ORDER — ERGOCALCIFEROL 1.25 MG/1
CAPSULE ORAL
Qty: 8 CAP | Refills: 2 | Status: CANCELLED | OUTPATIENT
Start: 2018-10-25

## 2018-10-25 NOTE — PROGRESS NOTES
After obtaining consent, and per verbal order from Dr. Yeimy Bingham, patient received influenza vaccine given by Emmie Nyhan, LPN in Left Deltoid. Influenza Vaccine 0.5 mL IM now. Patient was observed for 10 minutes post injection. Patient tolerated injection well.

## 2018-10-25 NOTE — PATIENT INSTRUCTIONS
Vaccine Information Statement    Influenza (Flu) Vaccine (Inactivated or Recombinant): What you need to know    Many Vaccine Information Statements are available in Faroese and other languages. See www.immunize.org/vis  Hojas de Información Sobre Vacunas están disponibles en Español y en muchos otros idiomas. Visite www.immunize.org/vis    1. Why get vaccinated? Influenza (flu) is a contagious disease that spreads around the [place] every year, usually between October and May. Flu is caused by influenza viruses, and is spread mainly by coughing, sneezing, and close contact. Anyone can get flu. Flu strikes suddenly and can last several days. Symptoms vary by age, but can include:   fever/chills   sore throat   muscle aches   fatigue   cough   headache    runny or stuffy nose    Flu can also lead to pneumonia and blood infections, and cause diarrhea and seizures in children. If you have a medical condition, such as heart or lung disease, flu can make it worse. Flu is more dangerous for some people. Infants and young children, people 72years of age and older, pregnant women, and people with certain health conditions or a weakened immune system are at greatest risk. Each year thousands of people in the [facility] die from flu, and many more are hospitalized. Flu vaccine can:   keep you from getting flu,   make flu less severe if you do get it, and   keep you from spreading flu to your family and other people. 2. Inactivated and recombinant flu vaccines    A dose of flu vaccine is recommended every flu season. Children 6 months through 6years of age may need two doses during the same flu season. Everyone else needs only one dose each flu season.        Some inactivated flu vaccines contain a very small amount of a mercury-based preservative called thimerosal. Studies have not shown thimerosal in vaccines to be harmful, but flu vaccines that do not contain thimerosal are available. There is no live flu virus in flu shots. They cannot cause the flu. There are many flu viruses, and they are always changing. Each year a new flu vaccine is made to protect against three or four viruses that are likely to cause disease in the upcoming flu season. But even when the vaccine doesnt exactly match these viruses, it may still provide some protection    Flu vaccine cannot prevent:   flu that is caused by a virus not covered by the vaccine, or   illnesses that look like flu but are not. It takes about 2 weeks for protection to develop after vaccination, and protection lasts through the flu season. 3. Some people should not get this vaccine    Tell the person who is giving you the vaccine:     If you have any severe, life-threatening allergies. If you ever had a life-threatening allergic reaction after a dose of flu vaccine, or have a severe allergy to any part of this vaccine, you may be advised not to get vaccinated. Most, but not all, types of flu vaccine contain a small amount of egg protein.  If you ever had Guillain-Barré Syndrome (also called GBS). Some people with a history of GBS should not get this vaccine. This should be discussed with your doctor.  If you are not feeling well. It is usually okay to get flu vaccine when you have a mild illness, but you might be asked to come back when you feel better. 4. Risks of a vaccine reaction    With any medicine, including vaccines, there is a chance of reactions. These are usually mild and go away on their own, but serious reactions are also possible. Most people who get a flu shot do not have any problems with it.      Minor problems following a flu shot include:    soreness, redness, or swelling where the shot was given     hoarseness   sore, red or itchy eyes   cough   fever   aches   headache   itching   fatigue  If these problems occur, they usually begin soon after the shot and last 1 or 2 days. More serious problems following a flu shot can include the following:     There may be a small increased risk of Guillain-Barré Syndrome (GBS) after inactivated flu vaccine. This risk has been estimated at 1 or 2 additional cases per million people vaccinated. This is much lower than the risk of severe complications from flu, which can be prevented by flu vaccine.  Young children who get the flu shot along with pneumococcal vaccine (PCV13) and/or DTaP vaccine at the same time might be slightly more likely to have a seizure caused by fever. Ask your doctor for more information. Tell your doctor if a child who is getting flu vaccine has ever had a seizure. Problems that could happen after any injected vaccine:      People sometimes faint after a medical procedure, including vaccination. Sitting or lying down for about 15 minutes can help prevent fainting, and injuries caused by a fall. Tell your doctor if you feel dizzy, or have vision changes or ringing in the ears.  Some people get severe pain in the shoulder and have difficulty moving the arm where a shot was given. This happens very rarely.  Any medication can cause a severe allergic reaction. Such reactions from a vaccine are very rare, estimated at about 1 in a million doses, and would happen within a few minutes to a few hours after the vaccination. As with any medicine, there is a very remote chance of a vaccine causing a serious injury or death. The safety of vaccines is always being monitored. For more information, visit: www.cdc.gov/vaccinesafety/    5. What if there is a serious reaction? What should I look for?  Look for anything that concerns you, such as signs of a severe allergic reaction, very high fever, or unusual behavior.     Signs of a severe allergic reaction can include hives, swelling of the face and throat, difficulty breathing, a fast heartbeat, dizziness, and weakness - usually within a few minutes to a few hours after the vaccination. What should I do?  If you think it is a severe allergic reaction or other emergency that cant wait, call 9-1-1 and get the person to the nearest hospital. Otherwise, call your doctor.  Reactions should be reported to the Vaccine Adverse Event Reporting System (VAERS). Your doctor should file this report, or you can do it yourself through  the VAERS web site at www.vaers. [facility].gov, or by calling 1-161.241.9241. VAERS does not give medical advice. 6. The National Vaccine Injury Compensation Program    The [facility] Vaccine Injury Compensation Program (VICP) is a federal program that was created to compensate people who may have been injured by certain vaccines. Persons who believe they may have been injured by a vaccine can learn about the program and about filing a claim by calling 3-138.729.2479 or visiting the [employer] website at www.[facility].gov/vaccinecompensation. There is a time limit to file a claim for compensation. 7. How can I learn more?  Ask your healthcare provider. He or she can give you the vaccine package insert or suggest other sources of information.  Call your local or state health department.  Contact the Centers for Disease Control and Prevention (CDC):  - Call 3-302.835.8782 (1-800-CDC-INFO) or  - Visit CDCs website at www.cdc.gov/flu    Vaccine Information Statement   Inactivated Influenza Vaccine   8/7/2015  [facility] [id number]-[id number]    Department of Health and Human Services  Centers for Disease Control and Prevention    Office Use Only

## 2018-10-25 NOTE — PROGRESS NOTES
Reviewed record in preparation for visit and have obtained necessary documentation. Identified pt with two pt identifiers(name and ). Chief Complaint   Patient presents with    Complete Physical     Pt fasting    Form Completion     Nursing school forms       Health Maintenance Due   Topic Date Due    Shingles Vaccine (1 of 2) 2012    Stool testing for trace blood  2012    Cervical Cancer Screening  2015    Breast Cancer Screening  2017       Ms. Chon De La Rosa has a reminder for a \"due or due soon\" health maintenance. I have asked that she discuss this further with her primary care provider for follow-up on this health maintenance. Coordination of Care Questionnaire:  :     1) Have you been to an emergency room, urgent care clinic since your last visit? no   Hospitalized since your last visit? no             2) Have you seen or consulted any other health care providers outside of [address] [place] [address] since your last visit? no  (Include any pap smears or colon screenings in this section.)    3) In the event something were to happen to you and you were unable to speak on your behalf, do you have an Advance Directive/ Living Will in place stating your wishes? NO    Do you have an Advance Directive on file? no    4) Are you interested in receiving information on Advance Directives? NO    Patient is accompanied by self I have received verbal consent from John Paul Genao to discuss any/all medical information while they are present in the room.

## 2018-10-30 ENCOUNTER — DOCUMENTATION ONLY (OUTPATIENT)
Dept: ONCOLOGY | Age: 58
End: 2018-10-30

## 2018-10-30 ENCOUNTER — HOSPITAL ENCOUNTER (OUTPATIENT)
Dept: PHYSICAL THERAPY | Age: 58
Discharge: HOME | End: 2018-10-30
Payer: MEDICAID

## 2018-10-30 PROCEDURE — 97110 THERAPEUTIC EXERCISES: CPT

## 2018-10-30 PROCEDURE — 97140 MANUAL THERAPY 1/> REGIONS: CPT

## 2018-10-30 NOTE — PROGRESS NOTES
ONCOLOGY NURSE NAVIGATOR    Discussed possible Cancer Rehab referral with Shakir Mina. Amanda Sifuentes rec that Mariana Sifuentes discuss her concerns with her lymphedema therapist as her insurance may not pay for both. Phoned and discussed Alyson's concerns -- as I understand them -- with Sascha Pearson, PT. 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 Zechariah Martinez is working with Mariana Sifuentes on adherence issues with decongestive lymphedema therapy and as well as AROM/PROM/strength/coordination, self-care training and functional mobility training as outlined in her excellent notes. Plan to work in tandem with lymphedema PT to support Mariana Sifuentes. [facility] in [address] [place] [address].     She has appt with Dr. Titi Adams today    Maurice Sequeira MS RN AOCNS

## 2018-10-30 NOTE — PROGRESS NOTES
[facility]  Physical Therapy Lymphedema Clinic  [facility] [address], [address] [address].    LYmphedema Therapy  Visit: 2    [x]                  Daily note               []                 30 day/10th visit progress note    NAME: Kurtis Vasquez  DATE: 10/30/2018    GOALS  Short term goals  Time frame: to be met by 11/27/2018  1. Patient will demonstrate knowledge of signs/symptoms of infections/cellulitis and be independent in skin care to prevent cellulitis. Patient has been educated in signs and symptoms of cellulitis and importance of daily skin care using MLD techniques. 2.  Patient will demonstrate independence in lymphedema home program of therapeutic exercises/stretches to improve circulation and decongest limb/axillary region to improve ADLs. Patient has been educated in the Active ROM, Stick, and [employer] routine during previous treatment in the Lymphedema Clinic. Continued education in the Stick Routine with modifications and provided patient with another set of written instructions. Continued education in the butterfly stretch in supine and sitting, forward wall climbing, and snow violet stretch for 3 to 5 reps to perform within tolerance. 3.  Patient will participate in the selection process to allow ordering of home compression system  (daytime, nighttime garments and pump as needed) and/or be compliant with use of the home compression system. Measured patient and ordered the following garments during treatment in the Lymphedema Clinic from March - June of 2018: 2 sets of Juzo Soft arm sleeves and gauntlets, Peterson Hernán bilateral mastectomy pad, Optiflow T pad, and a Wear Ease Compression Shirt. Patient also has a Flexitouch Vaso-pneumatic pump which was provided to her by a different Lymphedema Clinic. Will continue to monitor for any additional needs. Long term goals  Time frame: to be met by 1/18/2018  1.   Pt will show improvement in Lymphedema Life Impact Scale by decreasing impairment percentage to under 45% and thus allow improvement in patient's quality of life. 2.  Patient will be independent with don/doff of compression system and use in order to prevent reaccumulation of fluid at discharge. Patient is independent with donning and doffing of compression garments. Continued education in placement of bilateral chip bags and Optiflow T pad for maximum benefit for axillary swelling. 3.  Pt will be independent in self-MLD and show stable limb volumes showing decongestion and pt. ready for transition to independent restorative phase of lymphedema therapy. Continued education in self MLD with modificaitons during MLD portion of the session today. Full volumetric measurements deferred today. SUBJECTIVE REPORT:  Patient reports having anterior chest discomfort and a stretching sensation while performing exercises and chores at home. She does not report any pain this visit but states \"that my arms just feel weak. \" Patient reports that she has not had time to purchase a compression shirt from a local store or use the vaso-pneumatic pump for the last several weeks. Pain:   Patient complains of discomfort in the anterior chest with stretches and exercise in the clinic. No complaints of pain. Gait:    [x]  Independent gait without any assistive device for community distances  ADLs:  Modified independent with increased time for upper body dressing due to anterior chest tightness and discomfort with ROM. Has difficulty with household chores. Treatment Response:  Patient reports that she is using the Optiflow T pad and at times the chip bags for bilateral axillary swelling. Patient has not been compliant with use of the vaso-pneumatic pump or purchased a compression shirt to address axillary swelling due to lack of time.    [x]   Patient reviewed packet received at evaluation  []   Patient completed home program as prescribed  [x] Patient not fully compliant with home program to date  []   Patient waiting on compression garment arrival  Function: Patient is currently in school for CNA certification and works part time at [employer] sitting mostly at a desk. Patient now has a car and does not have to rely on others for transportation. []   Patient requiring less assistance with completion of home program  []   ADLs are requiring less assistance   []   Patient able to return to work/leisure activities  Lymphedema Life Impact Scale: Deferred. Weight: 215.8 lb this visit. Weight 216.6 lb on 10/23/2018. TREATMENT AND OBJECTIVE DATA SUMMARY:   Patient/Family Education:      Educated in skin care: [x]   Skin care products  []   Hygiene  [x]  Prevention of cellulitis  []   Wound care     Educated in exercise: [x]   Walking program  []   Ceci ball routine  [x]   Stick routine  []   ROM routine     Instructed in self MLD: Continued education in self MLD with modifications during the MLD portion of the session. Instructed in don/doff of compression system:   []   Multi layer bandage (MLB) donning principles and wear precautions  [x]   Day garments - patient is independent in donning and doffing compression garments. Continued education in placement of Optiflow T pad and bilateral chip bags for axillary swelling. [x]   Night Garments - patient is independent with donning and doffing the Peterson bilateral mastectomy pad. Therapeutic Activity 0 minutes   Treatment time: N/A  Functional Mobility: Patient is independent with ambulation and modified independent for ADL's with difficulty for upper body dressing and household chores. Patient is working part time and going to school. Drives.     Fall risk: Low     Therapeutic Exercise/Procedure 15 minutes   Treatment time: 8:55 - 9:10 am  Ceci ball exercise program: Deferred   Stick exercise program: Patient was educated in the Stick Routine but was not able to perform the complete routine in the clinic due to UE fatigue. Patient was educated in performing the routine with modifications as tolerated and has the written HEP to follow. Free exercises/ROM: Provided written instructions and educated patient in the butterfly stretch in supine and sitting, forward wall climbing, and snow violet stretch for 3 to 5 reps to perform within tolerance. Home program: Patient to perform daily to BID:  [x]   Skin care  [x]   Deep abdominal breathing  [x]   Exercise routine with modifications  [x]   Walking program  [x]   Rest in supine   []   Compression bandage  [x]   Compression garments  [x]   Vasopneumatic device up to an hour per day  []   Wound care  [x]   Self MLD  []   Bring supplies to each therapy visit  []   Purchase necessary supplies  []   Weight loss program  []   Follow up with       Rationale: Exercise will increase the lymph angiomotoricity and tissue pressure of the skin and thus decrease swelling. Modalities 0 minutes   Treatment time: N/A  Vasopneumatic pump: Recommended patient use the Flexitouch Vaso-pneumatic pump up to an hour per day as tolerated. Manual Lymphatic Drainage (MLD) 55 minutes   Treatment time: 8:00 - 8:55 am  Area to decongest:    [x] UE          [x]  Right     [x]  Left      [x] Trunk      [] LE             []  Right     []  Left      [] Trunk         Sequence used and effectiveness: [x] Secondary B UE sequence for upper extremity with / without trunk involvement with focus on abdominal breathing, trunk, and R UE.  Peformed B UE gentle shoulder traction and oscillations and fibrotic and skin traction techniques to the anterior chest.     [] Secondary/Primary sequence for lower extremities with / without trunk involvement     [x] Modifications were made to manual lymph drainage sequence to exclude cervical techniques secondary to hypothyroidism    [x] Self MLD sequence/techniques/hand strokes   Skin/wound care/debridement: Skin intact with bogginess R > L axillary regions and fibrosis/scar adhesions R > L anterior chest.    Upper/Lower extremity compression: Measured patient and ordered the following compression garments during previous treatment in the Lymphedema Clinic: 2 sets of Juzo Soft arm sleeves and gauntlets, Peterson Hernán bilateral mastectomy pad, Optiflow T pad, Wear Ease Compression Shirt. Continued education in use of the arm sleeve and gauntlet as needed and with high risk activities and daily and/or nightly use of the Optiflow T pad, JoviPak bilateral mastectomy pad, or the chip bags as tolerated. Patient is not pleased with the fit and comfort of the Wear Ease Compression Shirt and it was recommended that patient purchase a compression shirt from a local [employer] store/[employer] so that she can assess the fit and comfort of the shirt when wearing the Optiflow T pad or JoviPak Bilateral Mastectomy Pad. Will continue to monitor for any additional needs. Kinesiotaping: One piece of Y shaped kinesiotape was placed from below the right axilla toward the right groin last visit. Patient removed the kinesiotape prior to the visit today without any adverse skin reaction. Patient prefers not to have kinesiotape applied this visit. Girth/Volume measurement: Full volumetric measurements deferred this visit. TOTAL TREATMENT  70 mins     Circumferential Limb Measurements:  Full volumetric measurements deferred this visit. ASSESSMENT:   Treatment effectiveness and tolerance: Patient continues to have bilateral axillary swelling and anterior chest discomfort with fibrosis and adhesions noted. Patient tolerated MLD, stretching, and exercises with modifications in the clinic this visit. Continued education in being compliant with compression products and use of the vaso-pneumatic pump in order to be successful with long term management of lymphedema. Progress toward goals: See goal section of note.       PLAN OF CARE:   Changes to the plan of care: Continue per plan of care established on evaluation.    Frequency: []  2 times a week  [x]  Weekly  []  Biweekly  []  Monthly   Other: N/A       Franco Phoenix PT, CLT

## 2018-11-01 LAB
25(OH)D3+25(OH)D2 SERPL-MCNC: 12.7 NG/ML (ref 30–100)
ALBUMIN SERPL-MCNC: 4.6 G/DL (ref 3.5–5.5)
ALBUMIN/GLOB SERPL: 1.5 {RATIO} (ref 1.2–2.2)
ALP SERPL-CCNC: 58 IU/L (ref 39–117)
ALT SERPL-CCNC: 19 IU/L (ref 0–32)
APPEARANCE UR: ABNORMAL
AST SERPL-CCNC: 18 IU/L (ref 0–40)
BACTERIA #/AREA URNS HPF: ABNORMAL /[HPF]
BILIRUB SERPL-MCNC: 0.3 MG/DL (ref 0–1.2)
BILIRUB UR QL STRIP: NEGATIVE
BUN SERPL-MCNC: 13 MG/DL (ref 6–24)
BUN/CREAT SERPL: 18 (ref 9–23)
CALCIUM SERPL-MCNC: 9.7 MG/DL (ref 8.7–10.2)
CASTS URNS QL MICRO: ABNORMAL /LPF
CHLORIDE SERPL-SCNC: 103 MMOL/L (ref 96–106)
CHOLEST SERPL-MCNC: 310 MG/DL (ref 100–199)
CO2 SERPL-SCNC: 24 MMOL/L (ref 20–29)
COLOR UR: YELLOW
CREAT SERPL-MCNC: 0.71 MG/DL (ref 0.57–1)
EPI CELLS #/AREA URNS HPF: ABNORMAL /HPF
ERYTHROCYTE [DISTWIDTH] IN BLOOD BY AUTOMATED COUNT: 15.1 % (ref 12.3–15.4)
GAMMA INTERFERON BACKGROUND BLD IA-ACNC: 0.13 IU/ML
GLOBULIN SER CALC-MCNC: 3 G/DL (ref 1.5–4.5)
GLUCOSE SERPL-MCNC: 91 MG/DL (ref 65–99)
GLUCOSE UR QL: NEGATIVE
HBA1C MFR BLD: 6.4 % (ref 4.8–5.6)
HCT VFR BLD AUTO: 38.4 % (ref 34–46.6)
HDLC SERPL-MCNC: 54 MG/DL
HGB BLD-MCNC: 12.3 G/DL (ref 11.1–15.9)
HGB UR QL STRIP: ABNORMAL
KETONES UR QL STRIP: NEGATIVE
LDLC SERPL CALC-MCNC: 221 MG/DL (ref 0–99)
LEUKOCYTE ESTERASE UR QL STRIP: ABNORMAL
M TB IFN-G BLD-IMP: NEGATIVE
M TB IFN-G CD4+ BCKGRND COR BLD-ACNC: 0.19 IU/ML
MCH RBC QN AUTO: 29.3 PG (ref 26.6–33)
MCHC RBC AUTO-ENTMCNC: 32 G/DL (ref 31.5–35.7)
MCV RBC AUTO: 91 FL (ref 79–97)
MEV IGG SER IA-ACNC: 66.3 AU/ML
MICRO URNS: ABNORMAL
MITOGEN IGNF BLD-ACNC: >10 IU/ML
MUCOUS THREADS URNS QL MICRO: PRESENT
MUV IGG SER IA-ACNC: 33.2 AU/ML
NITRITE UR QL STRIP: NEGATIVE
PH UR STRIP: 5.5 [PH] (ref 5–7.5)
PLATELET # BLD AUTO: 385 X10E3/UL (ref 150–379)
POTASSIUM SERPL-SCNC: 4.5 MMOL/L (ref 3.5–5.2)
PROT SERPL-MCNC: 7.6 G/DL (ref 6–8.5)
PROT UR QL STRIP: ABNORMAL
QUANTIFERON INCUBATION, QF1T: NORMAL
QUANTIFERON TB2 AG: 0.21 IU/ML
RBC # BLD AUTO: 4.2 X10E6/UL (ref 3.77–5.28)
RBC #/AREA URNS HPF: ABNORMAL /HPF
RUBV IGG SERPL IA-ACNC: 4.41 INDEX
SERVICE CMNT-IMP: NORMAL
SODIUM SERPL-SCNC: 141 MMOL/L (ref 134–144)
SP GR UR: 1.02 (ref 1–1.03)
TRIGL SERPL-MCNC: 173 MG/DL (ref 0–149)
TSH SERPL DL<=0.005 MIU/L-ACNC: 2.2 UIU/ML (ref 0.45–4.5)
UROBILINOGEN UR STRIP-MCNC: 0.2 MG/DL (ref 0.2–1)
VLDLC SERPL CALC-MCNC: 35 MG/DL (ref 5–40)
VZV IGG SER IA-ACNC: 1579 INDEX
WBC # BLD AUTO: 7.4 X10E3/UL (ref 3.4–10.8)
WBC #/AREA URNS HPF: >30 /HPF

## 2018-11-06 ENCOUNTER — HOSPITAL ENCOUNTER (OUTPATIENT)
Dept: PHYSICAL THERAPY | Age: 58
Discharge: HOME | End: 2018-11-06
Payer: MEDICAID

## 2018-11-06 ENCOUNTER — TELEPHONE (OUTPATIENT)
Dept: ONCOLOGY | Age: 58
End: 2018-11-06

## 2018-11-06 PROCEDURE — 97140 MANUAL THERAPY 1/> REGIONS: CPT

## 2018-11-06 NOTE — PROGRESS NOTES
[facility]  Physical Therapy Lymphedema Clinic  [address], [address]    LYmphedema Therapy  Visit: 3    [x]                  Daily note               []                 30 day/10th visit progress note    NAME: Joe Kaiser  DATE: 11/6/2018    GOALS  Short term goals  Time frame: to be met by 11/27/2018  1. Patient will demonstrate knowledge of signs/symptoms of infections/cellulitis and be independent in skin care to prevent cellulitis. Patient has been educated in signs and symptoms of cellulitis and importance of daily skin care using MLD techniques. 2.  Patient will demonstrate independence in lymphedema home program of therapeutic exercises/stretches to improve circulation and decongest limb/axillary region to improve ADLs. Patient has been educated in the Active ROM, Stick, and [employer] routine during previous treatment in the Lymphedema Clinic. Continued education in the Stick Routine with modifications and provided patient with another set of written instructions. Continued education in the butterfly stretch in supine and sitting, forward wall climbing, and snow violet stretch for 3 to 5 reps to perform within tolerance. 3.  Patient will participate in the selection process to allow ordering of home compression system  (daytime, nighttime garments and pump as needed) and/or be compliant with use of the home compression system. Measured patient and ordered the following garments during treatment in the Lymphedema Clinic from March - June of 2018: 2 sets of Juzo Soft arm sleeves and gauntlets, Peterson Hernán bilateral mastectomy pad, Optiflow T pad, and a Wear Ease Compression Shirt. Patient also has a Flexitouch Vaso-pneumatic pump which was provided to her by a different Lymphedema Clinic. Will continue to monitor for any additional needs and plan to measure for new garments soon.      Long term goals  Time frame: to be met by 1/18/2018  1. Pt will show improvement in Lymphedema Life Impact Scale by decreasing impairment percentage to under 45% and thus allow improvement in patient's quality of life. Deferred  2. Patient will be independent with don/doff of compression system and use in order to prevent reaccumulation of fluid at discharge. Patient is independent with donning and doffing of compression garments. Continued education in placement of bilateral chip bags and Optiflow T pad for maximum benefit for axillary swelling. Will assess how she is doing when she received more compression garments. 3.  Pt will be independent in self-MLD and show stable limb volumes showing decongestion and pt. ready for transition to independent restorative phase of lymphedema therapy. Continued education in self MLD with modifications during MLD portion of the session today. Full volumetric measurements taken today reveal that patient is down 93 ml on the R UE and down 208 ml on the L LE from 10/23/18 re-evaluation. Volumes improving from 6 month re-evaluation so will monitor again next visit before proceeding with ordering garments. SUBJECTIVE REPORT:  Patient reports working on her home program daily. Continues to work and is in [employer] program.    Pain: Soreness with stretching, but not reporting pain today. Gait:    [x]  Independent gait without any assistive device for community distances  ADLs:  Modified independent with increased time for upper body dressing due to anterior chest tightness and discomfort with ROM. Has difficulty with household chores. Treatment Response:    [x]   Patient reviewed packet received at evaluation  [x]   Patient completed more of her home program as prescribed and was able to reduce her volumes.    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 Patient now has a car and does not have to rely on others for transportation. []   Patient requiring less assistance with completion of home program  []   ADLs are requiring less assistance   []   Patient able to return to work/leisure activities  Lymphedema Life Impact Scale: Deferred. Weight: 219.6 lbs up from last visit weight of 215.8 lbs  TREATMENT AND OBJECTIVE DATA SUMMARY:   Patient/Family Education:      Educated in skin care: [x]   Skin care products  [x]   Hygiene  [x]  Prevention of cellulitis  []   Wound care     Educated in exercise: [x]   Walking program  [x]   Ceci ball routine  [x]   Stick routine  [x]   ROM routine     Instructed in self MLD: Continued education in self MLD with modifications during the MLD portion of the session. Instructed in don/doff of compression system:   []   Multi layer bandage (MLB) donning principles and wear precautions  [x]   Day garments - patient is independent in donning and doffing compression garments. Continued education in placement of Optiflow T pad and bilateral chip bags for axillary swelling. Encouraged to purchase a compression t-shirt. Will measure for new compression sleeve/hand pieces on upcoming visit. [x]   Night Garments - patient is independent with donning and doffing the Peterson bilateral mastectomy pad. Patient would benefit from custom foam system for torso/shoulder/arm so we will work on measuring for this on an upcoming visit. Therapeutic Activity 0 minutes   Treatment time: N/A  Functional Mobility: Patient is independent with ambulation and modified independent for ADL's with difficulty for upper body dressing and household chores. Patient is working part time and going to school. Drives.     Fall risk: Low     Therapeutic Exercise/Procedure 0 minutes   Treatment time: 0  Ceci ball exercise program: Patient was educated in performing the routine with modifications as tolerated and has the written HEP to follow on previous visit. Stick exercise program: Patient was educated in performing the routine with modifications as tolerated and has the written HEP to follow on previous visit. Free exercises/ROM: Provided written instructions and educated patient in the butterfly stretch in supine and sitting, forward wall climbing, and snow violet stretch for 3 to 5 reps to perform within tolerance. Home program: Patient to perform daily to BID:  [x]   Skin care  [x]   Deep abdominal breathing  [x]   Exercise routine with modifications  [x]   Walking program  [x]   Rest in supine   []   Compression bandage  [x]   Compression garments  [x]   Vasopneumatic device up to an hour per day  []   Wound care  [x]   Self MLD  []   Bring supplies to each therapy visit  []   Purchase necessary supplies  [x]   Weight loss program  []   Follow up with       Rationale: Exercise will increase the lymph angiomotoricity and tissue pressure of the skin and thus decrease swelling. Modalities 0 minutes   Treatment time: N/A  Vasopneumatic pump: Recommended patient use the Flexitouch Vaso-pneumatic pump up to an hour per day as tolerated. Manual Lymphatic Drainage (MLD) 85 minutes   Treatment time:8:05am-9:30am  Area to decongest:    [x] UE          [x]  Right     [x]  Left      [x] Trunk      [] LE             []  Right     []  Left      [] Trunk         Sequence used and effectiveness: [x] Secondary B UE sequence for upper extremity with / without trunk involvement with focus on abdominal breathing, trunk, and R UE.  Peformed B UE gentle shoulder traction and oscillations and fibrotic and skin traction techniques to the anterior chest.     [] Secondary/Primary sequence for lower extremities with / without trunk involvement     [x] Modifications were made to manual lymph drainage sequence to exclude cervical techniques secondary to hypothyroidism    [x] Self MLD sequence/techniques/hand strokes   Skin/wound care/debridement: Skin intact with bogginess R > L axillary regions and fibrosis/scar adhesions R > L anterior chest.    Upper/Lower extremity compression: Measured patient and ordered the following compression garments during previous treatment in the Lymphedema Clinic: 2 sets of Juzo Soft arm sleeves and gauntlets, Peterson Hernán bilateral mastectomy pad, Optiflow T pad, Wear Ease Compression Shirt. Continued education in use of the arm sleeve and gauntlet as needed and with high risk activities and daily and/or nightly use of the Optiflow T pad, JoviPak bilateral mastectomy pad, or the chip bags as tolerated. Patient is not pleased with the fit and comfort of the Wear Ease Compression Shirt and it was recommended that patient purchase a compression shirt from a local [employer] store/[employer] so that she can assess the fit and comfort of the shirt when wearing the Optiflow T pad or JoviPak Bilateral Mastectomy Pad. Will continue to monitor for any additional needs and plan to order new day time garments and also measure for a custom foam torso/shoulder and arm garment. Kinesiotaping: Deferred   Girth/Volume measurement: Full volumetric measurements taken today reveal that patient is down 93 ml on the R UE and down 208 ml on the L LE from 10/23/18 re-evaluation. TOTAL TREATMENT  85 mins     Circumferential Limb Measurements:  Full volumetric measurements taken today see above for details. ASSESSMENT:   Treatment effectiveness and tolerance: Patient with noted improvement today in her volumes bilaterally. Encouraged patient to continue to work on her home program and return next week and we will measure for garments at that time. Patient will need to have new garments prior to discharging back to the restorative phase of care. Progress toward goals: See goal section of note. PLAN OF CARE:   Changes to the plan of care: Continue per plan of care established on evaluation.    Frequency: []  2 times a week  [x]  Weekly  [] Biweekly  []  Monthly   Other: N/A       Paul C Patch, PTA, CLT

## 2018-11-06 NOTE — TELEPHONE ENCOUNTER
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 Says she will not be required to transfer pt. More information and formal note come. Follow up scheduled in 2 weeks on 11/20.     Norlene Mohs LCSW

## 2018-11-08 ENCOUNTER — TELEPHONE (OUTPATIENT)
Dept: ONCOLOGY | Age: 58
End: 2018-11-08

## 2018-11-08 NOTE — TELEPHONE ENCOUNTER
Pt is a 65 yo SBF dx with breast cancer in 2012. She received chemo and radiation at that time and was cancer free for awhile until a recurrence in 2015 when she decided to have a double mastectomy. She came today with 2 children Josephine and Jesús Shed both grand niece and nephew whom she helps raise as both lost their fathers (one to illness and one to an accident). Pt known as Aunt B to many family members and views all of them  Like her own children. She currently lives with one Adult niece, reports it is chad healthy and helpful environment for her. Family- Pt has never  but has many nieces, nephews and grand niece and nephews. Per pt sister is in remission with breast cancer. Mother passed away of a GBM in Jan 2017. Father, per pt, is living  with Dementia, pt tearful as she is not able to help too much with all that she has had been going on. She is grateful for her family who have stepped in to help him. Pt worked for a few years at [employer], lost job in April though it is not clear why (not due to her illness she reports). Was able to receive unemployment. This prompted her to explore Customer service program offered at [employer]. She did very well with this and has completed the course. Currently works part time at [employer]. 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 Reports she has discussed her limitations with lifting with her advisor as she is still connected with lymphedema clinic and that she is not required to lift patients as a student. Pt very strong in her evelia, struggles at times to hold back tears over her dad's decline and fear of her cancer's recurrence. Emotional support provided. Information on Nala (whom she has already worked with and Cancer and Career website suggested just to review. Plan- Follow up with LCSW in 2 wks.   Suggestions for self care including walking, taking time for self encouraged.     Juan HONEYCUTTW

## 2018-11-09 ENCOUNTER — TELEPHONE (OUTPATIENT)
Dept: INTERNAL MEDICINE CLINIC | Age: 58
End: 2018-11-09

## 2018-11-09 NOTE — TELEPHONE ENCOUNTER
LVM for patient on home and cell numbers listed in the chart to return. Forms have been completed and are ready to be picked up at the office. Forms placed and in blue folder.

## 2018-11-09 NOTE — TELEPHONE ENCOUNTER
#[phone] pt states she has been waiting on lab results for some time. She needs to know this as she needs to  paperwork she states. She states you would know what this is about.

## 2018-11-13 ENCOUNTER — HOSPITAL ENCOUNTER (OUTPATIENT)
Dept: PHYSICAL THERAPY | Age: 58
Discharge: HOME | End: 2018-11-13
Payer: MEDICAID

## 2018-11-13 PROCEDURE — 97140 MANUAL THERAPY 1/> REGIONS: CPT

## 2018-11-13 NOTE — PROGRESS NOTES
[facility]  [address], [address], [place]  [address].    LYmphedema Therapy  Visit: 4    [x]                  Daily note               []                 30 day/10th visit progress note    NAME: Hany Camargo  DATE: 11/13/2018    GOALS  Short term goals  Time frame: to be met by 11/27/2018  1. Patient will demonstrate knowledge of signs/symptoms of infections/cellulitis and be independent in skin care to prevent cellulitis. Patient has been educated in daily skin care with a low Ph lotion using MLD techniques and signs and symptoms of cellulitis. Goal met 11/13/2018.  2.  Patient will demonstrate independence in lymphedema home program of therapeutic exercises/stretches to improve circulation and decongest limb/axillary region to improve ADLs. Patient has been educated in the Active ROM, Stick, and [employer] routine during previous visits in the Lymphedema Clinic. Continued education in the Stick Routine with modifications and provided patient with another set of written instructions. Continued education in the butterfly stretch in supine and sitting, forward wall climbing, and snow violet stretch for 3 to 5 reps to perform within tolerance. Patient is performing an exercise routine daily. Goal met 11/13/2018. 3.  Patient will participate in the selection process to allow ordering of home compression system  (daytime, nighttime garments and pump as needed) and/or be compliant with use of the home compression system. Measured patient and ordered the following garments this visit: two sets of [address] [place] arm sleeves 20 to 30 mmHg size 4 max/long color: black and cinnamon and one Juzo Soft 2001 gauntlet 20 to 30 mmHg size medium color: cinnamon and one Juzo 2301 ACFS basic seamless glove 20 to 30 mmHg size large color: black.  Measured patient and ordered a custom JoviPak Shoulder-Torso arm sleeve to wear nightly as tolerated. Patient has a Flexitouch Vaso-pneumatic pump which was provided to her by a different Lymphedema Clinic. Long term goals  Time frame: to be met by 1/18/2018  1. Pt will show improvement in Lymphedema Life Impact Scale by decreasing impairment percentage to under 45% and thus allow improvement in patient's quality of life. 2.  Patient will be independent with don/doff of compression system and use in order to prevent reaccumulation of fluid at discharge. Patient is independent with donning/doffing day time garments and the Optiflow T pad/bilateral mastectomy pad. Will educate patient in donning/doffing of the JoviPak Shoulder-Torso arm sleeve in a future visit. 3.  Pt will be independent in self-MLD and show stable limb volumes showing decongestion and pt. ready for transition to independent restorative phase of lymphedema therapy. Full volumetric measurements taken today reveal a loss of 82 ml in the uninvolved extremity and a gain of 71 ml in the involved extremity since the initial evaluation on 10/23/2018. Patient has been educated in self MLD and will benefit from continued review in a future visit. SUBJECTIVE REPORT:  Patient reports that she has been using the vaso-pneumatic pump and performing exercises daily. She has not initiated a walking program yet but does try to change her position and walk short distances while working at [employer]. Pain:   No complaints of pain this visit. Gait:    [x]  Independent gait without any assistive device for community distances  ADLs:  Modified independent with increased time for upper body dressing due to anterior chest tightness and discomfort with ROM. Has difficulty with household chores. Treatment Response:  Patient reports that she has been compliant with daily exercise and use of the Flexitouch vaso-pneumatic pump. She plans to initiate a walking program soon.     [x]   Patient reviewed packet received at evaluation  []   Patient completed home program as prescribed  [x]   Patient not fully compliant with home program to date  [x]   Patient waiting on compression garment arrival - garment order placed today. 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 Patient now has a car and does not have to rely on others for transportation. [x]   Patient requiring less assistance with completion of home program  []   ADLs are requiring less assistance   []   Patient able to return to work/leisure activities  Lymphedema Life Impact Scale: Deferred. Weight: 217.6 lb this visit. Weight 216.6 lb on 10/23/2018. TREATMENT AND OBJECTIVE DATA SUMMARY:   Patient/Family Education:      Educated in skin care: [x]   Skin care products  []   Hygiene  [x]  Prevention of cellulitis  []   Wound care     Educated in exercise: [x]   Walking program  [x]   Ceci ball routine  [x]   Stick routine  [x]   ROM routine     Instructed in self MLD: Patient has been educated in self MLD and has the written instructions to follow. Would benefit from continued education in a future visit. Instructed in don/doff of compression system:   []   Multi layer bandage (MLB) donning principles and wear precautions  [x]   Day garments - patient is independent in donning and doffing compression garments and the Optiflow T pad and bilateral chip bags for axillary swelling. [x]   Night Garments - patient is independent with donning and doffing the Peterson bilateral mastectomy pad. Therapeutic Activity 0 minutes   Treatment time: N/A  Functional Mobility: Patient is independent with ambulation and modified independent for ADL's with difficulty for upper body dressing and household chores. Patient is working part time and going to school. Drives.     Fall risk: Low     Therapeutic Exercise/Procedure 0 minutes   Treatment time: N/A   Ceci ball exercise program: Deferred   Stick exercise program: Patient has been educated in the Stick Routine with modifications and has the written instructions to follow. Free exercises/ROM: Provided written instructions and educated patient in the butterfly stretch in supine and sitting, forward wall climbing, and snow violet stretch for 3 to 5 reps to perform within tolerance. Home program: Patient to perform daily to BID:  [x]   Skin care  [x]   Deep abdominal breathing  [x]   Exercise routine with modifications  [x]   Walking program  [x]   Rest in supine   []   Compression bandage  [x]   Compression garments  [x]   Vasopneumatic device up to an hour per day  []   Wound care  [x]   Self MLD  [x]   Bring garments to the clinic for fitting. []   Purchase necessary supplies  []   Weight loss program  []   Follow up with       Rationale: Exercise will increase the lymph angiomotoricity and tissue pressure of the skin and thus decrease swelling. Modalities 0 minutes   Treatment time: N/A  Vasopneumatic pump: Patient reports using the Flexitouch Vaso-pneumatic pump up to an hour per day as tolerated. Manual Lymphatic Drainage (MLD) 75 minutes   Treatment time: 11:55 am - 1:10 pm  Area to decongest:    [x] UE          [x]  Right     [x]  Left      [x] Trunk      [] LE             []  Right     []  Left      [] Trunk         Sequence used and effectiveness: [] Secondary B UE sequence for upper extremity with / without trunk involvement with focus on abdominal breathing, trunk, and R UE.  Peformed B UE gentle shoulder traction and oscillations and fibrotic and skin traction techniques to the anterior chest.       [] Secondary/Primary sequence for lower extremities with / without trunk involvement     [x] Modifications were made to manual lymph drainage sequence to exclude cervical techniques secondary to hypothyroidism    [x] Self MLD sequence/techniques/hand strokes   Skin/wound care/debridement: Skin intact with bogginess R > L axillary regions and fibrosis/scar adhesions R > L anterior chest.    Upper/Lower extremity compression: Measured patient and ordered the following garments this visit: two sets of Juzo Soft 2001 arm sleeves 20 to 30 mmHg size 4 max/long color: black and cinnamon and one Juzo Soft 2001 gauntlet 20 to 30 mmHg size medium color: cinnamon and one Juzo 2301 ACFS basic seamless glove 20 to 30 mmHg size large color: black. Measured patient and ordered a custom JoviPak Shoulder-Torso arm sleeve. Garments were ordered from the vendor, [employer], and patient will bring the garments to the clinic for fitting. Kinesiotaping: Deferred       Girth/Volume measurement: Full volumetric measurements taken today reveal a loss of 82 ml in the uninvolved extremity and a gain of 71 ml in the involved extremity since the initial evaluation on 10/23/2018. TOTAL TREATMENT  75 mins     Circumferential Limb Measurements:  Full volumetric measurements taken today. See measurements above. ASSESSMENT:   Treatment effectiveness and tolerance: Patient has been more compliant with her home program of daily exercise and use of the vaso-pneumatic pump since her last visit. Patient continues to demonstrate bilateral axillary swelling and was measured for a night foam system and for additional day time garments this visit and the order was placed with [employer]. Patient met two STG's today and is progressing toward discharge to the Restorative Phase of the CDT program.    Progress toward goals: Patient has met STG 1 and STG 2 this visit. See goal section of note. PLAN OF CARE:   Changes to the plan of care: Continue per plan of care established on evaluation.    Frequency: []  2 times a week  [x]  Weekly  []  Biweekly  []  Monthly   Other: Vendor: [address] [place] [address], PT, CLT

## 2018-11-20 ENCOUNTER — TELEPHONE (OUTPATIENT)
Dept: ONCOLOGY | Age: 58
End: 2018-11-20

## 2018-11-23 ENCOUNTER — DOCUMENTATION ONLY (OUTPATIENT)
Dept: SURGERY | Age: 58
End: 2018-11-23

## 2018-11-29 ENCOUNTER — DOCUMENTATION ONLY (OUTPATIENT)
Dept: SURGERY | Age: 58
End: 2018-11-29

## 2018-11-29 NOTE — PROGRESS NOTES
Faxed updated CMN with name, title, and date of practitioner who completed face-to-face. Faxed to [facility]. Fax confirmation received.

## 2018-12-27 ENCOUNTER — NURSE NAVIGATOR (OUTPATIENT)
Dept: ONCOLOGY | Age: 58
End: 2018-12-27

## 2018-12-27 ENCOUNTER — OFFICE VISIT (OUTPATIENT)
Dept: ONCOLOGY | Age: 58
End: 2018-12-27

## 2018-12-27 VITALS
SYSTOLIC BLOOD PRESSURE: 119 MMHG | WEIGHT: 223 LBS | DIASTOLIC BLOOD PRESSURE: 83 MMHG | HEART RATE: 78 BPM | BODY MASS INDEX: 35 KG/M2 | TEMPERATURE: 98.1 F | RESPIRATION RATE: 20 BRPM | HEIGHT: 67 IN | OXYGEN SATURATION: 98 %

## 2018-12-27 DIAGNOSIS — Z90.13 H/O MASTECTOMY, BILATERAL: ICD-10-CM

## 2018-12-27 DIAGNOSIS — Z85.3 HISTORY OF BREAST CANCER: Primary | ICD-10-CM

## 2018-12-27 DIAGNOSIS — I89.0 LYMPHEDEMA: ICD-10-CM

## 2018-12-27 DIAGNOSIS — G89.18 CHEST WALL PAIN FOLLOWING SURGERY: ICD-10-CM

## 2018-12-27 DIAGNOSIS — R07.89 CHEST WALL PAIN FOLLOWING SURGERY: ICD-10-CM

## 2018-12-27 NOTE — PROGRESS NOTES
[facility] [place] [address], [place]: [phone]  F: [phone]    Reason for Visit:   Dami Moseley is a 66 y.o. female who is seen in consultation at the request of Dr. Willam Chilel for evaluation of history of breast cancer. Treatment History:   [facility] pt  Hx of b/l mastectomy 2015  Adjuvant hormonal therapy stopped due to intolerance. No chemo. STAGE:  T2N0 ER+ HER2 negative/ oncotype low risk x 2    History of Present Illness:     Pt seen today in office for 10 mo f/u of breast cancer not on any therapy from here. Pt is doing well overall. Has chronic chest wall pain. Still going to LE clinic for [address]. Pt is noticing a small area on right mast scar. Labs done with PCP 10/18. Last visit:  consult for history of breast cancer in 2012 and 2015 and not on any therapy for breast cancer due to intolerance of adjuvant hormonal therapy. Last seen by [facility] 6/17 and was on yearly visits with med/onc. Pt c/o chest wall pain chronic. CT chest 2012 negative. Hx of \"fluid drawn off by surgery last 10/17\". Needs a new surgeon now as surgery was following her chronically. Pt has been on different meds for chronic pain and states she needs help with this. Has ? Not seen pain clinic. Pt sees PCP but PCP has stopped giving pt pain meds. Pt is here alone today. Has not had any cancer recurrence. Labs per PCP.        Past Medical History:   Diagnosis Date    Arthritis     Cancer [facility])     breast cancer    H/O total mastectomy 11/30/2015    Hypercholesterolemia     Other ill-defined conditions(799.89)     completed radiation 9/12    Thyroid disease     hypothyroidism      Past Surgical History:   Procedure Laterality Date    HX BREAST LUMPECTOMY Right 2012    HX GYN      hysterectomy; TBL    HX HEENT      tonsillectomy    HX MASTECTOMY Bilateral 2015    HX ORTHOPAEDIC      right knee ACL    HX OTHER SURGICAL lumptectomy right breast      Social History     Tobacco Use    Smoking status: Former Smoker     Packs/day: 0.50     Last attempt to quit: 1/28/2015     Years since quitting: 3.9    Smokeless tobacco: Never Used   Substance Use Topics    Alcohol use: Yes     Comment: occasional      Family History   Problem Relation Age of Onset    Cancer Mother         brain cancer    No Known Problems Father     Breast Cancer Sister 54    No Known Problems Brother     Thyroid Disease Maternal Grandmother     MS Sister     Breast Cancer Maternal Aunt         60-70's     Current Outpatient Medications   Medication Sig    levothyroxine (SYNTHROID) 125 mcg tablet take 2 tablets by mouth every morning before BREAKFAST FOR THYROID REPLACEMENT AS DIRECTED    gabapentin (NEURONTIN) 300 mg capsule TAKE 1 C PO   TID    atorvastatin (LIPITOR) 20 mg tablet Take 1 Tab by mouth daily.  fluticasone (FLONASE) 50 mcg/actuation nasal spray 2 Sprays by Both Nostrils route daily.  ergocalciferol (ERGOCALCIFEROL) 50,000 unit capsule Take one capsule twice a week     No current facility-administered medications for this visit. Allergies   Allergen Reactions    Penicillins Hives     Pt states I am not allergic to penicillin.  Percocet [Oxycodone-Acetaminophen] Itching    Sulfa (Sulfonamide Antibiotics) Other (comments)     Mouth blistering        Review of Systems: A complete review of systems was obtained, negative except as described above.     Physical Exam:     Visit Vitals  /83   Pulse 78   Temp 98.1 °F (36.7 °C) (Oral)   Resp 20   Ht 5' 7\" (1.702 m)   Wt 223 lb (101.2 kg)   SpO2 98%   BMI 34.93 kg/m²     ECOG PS: 1  General: No distress  Eyes: PERRLA, anicteric sclerae  HENT: Atraumatic, OP clear  Neck: Supple  Lymphatic: No cervical, supraclavicular  Respiratory: CTAB, normal respiratory effort  CV: Normal rate, regular rhythm, no murmurs, no peripheral edema  GI: Soft, nontender, nondistended  MS: Normal gait and station. Skin: warm, dry  Psych: Alert, oriented, appropriate affect, normal judgment/insight  Breast b/l mastectomy with a small scab like area on right mast scar otherwise negative    Results:   Per PCP  Lab Results   Component Value Date/Time    WBC 7.4 10/25/2018 02:15 PM    HGB 12.3 10/25/2018 02:15 PM    HCT 38.4 10/25/2018 02:15 PM    PLATELET 271 (H) 71/81/1420 02:15 PM    MCV 91 10/25/2018 02:15 PM    ABS. NEUTROPHILS 4.7 07/16/2015 06:42 PM     Lab Results   Component Value Date/Time    Sodium 141 10/25/2018 02:15 PM    Potassium 4.5 10/25/2018 02:15 PM    Chloride 103 10/25/2018 02:15 PM    CO2 24 10/25/2018 02:15 PM    Glucose 91 10/25/2018 02:15 PM    BUN 13 10/25/2018 02:15 PM    Creatinine 0.71 10/25/2018 02:15 PM    GFR est  10/25/2018 02:15 PM    GFR est non-AA 96 10/25/2018 02:15 PM    Calcium 9.7 10/25/2018 02:15 PM     Lab Results   Component Value Date/Time    Bilirubin, total 0.3 10/25/2018 02:15 PM    ALT (SGPT) 19 10/25/2018 02:15 PM    AST (SGOT) 18 10/25/2018 02:15 PM    Alk. phosphatase 58 10/25/2018 02:15 PM    Protein, total 7.6 10/25/2018 02:15 PM    Albumin 4.6 10/25/2018 02:15 PM    Globulin 4.0 07/16/2015 06:42 PM         Records reviewed and summarized above. Pathology report(s) reviewed above. Radiology report(s) reviewed above. Assessment/PLAN:       1) History of breast cancer ER+ HER2 negative 2012 and 2015 treated at [address] [place] [address] with hx of b/l mastectomy 2015  oncotype low risk both times. No chemo done. Could not tolerate adjuvant hormonal therapy. Pt was following with surgery and sent here due to insurance change. Pt is not on any adjuvant therapy due to intolerance. Pt is doing LE clinic. Consider cancer rehab/ PT once done with this. Labs and routine care per PCP. 2)chronic chest wall pain since mastectomy. Better. Small area on right mast scar. Pt will f/u with breast surgery for eval of this. 3) grief due to dad's death.      F/u here in a year or sooner if needed. Pt advised to call anytime with questions. I appreciate the opportunity to participate in [facility] care.     Signed By: Renetta Lyman DO

## 2018-12-27 NOTE — PROGRESS NOTES
ONCOLOGY NURSE NAVIGATOR    Met with Rosaura Dallas in [facility] following her appt today with Dr. Jessenia Todd. Seen today for follow up of T2N0 ER positive, HER2 negative breast cancer. Had bilateral mastectomies in . Not on adjuvant treatment -- did not tolerate hormonal therapy. Follows with Dr. Jessenia Todd annually    Has benefited from work with lymphedema therapists. Waiting now for specially ordered garments. Dr Jessenia Todd discussed transitioning to Cancer Rehab for therapy as she meet LE goals -- Rosaura Dallas likes this idea. Has just completed course work and graduated from nursing assistant program -- will take state boards soon and plans to become certified in phlebotomy and EKG. 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 Expresses excitement about her new career. Her dad  yesterday after a long illness. She's grieving appropriately. Was able to briefly meet today with Honey Martines LCSW,  and knows she can continue to receive help in [facility] as needed. Rosaura Dallas will reach out for referral to Dirk Terry or me for bereavement resources as needed for self and family. Will follow up with Dr. Jessenia Todd in one year or sooner as needed. We agreed there are no barriers to care.       Army Yuri LIU RN AOAKUAS

## 2019-10-15 ENCOUNTER — TELEPHONE (OUTPATIENT)
Dept: INTERNAL MEDICINE CLINIC | Age: 59
End: 2019-10-15

## 2019-10-15 ENCOUNTER — OFFICE VISIT (OUTPATIENT)
Dept: INTERNAL MEDICINE CLINIC | Age: 59
End: 2019-10-15

## 2019-10-15 VITALS
TEMPERATURE: 97.6 F | DIASTOLIC BLOOD PRESSURE: 71 MMHG | RESPIRATION RATE: 16 BRPM | HEIGHT: 67 IN | HEART RATE: 72 BPM | SYSTOLIC BLOOD PRESSURE: 104 MMHG | WEIGHT: 219 LBS | BODY MASS INDEX: 34.37 KG/M2 | OXYGEN SATURATION: 98 %

## 2019-10-15 DIAGNOSIS — G89.18 CHEST WALL PAIN FOLLOWING SURGERY: Primary | ICD-10-CM

## 2019-10-15 DIAGNOSIS — R07.89 CHEST WALL PAIN FOLLOWING SURGERY: Primary | ICD-10-CM

## 2019-10-15 DIAGNOSIS — E78.2 MIXED HYPERLIPIDEMIA: ICD-10-CM

## 2019-10-15 DIAGNOSIS — E55.9 VITAMIN D DEFICIENCY: ICD-10-CM

## 2019-10-15 DIAGNOSIS — Z23 ENCOUNTER FOR IMMUNIZATION: ICD-10-CM

## 2019-10-15 DIAGNOSIS — Z00.00 ROUTINE MEDICAL EXAM: Primary | ICD-10-CM

## 2019-10-15 RX ORDER — GABAPENTIN 400 MG/1
400 CAPSULE ORAL 3 TIMES DAILY
Qty: 90 CAP | Refills: 1 | Status: ON HOLD | OUTPATIENT
Start: 2019-10-15 | End: 2020-05-26 | Stop reason: CLARIF

## 2019-10-15 NOTE — PROGRESS NOTES
Identified pt with two pt identifiers(name and ). Reviewed record in preparation for visit and have obtained necessary documentation. Chief Complaint   Patient presents with    Breast Problem     Pt reports having swelling and soreness of both breast.         Visit Vitals  /71 (BP 1 Location: Left arm, BP Patient Position: Sitting)   Pulse 72   Temp 97.6 °F (36.4 °C) (Oral)   Resp 16   Ht 5' 7\" (1.702 m)   Wt 219 lb (99.3 kg)   SpO2 98%   BMI 34.30 kg/m²       Health Maintenance Due   Topic    Shingrix Vaccine Age 50> (1 of 2)    FOBT Q 1 YEAR AGE 54-65     PAP AKA CERVICAL CYTOLOGY     Influenza Age 5 to Adult        Med Reconciliation: Completed    Coordination of Care Questionnaire:  :   1) Have you been to an emergency room, urgent care, or hospitalized since your last visit? If yes, where when, and reason for visit? No       2. Have seen or consulted any other health care provider since your last visit? If yes, where when, and reason for visit? No       3) Do you have an Advanced Directive/ Living Will in place? No  If yes, do we have a copy on file   If no, would you like information     Patient is accompanied by self I have received verbal consent from Ahsan Jaramillo to discuss any/all medical information while they are present in the room.

## 2019-10-15 NOTE — PROGRESS NOTES
Malia Kapoor is a 64 y.o. female who presents with chest wall pain. Last seen Oct 2018. She has a history of bilateral mastectomy for prior breast cancer. Has had lymphedema of chest wall. Prior  lymphedema therapy. Has a compression vest and will sleep in it and work in it. She reports it is not comfortable. Using ice and heat. Takes gabapentin 300mg TID. Used to be helpful, but not as helpful as it was. Is able to reach above head without difficulty. Is able to do compressions with CPR. Is working as a CNA. Reports healthy diet. Not exercising. Gaining weight in middle. Good BM, normal urination.      Due for labs.               Past Medical History:   Diagnosis Date    Arthritis     Cancer ([facility] [address]     breast cancer    H/O total mastectomy 2015    Hypercholesterolemia     Other ill-defined conditions(799.89)     completed radiation     Thyroid disease     hypothyroidism       Family History   Problem Relation Age of Onset    Cancer Mother         brain cancer    No Known Problems Father     Breast Cancer Sister 54    No Known Problems Brother     Thyroid Disease Maternal Grandmother     MS Sister     Breast Cancer Maternal Aunt         60-70's       Social History     Socioeconomic History    Marital status: SINGLE     Spouse name: Not on file    Number of children: Not on file    Years of education: Not on file    Highest education level: Not on file   Occupational History    Not on file   Social Needs    Financial resource strain: Not on file    Food insecurity:     Worry: Not on file     Inability: Not on file    Transportation needs:     Medical: Not on file     Non-medical: Not on file   Tobacco Use    Smoking status: Former Smoker     Packs/day: 0.50     Last attempt to quit: 2015     Years since quittin.7    Smokeless tobacco: Never Used   Substance and Sexual Activity    Alcohol use: Yes     Comment: occasional    Drug use: No    Sexual activity: Not Currently     Partners: Male     Birth control/protection: None   Lifestyle    Physical activity:     Days per week: Not on file     Minutes per session: Not on file    Stress: Not on file   Relationships    Social connections:     Talks on phone: Not on file     Gets together: Not on file     Attends Zoroastrianism service: Not on file     Active member of club or organization: Not on file     Attends meetings of clubs or organizations: Not on file     Relationship status: Not on file    Intimate partner violence:     Fear of current or ex partner: Not on file     Emotionally abused: Not on file     Physically abused: Not on file     Forced sexual activity: Not on file   Other Topics Concern    Not on file   Social History Narrative    Not on file       Current Outpatient Medications on File Prior to Visit   Medication Sig Dispense Refill    levothyroxine (SYNTHROID) 125 mcg tablet take 2 tablets by mouth every morning before BREAKFAST FOR THYROID REPLACEMENT AS DIRECTED 180 Tab 3    atorvastatin (LIPITOR) 20 mg tablet Take 1 Tab by mouth daily. 90 Tab 3    fluticasone (FLONASE) 50 mcg/actuation nasal spray 2 Sprays by Both Nostrils route daily. 3 Bottle 3    ergocalciferol (ERGOCALCIFEROL) 50,000 unit capsule Take one capsule twice a week 8 Cap 2     No current facility-administered medications on file prior to visit. Review of Systems  Pertinent items are noted in HPI. Objective:     Visit Vitals  /71 (BP 1 Location: Left arm, BP Patient Position: Sitting)   Pulse 72   Temp 97.6 °F (36.4 °C) (Oral)   Resp 16   Ht 5' 7\" (1.702 m)   Wt 219 lb (99.3 kg)   SpO2 98%   BMI 34.30 kg/m²     Gen: well appearing female  Resp:  No wheezing, no rhonchi, no rales. CV:  RRR, normal S1S2, no murmur. Chest: bilateral mastectomy, axillary lipomas, diffuse CW pain. No edema, bruising or crepitus    Assessment/Plan:       ICD-10-CM ICD-9-CM    1.  Chest wall pain following surgery R07.89 786.59 gabapentin (NEURONTIN) 400 mg capsule    G89.18 338.18    2. Encounter for immunization Z23 V03.89 [place] IMMUNIZ ADMIN,1 SINGLE/COMB VAC/TOXOID      INFLUENZA VIRUS VAC QUAD,SPLIT,PRESV FREE SYRINGE IM   Continue the gabapentin 300mg in morning, 300mg in afternoon, and change to 400mg at bedtime. After one week, increase to 400mg in morning, continue 300mg in afternoon and continue 400mg at bedtime. After one week, increase to 400mg three times a day. Schedule appt with surgeon    Start taking vitamin D3 2,000 iu daily. Follow-up and Dispositions    · Return for follow up as scheduled in January.          Tri Franco MD

## 2019-10-15 NOTE — PATIENT INSTRUCTIONS
Continue the gabapentin 300mg in morning, 300mg in afternoon, and change to 400mg at bedtime. After one week, increase to 400mg in morning, continue 300mg in afternoon and continue 400mg at bedtime. After one week, increase to 400mg three times a day. Schedule appt with surgeon    Start taking vitamin D3 2,000 iu daily. RECOMMEND [id number] CALORIE DIET. TRACK CALORIE INTAKE WITH MY FITNESS [employer]. PROTEIN AT EVERY MEAL. REDUCE INTAKE OF STARCHY CARBS, LIKE BREAD, RICE, PASTA, AND POTATOES. MAKE CARBS 1/4 OF YOUR PLATE. DRINK CALORIE FREE BEVERAGES ONLY. TRY GRAZING DIET, 3 SMALL MEALS AND 2 SNACKS. INCREASE CARDIOVASCULAR EXERCISE, MINIMUM 3 DAYS A WEEK, 30 MINUTES OF CARDIO.

## 2019-11-11 ENCOUNTER — OFFICE VISIT (OUTPATIENT)
Dept: SURGERY | Age: 59
End: 2019-11-11

## 2019-11-11 ENCOUNTER — OFFICE VISIT (OUTPATIENT)
Dept: ONCOLOGY | Age: 59
End: 2019-11-11

## 2019-11-11 VITALS
HEIGHT: 67 IN | SYSTOLIC BLOOD PRESSURE: 115 MMHG | BODY MASS INDEX: 35.94 KG/M2 | DIASTOLIC BLOOD PRESSURE: 76 MMHG | WEIGHT: 229 LBS | HEART RATE: 92 BPM

## 2019-11-11 VITALS
BODY MASS INDEX: 36.02 KG/M2 | TEMPERATURE: 98 F | DIASTOLIC BLOOD PRESSURE: 76 MMHG | OXYGEN SATURATION: 94 % | HEIGHT: 67 IN | WEIGHT: 229.5 LBS | HEART RATE: 92 BPM | SYSTOLIC BLOOD PRESSURE: 115 MMHG

## 2019-11-11 DIAGNOSIS — R07.81 RIB PAIN: Primary | ICD-10-CM

## 2019-11-11 DIAGNOSIS — E66.01 SEVERE OBESITY (HCC): ICD-10-CM

## 2019-11-11 DIAGNOSIS — G89.29 CHEST WALL PAIN, CHRONIC: ICD-10-CM

## 2019-11-11 DIAGNOSIS — G89.18 CHEST WALL PAIN FOLLOWING SURGERY: ICD-10-CM

## 2019-11-11 DIAGNOSIS — Z90.13 H/O MASTECTOMY, BILATERAL: ICD-10-CM

## 2019-11-11 DIAGNOSIS — R07.89 CHEST WALL PAIN FOLLOWING SURGERY: ICD-10-CM

## 2019-11-11 DIAGNOSIS — R07.89 CHEST WALL PAIN, CHRONIC: ICD-10-CM

## 2019-11-11 DIAGNOSIS — Z85.3 HISTORY OF BREAST CANCER: Primary | ICD-10-CM

## 2019-11-11 RX ORDER — GLUCOSAMINE SULFATE 1500 MG
POWDER IN PACKET (EA) ORAL DAILY
COMMUNITY

## 2019-11-11 NOTE — PROGRESS NOTES
Lovell Dance is a 64 y.o. female  Chief Complaint   Patient presents with    Follow-up     breast cancer     1. Have you been to the ER, urgent care clinic since your last visit? Hospitalized since your last visit? Yes, went to a Urgent Care for a sinus infection and patient is now on an antibiotic and Flonase. 2. Have you seen or consulted any other health care providers outside of the [address] [place] [address] since your last visit? Include any pap smears or colon screening.  No.

## 2019-11-11 NOTE — PROGRESS NOTES
HISTORY OF PRESENT ILLNESS  Mayco Charlton is a 64 y.o. female. HPI  ESTABLISHED patient here for annual follow up RIGHT breast cancer. Continues to have\" soreness\" to her breasts. BILATERAL lumpiness to her axillae. She just saw Dr. Dillon Christensen who is going to order a chest CT scan. Has seen Lymphedema clinic and physical therapy for pain. History of RIGHT breast cancer in 2012, lumpectomy and xrt, took antihormonal pill for 3 years. RIGHT breast cancer recurred in 2015, bilateral mastectomies. No reconstruction. Dr. Doc Rhodes was her breast surgeon and Dr. Nic Briggs was her med onc. FH:   - Sister diagnosed with breast cancer at age 54. - Maternal aunt diagnosed with breast cancer in her 57-65's. Review of Systems   All other systems reviewed and are negative. Physical Exam   Pulmonary/Chest:       Bilateral mastectomy scars  No masses  No adenopathy   Nursing note and vitals reviewed. ASSESSMENT and PLAN    ICD-10-CM ICD-9-CM    1. Rib pain R07.81 786.50      - no evidence of local recurrence  - agree with chest ct  F/u in 1 year.

## 2019-11-11 NOTE — LETTER
11/11/19 Patient: Elise Joradn YOB: 1960 Date of Visit: 11/11/2019 Sumi Alexandra MD 
[address] [facility] [address] [address] VIA In Basket Dear Sumi Alexandra MD, Thank you for referring Ms. Elise Jordan to [address] [place] [address] for evaluation. My notes for this consultation are attached. If you have questions, please do not hesitate to call me. I look forward to following your patient along with you. Sincerely, Thuy Pandey, DO

## 2019-11-11 NOTE — PROGRESS NOTES
[facility] [place] at [address], [address] [address], [facility] [phone]  F: [phone]    Reason for Visit:   Meme Lerner is a 64 y.o. female who is seen for 10 month follow up for history of breast cancer. Treatment History:   · Prior patient of [facility]  · Hx of b/l mastectomy   · Adjuvant hormonal therapy stopped due to intolerance. No chemo. ·   · STAGE:  T2N0 ER+ HER2 negative/ oncotype low risk x 2    History of Present Illness:   Patient seen today in office for 10 mo f/u of breast cancer not on any therapy from here. Patient reports that she is doing well overall - nothing new or different. She continues to have chronic chest wall pain, slightly worse now. She was going to the  [facility] but has not been in almost a year per patient. She is interested in [employer]. She reports that her appetite and energy levels are okay. She continues to work as a CNA. Last labs with PCP 10/18. She just saw PCP recently and labs were ordered but not done yet. She has an appointment with breast surgery, Dr. Shanon Aceves later this afternoon. She is here alone today.      Past Medical History:   Diagnosis Date    Arthritis     Cancer [facility])     breast cancer    H/O total mastectomy 2015    Hypercholesterolemia     Other ill-defined conditions(799.89)     completed radiation     Thyroid disease     hypothyroidism      Past Surgical History:   Procedure Laterality Date    HX BREAST LUMPECTOMY Right     HX GYN      hysterectomy; TBL    HX HEENT      tonsillectomy    HX MASTECTOMY Bilateral     HX ORTHOPAEDIC      right knee ACL    HX OTHER SURGICAL      lumptectomy right breast      Social History     Tobacco Use    Smoking status: Former Smoker     Packs/day: 0.50     Last attempt to quit: 2015     Years since quittin.7    Smokeless tobacco: Never Used   Substance Use Topics    Alcohol use: Yes     Comment: occasional      Family History   Problem Relation Age of Onset    Cancer Mother         brain cancer    No Known Problems Father     Breast Cancer Sister 54    No Known Problems Brother     Thyroid Disease Maternal Grandmother     MS Sister     Breast Cancer Maternal Aunt         60-70's     Current Outpatient Medications   Medication Sig    gabapentin (NEURONTIN) 400 mg capsule Take 1 Cap by mouth three (3) times daily. Max Daily Amount: 1,200 mg.  levothyroxine (SYNTHROID) 125 mcg tablet take 2 tablets by mouth every morning before BREAKFAST FOR THYROID REPLACEMENT AS DIRECTED    atorvastatin (LIPITOR) 20 mg tablet Take 1 Tab by mouth daily.  fluticasone (FLONASE) 50 mcg/actuation nasal spray 2 Sprays by Both Nostrils route daily.  ergocalciferol (ERGOCALCIFEROL) 50,000 unit capsule Take one capsule twice a week     No current facility-administered medications for this visit. Allergies   Allergen Reactions    Penicillins Hives     Pt states I am not allergic to penicillin.  Percocet [Oxycodone-Acetaminophen] Itching    Sulfa (Sulfonamide Antibiotics) Other (comments)     Mouth blistering      Review of Systems: A complete review of systems was obtained, negative except as described above.     Physical Exam:     Visit Vitals  /76 (BP 1 Location: Left arm, BP Patient Position: Sitting)   Pulse 92   Temp 98 °F (36.7 °C) (Oral)   Ht 5' 7\" (1.702 m)   Wt 229 lb 8 oz (104.1 kg)   SpO2 94%   BMI 35.94 kg/m²     ECOG PS: 1  General: No distress  Eyes: PERRLA, anicteric sclerae  HENT: Atraumatic, OP clear  Neck: Supple  Lymphatic: No cervical, supraclavicular  Respiratory: CTAB, normal respiratory effort  CV: Normal rate, regular rhythm, no murmurs, no peripheral edema  GI: Soft, nontender, nondistended  MS: Normal gait and station  Skin: Warm, dry  Psych: Alert, oriented, appropriate affect, normal judgment/insight  Breast: b/l mastectomy without lesions     Results:   Per PCP  Lab Results   Component Value Date/Time    WBC 7.4 10/25/2018 02:15 PM    HGB 12.3 10/25/2018 02:15 PM    HCT 38.4 10/25/2018 02:15 PM    PLATELET 401 (H) 59/75/4937 02:15 PM    MCV 91 10/25/2018 02:15 PM    ABS. NEUTROPHILS 4.7 07/16/2015 06:42 PM     Lab Results   Component Value Date/Time    Sodium 141 10/25/2018 02:15 PM    Potassium 4.5 10/25/2018 02:15 PM    Chloride 103 10/25/2018 02:15 PM    CO2 24 10/25/2018 02:15 PM    Glucose 91 10/25/2018 02:15 PM    BUN 13 10/25/2018 02:15 PM    Creatinine 0.71 10/25/2018 02:15 PM    GFR est  10/25/2018 02:15 PM    GFR est non-AA 96 10/25/2018 02:15 PM    Calcium 9.7 10/25/2018 02:15 PM     Lab Results   Component Value Date/Time    Bilirubin, total 0.3 10/25/2018 02:15 PM    ALT (SGPT) 19 10/25/2018 02:15 PM    AST (SGOT) 18 10/25/2018 02:15 PM    Alk. phosphatase 58 10/25/2018 02:15 PM    Protein, total 7.6 10/25/2018 02:15 PM    Albumin 4.6 10/25/2018 02:15 PM    Globulin 4.0 07/16/2015 06:42 PM     Records reviewed and summarized above. Pathology report(s) reviewed above. Radiology report(s) reviewed above. Assessment/PLAN:       1) History of Breast Cancer ER+ HER2 negative 2012 and 2015 treated at [address] [place] [address] with hx of b/l mastectomy in 2015  Oncotype low risk both times. No chemo done. She could not tolerate adjuvant hormonal therapy. Patient was following with surgery and sent here due to insurance change. Patient is not on any adjuvant therapy due to intolerance. She was going to the [address] but has not been recently. She still has pulling/discomfort in chest wall since surgery. Will refer to PT/Cancer rehab today. Will order CT Chest to eval for recurrence. Patient agrees with plan. She has an appointment with breast surgery this afternoon. Labs and routine care per PCP. Labs soon with PCP per patient. 2) Chronic Chest Wall Pain   Since mastectomy. She has an appointment with Dr. Veronique Rojo this afternoon. Will refer to cancer rehab today.    CT Chest ordered today for further eval.     3) Psychosocial  Mood good, coping well. She is here alone today. F/u here in a year or sooner if needed. Patient advised to call anytime with questions. This patient was seen in conjunction with Gamal Solorzano NP. I appreciate the opportunity to participate in [facility]. [facility] care.     Signed By: Sanchez Saldana DO

## 2019-11-23 ENCOUNTER — HOSPITAL ENCOUNTER (OUTPATIENT)
Dept: CT IMAGING | Age: 59
Discharge: HOME | End: 2019-11-23
Attending: NURSE PRACTITIONER
Payer: MEDICAID

## 2019-11-23 DIAGNOSIS — G89.18 CHEST WALL PAIN FOLLOWING SURGERY: ICD-10-CM

## 2019-11-23 DIAGNOSIS — Z85.3 HISTORY OF BREAST CANCER: ICD-10-CM

## 2019-11-23 DIAGNOSIS — Z90.13 H/O MASTECTOMY, BILATERAL: ICD-10-CM

## 2019-11-23 DIAGNOSIS — R07.89 CHEST WALL PAIN FOLLOWING SURGERY: ICD-10-CM

## 2019-11-23 PROCEDURE — 74011636320 HC RX REV CODE- 636/320: Performed by: NURSE PRACTITIONER

## 2019-11-23 PROCEDURE — 71260 CT THORAX DX C+: CPT

## 2019-11-23 RX ORDER — SODIUM CHLORIDE 0.9 % (FLUSH) 0.9 %
10 SYRINGE (ML) INJECTION
Status: COMPLETED | OUTPATIENT
Start: 2019-11-23 | End: 2019-11-23

## 2019-11-23 RX ADMIN — IOPAMIDOL 100 ML: 755 INJECTION, SOLUTION INTRAVENOUS at 09:00

## 2019-11-23 RX ADMIN — Medication 10 ML: at 09:00

## 2019-11-27 NOTE — PROGRESS NOTES
Called patient. HIPAA verified. Advised that CT Chest was good. She verbalized understanding and thanked for call.

## 2019-12-04 ENCOUNTER — DOCUMENTATION ONLY (OUTPATIENT)
Dept: INTERNAL MEDICINE CLINIC | Age: 59
End: 2019-12-04

## 2019-12-04 ENCOUNTER — HOSPITAL ENCOUNTER (OUTPATIENT)
Dept: PHYSICAL THERAPY | Age: 59
Discharge: HOME | End: 2019-12-04
Payer: MEDICAID

## 2019-12-04 PROCEDURE — 97161 PT EVAL LOW COMPLEX 20 MIN: CPT | Performed by: PHYSICAL THERAPIST

## 2019-12-04 PROCEDURE — 97530 THERAPEUTIC ACTIVITIES: CPT | Performed by: PHYSICAL THERAPIST

## 2019-12-04 PROCEDURE — 97110 THERAPEUTIC EXERCISES: CPT | Performed by: PHYSICAL THERAPIST

## 2019-12-04 NOTE — PROGRESS NOTES
PT ONCOLOGY EVAL/DAILY NOTE    Patient Name: Vicente Caldwell  Date:2019  : 1960  [x]  Patient  Verified  Payor: SELF PAY / Plan: [employer] SELF PAY / Product Type: Self Pay /    In time:1:00  Out time:2:00  Total Treatment Time (min): 60  Total Timed Codes (min): 45     Visit #: 1 of 1    Treatment Area: Other chest pain [R07.89]  Personal history of malignant neoplasm of breast [Z85.3]    SUBJECTIVE    Current symptoms/Complaints: Pt c/o's of chest pain, rib pain, has tried PT/Lymph therapy, has tried aquatic therapy, reports that Yevgeniy Sen tries to do all exercises, and do pump and work\" takes gabapentin, \"seems to work for muscle spasms\" Her job requires lifting, 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 Wearing compression vest consistently, sleeves/gloves \"not as often\" Recently, noticed increase in swelling in chest/armpits and reports that she does \"stretches throughout the day\" Just had CT which was negative for chest/rib fractures.      Subjective functional status:     Present Symptoms/History:   Past Medical History:   Diagnosis Date    Arthritis      Cancer [facility])       breast cancer    H/O total mastectomy 2015    Hypercholesterolemia      Other ill-defined conditions(799.89)       completed radiation     Thyroid disease       hypothyroidism            Past Surgical History:   Procedure Laterality Date    HX BREAST LUMPECTOMY Right     HX GYN         hysterectomy; TBL    HX HEENT         tonsillectomy    HX MASTECTOMY Bilateral     HX ORTHOPAEDIC         right knee ACL    HX OTHER SURGICAL         lumptectomy right breast       Referring Provider: Sheryl Faye NP   Medical Oncologist: Olvin   Surgeon: Dr Raul Rodríguez   Primary Care Physician: Francis Arora MD  Next Appointment: next year, just saw in 11/11 Dr Erna Dixon and Dr Danny Russell  Diagnosis: Chest lymphedema  Precautions/Indications: lymphedema, fibromyalgia  History of Present Illness:  Treatment History:   · Prior patient of [facility]  · Hx of b/l mastectomy 2015  · Adjuvant hormonal therapy stopped due to intolerance. No chemo. ·    · STAGE:  T2N0 ER+ HER2 negative/ oncotype low risk x 2    Pain Intensity:5 on a scale of 0-10  · Pain Aggravating Factors: lifting, pushing, pulling  · Pain Relieving Factors: \"sometimes heat\", gabapentin    Has your activity changed since diagnosis?    yes  Limitations/Prior level of functioning: Pt is having difficulty performing physical job requirements of CNA  Dominant Hand: right handed  Personal Goals: \"comfort\"  Home Situation (including environment, stairs, family support, if living alone, any DME used at home):  Pt reports one level home, no difficulties with ADL's, does have compression pump for her lymphedema  Work Status: 40 hours at [employer] as [employer] and [employer]   Current meds: see chart  Barriers:    [x]N/A []pain []financial []time []transportation []other  Motivation: low  Substance use:   [x]N/A  []Alcohol []Tobacco []other:   Cognition: A & O x 4       OBJECTIVE    Ports/ Drains: N/A  Skin/Soft Tissue: Skin of torso and arms dry, flaxy, incisions healed, no signs of infection  ROM: WNL's except R shoulder flex=135 with end range discomfort  Strength: 3/5 throughout B upper quadrants  Mobility/Gait: No reported or observable deficits  Palpation: TTP R axilla, subscap  Posture: forward head, protracted scapulae, IR B g-h jts  Breath pattern assessment  Diaphragm: able to initiate; not primary  Anterior chest primary   Accessory respiratory muscle use: none      15 min [x]Eval []Re-Eval       30 min Therapeutic Exercise:  [x] See flow sheet :   Rationale: increase ROM and increase strength to improve the patients ability to perform ADL's required for return to work and/or community      15 min Therapeutic Activity:  []  See flow sheet :   Rationale: Pt education on lymphedema management, importance of HEP on regular daily schedule, infection risk reduction  to improve the patients ability to effectively manage her lymphedema            With   [x] TE   [] TA   [] neuro   [] other: Patient Education: [] Review HEP    [] Progressed/Changed HEP based on:   [x] positioning   [x] body mechanics   [] transfers   [] heat/ice application    [] other:      Other Objective/Functional Measures: Girth (cm)                             Distance from wrist (cm)    R  L  Palm:  21  21.2  Wrist:  17.8  17.4  Forearm: 30               30.2       17  Elbow:  29.4  29.1  Bicep:  36.3  35.7       35  Axilla:  39.3  39.1  Axillary line 113.5  Incision line 109.2     Pain Level (0-10 scale) post treatment: 5    ASSESSMENT/Changes in Function: Pt is a 64 y.o female s/p B mastectomies without reconstruction, R UE lymphedema. Pt has completed lymphedema management treatment and has worked in [place] with [employer] with good results. Pt admits to not being compliant with pump use, exercise program. At length review of lymphedema management practices and importance of daily stretching and postural exercises. No further PT needs at this time. Goals:  See Plan of Care    PLAN  []  Upgrade activities as tolerated     []  Continue plan of care  []  Update interventions per flow sheet       [x]  Discharge due to:  No further PT needs at this time  []  Other:_      FRANCISCO Carmen 12/4/2019  12:22 PM

## 2019-12-04 NOTE — PROGRESS NOTES
Patient presents to the office to drop off Medicaid Redermination form for Kingston Adams MD. Patient advised of 7-10 business day turnaround time for form completion & may incur a fee of $25.00. This has been fully explained to the patient, who indicates understanding.

## 2019-12-04 NOTE — PROGRESS NOTES
[address] 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 Assessment/ key information: Pt is a 64 y.o female s/p B mastectomies without reconstruction, R UE lymphedema. Pt has completed lymphedema management treatment and has worked in [place] with [employer] with good results. Pt admits to not being compliant with pump use, exercise program. At length review of lymphedema management practices and importance of daily stretching and postural exercises. No further PT needs at this time. Patient Self Reported Health Status: fair  Rehabilitation Potential: poor    Short Term Goals: To be accomplished in 1 treatments: ALL GOALS MET  1) Pt will verbalize understanding of infection and lymphedema risk reduction practices.   2) Pt will demonstrate [place] in prescribed HEP in order to improve motion and strength and decrease pain in B upper quadrants  3) Pt will verbalize knowledge of signs and symptoms to report to physician     Patient/ Caregiver education and instruction: exercises and other Pt review/education in lymphedema management    [x]  Plan of care has been reviewed with PTA    [address] [employer], FRANCISCO 2019 ________________________________________________________________________    I certify that the above Therapy Services are being furnished while the patient is under my care. I agree with the treatment plan and certify that this therapy is necessary.     [de-identified] Signature:____________________  Date:____________Time: _________

## 2019-12-12 ENCOUNTER — TELEPHONE (OUTPATIENT)
Dept: INTERNAL MEDICINE CLINIC | Age: 59
End: 2019-12-12

## 2019-12-12 NOTE — TELEPHONE ENCOUNTER
Patient states she needs a call back to get the status of forms dropped off on 12/4/19 for her Medicaid Re-determination & if faxed over to [facility] yet. Please call to advise.  Thank you

## 2019-12-12 NOTE — TELEPHONE ENCOUNTER
Spoke with patient. Two pt identifiers confirmed. Patient advised that we require 7-10 business days to complete forms. Patient advised that she will be notified when her form has been completed. Pt verbalized understanding of information discussed w/ no further questions at this time.

## 2019-12-13 NOTE — TELEPHONE ENCOUNTER
LVM for patient on home and cell numbers listed in the chart. Pt informed forms has been completed. Forms placed in blue folder.

## 2020-04-14 ENCOUNTER — TELEPHONE (OUTPATIENT)
Dept: INTERNAL MEDICINE CLINIC | Age: 60
End: 2020-04-14

## 2020-04-14 NOTE — TELEPHONE ENCOUNTER
Mary Padilla [address]             Caller's first and last name: Ezequiel Nails   Reason for call:   got a call from the office to say call office   Callback required yes/no and why: yes   Best contact number(s):  [phone]   Details to clarify the request:     Copy/Paste  ENVERA

## 2020-04-27 ENCOUNTER — TELEPHONE (OUTPATIENT)
Dept: INTERNAL MEDICINE CLINIC | Age: 60
End: 2020-04-27

## 2020-04-29 ENCOUNTER — TELEPHONE (OUTPATIENT)
Dept: INTERNAL MEDICINE CLINIC | Age: 60
End: 2020-04-29

## 2020-04-29 ENCOUNTER — VIRTUAL VISIT (OUTPATIENT)
Dept: INTERNAL MEDICINE CLINIC | Age: 60
End: 2020-04-29

## 2020-04-29 DIAGNOSIS — M54.42 ACUTE BILATERAL LOW BACK PAIN WITH BILATERAL SCIATICA: Primary | ICD-10-CM

## 2020-04-29 DIAGNOSIS — M54.41 ACUTE BILATERAL LOW BACK PAIN WITH BILATERAL SCIATICA: Primary | ICD-10-CM

## 2020-04-29 RX ORDER — METHYLPREDNISOLONE 4 MG/1
4 TABLET ORAL
Qty: 1 DOSE PACK | Refills: 0 | Status: ON HOLD | OUTPATIENT
Start: 2020-04-29 | End: 2020-05-26 | Stop reason: CLARIF

## 2020-04-29 NOTE — TELEPHONE ENCOUNTER
Patient return call     Caller's first and last name and relationship (if not the patient): n/a       Best contact number(s): [phone]       Whose call is being returned: Nurse West Weber

## 2020-04-29 NOTE — TELEPHONE ENCOUNTER
Called, spoke to pt. Two pt identifiers confirmed. Patient states she needs appointment for Medication follow up and back pain. Patient offered and accepted appointment 04/29/2020 at 9:45am.    Pt verbalized understanding of information discussed w/ no further questions at this time.

## 2020-04-29 NOTE — PROGRESS NOTES
Omid Nguyen is a 64 y.o. female who presents with concern of lower back pain with bilateral sciatica. Onset a few weeks. No known injury. She is working as a CNA. Some lifting on the job. Tried icy hot patches and heat. Took naproxen, some relief. Xray LS spine 2014 facet OA. This is an established visit conducted via telemedicine with video. The patient has been instructed that this meets HIPAA criteria and acknowledges and agrees to this method of visitation. Pursuant to the emergency declaration under the [facility] waiver authority and the [employer] Act, this Virtual Visit was conducted, with patient's consent, to reduce the patient's risk of exposure to COVID-19 and provide continuity of care for an established patient. Services were provided through a video synchronous discussion virtually to substitute for in-person clinic visit.         Past Medical History:   Diagnosis Date    Arthritis     Cancer [facility])     breast cancer    H/O total mastectomy 11/30/2015    Hypercholesterolemia     Other ill-defined conditions(799.89)     completed radiation 9/12    Thyroid disease     hypothyroidism       Family History   Problem Relation Age of Onset    Cancer Mother         brain cancer    No Known Problems Father     Breast Cancer Sister 54    No Known Problems Brother     Thyroid Disease Maternal Grandmother     MS Sister     Breast Cancer Maternal Aunt         60-70's       Social History     Socioeconomic History    Marital status: SINGLE     Spouse name: Not on file    Number of children: Not on file    Years of education: Not on file    Highest education level: Not on file   Occupational History    Not on file   Social Needs    Financial resource strain: Not on file    Food insecurity     Worry: Not on file     Inability: Not on file    Transportation needs     Medical: Not on file     Non-medical: Not on file   Tobacco Use    Smoking status: Former Smoker     Packs/day: 0.50     Last attempt to quit: 2015     Years since quittin.2    Smokeless tobacco: Never Used   Substance and Sexual Activity    Alcohol use: Yes     Comment: occasional    Drug use: No    Sexual activity: Not Currently     Partners: Male     Birth control/protection: None   Lifestyle    Physical activity     Days per week: Not on file     Minutes per session: Not on file    Stress: Not on file   Relationships    Social connections     Talks on phone: Not on file     Gets together: Not on file     Attends Holiness service: Not on file     Active member of club or organization: Not on file     Attends meetings of clubs or organizations: Not on file     Relationship status: Not on file    Intimate partner violence     Fear of current or ex partner: Not on file     Emotionally abused: Not on file     Physically abused: Not on file     Forced sexual activity: Not on file   Other Topics Concern    Not on file   Social History Narrative    Not on file       Current Outpatient Medications on File Prior to Visit   Medication Sig Dispense Refill    cholecalciferol (VITAMIN D3) 1,000 unit cap Take  by mouth daily.  gabapentin (NEURONTIN) 400 mg capsule Take 1 Cap by mouth three (3) times daily. Max Daily Amount: 1,200 mg. 90 Cap 1    levothyroxine (SYNTHROID) 125 mcg tablet take 2 tablets by mouth every morning before BREAKFAST FOR THYROID REPLACEMENT AS DIRECTED 180 Tab 3    atorvastatin (LIPITOR) 20 mg tablet Take 1 Tab by mouth daily. 90 Tab 3    fluticasone (FLONASE) 50 mcg/actuation nasal spray 2 Sprays by Both Nostrils route daily. 3 Bottle 3    ergocalciferol (ERGOCALCIFEROL) 50,000 unit capsule Take one capsule twice a week 8 Cap 2     No current facility-administered medications on file prior to visit. Review of Systems  Pertinent items are noted in HPI.     Objective:     Gen: well appearing female  Resp: normal respiratory effort, no audible wheezing. CV: patient does not feel palpitations or heart irregularity  Abd: patient does not feel abdominal tenderness or mass, patient does not notice distension  Extrem: patient does not see swelling in ankles or joints. Neuro: Alert and oriented, able to answer questions without difficulty, able to move all extremities and walk normally  Back: reports lower back pain with bending. Assessment/Plan:       ICD-10-CM ICD-9-CM    1. Acute bilateral low back pain with bilateral sciatica M54.42 724.2 methylPREDNISolone (MEDROL DOSEPACK) 4 mg tablet    M54.41 724.3    heat, stretches, start steroid pack. This was a telemedicine visit with video.         Faith Mike MD

## 2020-04-30 ENCOUNTER — TELEPHONE (OUTPATIENT)
Dept: INTERNAL MEDICINE CLINIC | Age: 60
End: 2020-04-30

## 2020-04-30 NOTE — TELEPHONE ENCOUNTER
LVM for patient on home and cell numbers listed in the chart to return call to the office. Pt needs follow up on medication in 6 months.

## 2020-05-25 ENCOUNTER — APPOINTMENT (OUTPATIENT)
Dept: GENERAL RADIOLOGY | Age: 60
DRG: 137 | End: 2020-05-25
Attending: EMERGENCY MEDICINE
Payer: MEDICAID

## 2020-05-25 ENCOUNTER — HOSPITAL ENCOUNTER (INPATIENT)
Age: 60
LOS: 15 days | Discharge: HOME HEALTH | DRG: 137 | End: 2020-06-09
Attending: EMERGENCY MEDICINE | Admitting: HOSPITALIST
Payer: MEDICAID

## 2020-05-25 DIAGNOSIS — U07.1 INFECTION DUE TO SEVERE ACUTE RESPIRATORY SYNDROME CORONAVIRUS 2 (SARS-COV-2): ICD-10-CM

## 2020-05-25 DIAGNOSIS — Z85.3 HISTORY OF BREAST CANCER: ICD-10-CM

## 2020-05-25 DIAGNOSIS — R06.02 SOB (SHORTNESS OF BREATH): ICD-10-CM

## 2020-05-25 DIAGNOSIS — R09.02 HYPOXIA: ICD-10-CM

## 2020-05-25 DIAGNOSIS — U07.1 COVID-19: ICD-10-CM

## 2020-05-25 DIAGNOSIS — E03.9 HYPOTHYROIDISM, UNSPECIFIED TYPE: ICD-10-CM

## 2020-05-25 DIAGNOSIS — U07.1 CLINICAL DIAGNOSIS OF COVID-19: Primary | ICD-10-CM

## 2020-05-25 DIAGNOSIS — Z90.13 H/O MASTECTOMY, BILATERAL: ICD-10-CM

## 2020-05-25 DIAGNOSIS — J00 ACUTE RHINITIS: ICD-10-CM

## 2020-05-25 DIAGNOSIS — E03.4 HYPOTHYROIDISM DUE TO ACQUIRED ATROPHY OF THYROID: ICD-10-CM

## 2020-05-25 LAB
ALBUMIN SERPL-MCNC: 3.9 G/DL (ref 3.5–5)
ALBUMIN/GLOB SERPL: 1 {RATIO} (ref 1.1–2.2)
ALP SERPL-CCNC: 50 U/L (ref 45–117)
ALT SERPL-CCNC: 78 U/L (ref 12–78)
ANION GAP SERPL CALC-SCNC: 8 MMOL/L (ref 5–15)
APTT PPP: 27 SEC (ref 22.1–32)
AST SERPL-CCNC: 153 U/L (ref 15–37)
BASOPHILS # BLD: 0 K/UL (ref 0–0.1)
BASOPHILS NFR BLD: 0 % (ref 0–1)
BILIRUB SERPL-MCNC: 0.3 MG/DL (ref 0.2–1)
BNP SERPL-MCNC: 53 PG/ML (ref 0–125)
BUN SERPL-MCNC: 12 MG/DL (ref 6–20)
BUN/CREAT SERPL: 10 (ref 12–20)
CALCIUM SERPL-MCNC: 8.6 MG/DL (ref 8.5–10.1)
CHLORIDE SERPL-SCNC: 99 MMOL/L (ref 97–108)
CO2 SERPL-SCNC: 32 MMOL/L (ref 21–32)
CREAT SERPL-MCNC: 1.21 MG/DL (ref 0.55–1.02)
CRP SERPL HS-MCNC: >9.5 MG/L
D DIMER PPP FEU-MCNC: 0.3 MG/L FEU (ref 0–0.65)
DIFFERENTIAL METHOD BLD: ABNORMAL
EOSINOPHIL # BLD: 0 K/UL (ref 0–0.4)
EOSINOPHIL NFR BLD: 0 % (ref 0–7)
ERYTHROCYTE [DISTWIDTH] IN BLOOD BY AUTOMATED COUNT: 15 % (ref 11.5–14.5)
FERRITIN SERPL-MCNC: 532 NG/ML (ref 26–388)
FIBRINOGEN PPP-MCNC: 402 MG/DL (ref 200–475)
GLOBULIN SER CALC-MCNC: 3.9 G/DL (ref 2–4)
GLUCOSE SERPL-MCNC: 99 MG/DL (ref 65–100)
HCT VFR BLD AUTO: 38.4 % (ref 35–47)
HGB BLD-MCNC: 11.9 G/DL (ref 11.5–16)
IMM GRANULOCYTES # BLD AUTO: 0 K/UL (ref 0–0.04)
IMM GRANULOCYTES NFR BLD AUTO: 1 % (ref 0–0.5)
INR PPP: 1 (ref 0.9–1.1)
LACTATE SERPL-SCNC: 0.5 MMOL/L (ref 0.4–2)
LDH SERPL L TO P-CCNC: 719 U/L (ref 81–246)
LYMPHOCYTES # BLD: 1 K/UL (ref 0.8–3.5)
LYMPHOCYTES NFR BLD: 11 % (ref 12–49)
MCH RBC QN AUTO: 29.4 PG (ref 26–34)
MCHC RBC AUTO-ENTMCNC: 31 G/DL (ref 30–36.5)
MCV RBC AUTO: 94.8 FL (ref 80–99)
MONOCYTES # BLD: 0.2 K/UL (ref 0–1)
MONOCYTES NFR BLD: 2 % (ref 5–13)
NEUTS SEG # BLD: 7.4 K/UL (ref 1.8–8)
NEUTS SEG NFR BLD: 86 % (ref 32–75)
NRBC # BLD: 0 K/UL (ref 0–0.01)
NRBC BLD-RTO: 0 PER 100 WBC
PLATELET # BLD AUTO: 270 K/UL (ref 150–400)
PMV BLD AUTO: 10.8 FL (ref 8.9–12.9)
POTASSIUM SERPL-SCNC: 3.8 MMOL/L (ref 3.5–5.1)
PROT SERPL-MCNC: 7.8 G/DL (ref 6.4–8.2)
PROTHROMBIN TIME: 9.4 SEC (ref 9–11.1)
RBC # BLD AUTO: 4.05 M/UL (ref 3.8–5.2)
SARS-COV-2, COV2: DETECTED
SODIUM SERPL-SCNC: 139 MMOL/L (ref 136–145)
SPECIMEN SOURCE, FCOV2M: ABNORMAL
THERAPEUTIC RANGE,PTTT: NORMAL SECS (ref 58–77)
WBC # BLD AUTO: 8.6 K/UL (ref 3.6–11)

## 2020-05-25 PROCEDURE — 74011250637 HC RX REV CODE- 250/637: Performed by: HOSPITALIST

## 2020-05-25 PROCEDURE — 85379 FIBRIN DEGRADATION QUANT: CPT

## 2020-05-25 PROCEDURE — 36415 COLL VENOUS BLD VENIPUNCTURE: CPT

## 2020-05-25 PROCEDURE — 99218 HC RM OBSERVATION: CPT

## 2020-05-25 PROCEDURE — 83520 IMMUNOASSAY QUANT NOS NONAB: CPT

## 2020-05-25 PROCEDURE — 80053 COMPREHEN METABOLIC PANEL: CPT

## 2020-05-25 PROCEDURE — 65660000000 HC RM CCU STEPDOWN

## 2020-05-25 PROCEDURE — 71045 X-RAY EXAM CHEST 1 VIEW: CPT

## 2020-05-25 PROCEDURE — 85384 FIBRINOGEN ACTIVITY: CPT

## 2020-05-25 PROCEDURE — 96374 THER/PROPH/DIAG INJ IV PUSH: CPT

## 2020-05-25 PROCEDURE — 82728 ASSAY OF FERRITIN: CPT

## 2020-05-25 PROCEDURE — 87070 CULTURE OTHR SPECIMN AEROBIC: CPT

## 2020-05-25 PROCEDURE — 83605 ASSAY OF LACTIC ACID: CPT

## 2020-05-25 PROCEDURE — 83615 LACTATE (LD) (LDH) ENZYME: CPT

## 2020-05-25 PROCEDURE — 85610 PROTHROMBIN TIME: CPT

## 2020-05-25 PROCEDURE — 85025 COMPLETE CBC W/AUTO DIFF WBC: CPT

## 2020-05-25 PROCEDURE — 83880 ASSAY OF NATRIURETIC PEPTIDE: CPT

## 2020-05-25 PROCEDURE — 74011250636 HC RX REV CODE- 250/636: Performed by: HOSPITALIST

## 2020-05-25 PROCEDURE — 99284 EMERGENCY DEPT VISIT MOD MDM: CPT

## 2020-05-25 PROCEDURE — 86141 C-REACTIVE PROTEIN HS: CPT

## 2020-05-25 PROCEDURE — 87635 SARS-COV-2 COVID-19 AMP PRB: CPT

## 2020-05-25 PROCEDURE — 74011250636 HC RX REV CODE- 250/636: Performed by: EMERGENCY MEDICINE

## 2020-05-25 PROCEDURE — 85730 THROMBOPLASTIN TIME PARTIAL: CPT

## 2020-05-25 PROCEDURE — 74011250637 HC RX REV CODE- 250/637: Performed by: NURSE PRACTITIONER

## 2020-05-25 PROCEDURE — 65660000001 HC RM ICU INTERMED STEPDOWN

## 2020-05-25 RX ORDER — ONDANSETRON 2 MG/ML
4 INJECTION INTRAMUSCULAR; INTRAVENOUS
Status: COMPLETED | OUTPATIENT
Start: 2020-05-25 | End: 2020-05-25

## 2020-05-25 RX ORDER — SODIUM CHLORIDE 0.9 % (FLUSH) 0.9 %
5-40 SYRINGE (ML) INJECTION EVERY 8 HOURS
Status: DISCONTINUED | OUTPATIENT
Start: 2020-05-25 | End: 2020-06-09 | Stop reason: HOSPADM

## 2020-05-25 RX ORDER — ACETAMINOPHEN 650 MG/1
650 SUPPOSITORY RECTAL
Status: DISCONTINUED | OUTPATIENT
Start: 2020-05-25 | End: 2020-06-09 | Stop reason: HOSPADM

## 2020-05-25 RX ORDER — ENOXAPARIN SODIUM 100 MG/ML
40 INJECTION SUBCUTANEOUS EVERY 12 HOURS
Status: DISCONTINUED | OUTPATIENT
Start: 2020-05-25 | End: 2020-06-09 | Stop reason: HOSPADM

## 2020-05-25 RX ORDER — ZINC SULFATE 50(220)MG
1 CAPSULE ORAL DAILY
Status: DISCONTINUED | OUTPATIENT
Start: 2020-05-26 | End: 2020-06-09 | Stop reason: HOSPADM

## 2020-05-25 RX ORDER — ASCORBIC ACID 500 MG
500 TABLET ORAL 2 TIMES DAILY
Status: DISCONTINUED | OUTPATIENT
Start: 2020-05-25 | End: 2020-06-09 | Stop reason: HOSPADM

## 2020-05-25 RX ORDER — SODIUM CHLORIDE 9 MG/ML
50 INJECTION, SOLUTION INTRAVENOUS CONTINUOUS
Status: DISPENSED | OUTPATIENT
Start: 2020-05-25 | End: 2020-05-26

## 2020-05-25 RX ORDER — ACETAMINOPHEN 325 MG/1
650 TABLET ORAL
Status: DISCONTINUED | OUTPATIENT
Start: 2020-05-25 | End: 2020-06-09 | Stop reason: HOSPADM

## 2020-05-25 RX ORDER — GUAIFENESIN 100 MG/5ML
100 SOLUTION ORAL
Status: DISCONTINUED | OUTPATIENT
Start: 2020-05-25 | End: 2020-06-09 | Stop reason: HOSPADM

## 2020-05-25 RX ORDER — SODIUM CHLORIDE 0.9 % (FLUSH) 0.9 %
5-40 SYRINGE (ML) INJECTION AS NEEDED
Status: DISCONTINUED | OUTPATIENT
Start: 2020-05-25 | End: 2020-06-09 | Stop reason: HOSPADM

## 2020-05-25 RX ORDER — ALBUTEROL SULFATE 90 UG/1
2 AEROSOL, METERED RESPIRATORY (INHALATION)
Status: DISCONTINUED | OUTPATIENT
Start: 2020-05-25 | End: 2020-06-09 | Stop reason: HOSPADM

## 2020-05-25 RX ORDER — ONDANSETRON 2 MG/ML
4 INJECTION INTRAMUSCULAR; INTRAVENOUS
Status: DISCONTINUED | OUTPATIENT
Start: 2020-05-25 | End: 2020-06-09 | Stop reason: HOSPADM

## 2020-05-25 RX ORDER — LEVOTHYROXINE SODIUM 125 UG/1
125 TABLET ORAL
Status: DISCONTINUED | OUTPATIENT
Start: 2020-05-26 | End: 2020-05-26

## 2020-05-25 RX ORDER — FLUTICASONE PROPIONATE 50 MCG
2 SPRAY, SUSPENSION (ML) NASAL DAILY
Status: DISCONTINUED | OUTPATIENT
Start: 2020-05-26 | End: 2020-06-09 | Stop reason: HOSPADM

## 2020-05-25 RX ADMIN — SODIUM CHLORIDE 50 ML/HR: 900 INJECTION, SOLUTION INTRAVENOUS at 14:15

## 2020-05-25 RX ADMIN — Medication 10 ML: at 17:08

## 2020-05-25 RX ADMIN — ACETAMINOPHEN 650 MG: 325 TABLET, FILM COATED ORAL at 21:34

## 2020-05-25 RX ADMIN — Medication 10 ML: at 21:34

## 2020-05-25 RX ADMIN — ONDANSETRON 4 MG: 2 INJECTION INTRAMUSCULAR; INTRAVENOUS at 10:05

## 2020-05-25 RX ADMIN — Medication 500 MG: at 17:08

## 2020-05-25 RX ADMIN — ENOXAPARIN SODIUM 40 MG: 40 INJECTION SUBCUTANEOUS at 17:08

## 2020-05-25 RX ADMIN — ONDANSETRON 4 MG: 2 INJECTION INTRAMUSCULAR; INTRAVENOUS at 17:11

## 2020-05-25 RX ADMIN — AZITHROMYCIN 500 MG: 500 INJECTION, POWDER, LYOPHILIZED, FOR SOLUTION INTRAVENOUS at 14:20

## 2020-05-25 NOTE — CONSULTS
Pulmonology Consult - [facility]    Subjective:   Consult Note: 5/25/2020 3:40 PM    This patient has been seen and evaluated at the request of Dr. Kavita Augustin for COVID peumonia and use of Sloane. Patient is a 64 y.o. female   attmepted telemedicine conference-(not on site and inadequate PPE)  Patient did not  for call  Chart reviewed  Dx COVID 19 pos 2 days ago  Repeat test result at [address] reviewed - overlying soft tissue affects accuracy of interpretation   Complaints of increased dyspnea past couple of days      Past Medical History:   Diagnosis Date    Arthritis     Cancer ([address]     breast cancer    H/O total mastectomy 11/30/2015    Hypercholesterolemia     Other ill-defined conditions(799.89)     completed radiation 9/12    Thyroid disease     hypothyroidism      Past Surgical History:   Procedure Laterality Date    HX BREAST LUMPECTOMY Right 2012    HX GYN      hysterectomy; TBL    HX HEENT      tonsillectomy    HX MASTECTOMY Bilateral 2015    HX ORTHOPAEDIC      right knee ACL    HX OTHER SURGICAL      lumptectomy right breast      Prior to Admission medications    Medication Sig Start Date End Date Taking? Authorizing Provider   cyclobenzaprine (FLEXERIL) 5 mg tablet Take 1-2 tablets at bedtime as needed for muscle spasm. 5/4/20   Savannah Prajapati MD   methylPREDNISolone (MEDROL DOSEPACK) 4 mg tablet Take 1 Tab by mouth Specific Days and Specific Times. 4/29/20   Savannah Prajapati MD   cholecalciferol (VITAMIN D3) 1,000 unit cap Take  by mouth daily. Provider, Historical   gabapentin (NEURONTIN) 400 mg capsule Take 1 Cap by mouth three (3) times daily. Max Daily Amount: 1,200 mg. 10/15/19   Savannah Prajapati MD   levothyroxine (SYNTHROID) 125 mcg tablet take 2 tablets by mouth every morning before BREAKFAST FOR THYROID REPLACEMENT AS DIRECTED 10/25/18   Savannah Prajapati MD   atorvastatin (LIPITOR) 20 mg tablet Take 1 Tab by mouth daily.  10/25/18   Radha Neftali Urrutia MD   fluticasone [facility]) 50 mcg/actuation nasal spray 2 Sprays by Both Nostrils route daily. 10/25/18   Natalie Jose MD   ergocalciferol (ERGOCALCIFEROL) 50,000 unit capsule Take one capsule twice a week 17   Natalie Jose MD     Allergies   Allergen Reactions    Penicillins Hives     Pt states I am not allergic to penicillin.      Percocet [Oxycodone-Acetaminophen] Itching    Sulfa (Sulfonamide Antibiotics) Other (comments)     Mouth blistering      Social History     Tobacco Use    Smoking status: Former Smoker     Packs/day: 0.50     Last attempt to quit: 2015     Years since quittin.3    Smokeless tobacco: Never Used   Substance Use Topics    Alcohol use: Yes     Comment: occasional      Family History   Problem Relation Age of Onset    Cancer Mother         brain cancer    No Known Problems Father     Breast Cancer Sister 54    No Known Problems Brother     Thyroid Disease Maternal Grandmother     MS Sister     Breast Cancer Maternal Aunt         60-70's        Current Facility-Administered Medications   Medication Dose Route Frequency    acetaminophen (TYLENOL) tablet 650 mg  650 mg Oral Q6H PRN    Or    acetaminophen (TYLENOL) suppository 650 mg  650 mg Rectal Q6H PRN    [START ON 2020] levothyroxine (SYNTHROID) tablet 125 mcg  125 mcg Oral ACB    [START ON 2020] fluticasone propionate (FLONASE) 50 mcg/actuation nasal spray 2 Spray  2 Spray Both Nostrils DAILY    albuterol (PROVENTIL HFA, VENTOLIN HFA, PROAIR HFA) inhaler 2 Puff  2 Puff Inhalation Q6H PRN    [START ON 2020] zinc sulfate (ZINCATE) 220 (50) mg capsule 1 Cap  1 Cap Oral DAILY    ascorbic acid (vitamin C) (VITAMIN C) tablet 500 mg  500 mg Oral BID    enoxaparin (LOVENOX) injection 40 mg  40 mg SubCUTAneous Q12H    0.9% sodium chloride infusion  50 mL/hr IntraVENous CONTINUOUS    azithromycin (ZITHROMAX) 500 mg in 0.9% sodium chloride (MBP/ADV) 250 mL  500 mg IntraVENous Q24H  guaiFENesin (ROBITUSSIN) 100 mg/5 mL oral liquid 100 mg  100 mg Oral Q4H PRN    sodium chloride (NS) flush 5-40 mL  5-40 mL IntraVENous Q8H    sodium chloride (NS) flush 5-40 mL  5-40 mL IntraVENous PRN    ondansetron (ZOFRAN) injection 4 mg  4 mg IntraVENous Q6H PRN           Objective:   Vital Signs:    Visit Vitals  /51 (BP 1 Location: Left arm, BP Patient Position: At rest)   Pulse 79   Temp 98.2 °F (36.8 °C)   Resp 18   Ht 5' 7.01\" (1.702 m)   Wt 109.1 kg (240 lb 7.4 oz)   SpO2 100%   BMI 37.65 kg/m²       O2 Device: Nasal cannula   O2 Flow Rate (L/min): 2 l/min   Temp (24hrs), Av.2 °F (36.8 °C), Min:98.1 °F (36.7 °C), Max:98.3 °F (36.8 °C)       Intake/Output:   Last shift:      No intake/output data recorded. Last 3 shifts: No intake/output data recorded. No intake or output data in the 24 hours ending 20 1540          Data Review     Recent Results (from the past 24 hour(s))   CBC WITH AUTOMATED DIFF    Collection Time: 20 10:07 AM   Result Value Ref Range    WBC 8.6 3.6 - 11.0 K/uL    RBC 4.05 3.80 - 5.20 M/uL    HGB 11.9 11.5 - 16.0 g/dL    HCT 38.4 35.0 - 47.0 %    MCV 94.8 80.0 - 99.0 FL    MCH 29.4 26.0 - 34.0 PG    MCHC 31.0 30.0 - 36.5 g/dL    RDW 15.0 (H) 11.5 - 14.5 %    PLATELET 475 743 - 176 K/uL    MPV 10.8 8.9 - 12.9 FL    NRBC 0.0 0  WBC    ABSOLUTE NRBC 0.00 0.00 - 0.01 K/uL    NEUTROPHILS 86 (H) 32 - 75 %    LYMPHOCYTES 11 (L) 12 - 49 %    MONOCYTES 2 (L) 5 - 13 %    EOSINOPHILS 0 0 - 7 %    BASOPHILS 0 0 - 1 %    IMMATURE GRANULOCYTES 1 (H) 0.0 - 0.5 %    ABS. NEUTROPHILS 7.4 1.8 - 8.0 K/UL    ABS. LYMPHOCYTES 1.0 0.8 - 3.5 K/UL    ABS. MONOCYTES 0.2 0.0 - 1.0 K/UL    ABS. EOSINOPHILS 0.0 0.0 - 0.4 K/UL    ABS. BASOPHILS 0.0 0.0 - 0.1 K/UL    ABS. IMM.  GRANS. 0.0 0.00 - 0.04 K/UL    DF AUTOMATED     METABOLIC PANEL, COMPREHENSIVE    Collection Time: 20 10:07 AM   Result Value Ref Range    Sodium 139 136 - 145 mmol/L    Potassium 3.8 3.5 - 5.1 mmol/L    Chloride 99 97 - 108 mmol/L    CO2 32 21 - 32 mmol/L    Anion gap 8 5 - 15 mmol/L    Glucose 99 65 - 100 mg/dL    BUN 12 6 - 20 MG/DL    Creatinine 1.21 (H) 0.55 - 1.02 MG/DL    BUN/Creatinine ratio 10 (L) 12 - 20      GFR est AA 56 (L) >60 ml/min/1.73m2    GFR est non-AA 46 (L) >60 ml/min/1.73m2    Calcium 8.6 8.5 - 10.1 MG/DL    Bilirubin, total 0.3 0.2 - 1.0 MG/DL    ALT (SGPT) 78 12 - 78 U/L    AST (SGOT) 153 (H) 15 - 37 U/L    Alk. phosphatase 50 45 - 117 U/L    Protein, total 7.8 6.4 - 8.2 g/dL    Albumin 3.9 3.5 - 5.0 g/dL    Globulin 3.9 2.0 - 4.0 g/dL    A-G Ratio 1.0 (L) 1.1 - 2.2     FERRITIN    Collection Time: 05/25/20 10:07 AM   Result Value Ref Range    Ferritin 532 (H) 26 - 388 NG/ML   LD    Collection Time: 05/25/20 10:07 AM   Result Value Ref Range     (H) 81 - 246 U/L   LACTIC ACID    Collection Time: 05/25/20 10:07 AM   Result Value Ref Range    Lactic acid 0.5 0.4 - 2.0 MMOL/L   SARS-COV-2    Collection Time: 05/25/20 10:07 AM   Result Value Ref Range    Specimen source Nasopharyngeal      SARS-CoV-2 PENDING    NT-PRO BNP    Collection Time: 05/25/20 10:07 AM   Result Value Ref Range    NT pro-BNP 53 0 - 125 PG/ML   PROTHROMBIN TIME + INR    Collection Time: 05/25/20 10:45 AM   Result Value Ref Range    INR 1.0 0.9 - 1.1      Prothrombin time 9.4 9.0 - 11.1 sec   PTT    Collection Time: 05/25/20 10:45 AM   Result Value Ref Range    aPTT 27.0 22.1 - 32.0 sec    aPTT, therapeutic range     58.0 - 77.0 SECS   FIBRINOGEN    Collection Time: 05/25/20 10:45 AM   Result Value Ref Range    Fibrinogen 402 200 - 475 mg/dL   D DIMER    Collection Time: 05/25/20 10:45 AM   Result Value Ref Range    D-dimer 0.30 0.00 - 0.65 mg/L FEU       Chest X-Ray:image and report reviewed    Assessment:   Active Problems:    Hypoxia (5/25/2020)  COVID-19 (5/25/2020) pos  Pneumonia  Obesity  Low flow O2        ·     Recommendations:     1. Follow up CXR   2. Follow O2 requirements  3.  Sloane suitable for patients requiring 4-10 L NC )2  4.  Likely initiate remdesivir and Sloane if clinical status worsens and she meets criteria    Blanca Ware MD

## 2020-05-25 NOTE — H&P
History & Physical    Primary Care Provider: Jason Swanson MD  Source of Information: Patient     History of Presenting Illness:   Rico Arzola is a 64 y.o. female who presents with COVID pos+ from [address] [place] [address]. Pt said she had a test 2 days back and came back positive. She became SOB and went to ED. Patient states that for the last 3 days she has not felt well. She has had some body aches and nausea and fatigue. She is also had cough. Over the last 24 hours she started to have shortness of breath. She comes in for evaluation of these complaints and concern for coronavirus given her workplace exposure. She works at a facility in "[employer] where there is apparently several patients with confirmed coronavirus. Otherwise patient denies any type of chest pain. She denies having any fevers but does note that she is been chilled. No dysuria or abdominal pain. No other acute complaints The patient denies any fever, chills, chest pain, cough, congestion, recent illness, palpitations, or dysuria. Review of Systems:  Pertinent items are noted in the History of Present Illness. Past Medical History:   Diagnosis Date    Arthritis     Cancer [facility])     breast cancer    H/O total mastectomy 11/30/2015    Hypercholesterolemia     Other ill-defined conditions(799.89)     completed radiation 9/12    Thyroid disease     hypothyroidism      Past Surgical History:   Procedure Laterality Date    HX BREAST LUMPECTOMY Right 2012    HX GYN      hysterectomy; TBL    HX HEENT      tonsillectomy    HX MASTECTOMY Bilateral 2015    HX ORTHOPAEDIC      right knee ACL    HX OTHER SURGICAL      lumptectomy right breast     Prior to Admission medications    Medication Sig Start Date End Date Taking? Authorizing Provider   cyclobenzaprine (FLEXERIL) 5 mg tablet Take 1-2 tablets at bedtime as needed for muscle spasm.  5/4/20   Jason Swanson MD   methylPREDNISolone (William Mariano) 4 mg tablet Take 1 Tab by mouth Specific Days and Specific Times. 4/29/20   Nicanor Covarrubias MD   cholecalciferol (VITAMIN D3) 1,000 unit cap Take  by mouth daily. Provider, Babar   gabapentin (NEURONTIN) 400 mg capsule Take 1 Cap by mouth three (3) times daily. Max Daily Amount: 1,200 mg. 10/15/19   Nicanor Covarrubias MD   levothyroxine (SYNTHROID) 125 mcg tablet take 2 tablets by mouth every morning before BREAKFAST FOR THYROID REPLACEMENT AS DIRECTED 10/25/18   Nicanor Covarrubias MD   atorvastatin (LIPITOR) 20 mg tablet Take 1 Tab by mouth daily. 10/25/18   Nicanor Covarrubias MD   fluticasone (FLONASE) 50 mcg/actuation nasal spray 2 Sprays by Both Nostrils route daily. 10/25/18   Nicanor Covarrubias MD   ergocalciferol (ERGOCALCIFEROL) 50,000 unit capsule Take one capsule twice a week 4/19/17   Nicanor Covarrubias MD     Allergies   Allergen Reactions    Penicillins Hives     Pt states I am not allergic to penicillin.      Percocet [Oxycodone-Acetaminophen] Itching    Sulfa (Sulfonamide Antibiotics) Other (comments)     Mouth blistering      Family History   Problem Relation Age of Onset    Cancer Mother         brain cancer    No Known Problems Father     Breast Cancer Sister 54    No Known Problems Brother     Thyroid Disease Maternal Grandmother     MS Sister     Breast Cancer Maternal Aunt         60-70's        SOCIAL HISTORY:  Patient resides:  Independently    Assisted Living    SNF    With family care x      Smoking history:   None x   Former    Chronic      Alcohol history:   None x   Social    Chronic      Ambulates:   Independently x   w/cane    w/walker    w/wc    CODE STATUS:  DNR    Full x   Other      Objective:     Physical Exam:     Visit Vitals  /51   Pulse 79   Temp 98.2 °F (36.8 °C)   Resp 18   Ht 5' 7.01\" (1.702 m)   Wt 109.1 kg (240 lb 7.4 oz)   SpO2 100%   BMI 37.65 kg/m²    O2 Flow Rate (L/min): 2 l/min O2 Device: Nasal cannula    General:  Alert, cooperative, no distress, appears stated age. Head:  Normocephalic, without obvious abnormality, atraumatic. Eyes:  Conjunctivae/corneas clear. PERRL, EOMs intact. Nose: Nares normal. Septum midline. Mucosa normal.    Throat: Lips, mucosa, and tongue normal. Teeth and gums normal.   Neck: Supple, symmetrical, trachea midline,, no carotid bruit and no JVD. Lungs:   Clear to auscultation bilaterally. Heart:  Regular rate and rhythm, S1, S2 normal, no murmur, click, rub or gallop. Abdomen:   Soft, non-tender. Bowel sounds normal. No masses,  No organomegaly. Extremities: Extremities normal, atraumatic, no cyanosis or edema. Pulses: 2+ and symmetric all extremities. Skin: Skin color, texture, turgor normal. No rashes or lesions   Neurologic: CNII-XII intact. EKG:  No EKG done      Data Review:     Recent Days:  Recent Labs     05/25/20  1007   WBC 8.6   HGB 11.9   HCT 38.4        Recent Labs     05/25/20  1045 05/25/20  1007   NA  --  139   K  --  3.8   CL  --  99   CO2  --  32   GLU  --  99   BUN  --  12   CREA  --  1.21*   CA  --  8.6   ALB  --  3.9   TBILI  --  0.3   SGOT  --  153*   ALT  --  78   INR 1.0  --      No results for input(s): PH, PCO2, PO2, HCO3, FIO2 in the last 72 hours. 24 Hour Results:  Recent Results (from the past 24 hour(s))   CBC WITH AUTOMATED DIFF    Collection Time: 05/25/20 10:07 AM   Result Value Ref Range    WBC 8.6 3.6 - 11.0 K/uL    RBC 4.05 3.80 - 5.20 M/uL    HGB 11.9 11.5 - 16.0 g/dL    HCT 38.4 35.0 - 47.0 %    MCV 94.8 80.0 - 99.0 FL    MCH 29.4 26.0 - 34.0 PG    MCHC 31.0 30.0 - 36.5 g/dL    RDW 15.0 (H) 11.5 - 14.5 %    PLATELET 691 052 - 452 K/uL    MPV 10.8 8.9 - 12.9 FL    NRBC 0.0 0  WBC    ABSOLUTE NRBC 0.00 0.00 - 0.01 K/uL    NEUTROPHILS 86 (H) 32 - 75 %    LYMPHOCYTES 11 (L) 12 - 49 %    MONOCYTES 2 (L) 5 - 13 %    EOSINOPHILS 0 0 - 7 %    BASOPHILS 0 0 - 1 %    IMMATURE GRANULOCYTES 1 (H) 0.0 - 0.5 %    ABS. NEUTROPHILS 7.4 1.8 - 8.0 K/UL    ABS. LYMPHOCYTES 1.0 0.8 - 3.5 K/UL    ABS. MONOCYTES 0.2 0.0 - 1.0 K/UL    ABS. EOSINOPHILS 0.0 0.0 - 0.4 K/UL    ABS. BASOPHILS 0.0 0.0 - 0.1 K/UL    ABS. IMM. GRANS. 0.0 0.00 - 0.04 K/UL    DF AUTOMATED     METABOLIC PANEL, COMPREHENSIVE    Collection Time: 05/25/20 10:07 AM   Result Value Ref Range    Sodium 139 136 - 145 mmol/L    Potassium 3.8 3.5 - 5.1 mmol/L    Chloride 99 97 - 108 mmol/L    CO2 32 21 - 32 mmol/L    Anion gap 8 5 - 15 mmol/L    Glucose 99 65 - 100 mg/dL    BUN 12 6 - 20 MG/DL    Creatinine 1.21 (H) 0.55 - 1.02 MG/DL    BUN/Creatinine ratio 10 (L) 12 - 20      GFR est AA 56 (L) >60 ml/min/1.73m2    GFR est non-AA 46 (L) >60 ml/min/1.73m2    Calcium 8.6 8.5 - 10.1 MG/DL    Bilirubin, total 0.3 0.2 - 1.0 MG/DL    ALT (SGPT) 78 12 - 78 U/L    AST (SGOT) 153 (H) 15 - 37 U/L    Alk.  phosphatase 50 45 - 117 U/L    Protein, total 7.8 6.4 - 8.2 g/dL    Albumin 3.9 3.5 - 5.0 g/dL    Globulin 3.9 2.0 - 4.0 g/dL    A-G Ratio 1.0 (L) 1.1 - 2.2     LACTIC ACID    Collection Time: 05/25/20 10:07 AM   Result Value Ref Range    Lactic acid 0.5 0.4 - 2.0 MMOL/L   SARS-COV-2    Collection Time: 05/25/20 10:07 AM   Result Value Ref Range    Specimen source Nasopharyngeal      SARS-CoV-2 PENDING    NT-PRO BNP    Collection Time: 05/25/20 10:07 AM   Result Value Ref Range    NT pro-BNP 53 0 - 125 PG/ML   PROTHROMBIN TIME + INR    Collection Time: 05/25/20 10:45 AM   Result Value Ref Range    INR 1.0 0.9 - 1.1      Prothrombin time 9.4 9.0 - 11.1 sec   PTT    Collection Time: 05/25/20 10:45 AM   Result Value Ref Range    aPTT 27.0 22.1 - 32.0 sec    aPTT, therapeutic range     58.0 - 77.0 SECS   FIBRINOGEN    Collection Time: 05/25/20 10:45 AM   Result Value Ref Range    Fibrinogen 402 200 - 475 mg/dL   D DIMER    Collection Time: 05/25/20 10:45 AM   Result Value Ref Range    D-dimer 0.30 0.00 - 0.65 mg/L FEU         Imaging:     Assessment:     Active Problems:    Hypoxia (5/25/2020)      COVID-19 (5/25/2020) Plan: 1. COVID positive elsewhere  -  Droplet plus precaution  - repeat SARS- COV 2   - started on zinc/ vit C and zithromox  - Gufenesin for cough     2. Acute hypoxic resp failure from possibly COVID 19  - on 2 L nc  Wean off slowly as possible   - will get D dimer ( initial WNL ) / LDH/ IL6 and fibrinogen  - consulted pulmonary for NO therapy   - a/c with 30 mg q 12    3. Hypothyroid  - resume home meds get TSH    4. Body mass index is 37.65 kg/m². obesity    5. Dehydration-  unknown creatinine base line  -  Will give small amount of fluid 50 ml / hr for 24 hrs    6. Abnormal LFT's   - consider USG as out pt     6. DVT  On Lovenox     7. Full code     8. Ambulates at baseline    9.  Dispo: in 48 hrs            Signed By: Susanne Arita MD     May 25, 2020

## 2020-05-25 NOTE — ED PROVIDER NOTES
Date of Service:  5/25/2020    Patient:  Zarina Soares    Chief Complaint:  Concern For COVID-19 (Coronavirus)       HPI:  Zarina Soares is a 64 y.o.  female who presents for evaluation of respiratory distress. Patient states that for the last 3 days she has not felt well. She has had some body aches and nausea and fatigue. She is also had cough. Over the last 24 hours she started to have shortness of breath. She comes in for evaluation of these complaints and concern for coronavirus given her workplace exposure. 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 Patient had an outpatient test done several days ago but cannot remember where and does not know the results yet. Otherwise patient denies any type of chest pain. She denies having any fevers but does note that she is been chilled. No dysuria or abdominal pain.   No other acute complaints           Past Medical History:   Diagnosis Date    Arthritis     Cancer ([facility] [address]     breast cancer    H/O total mastectomy 11/30/2015    Hypercholesterolemia     Other ill-defined conditions(799.89)     completed radiation 9/12    Thyroid disease     hypothyroidism       Past Surgical History:   Procedure Laterality Date    HX BREAST LUMPECTOMY Right 2012    HX GYN      hysterectomy; TBL    HX HEENT      tonsillectomy    HX MASTECTOMY Bilateral 2015    HX ORTHOPAEDIC      right knee ACL    HX OTHER SURGICAL      lumptectomy right breast         Family History:   Problem Relation Age of Onset    Cancer Mother         brain cancer    No Known Problems Father     Breast Cancer Sister 54    No Known Problems Brother     Thyroid Disease Maternal Grandmother     MS Sister     Breast Cancer Maternal Aunt         60-70's       Social History     Socioeconomic History    Marital status: SINGLE     Spouse name: Not on file    Number of children: Not on file    Years of education: Not on file    Highest education level: Not on file   Occupational History    Not on file   Social Needs    Financial resource strain: Not on file    Food insecurity     Worry: Not on file     Inability: Not on file    Transportation needs     Medical: Not on file     Non-medical: Not on file   Tobacco Use    Smoking status: Former Smoker     Packs/day: 0.50     Last attempt to quit: 2015     Years since quittin.3    Smokeless tobacco: Never Used   Substance and Sexual Activity    Alcohol use: Yes     Comment: occasional    Drug use: No    Sexual activity: Not Currently     Partners: Male     Birth control/protection: None   Lifestyle    Physical activity     Days per week: Not on file     Minutes per session: Not on file    Stress: Not on file   Relationships    Social connections     Talks on phone: Not on file     Gets together: Not on file     Attends Jain service: Not on file     Active member of club or organization: Not on file     Attends meetings of clubs or organizations: Not on file     Relationship status: Not on file    Intimate partner violence     Fear of current or ex partner: Not on file     Emotionally abused: Not on file     Physically abused: Not on file     Forced sexual activity: Not on file   Other Topics Concern    Not on file   Social History Narrative    Not on file         ALLERGIES: Penicillins; Percocet [oxycodone-acetaminophen]; and Sulfa (sulfonamide antibiotics)    Review of Systems   Constitutional: Positive for appetite change, chills and fatigue. Negative for fever. HENT: Negative for hearing loss. Eyes: Negative for visual disturbance. Respiratory: Positive for cough and shortness of breath. Negative for wheezing. Cardiovascular: Negative for chest pain and palpitations. Gastrointestinal: Positive for nausea. Negative for abdominal pain, diarrhea and vomiting. Genitourinary: Negative for flank pain. Musculoskeletal: Positive for myalgias. Negative for back pain. Skin: Negative for rash. Neurological: Negative for dizziness, weakness and light-headedness. Vitals:    05/25/20 0923 05/25/20 0925 05/25/20 0927   BP: 112/75 112/75    Pulse:  84    Resp:  20    Temp:  98.1 °F (36.7 °C)    SpO2: 91% 92% 90%   Weight:  109.1 kg (240 lb 7.4 oz)             Physical Exam  Vitals signs and nursing note reviewed. Constitutional:       General: She is not in acute distress. Appearance: Normal appearance. She is well-developed. She is ill-appearing. She is not toxic-appearing or diaphoretic. HENT:      Head: Normocephalic and atraumatic. Mouth/Throat:      Mouth: Mucous membranes are moist.   Eyes:      General: No scleral icterus. Neck:      Musculoskeletal: Neck supple. Vascular: No JVD. Trachea: No tracheal deviation. Cardiovascular:      Rate and Rhythm: Normal rate and regular rhythm. Pulses: Normal pulses. Heart sounds: No murmur. Pulmonary:      Effort: Respiratory distress present. Breath sounds: Normal breath sounds. No wheezing. Comments: Talking in 3-5 word sentences  Abdominal:      General: Bowel sounds are normal.      Palpations: Abdomen is soft. Tenderness: There is no abdominal tenderness. Musculoskeletal: Normal range of motion. Right lower leg: No edema. Left lower leg: No edema. Lymphadenopathy:      Cervical: No cervical adenopathy. Skin:     General: Skin is warm and dry. Capillary Refill: Capillary refill takes less than 2 seconds. Findings: No rash. Neurological:      Mental Status: She is alert and oriented to person, place, and time. Psychiatric:         Mood and Affect: Mood normal.         Behavior: Behavior normal.          [facility]    ED Course as of May 25 1052   Mon May 25, 2020   0936 89% with good waveform     [GG]   1052 Patient got phone call.  Her OUT PATIENT COVID test is POSITIVE    [GG]      ED Course User Index  [GG] Trish Su DO     VITAL SIGNS:  Patient Vitals for the past 4 hrs:   Temp Pulse Resp BP SpO2   05/25/20 1019 -- -- -- -- 96 %   05/25/20 0927 -- -- -- -- 90 %   05/25/20 0925 98.1 °F (36.7 °C) 84 20 112/75 92 %   05/25/20 0923 -- -- -- 112/75 91 %         LABS:  Recent Results (from the past 6 hour(s))   CBC WITH AUTOMATED DIFF    Collection Time: 05/25/20 10:07 AM   Result Value Ref Range    WBC 8.6 3.6 - 11.0 K/uL    RBC 4.05 3.80 - 5.20 M/uL    HGB 11.9 11.5 - 16.0 g/dL    HCT 38.4 35.0 - 47.0 %    MCV 94.8 80.0 - 99.0 FL    MCH 29.4 26.0 - 34.0 PG    MCHC 31.0 30.0 - 36.5 g/dL    RDW 15.0 (H) 11.5 - 14.5 %    PLATELET 334 050 - 951 K/uL    MPV 10.8 8.9 - 12.9 FL    NRBC 0.0 0  WBC    ABSOLUTE NRBC 0.00 0.00 - 0.01 K/uL    NEUTROPHILS 86 (H) 32 - 75 %    LYMPHOCYTES 11 (L) 12 - 49 %    MONOCYTES 2 (L) 5 - 13 %    EOSINOPHILS 0 0 - 7 %    BASOPHILS 0 0 - 1 %    IMMATURE GRANULOCYTES 1 (H) 0.0 - 0.5 %    ABS. NEUTROPHILS 7.4 1.8 - 8.0 K/UL    ABS. LYMPHOCYTES 1.0 0.8 - 3.5 K/UL    ABS. MONOCYTES 0.2 0.0 - 1.0 K/UL    ABS. EOSINOPHILS 0.0 0.0 - 0.4 K/UL    ABS. BASOPHILS 0.0 0.0 - 0.1 K/UL    ABS. IMM. GRANS. 0.0 0.00 - 0.04 K/UL    DF AUTOMATED     METABOLIC PANEL, COMPREHENSIVE    Collection Time: 05/25/20 10:07 AM   Result Value Ref Range    Sodium 139 136 - 145 mmol/L    Potassium 3.8 3.5 - 5.1 mmol/L    Chloride 99 97 - 108 mmol/L    CO2 32 21 - 32 mmol/L    Anion gap 8 5 - 15 mmol/L    Glucose 99 65 - 100 mg/dL    BUN 12 6 - 20 MG/DL    Creatinine 1.21 (H) 0.55 - 1.02 MG/DL    BUN/Creatinine ratio 10 (L) 12 - 20      GFR est AA 56 (L) >60 ml/min/1.73m2    GFR est non-AA 46 (L) >60 ml/min/1.73m2    Calcium 8.6 8.5 - 10.1 MG/DL    Bilirubin, total 0.3 0.2 - 1.0 MG/DL    ALT (SGPT) 78 12 - 78 U/L    AST (SGOT) 153 (H) 15 - 37 U/L    Alk.  phosphatase 50 45 - 117 U/L    Protein, total 7.8 6.4 - 8.2 g/dL    Albumin 3.9 3.5 - 5.0 g/dL    Globulin 3.9 2.0 - 4.0 g/dL    A-G Ratio 1.0 (L) 1.1 - 2.2     LACTIC ACID Collection Time: 05/25/20 10:07 AM   Result Value Ref Range    Lactic acid 0.5 0.4 - 2.0 MMOL/L   SARS-COV-2    Collection Time: 05/25/20 10:07 AM   Result Value Ref Range    Specimen source Nasopharyngeal      SARS-CoV-2 PENDING     SARS-CoV-2 PENDING     Specimen source PENDING     COVID-19 rapid test PENDING     COVID-19 PENDING NEG   NT-PRO BNP    Collection Time: 05/25/20 10:07 AM   Result Value Ref Range    NT pro-BNP 53 0 - 125 PG/ML        IMAGING:  XR CHEST PORT   Final Result   impression: Limited by shallow inspiration, cannot exclude interstitial   infiltrates bilaterally. Medications During Visit:  Medications   acetaminophen (TYLENOL) tablet 650 mg (has no administration in time range)     Or   acetaminophen (TYLENOL) suppository 650 mg (has no administration in time range)   ondansetron (ZOFRAN) injection 4 mg (4 mg IntraVENous Given 5/25/20 1005)         DECISION MAKING:  Xiomara Bauman is a 64 y.o. female who comes in as above. Labs and imaging are as above. This time patient is concerning for coronavirus given her exposure. Patient will be admitted because of her hypoxia. She is on 2 L nasal cannula now at 97 to 98%. Patient is agreeable to transfer to [facility]. She is been accepted by the hospitalist service. Patient remains otherwise stable at this time. After disposition, patient received a phone call from her outpatient coronavirus testing lab noting that she was in fact positive. Xiomara Bauman was evaluated in the Emergency Department on 5/25/2020 for the symptoms described in the history of present illness. He/she was evaluated in the context of the global COVID-19 pandemic, which necessitated consideration that the patient might be at risk for infection with the SARS-CoV-2 virus that causes COVID-19.  Institutional protocols and algorithms that pertain to the evaluation of patients at risk for COVID-19 are in a state of rapid change based on information released by regulatory bodies including the CDC and federal and state organizations. These policies and algorithms were followed during the patient's care in the ED. IMPRESSION:  1. Clinical diagnosis of COVID-19    2. SOB (shortness of breath)    3. Hypoxia        DISPOSITION:  Admitted      Procedures    Hospitalist Perfect Serve for Admission  10:11 AM    ED Room Number: PAT14/41  Patient Name and age:  Justice Rodriguez 64 y.o.  female  Working Diagnosis:   1. Clinical diagnosis of COVID-19    2. SOB (shortness of breath)    3. Hypoxia        COVID-19 Suspicion:  yes    Code Status:  Full Code  Readmission: no  Isolation Requirements:  yes  Recommended Level of Care:  telemetry  Department:[place] [facility] - [phone]  Other:  Several days of fatigue, body aches etc. SOB started this AM. 88-99% on RA. 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 XRAY concerning. Probable COVID but acute worsening over 24 hours. Waiting on labs. Will start on ABX until COVID returns bc of xray findings.

## 2020-05-25 NOTE — ED NOTES
TRANSFER - OUT REPORT:    Verbal report given to Bhavya(name) martine Philippe  being transferred to 410(unit) for routine progression of care       Report consisted of patients Situation, Background, Assessment and   Recommendations(SBAR). Information from the following report(s) SBAR, Kardex, ED Summary, [facility] and Recent Results was reviewed with the receiving nurse. Lines:   Peripheral IV 05/25/20 Right Antecubital (Active)        Opportunity for questions and clarification was provided.       Patient transported with:   O2 @ 2 liters

## 2020-05-25 NOTE — ED TRIAGE NOTES
TRIAGE NOTE;. Contact with COVID patients. Has a pending covid test.      Since 5/23- dry cough, decreased appetite, fatigue, sob.   Denies fever/ chills

## 2020-05-25 NOTE — ACP (ADVANCE CARE PLANNING)
Advance Care Planning     Advance Care Planning Activator (Inpatient)  Conversation Note      Date of ACP Conversation: 05/25/20     Conversation Conducted with:   Patient with Decision Making Capacity    ACP Activator: Julisa Zuniga, RN    *When Decision Maker makes decisions on behalf of the incapacitated patient: Decision Maker is asked to consider and make decisions based on patient values, known preferences, or best interests. Health Care Decision Maker:    Current Designated Health Care Decision Maker:   Primary Decision Maker: Antwan Denis - [phone]  (If there is a [address] named in the \"Healthcare Decision Makers\" box in the ACP activity, but it is not visible above, be sure to open that field and then select the health care decision maker relationship (ie \"primary\") in the blank space to the right of the name.) Validate  this information as still accurate & up-to-date; edit [place] field as needed.)    Note: Assess and validate information in current ACP documents, as indicated. If no Decision Maker listed above or available through scanned documents, then:    If no Authorized Decision Maker has previously been identified, then patient chooses [place]:  \"Who would you like to name as your primary health care decision-maker? \"    Name: Amadeo Bullock   Relationship: Sister Phone number: [phone]  Ulysess Prim this person be reached easy. Yes  \"  Care Preferences    Ventilation: \"If you were in your present state of health and suddenly became very ill and were unable to breathe on your own, what would your preference be about the use of a ventilator (breathing machine) if it were available to you? \"  Yes    If patient would desire the use of a ventilator (breathing machine) Yes    \"If your health worsens and it becomes clear that your chance of recovery is unlikely, what would your preference be about the use of a ventilator (breathing machine) if it were available to you? \"     Would the patient desire the use of a ventilator Yes    Resuscitation  \"CPR works best to restart the heart when there is a sudden event, like a heart attack, in someone who is otherwise healthy. Unfortunately, CPR does not typically restart the heart for people who have serious health conditions or who are very sick. \"    \"In the event your heart stopped as a result of an underlying serious health condition, would you want attempts to be made to restart your heart NO      r regarding differences between Advance Directives and portable DNR orders. Length of ACP Conversation in minutes:  15    Conversation Outcomes:  [x] ACP discussion completed  [] Existing advance directive reviewed with patient; no changes to patient's previously recorded wishes     [] New Advance Directive completed   [] Portable Do Not Resuscitate prepared for Provider review and signature  [] POLST/POST/MOLST/MOST prepared for Provider review and signature      Follow-up plan:    [] Schedule follow-up conversation to continue planning  [] Referred individual to Provider for additional questions/concerns   [] Advised patient/agent/surrogate to review completed ACP document and update if needed with changes in condition, patient preferences or care setting     [] This note routed to one or more involved healthcare providers  Spoke with patient by phone. She states she has an AMD and she will ask sister to fax a copy to [facility]. She states she does not want CPR but she wants Vent support even if her condition warrants. She has named her sister Rosas Colón as her decision maker. When this CM clarified her wishes she remains consistent with her wish to be Partial code Sent text to Dr Reba Briggs to clarify code status as she is currently a Full code.     Kamala Bird RN CRM

## 2020-05-25 NOTE — PROGRESS NOTES
Reason for Admission:   Admitted from home with complaints of shortness of breath. 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 RUR Score:      8%-Low               Plan for utilizing home health:      No needs  identified at this time. PCP: First and Last name:  Cintia Duran   Name of Practice:    Are you a current patient: Yes/No: Yes   Approximate date of last visit:  5/22/20                    Current Advanced Directive/Advance Care Plan:  Patient reports that she does have an AMD and her wished are to not have CPR/Vent support. She has named her sister Eliane Lee as her decision maker. Transition of Care Plan:     Anticipate she will discharge home with family assist once stable.      Care Management Interventions  PCP Verified by CM: Yes(Dr Cam appt 4/20/20)  Palliative Care Criteria Met (RRAT>21 & CHF Dx)?: No  Mode of Transport at Discharge: (Family car)  Current Support Network: Lives Alone, Own Home  Confirm Follow Up Transport: Self    Michaela Rojo RN CRM

## 2020-05-25 NOTE — PROGRESS NOTES
[address] [facility] Adult  Hospitalist Group                                      Advance Care Planning Note    Name: Lenka Weeks  YOB: 1960  MRN: [id number]  Admission Date: 5/25/2020  9:16 AM    Date of discussion: 5/25/2020    Active Diagnoses:    Hospital Problems  Date Reviewed: 11/13/2019          Codes Class Noted POA    Hypoxia ICD-10-CM: R09.02  ICD-9-CM: 799.02  5/25/2020 Unknown        COVID-19 ICD-10-CM: U07.1  ICD-9-CM: 079.89  5/25/2020 Unknown              These active diagnoses are of sufficient risk that focused discussion on advance care planning is indicated in order to allow the patient to thoughtfully consider personal goals of care, and if situations arise that prevent the ability to personally give input, to ensure appropriate representation of their personal desires for different levels and aggressiveness of care. Discussion:  I called ms leal and discussed any life support CPR in case of detorioration of her condition. She wish not to be resuscitated. DNR order in place. Persons present and participating in discussion: Veronica Loomis MD,     Discussion:     Time Spent: 16 minutes    Total time spent face-to-face in education and discussion: 16 minutes.      Veronica Shipley MD  Date of Service:  5/25/2020  6:19 PM

## 2020-05-26 LAB
ALBUMIN SERPL-MCNC: 3.4 G/DL (ref 3.5–5)
ALBUMIN/GLOB SERPL: 0.9 {RATIO} (ref 1.1–2.2)
ALP SERPL-CCNC: 52 U/L (ref 45–117)
ALT SERPL-CCNC: 69 U/L (ref 12–78)
ANION GAP SERPL CALC-SCNC: 6 MMOL/L (ref 5–15)
APTT PPP: 33.3 SEC (ref 22.1–32)
AST SERPL-CCNC: 147 U/L (ref 15–37)
BASOPHILS # BLD: 0 K/UL (ref 0–0.1)
BASOPHILS NFR BLD: 0 % (ref 0–1)
BILIRUB SERPL-MCNC: 0.2 MG/DL (ref 0.2–1)
BUN SERPL-MCNC: 16 MG/DL (ref 6–20)
BUN/CREAT SERPL: 12 (ref 12–20)
CALCIUM SERPL-MCNC: 8.5 MG/DL (ref 8.5–10.1)
CHLORIDE SERPL-SCNC: 102 MMOL/L (ref 97–108)
CO2 SERPL-SCNC: 27 MMOL/L (ref 21–32)
COHGB MFR BLD: 2.5 % (ref 1–2)
COHGB MFR BLD: 2.6 % (ref 1–2)
CREAT SERPL-MCNC: 1.3 MG/DL (ref 0.55–1.02)
D DIMER PPP FEU-MCNC: 0.24 MG/L FEU (ref 0–0.65)
DIFFERENTIAL METHOD BLD: ABNORMAL
EOSINOPHIL # BLD: 0 K/UL (ref 0–0.4)
EOSINOPHIL NFR BLD: 0 % (ref 0–7)
ERYTHROCYTE [DISTWIDTH] IN BLOOD BY AUTOMATED COUNT: 15.1 % (ref 11.5–14.5)
FIBRINOGEN PPP-MCNC: 432 MG/DL (ref 200–475)
GLOBULIN SER CALC-MCNC: 4 G/DL (ref 2–4)
GLUCOSE SERPL-MCNC: 109 MG/DL (ref 65–100)
HCT VFR BLD AUTO: 38.4 % (ref 35–47)
HGB BLD OXIMETRY-MCNC: 12.3 G/DL (ref 14–17)
HGB BLD OXIMETRY-MCNC: 12.9 G/DL (ref 14–17)
HGB BLD-MCNC: 11.8 G/DL (ref 11.5–16)
IMM GRANULOCYTES # BLD AUTO: 0.1 K/UL (ref 0–0.04)
IMM GRANULOCYTES NFR BLD AUTO: 1 % (ref 0–0.5)
INR PPP: 0.9 (ref 0.9–1.1)
LDH SERPL L TO P-CCNC: 642 U/L (ref 81–246)
LYMPHOCYTES # BLD: 1.1 K/UL (ref 0.8–3.5)
LYMPHOCYTES NFR BLD: 15 % (ref 12–49)
MCH RBC QN AUTO: 29.4 PG (ref 26–34)
MCHC RBC AUTO-ENTMCNC: 30.7 G/DL (ref 30–36.5)
MCV RBC AUTO: 95.5 FL (ref 80–99)
METHGB MFR BLD: 0.4 % (ref 0–1.4)
METHGB MFR BLD: 0.5 % (ref 0–1.4)
MONOCYTES # BLD: 0.2 K/UL (ref 0–1)
MONOCYTES NFR BLD: 2 % (ref 5–13)
NEUTS SEG # BLD: 6.2 K/UL (ref 1.8–8)
NEUTS SEG NFR BLD: 82 % (ref 32–75)
NRBC # BLD: 0 K/UL (ref 0–0.01)
NRBC BLD-RTO: 0 PER 100 WBC
OXYHGB MFR BLD: 91.2 % (ref 94–97)
OXYHGB MFR BLD: 91.3 % (ref 94–97)
PLATELET # BLD AUTO: 269 K/UL (ref 150–400)
PMV BLD AUTO: 10.6 FL (ref 8.9–12.9)
POTASSIUM SERPL-SCNC: 3.6 MMOL/L (ref 3.5–5.1)
PROT SERPL-MCNC: 7.4 G/DL (ref 6.4–8.2)
PROTHROMBIN TIME: 9.8 SEC (ref 9–11.1)
RBC # BLD AUTO: 4.02 M/UL (ref 3.8–5.2)
SAO2 % BLD: 94 % (ref 95–99)
SAO2 % BLD: 94 % (ref 95–99)
SODIUM SERPL-SCNC: 135 MMOL/L (ref 136–145)
THERAPEUTIC RANGE,PTTT: ABNORMAL SECS (ref 58–77)
TSH SERPL DL<=0.05 MIU/L-ACNC: 13.1 UIU/ML (ref 0.36–3.74)
WBC # BLD AUTO: 7.6 K/UL (ref 3.6–11)

## 2020-05-26 PROCEDURE — 84443 ASSAY THYROID STIM HORMONE: CPT

## 2020-05-26 PROCEDURE — 80053 COMPREHEN METABOLIC PANEL: CPT

## 2020-05-26 PROCEDURE — 74011250636 HC RX REV CODE- 250/636: Performed by: HOSPITALIST

## 2020-05-26 PROCEDURE — 65660000000 HC RM CCU STEPDOWN

## 2020-05-26 PROCEDURE — 74011250637 HC RX REV CODE- 250/637: Performed by: NURSE PRACTITIONER

## 2020-05-26 PROCEDURE — 83520 IMMUNOASSAY QUANT NOS NONAB: CPT

## 2020-05-26 PROCEDURE — 99255 IP/OBS CONSLTJ NEW/EST HI 80: CPT | Performed by: INTERNAL MEDICINE

## 2020-05-26 PROCEDURE — 85730 THROMBOPLASTIN TIME PARTIAL: CPT

## 2020-05-26 PROCEDURE — 74011250637 HC RX REV CODE- 250/637: Performed by: HOSPITALIST

## 2020-05-26 PROCEDURE — 74011000250 HC RX REV CODE- 250: Performed by: NURSE PRACTITIONER

## 2020-05-26 PROCEDURE — 85379 FIBRIN DEGRADATION QUANT: CPT

## 2020-05-26 PROCEDURE — 36415 COLL VENOUS BLD VENIPUNCTURE: CPT

## 2020-05-26 PROCEDURE — 85384 FIBRINOGEN ACTIVITY: CPT

## 2020-05-26 PROCEDURE — 85610 PROTHROMBIN TIME: CPT

## 2020-05-26 PROCEDURE — 85025 COMPLETE CBC W/AUTO DIFF WBC: CPT

## 2020-05-26 PROCEDURE — 83615 LACTATE (LD) (LDH) ENZYME: CPT

## 2020-05-26 PROCEDURE — 82375 ASSAY CARBOXYHB QUANT: CPT

## 2020-05-26 PROCEDURE — 65660000001 HC RM ICU INTERMED STEPDOWN

## 2020-05-26 PROCEDURE — 74011250636 HC RX REV CODE- 250/636: Performed by: FAMILY MEDICINE

## 2020-05-26 RX ORDER — LEVOTHYROXINE SODIUM 125 UG/1
250 TABLET ORAL
Status: DISCONTINUED | OUTPATIENT
Start: 2020-05-27 | End: 2020-06-09 | Stop reason: HOSPADM

## 2020-05-26 RX ORDER — LEVOTHYROXINE SODIUM 125 UG/1
250 TABLET ORAL
COMMUNITY
End: 2022-03-28 | Stop reason: SDUPTHER

## 2020-05-26 RX ORDER — ALLOPURINOL 100 MG/1
100 TABLET ORAL DAILY
Status: DISCONTINUED | OUTPATIENT
Start: 2020-05-27 | End: 2020-06-09 | Stop reason: HOSPADM

## 2020-05-26 RX ORDER — BUTALBITAL, ACETAMINOPHEN AND CAFFEINE 50; 325; 40 MG/1; MG/1; MG/1
1 TABLET ORAL ONCE
Status: COMPLETED | OUTPATIENT
Start: 2020-05-26 | End: 2020-05-26

## 2020-05-26 RX ORDER — LIDOCAINE 4 G/100G
2 PATCH TOPICAL EVERY 24 HOURS
Status: DISCONTINUED | OUTPATIENT
Start: 2020-05-26 | End: 2020-05-29

## 2020-05-26 RX ORDER — GABAPENTIN 400 MG/1
400 CAPSULE ORAL
COMMUNITY
End: 2020-08-25 | Stop reason: SDUPTHER

## 2020-05-26 RX ORDER — ALLOPURINOL 100 MG/1
100 TABLET ORAL DAILY
COMMUNITY
End: 2021-03-08 | Stop reason: SDUPTHER

## 2020-05-26 RX ADMIN — Medication 10 ML: at 08:26

## 2020-05-26 RX ADMIN — Medication 10 ML: at 14:35

## 2020-05-26 RX ADMIN — Medication 500 MG: at 17:22

## 2020-05-26 RX ADMIN — ENOXAPARIN SODIUM 40 MG: 40 INJECTION SUBCUTANEOUS at 04:01

## 2020-05-26 RX ADMIN — Medication 10 ML: at 22:10

## 2020-05-26 RX ADMIN — ACETAMINOPHEN 650 MG: 325 TABLET, FILM COATED ORAL at 04:02

## 2020-05-26 RX ADMIN — AZITHROMYCIN 500 MG: 500 INJECTION, POWDER, LYOPHILIZED, FOR SOLUTION INTRAVENOUS at 14:34

## 2020-05-26 RX ADMIN — SODIUM CHLORIDE 250 ML: 900 INJECTION, SOLUTION INTRAVENOUS at 10:34

## 2020-05-26 RX ADMIN — Medication 500 MG: at 08:26

## 2020-05-26 RX ADMIN — LEVOTHYROXINE SODIUM 125 MCG: 125 TABLET ORAL at 08:26

## 2020-05-26 RX ADMIN — SODIUM CHLORIDE 50 ML/HR: 900 INJECTION, SOLUTION INTRAVENOUS at 10:35

## 2020-05-26 RX ADMIN — ENOXAPARIN SODIUM 40 MG: 40 INJECTION SUBCUTANEOUS at 14:35

## 2020-05-26 RX ADMIN — ZINC SULFATE 220 MG (50 MG) CAPSULE 1 CAPSULE: CAPSULE at 08:26

## 2020-05-26 RX ADMIN — BUTALBITAL, ACETAMINOPHEN, AND CAFFEINE 1 TABLET: 50; 325; 40 TABLET ORAL at 22:22

## 2020-05-26 NOTE — PROGRESS NOTES
0000: Assumed care of pt. Received report from [address]. Pt in bed asleep.     0300: Pt OOB x1 assist to bathroom, voided 600 ml clear/yellow urine. Pt c/o 6/10 pain in scapula area. 0320: Larwance Covert NP on telephone. NP made aware of pt c/o pain, orders received for lidocaine patch. Labs sent. 0800: Bedside and Verbal shift change report given to Ally Deluca (oncoming nurse) by Blade Mckinley (offgoing nurse). Report included the following information SBAR, Kardex, ED Summary, Intake/Output and Cardiac Rhythm NSR .

## 2020-05-26 NOTE — PROGRESS NOTES
Investigational therapy PI documentation and INOPulse protocol for RT     -- Request received for investigational therapy with INOPulse under Intermediate Emergency Access Program with IRB approval: [id number]  -- Criteria met: COVID positive pneumonia suspected with hypoxia on supplemental O2 between 2-10 L/min by nasal cannula: 4 L/min  -- Signed informed consent on chart verified- obtained by Barrett Holley Sandhoff, PAR  -- Order for RT-JUSTIN placed- per protocol  -- No obvious exclusion criteria noted (organic left sided cardiac disease)   -- Treatment with INOPulse at 20 ppm (125 mcg dose programmed) for 8-24 hours/day for 3-14 days as tolerated--continuous use is recommended  -- Goals of therapy: minimum of 3 days, approved for use for 14 days: do not discontinue until 72 hours of therapy are complete (antiviral effect)   -- After starting Justin from Day 0, start weaning down O2 on EVERY SHIFT as tolerated for SpO2 90% until 2 L/min nasal cannula O2  -- Stop Justin when at least 72 hours of treatment are complete and patient is on 2 L/min of O2   -- Advise patient to rest/sleep in prone position as much as able   -- Since the connecting tubing is short, it is OK for patient to switch to longer only O2 NC to use the bathroom etc. But otherwise should be using Justin as much as able   -- MetHb measurement per protocol (On Day 0 at baseline, then at 1 hour of therapy and then days 3, 6, 9. Malcom Morgan )  -- Optimize volume status with diuretics as indicated guided by daily NT ProBNP  -- Patient will be followed by [facility] ([phone]) while on Kailee Santo MD

## 2020-05-26 NOTE — CONSULTS
Infectious Disease Consult Note    Reason for Consult: SARS-CoV-2 infection  Date of Consultation: May 26, 2020  Date of Admission: 5/25/2020  Referring Physician: Dr Kiah Hernandez      HPI:    Ms. Tri Fajardo is a 82-year-old lady with a history of breast cancer status post radiation therapy in 2013 with recurrence and status post mastectomy in 2015, hypothyroidism, arthritis who was admitted on 5/25/2020. She reports feeling ill since 5/23/2020. 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 She states she was starting to feel ill with body aches and nausea. She denies any chest pain headache sore throat. She denies any neurological symptoms such as numbness tingling focal weakness. She denies any dysuria or vaginal discharge. She denies any particular joint ache but admits to muscle ache all over her body. She says her most significant symptom is feeling achy all over her body. She has some shortness of breath and some cough but not hemoptysis. Cough seems to be slightly better. From chart review, she has had hypotension earlier this morning with a systolic of 85. Her O2 sats earlier in the day was 100% on 2 L nasal cannula but  One documentation of 88% on 5 L nasal cannula. I discussed with the RN who reports that her finger pulse ox does not capture Sats well and when they switch it to the ear probe, her oxygenation is 100% on 2L NC     She has been seen by pulmonary team and started on inhaled nitric oxide investigational therapy. She is noted to be on a azithromycin, vitamin C as well as zinc.    Sputum culture is pending but shows gram-positive cocci in clusters with moderate normal respiratory hilda so far. Chest x-ray has been read as cannot exclude interstitial infiltrates bilaterally. SARS-CoV-2 PCR detected on 5/25/2020.       I am consulted today regarding Remdesivir as investigational therapy        Past Medical History:  Past Medical History:   Diagnosis Date    Arthritis     Cancer ([address] breast cancer    H/O total mastectomy 11/30/2015    Hypercholesterolemia     Other ill-defined conditions(799.89)     completed radiation 9/12    Thyroid disease     hypothyroidism         Surgical History:  Past Surgical History:   Procedure Laterality Date    HX BREAST LUMPECTOMY Right 2012    HX GYN      hysterectomy; TBL    HX HEENT      tonsillectomy    HX MASTECTOMY Bilateral 2015    HX ORTHOPAEDIC      right knee ACL    HX OTHER SURGICAL      lumptectomy right breast         Family History:   Family History   Problem Relation Age of Onset    Cancer Mother         brain cancer    No Known Problems Father     Breast Cancer Sister 54    No Known Problems Brother     Thyroid Disease Maternal Grandmother     MS Sister     Breast Cancer Maternal Aunt         60-70's         Social History:     · Living Situation: At home alone, works as a CNA at a nursing home  · Tobacco: Denied  · Alcohol: Denied  · Illicit Drugs: Denied  · Sexual History: Denied  · Travel History denied  · Exposures: Denied exposure to animals or pets at home    Allergies: Allergies   Allergen Reactions    Penicillins Hives     Pt states I am not allergic to penicillin.      Percocet [Oxycodone-Acetaminophen] Itching    Sulfa (Sulfonamide Antibiotics) Other (comments)     Mouth blistering         Review of Systems:     Gen: Negative for chills, fevers, + for excessive fatigue   HEENT: Negative for headache, vision changes, ear ache or discharge, tingling,  nasal discharge, swelling, lumps in neck, sores on tongue   CV:  Negative for chest pain, dyspnea on exertion, leg edema   Lungs: + for shortness of breath, + cough    Abdomen: Negative for abdominal pain, vomiting, diarrhea, constipation, + nausea   Genitourinary: Negative for genital pain or genital discharge     Neuro: Negative for headache, numbness, tingling, extremity weakness,  syncope, seizures    Skin: Negative for rash, sores/open wounds   Musculoskeletal: Negative for joint pain, joint swelling, joint erythema    Endocrine: Negative for high or low blood sugars, heat or cold intolerance    Psych: Negative for manic behavior     Medications:  No current facility-administered medications on file prior to encounter. Current Outpatient Medications on File Prior to Encounter   Medication Sig Dispense Refill    cyclobenzaprine (FLEXERIL) 5 mg tablet Take 1-2 tablets at bedtime as needed for muscle spasm. 15 Tab 0    methylPREDNISolone (MEDROL DOSEPACK) 4 mg tablet Take 1 Tab by mouth Specific Days and Specific Times. 1 Dose Pack 0    cholecalciferol (VITAMIN D3) 1,000 unit cap Take  by mouth daily.  gabapentin (NEURONTIN) 400 mg capsule Take 1 Cap by mouth three (3) times daily. Max Daily Amount: 1,200 mg. 90 Cap 1    levothyroxine (SYNTHROID) 125 mcg tablet take 2 tablets by mouth every morning before BREAKFAST FOR THYROID REPLACEMENT AS DIRECTED 180 Tab 3    atorvastatin (LIPITOR) 20 mg tablet Take 1 Tab by mouth daily. 90 Tab 3    fluticasone (FLONASE) 50 mcg/actuation nasal spray 2 Sprays by Both Nostrils route daily.  3 Bottle 3    ergocalciferol (ERGOCALCIFEROL) 50,000 unit capsule Take one capsule twice a week 8 Cap 2           Physical Exam:    Vitals:   Patient Vitals for the past 24 hrs:   Temp Pulse Resp BP SpO2   05/26/20 1237 -- -- -- -- (!) 88 %   05/26/20 1118 98.1 °F (36.7 °C) 76 22 96/68 100 %   05/26/20 1054 -- -- -- 100/51 100 %   05/26/20 0900 -- -- -- 94/42 --   05/26/20 0842 98.8 °F (37.1 °C) 74 21 (!) 85/62 94 %   05/26/20 0712 98.6 °F (37 °C) 74 21 91/52 90 %   05/26/20 0504 98.6 °F (37 °C) 71 22 126/63 92 %   05/25/20 2309 99.1 °F (37.3 °C) 77 20 106/52 91 %   05/25/20 1925 98.8 °F (37.1 °C) 79 17 106/60 93 %   05/25/20 1556 97.7 °F (36.5 °C) 77 14 101/58 97 %   ·   · GEN: NAD  · HEENT: Normocephalic, atraumatic, no scleral icterus,  no cervical lymphadenopathy, no sinus tenderness, no thrush  · CV: S1, S2 heard regularly, · Lungs: Diminished breath sounds no wheezing or crackles heard  · Abdomen: soft, non distended, non tender,  no CVA tenderness   · Genitourinary: no murphy  · Extremities: no edema  · Neuro: Alert, oriented to self, time, place and situation, moves all extremities to commands, verbal   · Skin: no rash  · Psych: good affect, good eye contact, non tearful   ·  musculoskeletal :       Labs:   Recent Results (from the past 24 hour(s))   CULTURE, RESPIRATORY/SPUTUM/BRONCH W GRAM STAIN    Collection Time: 05/25/20  3:23 PM   Result Value Ref Range    Special Requests: NO SPECIAL REQUESTS      GRAM STAIN FEW WBCS SEEN      GRAM STAIN OCCASIONAL EPITHELIAL CELLS SEEN      GRAM STAIN RARE GRAM POSITIVE COCCI IN CLUSTERS      Culture result: MODERATE NORMAL RESPIRATORY SAMUEL SO FAR     CRP, HIGH SENSITIVITY    Collection Time: 05/25/20  3:23 PM   Result Value Ref Range    CRP, High sensitivity >9.5 mg/L   CBC WITH AUTOMATED DIFF    Collection Time: 05/26/20  3:30 AM   Result Value Ref Range    WBC 7.6 3.6 - 11.0 K/uL    RBC 4.02 3.80 - 5.20 M/uL    HGB 11.8 11.5 - 16.0 g/dL    HCT 38.4 35.0 - 47.0 %    MCV 95.5 80.0 - 99.0 FL    MCH 29.4 26.0 - 34.0 PG    MCHC 30.7 30.0 - 36.5 g/dL    RDW 15.1 (H) 11.5 - 14.5 %    PLATELET 022 605 - 590 K/uL    MPV 10.6 8.9 - 12.9 FL    NRBC 0.0 0  WBC    ABSOLUTE NRBC 0.00 0.00 - 0.01 K/uL    NEUTROPHILS 82 (H) 32 - 75 %    LYMPHOCYTES 15 12 - 49 %    MONOCYTES 2 (L) 5 - 13 %    EOSINOPHILS 0 0 - 7 %    BASOPHILS 0 0 - 1 %    IMMATURE GRANULOCYTES 1 (H) 0.0 - 0.5 %    ABS. NEUTROPHILS 6.2 1.8 - 8.0 K/UL    ABS. LYMPHOCYTES 1.1 0.8 - 3.5 K/UL    ABS. MONOCYTES 0.2 0.0 - 1.0 K/UL    ABS. EOSINOPHILS 0.0 0.0 - 0.4 K/UL    ABS. BASOPHILS 0.0 0.0 - 0.1 K/UL    ABS. IMM.  GRANS. 0.1 (H) 0.00 - 0.04 K/UL    DF AUTOMATED     METABOLIC PANEL, COMPREHENSIVE    Collection Time: 05/26/20  3:30 AM   Result Value Ref Range    Sodium 135 (L) 136 - 145 mmol/L    Potassium 3.6 3.5 - 5.1 mmol/L Chloride 102 97 - 108 mmol/L    CO2 27 21 - 32 mmol/L    Anion gap 6 5 - 15 mmol/L    Glucose 109 (H) 65 - 100 mg/dL    BUN 16 6 - 20 MG/DL    Creatinine 1.30 (H) 0.55 - 1.02 MG/DL    BUN/Creatinine ratio 12 12 - 20      GFR est AA 51 (L) >60 ml/min/1.73m2    GFR est non-AA 42 (L) >60 ml/min/1.73m2    Calcium 8.5 8.5 - 10.1 MG/DL    Bilirubin, total 0.2 0.2 - 1.0 MG/DL    ALT (SGPT) 69 12 - 78 U/L    AST (SGOT) 147 (H) 15 - 37 U/L    Alk.  phosphatase 52 45 - 117 U/L    Protein, total 7.4 6.4 - 8.2 g/dL    Albumin 3.4 (L) 3.5 - 5.0 g/dL    Globulin 4.0 2.0 - 4.0 g/dL    A-G Ratio 0.9 (L) 1.1 - 2.2     PROTHROMBIN TIME + INR    Collection Time: 05/26/20  3:30 AM   Result Value Ref Range    INR 0.9 0.9 - 1.1      Prothrombin time 9.8 9.0 - 11.1 sec   PTT    Collection Time: 05/26/20  3:30 AM   Result Value Ref Range    aPTT 33.3 (H) 22.1 - 32.0 sec    aPTT, therapeutic range     58.0 - 77.0 SECS   FIBRINOGEN    Collection Time: 05/26/20  3:30 AM   Result Value Ref Range    Fibrinogen 432 200 - 475 mg/dL   D DIMER    Collection Time: 05/26/20  3:30 AM   Result Value Ref Range    D-dimer 0.24 0.00 - 0.65 mg/L FEU   TSH 3RD GENERATION    Collection Time: 05/26/20  3:30 AM   Result Value Ref Range    TSH 13.10 (H) 0.36 - 3.74 uIU/mL   LD    Collection Time: 05/26/20  3:30 AM   Result Value Ref Range     (H) 81 - 246 U/L   CARBOXY HEMOGLOBIN    Collection Time: 05/26/20  1:22 PM   Result Value Ref Range    Carboxy-Hgb 2.5 (H) 1 - 2 %    Methemoglobin 0.4 0 - 1.4 %    tHb 12.3 (L) 14 - 17 g/dL    Oxyhemoglobin 91.3 (L) 94 - 97 %    O2 SATURATION 94 (L) 95 - 99 %       IL 6 pending  Component Value Flag Ref Range Units Status   CRP, High sensitivity >9.5          , 642    Ferritin 532            Microbiology Data:    sputum       Component Value Ref Range & Units Status   Special Requests: NO SPECIAL REQUESTS    Preliminary   GRAM STAIN FEW WBCS SEEN    Preliminary   GRAM STAIN    Preliminary   OCCASIONAL EPITHELIAL CELLS SEEN    GRAM STAIN    Preliminary   RARE GRAM POSITIVE COCCI IN CLUSTERS    Culture result:    Preliminary   MODERATE NORMAL RESPIRATORY SAMUEL SO FAR    Result History       Component Value Ref Range & Units Status   Special Requests: NO SPECIAL REQUESTS    Final   [place] Count [id number]   COLONIES/mL    Final   Culture result:    Final   MIXED UROGENITAL SAMUEL ISOLATED        Component Value Flag Ref Range Units Status   Specimen source Nasopharyngeal      Final   SARS-CoV-2 Detected  Abnormal   NOTD   Final   Comment:     TB Quantiferon 10/25/18  Component Value Ref Range & Units Status   QuantiFERON Incubation Incubation performed. Final   QuantiFERON Criteria Comment   Final   The QuantiFERON-TB Gold Plus result is determined by subtracting   the Nil value from either TB antigen (Ag) tube. The mitogen tube   serves as a control for the test.    QuantiFERON TB1 Ag 0.19  IU/mL Final   QuantiFERON TB2 Ag 0.21  IU/mL Final   QuantiFERON Nil Value 0.13  IU/mL Final   QuantiFERON Mitogen Value >10.00  IU/mL Final   QuantiFERON Plus Negative  Negative Final   Narrative               Pathology Results:  B/L mastectomy 2016, seromas  2015 R breast mastectomy invasive ductal carcinoma, ductal carcinoma in situ  2 intrammammary lymph nodes negative for tumor     Imaging:     Portable AP view of the chest is obtained, comparison to 2013. Inspiration is  very shallow limiting the examination. Interstitial infiltrates not excluded.     IMPRESSION  impression: Limited by shallow inspiration, cannot exclude interstitial  infiltrates bilaterally.         Assessment / Plan:     Problem List as of 5/26/2020 Date Reviewed: 11/13/2019          Codes Class Noted - Resolved    Hypoxia ICD-10-CM: R09.02  ICD-9-CM: 799.02  5/25/2020 - Present        COVID-19 ICD-10-CM: U07.1  ICD-9-CM: 079.89  5/25/2020 - Present        Severe obesity ([address] ICD-10-CM: E66.01  ICD-9-CM: 278.01  11/11/2019 - Present        Costochondritis ICD-10-CM: M94.0  ICD-9-CM: 733.6  10/9/2018 - Present        BMI 33.0-33.9,adult ICD-10-CM: O47.30  ICD-9-CM: V85.33  9/14/2018 - Present        Chest wall pain following surgery ICD-10-CM: R07.89, G89.18  ICD-9-CM: 786.59, 338.18  2/9/2018 - Present        Chest wall pain, chronic ICD-10-CM: R07.89, G89.29  ICD-9-CM: 786.52, 338.29  2/9/2018 - Present        H/O mastectomy, bilateral ICD-10-CM: Z90.13  ICD-9-CM: V45.71  2/9/2018 - Present        Vitamin D deficiency ICD-10-CM: E55.9  ICD-9-CM: 268.9  2/21/2017 - Present        Lymphedema ICD-10-CM: I89.0  ICD-9-CM: 457.1  11/9/2016 - Present        Hypothyroidism due to acquired atrophy of thyroid ICD-10-CM: E03.4  ICD-9-CM: 244.8, 246.8  5/14/2015 - Present        HLD (hyperlipidemia) ICD-10-CM: E78.5  ICD-9-CM: 272.4  5/14/2015 - Present        History of breast cancer ICD-10-CM: Z85.3  ICD-9-CM: V10.3  6/18/2013 - Present        Chronic headaches ICD-10-CM: R51  ICD-9-CM: 784.0  6/18/2013 - Present        Family history of cancer ICD-10-CM: Z80.9  ICD-9-CM: V16.9  6/18/2013 - Present        Skin moles ICD-10-CM: D22.9  ICD-9-CM: 216.9  6/18/2013 - Present        RESOLVED: Routine general medical examination at a health care facility ICD-10-CM: Z00.00  ICD-9-CM: V70.0  6/18/2013 - 9/30/2019             Ms. Enriqueta Rasheed is a 65-year-old lady history of breast cancer status post radiation therapy in 2013 with recurrence and status post mastectomy in 2015, hypothyroidism, arthritis with SARS-CoV-2 infection/pneumonia.     Her latest oxygen status has been reported as 88% on 5 L nasal cannula  Her inflammatory markers is elevated and IL-6 is pending    She denied being in any other antiviral trial or COVID trials    She is getting inhaled nitric oxide  has been seen by the pulmonary team    1) Sars COV 2 infection, pneumonia  Discussed with patient regarding investigational drug Remdesivir  D/W her that last article NEJM reports that Remdesivir  Discussed regarding the latest article that came out in the [facility] [id number] of [facility] regarding \" Remdesivir was superior to placebo in shortening the time to recovery in adults hospitalized with COVID 19 and evidence of lower respiratory tract infection. \"  Discussed potential side effects such as drop in Hemoglobin, renal and liver side effects   Discussed that this is still investigational use and our protocol so far allows us to given to patients who do not meet certain exclusion criteria such as Cr CL < 30 and LFT > 5 times normal.   She denied being part of any other clinical trial for an antiviral experimental treatment for COVID 19  If her SpO2 < 94% on Room air /requiring supplemental oxygen > 4L , will give Remdesivir ( 200 mg day one followed by 100 mg daily for 4 more days) as far as her LFTs/Cr do not worsen and she can tolerate the medication   Her last documented 88% on 5L was not accurate capture per discussion with nursing team  Discussed to closely monitor oxygen requirement and if worsening, discussed about Remdesivir use as investigational drug that patient is agreeable to   Discussed with patient and gave her opportunity to ask questions/express concerns   Monitor Qtc closely as on azithromycin   Check procalcitonin ( noted ordered )      2) Hx of Breast cancer   S/p mastectomy, hx of radiation therapy     3) hypothyroidsm     4) Arthritis hx     5) DVT px       Discussed with primary team, pharmacy and nursing teams     Thank for the opportunity to participate in the care of this patient. Please contact with questions or concerns.

## 2020-05-26 NOTE — PROGRESS NOTES
Admission Medication Reconciliation:    Information obtained from:  Patient  RxQuery data available¹:  YES    Comments/Recommendations: Updated PTA meds/reviewed patient's allergies. 1)  Patient interviewed via phone regarding PTA medications. Verified with RxQuery fill histories. Patient is a good historian. 2)  Medication changes (since last review): Added  - Allopurinol    Adjusted  - Levothyroxine 250 mcg QAM  - Gabapentin 400 mg TID PRN     Removed  - Methylprednisolone   - Ergocalciferol  - Fluticasone nasal spray  - Cyclobenzaprine        ¹RxQuery pharmacy benefit data reflects medications filled and processed through the patient's insurance, however   this data does NOT capture whether the medication was picked up or is currently being taken by the patient. Allergies:  Penicillins; Percocet [oxycodone-acetaminophen]; and Sulfa (sulfonamide antibiotics)    Significant PMH/Disease States:   Past Medical History:   Diagnosis Date    Arthritis     Cancer [address] [address]     breast cancer    H/O total mastectomy 11/30/2015    Hypercholesterolemia     Other ill-defined conditions(799.89)     completed radiation 9/12    Thyroid disease     hypothyroidism     Chief Complaint for this Admission:    Chief Complaint   Patient presents with    Concern For COVID-19 (Coronavirus)     Prior to Admission Medications:   Prior to Admission Medications   Prescriptions Last Dose Informant Taking?   allopurinoL (ZYLOPRIM) 100 mg tablet   Yes   Sig: Take 100 mg by mouth daily. cholecalciferol (VITAMIN D3) 1,000 unit cap   No   Sig: Take  by mouth daily. gabapentin (NEURONTIN) 400 mg capsule   Yes   Sig: Take 400 mg by mouth three (3) times daily as needed. levothyroxine (SYNTHROID) 125 mcg tablet   Yes   Sig: Take 250 mcg by mouth Daily (before breakfast).       Facility-Administered Medications: None       Please contact the main inpatient pharmacy with any questions or concerns at [phone] and we will direct you to the clinical pharmacist covering this patient's care while in-house.    Stephanie Almazan

## 2020-05-26 NOTE — PROGRESS NOTES
[facility]                                                                                          Hospitalist Progress Note  Garrison Matthews MD  Answering service: [phone] from in house phone        Date of Service:  2020  NAME:  Elana Avila  :  1960  MRN:  [id number]      Admission Summary:     Elana Avila is a 64 y.o. female who presents with COVID pos+ from [address] [place] [address]. Pt said she had a test 2 days back and came back positive. She became SOB and went to ED.  Patient states that for the last 3 days she has not felt well.  She has had some body aches and nausea and fatigue.  She is also had cough.  Over the last 24 hours she started to have shortness of breath.  She comes in for evaluation of these complaints and concern for coronavirus given her workplace exposure.  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 Interval history / Subjective:       Patient is somewhat confused, oxygen weaned down to 2 L today.      Assessment & Plan:     Acute respiratory failure  -Acute hypoxic respiratory failure secondary to viral pneumonia  -COVID 19 positive as outpatient  -Pulmonology following  -Plan for pulse nitric oxide therapy    COVID 19 infection  -Droplet plus precaution  -Continue vitamin C and azithromycin  -Anticoagulation per protocol  -Appreciate ID weighing in    Hypothyroidism  -TSH elevated  -Continue Synthroid  -Follow TSH level as outpatient    Obesity  -Patient with body mass index of 37.66 kg/m²  -Outpatient follow-up for weight management    Dehydration  -Monitor renal function    Abnormal LFT's   -Likely from infectious process  -Monitor liver function  -consider USG as out pt      Code status: FULL   DVT prophylaxis: Lovenox    Care Plan discussed with: Patient/Family and Nurse  Anticipated Disposition: TBD  Anticipated Discharge: Greater than 48 hours     Hospital Problems Date Reviewed: 11/13/2019          Codes Class Noted POA    Hypoxia ICD-10-CM: R09.02  ICD-9-CM: 799.02  5/25/2020 Unknown        COVID-19 ICD-10-CM: U07.1  ICD-9-CM: 079.89  5/25/2020 Unknown                Review of Systems:   A comprehensive review of systems was negative except for that written in the HPI. Vital Signs:    Last 24hrs VS reviewed since prior progress note. Most recent are:  Visit Vitals  BP 96/68 (BP 1 Location: Left arm, BP Patient Position: Sitting)   Pulse 76   Temp 98.1 °F (36.7 °C)   Resp 22   Ht 5' 7.01\" (1.702 m)   Wt 109.1 kg (240 lb 8 oz)   SpO2 100%   BMI 37.66 kg/m²         Intake/Output Summary (Last 24 hours) at 5/26/2020 1522  Last data filed at 5/26/2020 0349  Gross per 24 hour   Intake 0 ml   Output 600 ml   Net -600 ml        Physical Examination:             Constitutional:  No acute distress, cooperative, pleasant    ENT:  Oral mucosa moist, oropharynx benign. Resp:  CTA bilaterally. No wheezing/rhonchi/rales. No accessory muscle use   CV:  Regular rhythm, normal rate, no murmurs, gallops, rubs    GI:  Soft, non distended, non tender. normoactive bowel sounds, no hepatosplenomegaly     Musculoskeletal:  No edema, warm, 2+ pulses throughout    Neurologic:  Moves all extremities.   AAOx3, CN II-XII reviewed     Skin:  Good turgor, no rashes or ulcers       Data Review:    Review and/or order of clinical lab test      Labs:     Recent Labs     05/26/20  0330 05/25/20  1007   WBC 7.6 8.6   HGB 11.8 11.9   HCT 38.4 38.4    270     Recent Labs     05/26/20  0330 05/25/20  1007   * 139   K 3.6 3.8    99   CO2 27 32   BUN 16 12   CREA 1.30* 1.21*   * 99   CA 8.5 8.6     Recent Labs     05/26/20  0330 05/25/20  1007   SGOT 147* 153*   ALT 69 78   AP 52 50   TBILI 0.2 0.3   TP 7.4 7.8   ALB 3.4* 3.9   GLOB 4.0 3.9     Recent Labs     05/26/20  0330 05/25/20  1045   INR 0.9 1.0   PTP 9.8 9.4   APTT 33.3* 27.0      Recent Labs     05/25/20  1007   FERR 532*      No results found for: FOL, RBCF   No results for input(s): PH, PCO2, PO2 in the last 72 hours. No results for input(s): CPK, CKNDX, TROIQ in the last 72 hours.     No lab exists for component: CPKMB  Lab Results   Component Value Date/Time    Cholesterol, total 310 (H) 10/25/2018 02:15 PM    HDL Cholesterol 54 10/25/2018 02:15 PM    LDL, calculated 221 (H) 10/25/2018 02:15 PM    Triglyceride 173 (H) 10/25/2018 02:15 PM    CHOL/HDL Ratio 6.8 (H) 03/09/2012 09:10 AM     No results found for: Skyla Avery  Lab Results   Component Value Date/Time    Color Yellow 10/25/2018 02:15 PM    Appearance Cloudy (A) 10/25/2018 02:15 PM    Specific gravity 1.020 07/16/2015 07:58 PM    pH (UA) 5.5 10/25/2018 02:15 PM    Protein NEGATIVE  07/16/2015 07:58 PM    Glucose NEGATIVE  07/16/2015 07:58 PM    Ketone Negative 10/25/2018 02:15 PM    Bilirubin Negative 10/25/2018 02:15 PM    Urobilinogen 0.2 07/16/2015 07:58 PM    Nitrites Negative 10/25/2018 02:15 PM    Leukocyte Esterase 2+ (A) 10/25/2018 02:15 PM    Epithelial cells FEW 07/16/2015 07:58 PM    Bacteria Few 10/25/2018 02:15 PM    WBC >30 (A) 10/25/2018 02:15 PM    RBC 3-10 (A) 10/25/2018 02:15 PM         Medications Reviewed:     Current Facility-Administered Medications   Medication Dose Route Frequency    lidocaine 4 % patch 2 Patch  2 Patch TransDERmal Q24H    acetaminophen (TYLENOL) tablet 650 mg  650 mg Oral Q6H PRN    Or    acetaminophen (TYLENOL) suppository 650 mg  650 mg Rectal Q6H PRN    levothyroxine (SYNTHROID) tablet 125 mcg  125 mcg Oral ACB    fluticasone propionate (FLONASE) 50 mcg/actuation nasal spray 2 Spray  2 Spray Both Nostrils DAILY    albuterol (PROVENTIL HFA, VENTOLIN HFA, PROAIR HFA) inhaler 2 Puff  2 Puff Inhalation Q6H PRN    zinc sulfate (ZINCATE) 220 (50) mg capsule 1 Cap  1 Cap Oral DAILY    ascorbic acid (vitamin C) (VITAMIN C) tablet 500 mg  500 mg Oral BID    enoxaparin (LOVENOX) injection 40 mg  40 mg SubCUTAneous Q12H    azithromycin (ZITHROMAX) 500 mg in 0.9% sodium chloride (MBP/ADV) 250 mL  500 mg IntraVENous Q24H    guaiFENesin (ROBITUSSIN) 100 mg/5 mL oral liquid 100 mg  100 mg Oral Q4H PRN    sodium chloride (NS) flush 5-40 mL  5-40 mL IntraVENous Q8H    sodium chloride (NS) flush 5-40 mL  5-40 mL IntraVENous PRN    ondansetron (ZOFRAN) injection 4 mg  4 mg IntraVENous Q6H PRN     ______________________________________________________________________  EXPECTED LENGTH OF STAY: 5d 12h  ACTUAL LENGTH OF STAY:          1                 Nirali Goldstein MD

## 2020-05-26 NOTE — ADVANCED PRACTICE NURSE
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  A: assisted patient back to bed, provided education on lung effects with COVID, reinforced ambulation and deep breathing, encouraged activity with periods of rest. Explained family liaison role. R: patient back to bed with min assist, patient acknowledged importance of spacing activity and rest when feel SOB, patient indicated she was contacting family via her smart phone. Continue to follow.  Miguel Dubois, RN, CCNS

## 2020-05-26 NOTE — PROGRESS NOTES
Pulmonary, Critical Care, and Sleep Medicine~Progress Note    Name: Rico Arzola MRN: [id number]   : 1960 Hospital: [facility]   Date: 2020 10:42 AM Admission: 2020     Impression Plan   1. Acute hypoxic resp failure   2. COVID19 PNA   3. Obesity   4. Hx of total mastectomy  1. High risk of decline   2. Start pulsed [place]; obtained consent. Discussed with attending, RN, and patient  3. lovenox   4. azithromax   5. Vit c/zinc  6. Inflammatory markers      Daily Progression:    Sitting up in chair    I have reviewed the labs and previous days notes. Pertinent items are noted in HPI. OBJECTIVE:     Vital Signs:       Visit Vitals  BP (!) 85/62 (BP 1 Location: Left arm, BP Patient Position: At rest)   Pulse 74   Temp 98.8 °F (37.1 °C)   Resp 21   Ht 5' 7.01\" (1.702 m)   Wt 109.1 kg (240 lb 8 oz)   SpO2 94%   BMI 37.66 kg/m²      Temp (24hrs), Av.5 °F (36.9 °C), Min:97.7 °F (36.5 °C), Max:99.1 °F (37.3 °C)     Intake/Output:     Last shift: No intake/output data recorded.     Last 3 shifts: 1901 -  0700  In: 120 [P.O.:120]  Out: 600 [Urine:600]          Intake/Output Summary (Last 24 hours) at 2020 1042  Last data filed at 2020 0349  Gross per 24 hour   Intake 120 ml   Output 600 ml   Net -480 ml       Physical Exam:                                        Exam Findings Other   General: No resp distress noted, appears stated age    [de-identified]:  No ulcers, JVD not elevated, no cervical LAD    Chest: No pectus deformity, normal chest rise b/l    HEART:  Deferred     Lungs:  Deferred     ABD: Soft/NT, non rigid mildly distended    EXT: No cyanosis/clubbing/edema, normal peripheral pulses    Skin: No rashes or ulcers, no mottling    Neuro: A/O x 3        Medications:  Current Facility-Administered Medications   Medication Dose Route Frequency    lidocaine 4 % patch 2 Patch  2 Patch TransDERmal Q24H    sodium chloride 0.9 % bolus infusion 250 mL  250 mL IntraVENous ONCE    acetaminophen (TYLENOL) tablet 650 mg  650 mg Oral Q6H PRN    Or    acetaminophen (TYLENOL) suppository 650 mg  650 mg Rectal Q6H PRN    levothyroxine (SYNTHROID) tablet 125 mcg  125 mcg Oral ACB    fluticasone propionate (FLONASE) 50 mcg/actuation nasal spray 2 Spray  2 Spray Both Nostrils DAILY    albuterol (PROVENTIL HFA, VENTOLIN HFA, PROAIR HFA) inhaler 2 Puff  2 Puff Inhalation Q6H PRN    zinc sulfate (ZINCATE) 220 (50) mg capsule 1 Cap  1 Cap Oral DAILY    ascorbic acid (vitamin C) (VITAMIN C) tablet 500 mg  500 mg Oral BID    enoxaparin (LOVENOX) injection 40 mg  40 mg SubCUTAneous Q12H    0.9% sodium chloride infusion  50 mL/hr IntraVENous CONTINUOUS    azithromycin (ZITHROMAX) 500 mg in 0.9% sodium chloride (MBP/ADV) 250 mL  500 mg IntraVENous Q24H    guaiFENesin (ROBITUSSIN) 100 mg/5 mL oral liquid 100 mg  100 mg Oral Q4H PRN    sodium chloride (NS) flush 5-40 mL  5-40 mL IntraVENous Q8H    sodium chloride (NS) flush 5-40 mL  5-40 mL IntraVENous PRN    ondansetron (ZOFRAN) injection 4 mg  4 mg IntraVENous Q6H PRN       Labs:  ABG No results for input(s): PHI, PCO2I, PO2I, HCO3I, SO2I, FIO2I in the last 72 hours.      CBC Recent Labs     05/26/20  0330 05/25/20  1007   WBC 7.6 8.6   HGB 11.8 11.9   HCT 38.4 38.4    270   MCV 95.5 94.8   MCH 29.4 17.3        Metabolic  Panel Recent Labs     05/26/20  0330 05/25/20  1045 05/25/20  1007   *  --  139   K 3.6  --  3.8     --  99   CO2 27  --  32   *  --  99   BUN 16  --  12   CREA 1.30*  --  1.21*   CA 8.5  --  8.6   ALB 3.4*  --  3.9   SGOT 147*  --  153*   ALT 69  --  78   INR 0.9 1.0  --         Pertinent Labs                Toi Barrios PA-C  5/26/2020

## 2020-05-27 LAB
ALBUMIN SERPL-MCNC: 3.1 G/DL (ref 3.5–5)
ALBUMIN/GLOB SERPL: 0.8 {RATIO} (ref 1.1–2.2)
ALP SERPL-CCNC: 53 U/L (ref 45–117)
ALT SERPL-CCNC: 62 U/L (ref 12–78)
ANION GAP SERPL CALC-SCNC: 5 MMOL/L (ref 5–15)
APTT PPP: 31.3 SEC (ref 22.1–32)
ARTERIAL PATENCY WRIST A: YES
AST SERPL-CCNC: 120 U/L (ref 15–37)
BACTERIA SPEC CULT: NORMAL
BASE EXCESS BLD CALC-SCNC: 3 MMOL/L
BASE EXCESS BLD CALC-SCNC: 4 MMOL/L
BASE EXCESS BLD CALC-SCNC: 4 MMOL/L
BASOPHILS # BLD: 0 K/UL (ref 0–0.1)
BASOPHILS NFR BLD: 0 % (ref 0–1)
BDY SITE: ABNORMAL
BILIRUB SERPL-MCNC: 0.2 MG/DL (ref 0.2–1)
BNP SERPL-MCNC: 120 PG/ML
BUN SERPL-MCNC: 11 MG/DL (ref 6–20)
BUN/CREAT SERPL: 10 (ref 12–20)
CA-I BLD-SCNC: 1.11 MMOL/L (ref 1.12–1.32)
CA-I BLD-SCNC: 1.12 MMOL/L (ref 1.12–1.32)
CA-I BLD-SCNC: 1.13 MMOL/L (ref 1.12–1.32)
CALCIUM SERPL-MCNC: 8.4 MG/DL (ref 8.5–10.1)
CHLORIDE SERPL-SCNC: 103 MMOL/L (ref 97–108)
CO2 SERPL-SCNC: 29 MMOL/L (ref 21–32)
CREAT SERPL-MCNC: 1.13 MG/DL (ref 0.55–1.02)
CRP SERPL-MCNC: 9.99 MG/DL (ref 0–0.6)
D DIMER PPP FEU-MCNC: 0.22 MG/L FEU (ref 0–0.65)
DIFFERENTIAL METHOD BLD: ABNORMAL
EOSINOPHIL # BLD: 0 K/UL (ref 0–0.4)
EOSINOPHIL NFR BLD: 0 % (ref 0–7)
ERYTHROCYTE [DISTWIDTH] IN BLOOD BY AUTOMATED COUNT: 15.1 % (ref 11.5–14.5)
FERRITIN SERPL-MCNC: 712 NG/ML (ref 26–388)
FIBRINOGEN PPP-MCNC: 530 MG/DL (ref 200–475)
GAS FLOW.O2 O2 DELIVERY SYS: ABNORMAL L/MIN
GAS FLOW.O2 SETTING OXYMISER: 6 L/M
GAS FLOW.O2 SETTING OXYMISER: 6 L/M
GLOBULIN SER CALC-MCNC: 4.1 G/DL (ref 2–4)
GLUCOSE BLD STRIP.AUTO-MCNC: 130 MG/DL (ref 65–100)
GLUCOSE SERPL-MCNC: 88 MG/DL (ref 65–100)
GRAM STN SPEC: NORMAL
HCO3 BLD-SCNC: 27.7 MMOL/L (ref 22–26)
HCO3 BLD-SCNC: 28 MMOL/L (ref 22–26)
HCO3 BLD-SCNC: 29.1 MMOL/L (ref 22–26)
HCT VFR BLD AUTO: 35 % (ref 35–47)
HGB BLD-MCNC: 10.7 G/DL (ref 11.5–16)
IL6 SERPL-MCNC: 89.3 PG/ML (ref 0–15.5)
IL6 SERPL-MCNC: 91.2 PG/ML (ref 0–15.5)
IMM GRANULOCYTES # BLD AUTO: 0.1 K/UL (ref 0–0.04)
IMM GRANULOCYTES NFR BLD AUTO: 2 % (ref 0–0.5)
INR PPP: 1 (ref 0.9–1.1)
LDH SERPL L TO P-CCNC: 657 U/L (ref 81–246)
LYMPHOCYTES # BLD: 1.4 K/UL (ref 0.8–3.5)
LYMPHOCYTES NFR BLD: 17 % (ref 12–49)
MCH RBC QN AUTO: 29.2 PG (ref 26–34)
MCHC RBC AUTO-ENTMCNC: 30.6 G/DL (ref 30–36.5)
MCV RBC AUTO: 95.4 FL (ref 80–99)
MONOCYTES # BLD: 0.2 K/UL (ref 0–1)
MONOCYTES NFR BLD: 2 % (ref 5–13)
NEUTS SEG # BLD: 6.5 K/UL (ref 1.8–8)
NEUTS SEG NFR BLD: 79 % (ref 32–75)
NRBC # BLD: 0 K/UL (ref 0–0.01)
NRBC BLD-RTO: 0 PER 100 WBC
O2/TOTAL GAS SETTING VFR VENT: 100 %
O2/TOTAL GAS SETTING VFR VENT: 44 %
O2/TOTAL GAS SETTING VFR VENT: 44 %
PCO2 BLD: 42.1 MMHG (ref 35–45)
PCO2 BLD: 45.4 MMHG (ref 35–45)
PCO2 BLD: 49.8 MMHG (ref 35–45)
PEEP RESPIRATORY: 6 CMH2O
PH BLD: 7.37 [PH] (ref 7.35–7.45)
PH BLD: 7.39 [PH] (ref 7.35–7.45)
PH BLD: 7.43 [PH] (ref 7.35–7.45)
PIP ISTAT,IPIP: 12
PLATELET # BLD AUTO: 298 K/UL (ref 150–400)
PMV BLD AUTO: 11 FL (ref 8.9–12.9)
PO2 BLD: 40 MMHG (ref 80–100)
PO2 BLD: 44 MMHG (ref 80–100)
PO2 BLD: 79 MMHG (ref 80–100)
POTASSIUM SERPL-SCNC: 3.8 MMOL/L (ref 3.5–5.1)
PROCALCITONIN SERPL-MCNC: <0.05 NG/ML
PROT SERPL-MCNC: 7.2 G/DL (ref 6.4–8.2)
PROTHROMBIN TIME: 9.9 SEC (ref 9–11.1)
RBC # BLD AUTO: 3.67 M/UL (ref 3.8–5.2)
SAO2 % BLD: 77 % (ref 92–97)
SAO2 % BLD: 79 % (ref 92–97)
SAO2 % BLD: 95 % (ref 92–97)
SERVICE CMNT-IMP: ABNORMAL
SERVICE CMNT-IMP: NORMAL
SODIUM SERPL-SCNC: 137 MMOL/L (ref 136–145)
SPECIMEN TYPE: ABNORMAL
SPONTANEOUS TIMED, IST: YES
THERAPEUTIC RANGE,PTTT: NORMAL SECS (ref 58–77)
TOTAL RESP. RATE, ITRR: 19
TOTAL RESP. RATE, ITRR: 24
TOTAL RESP. RATE, ITRR: 26
WBC # BLD AUTO: 8.3 K/UL (ref 3.6–11)

## 2020-05-27 PROCEDURE — 82803 BLOOD GASES ANY COMBINATION: CPT

## 2020-05-27 PROCEDURE — 74011250636 HC RX REV CODE- 250/636: Performed by: HOSPITALIST

## 2020-05-27 PROCEDURE — 84145 PROCALCITONIN (PCT): CPT

## 2020-05-27 PROCEDURE — 86140 C-REACTIVE PROTEIN: CPT

## 2020-05-27 PROCEDURE — 94660 CPAP INITIATION&MGMT: CPT

## 2020-05-27 PROCEDURE — 74011250637 HC RX REV CODE- 250/637: Performed by: NURSE PRACTITIONER

## 2020-05-27 PROCEDURE — 36600 WITHDRAWAL OF ARTERIAL BLOOD: CPT

## 2020-05-27 PROCEDURE — 74011000250 HC RX REV CODE- 250: Performed by: INTERNAL MEDICINE

## 2020-05-27 PROCEDURE — 36415 COLL VENOUS BLD VENIPUNCTURE: CPT

## 2020-05-27 PROCEDURE — C9113 INJ PANTOPRAZOLE SODIUM, VIA: HCPCS | Performed by: INTERNAL MEDICINE

## 2020-05-27 PROCEDURE — 5A09557 ASSISTANCE WITH RESPIRATORY VENTILATION, GREATER THAN 96 CONSECUTIVE HOURS, CONTINUOUS POSITIVE AIRWAY PRESSURE: ICD-10-PCS | Performed by: INTERNAL MEDICINE

## 2020-05-27 PROCEDURE — 85610 PROTHROMBIN TIME: CPT

## 2020-05-27 PROCEDURE — 77030013038 HC MSK CPAP FISP -A

## 2020-05-27 PROCEDURE — 94760 N-INVAS EAR/PLS OXIMETRY 1: CPT

## 2020-05-27 PROCEDURE — 77010033678 HC OXYGEN DAILY

## 2020-05-27 PROCEDURE — 85025 COMPLETE CBC W/AUTO DIFF WBC: CPT

## 2020-05-27 PROCEDURE — 74011250636 HC RX REV CODE- 250/636: Performed by: INTERNAL MEDICINE

## 2020-05-27 PROCEDURE — 74011250637 HC RX REV CODE- 250/637: Performed by: HOSPITALIST

## 2020-05-27 PROCEDURE — 74011250637 HC RX REV CODE- 250/637: Performed by: INTERNAL MEDICINE

## 2020-05-27 PROCEDURE — 74011000250 HC RX REV CODE- 250: Performed by: NURSE PRACTITIONER

## 2020-05-27 PROCEDURE — 85384 FIBRINOGEN ACTIVITY: CPT

## 2020-05-27 PROCEDURE — 74011000258 HC RX REV CODE- 258: Performed by: INTERNAL MEDICINE

## 2020-05-27 PROCEDURE — 73610000026 HC INO THERAPY EACH HOUR

## 2020-05-27 PROCEDURE — 80053 COMPREHEN METABOLIC PANEL: CPT

## 2020-05-27 PROCEDURE — 82728 ASSAY OF FERRITIN: CPT

## 2020-05-27 PROCEDURE — 74011250637 HC RX REV CODE- 250/637: Performed by: FAMILY MEDICINE

## 2020-05-27 PROCEDURE — 85730 THROMBOPLASTIN TIME PARTIAL: CPT

## 2020-05-27 PROCEDURE — 82962 GLUCOSE BLOOD TEST: CPT

## 2020-05-27 PROCEDURE — 85379 FIBRIN DEGRADATION QUANT: CPT

## 2020-05-27 PROCEDURE — 65660000000 HC RM CCU STEPDOWN

## 2020-05-27 PROCEDURE — 83615 LACTATE (LD) (LDH) ENZYME: CPT

## 2020-05-27 PROCEDURE — 65660000001 HC RM ICU INTERMED STEPDOWN

## 2020-05-27 PROCEDURE — 83880 ASSAY OF NATRIURETIC PEPTIDE: CPT

## 2020-05-27 RX ORDER — BUTALBITAL, ACETAMINOPHEN AND CAFFEINE 50; 325; 40 MG/1; MG/1; MG/1
1 TABLET ORAL
Status: DISCONTINUED | OUTPATIENT
Start: 2020-05-27 | End: 2020-06-09 | Stop reason: HOSPADM

## 2020-05-27 RX ORDER — SODIUM CHLORIDE 9 MG/ML
50 INJECTION, SOLUTION INTRAVENOUS CONTINUOUS
Status: DISCONTINUED | OUTPATIENT
Start: 2020-05-27 | End: 2020-05-28

## 2020-05-27 RX ADMIN — BUTALBITAL, ACETAMINOPHEN, AND CAFFEINE 1 TABLET: 50; 325; 40 TABLET ORAL at 18:41

## 2020-05-27 RX ADMIN — LEVOTHYROXINE SODIUM 250 MCG: 125 TABLET ORAL at 06:35

## 2020-05-27 RX ADMIN — ENOXAPARIN SODIUM 40 MG: 40 INJECTION SUBCUTANEOUS at 13:32

## 2020-05-27 RX ADMIN — ACETAMINOPHEN 650 MG: 325 TABLET, FILM COATED ORAL at 03:18

## 2020-05-27 RX ADMIN — SODIUM CHLORIDE 40 MG: 9 INJECTION INTRAMUSCULAR; INTRAVENOUS; SUBCUTANEOUS at 13:31

## 2020-05-27 RX ADMIN — ALLOPURINOL 100 MG: 100 TABLET ORAL at 09:33

## 2020-05-27 RX ADMIN — ONDANSETRON 4 MG: 2 INJECTION INTRAMUSCULAR; INTRAVENOUS at 11:05

## 2020-05-27 RX ADMIN — GUAIFENESIN 100 MG: 200 SOLUTION ORAL at 03:24

## 2020-05-27 RX ADMIN — FLUTICASONE PROPIONATE 2 SPRAY: 50 SPRAY, METERED NASAL at 09:34

## 2020-05-27 RX ADMIN — AZITHROMYCIN 500 MG: 500 INJECTION, POWDER, LYOPHILIZED, FOR SOLUTION INTRAVENOUS at 13:30

## 2020-05-27 RX ADMIN — Medication 500 MG: at 09:34

## 2020-05-27 RX ADMIN — REMDESIVIR 200 MG: 5 INJECTION INTRAVENOUS at 15:00

## 2020-05-27 RX ADMIN — SODIUM CHLORIDE 50 ML/HR: 900 INJECTION, SOLUTION INTRAVENOUS at 11:54

## 2020-05-27 RX ADMIN — Medication 10 ML: at 03:19

## 2020-05-27 RX ADMIN — SODIUM CHLORIDE 40 MG: 9 INJECTION INTRAMUSCULAR; INTRAVENOUS; SUBCUTANEOUS at 21:57

## 2020-05-27 RX ADMIN — Medication 10 ML: at 21:58

## 2020-05-27 RX ADMIN — Medication 500 MG: at 18:41

## 2020-05-27 RX ADMIN — Medication 10 ML: at 13:43

## 2020-05-27 RX ADMIN — SODIUM CHLORIDE 250 ML: 900 INJECTION, SOLUTION INTRAVENOUS at 11:45

## 2020-05-27 RX ADMIN — ENOXAPARIN SODIUM 40 MG: 40 INJECTION SUBCUTANEOUS at 03:14

## 2020-05-27 RX ADMIN — ZINC SULFATE 220 MG (50 MG) CAPSULE 1 CAPSULE: CAPSULE at 09:33

## 2020-05-27 NOTE — PROGRESS NOTES
Remdesivir Initiation Note    Laura Miranda meets criteria for initiation of remdesivir under the emergency use authorization (EUA) based on the following:   Known or suspected COVID-19   Severe disease (SpO2 ? 94% on RA, requiring supplemental O2, or requiring invasive mechanical ventilation)   Acceptable renal function  o CrCl ? 30 ml/min based on SCr obtained prior to initiation OR   o CrCl < 30 ml/min but the potential benefit of remdesivir outweighs the risk   Acceptable hepatic function (ALT within 5 times ULN)    I have discussed with the patient/proxy information consistent with the Fact Sheet for Patients and Parents/Caregivers\" and the patient/proxy has agreed to initiating remdesivir after being:   Given the Fact Sheet for Patients and Parents/Caregivers   Informed of the alternatives to receiving remdesivir, and    Informed that remdesivir is an unapproved drug that is authorized for use under EUA    Liver function tests will be monitored daily while on remdesivir.     Olivier Prescott DO  1:53 PM

## 2020-05-27 NOTE — PROGRESS NOTES
[facility]  Hospitalist Group                                                                                          Hospitalist Progress Note  Denisse Valenzuela MD  Answering service: [phone] from in house phone        Date of Service:  2020  NAME:  Allison Burnett  :  1960  MRN:  [id number]      Admission Summary:     Allison Burnett is a 64 y.o. female who presents with COVID pos+ from [address] [place] [address]. Pt said she had a test 2 days back and came back positive. She became SOB and went to ED.  Patient states that for the last 3 days she has not felt well.  She has had some body aches and nausea and fatigue.  She is also had cough.  Over the last 24 hours she started to have shortness of breath.  She comes in for evaluation of these complaints and concern for coronavirus given her workplace exposure.  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 Interval history / Subjective:       Patient was doing okay earlier in the day. Rapid was called for worsened hypoxia and AMS. Patient awake, responding to commands appropriately intermittently but intermittently slumping over with sleepiness/drowsiness. Needing high flow. ABG with PO2 44. D/W with intensivist on call. Patient is DNR. Intensivist agreed with plan for BiPAP trial.  On reevaluation on BiPAP, patient improved with her mentation later on. Assessment & Plan:     Acute hypoxic and hypercarbic respiratory failure, on BiPAP  -Acute hypoxic respiratory failure secondary to viral pneumonia  -COVID 19 positive as outpatient  -Pulmonology following  -Plan for pulse nitric oxide therapy, O2 requirement improves under 10 LPM  -Pulmonology agreeable with BiPAP as well    COVID 19 infection  -Droplet plus precaution  -Continue vitamin C and azithromycin  -Anticoagulation per protocol  -Appreciate ID weighing in.   Started on Remdesivir given worsened hypoxia  -Guarded prognosis    Hypothyroidism  -TSH elevated  -Continue Synthroid  -Follow TSH level as outpatient    Obesity  -Patient with body mass index of 37.66 kg/m²  -Outpatient follow-up for weight management    Dehydration  -Monitor renal function    Abnormal LFT's   -Likely from infectious process  -Monitor liver function  -consider USG as out pt     Guarded prognosis. D/W with daughter Ms. Wheeler. [phone]. Answered all her questions and concerns. Code status: DNR   DVT prophylaxis: Lovenox    Care Plan discussed with: Patient/Family and Nurse  Anticipated Disposition: TBD  Anticipated Discharge: Greater than 48 hours     Hospital Problems  Date Reviewed: 11/13/2019          Codes Class Noted POA    Hypoxia ICD-10-CM: R09.02  ICD-9-CM: 799.02  5/25/2020 Unknown        COVID-19 ICD-10-CM: U07.1  ICD-9-CM: 079.89  5/25/2020 Unknown                Review of Systems:   A comprehensive review of systems was negative except for that written in the HPI. Limited ROS given fluctuating patient mentation and response. Vital Signs:    Last 24hrs VS reviewed since prior progress note. Most recent are:  Visit Vitals  /75 (BP 1 Location: Right arm, BP Patient Position: At rest)   Pulse 77   Temp 98.3 °F (36.8 °C)   Resp 25   Ht 5' 7.01\" (1.702 m)   Wt 108.1 kg (238 lb 4.8 oz)   SpO2 92%   BMI 37.31 kg/m²         Intake/Output Summary (Last 24 hours) at 5/27/2020 1059  Last data filed at 5/26/2020 1826  Gross per 24 hour   Intake 440 ml   Output --   Net 440 ml        Physical Examination:             Constitutional:  Sleepier, dozing in and out, answered some questions appropriately, arousable   ENT:  Oral mucosa moist, oropharynx benign. Resp:  CTA bilaterally. No wheezing/rhonchi/rales. No accessory muscle use   CV:  Regular rhythm, normal rate, no murmurs, gallops, rubs    GI:  Soft, non distended, non tender.  normoactive bowel sounds, no hepatosplenomegaly     Musculoskeletal:  No edema, warm, 2+ pulses throughout    Neurologic:  Moves all extremities. AAOx3, CN II-XII reviewed     Skin:  Good turgor, no rashes or ulcers       Data Review:    Review and/or order of clinical lab test    Xr Chest Port    Result Date: 5/25/2020  impression: Limited by shallow inspiration, cannot exclude interstitial infiltrates bilaterally. Labs:     Recent Labs     05/27/20  0336 05/26/20  0330   WBC 8.3 7.6   HGB 10.7* 11.8   HCT 35.0 38.4    269     Recent Labs     05/27/20  0336 05/26/20  0330 05/25/20  1007    135* 139   K 3.8 3.6 3.8    102 99   CO2 29 27 32   BUN 11 16 12   CREA 1.13* 1.30* 1.21*   GLU 88 109* 99   CA 8.4* 8.5 8.6     Recent Labs     05/27/20  0336 05/26/20  0330 05/25/20  1007   ALT 62 69 78   AP 53 52 50   TBILI 0.2 0.2 0.3   TP 7.2 7.4 7.8   ALB 3.1* 3.4* 3.9   GLOB 4.1* 4.0 3.9     Recent Labs     05/27/20  0336 05/26/20  0330 05/25/20  1045   INR 1.0 0.9 1.0   PTP 9.9 9.8 9.4   APTT 31.3 33.3* 27.0      Recent Labs     05/27/20  0336 05/25/20  1007   FERR 712* 532*      No results found for: FOL, RBCF   No results for input(s): PH, PCO2, PO2 in the last 72 hours. No results for input(s): CPK, CKNDX, TROIQ in the last 72 hours.     No lab exists for component: CPKMB  Lab Results   Component Value Date/Time    Cholesterol, total 310 (H) 10/25/2018 02:15 PM    HDL Cholesterol 54 10/25/2018 02:15 PM    LDL, calculated 221 (H) 10/25/2018 02:15 PM    Triglyceride 173 (H) 10/25/2018 02:15 PM    CHOL/HDL Ratio 6.8 (H) 03/09/2012 09:10 AM     No results found for: GLUCPOC  Lab Results   Component Value Date/Time    Color Yellow 10/25/2018 02:15 PM    Appearance Cloudy (A) 10/25/2018 02:15 PM    Specific gravity 1.020 07/16/2015 07:58 PM    pH (UA) 5.5 10/25/2018 02:15 PM    Protein NEGATIVE  07/16/2015 07:58 PM    Glucose NEGATIVE  07/16/2015 07:58 PM    Ketone Negative 10/25/2018 02:15 PM    Bilirubin Negative 10/25/2018 02:15 PM    Urobilinogen 0.2 07/16/2015 07:58 PM    Nitrites Negative 10/25/2018 02:15 PM    Leukocyte Esterase 2+ (A) 10/25/2018 02:15 PM    Epithelial cells FEW 07/16/2015 07:58 PM    Bacteria Few 10/25/2018 02:15 PM    WBC >30 (A) 10/25/2018 02:15 PM    RBC 3-10 (A) 10/25/2018 02:15 PM         Medications Reviewed:     Current Facility-Administered Medications   Medication Dose Route Frequency    butalbital-acetaminophen-caffeine (FIORICET, ESGIC) -40 mg per tablet 1 Tab  1 Tab Oral Q12H PRN    lidocaine 4 % patch 2 Patch  2 Patch TransDERmal Q24H    levothyroxine (SYNTHROID) tablet 250 mcg  250 mcg Oral 6am    allopurinoL (ZYLOPRIM) tablet 100 mg  100 mg Oral DAILY    acetaminophen (TYLENOL) tablet 650 mg  650 mg Oral Q6H PRN    Or    acetaminophen (TYLENOL) suppository 650 mg  650 mg Rectal Q6H PRN    fluticasone propionate (FLONASE) 50 mcg/actuation nasal spray 2 Spray  2 Spray Both Nostrils DAILY    albuterol (PROVENTIL HFA, VENTOLIN HFA, PROAIR HFA) inhaler 2 Puff  2 Puff Inhalation Q6H PRN    zinc sulfate (ZINCATE) 220 (50) mg capsule 1 Cap  1 Cap Oral DAILY    ascorbic acid (vitamin C) (VITAMIN C) tablet 500 mg  500 mg Oral BID    enoxaparin (LOVENOX) injection 40 mg  40 mg SubCUTAneous Q12H    azithromycin (ZITHROMAX) 500 mg in 0.9% sodium chloride (MBP/ADV) 250 mL  500 mg IntraVENous Q24H    guaiFENesin (ROBITUSSIN) 100 mg/5 mL oral liquid 100 mg  100 mg Oral Q4H PRN    sodium chloride (NS) flush 5-40 mL  5-40 mL IntraVENous Q8H    sodium chloride (NS) flush 5-40 mL  5-40 mL IntraVENous PRN    ondansetron (ZOFRAN) injection 4 mg  4 mg IntraVENous Q6H PRN     ______________________________________________________________________  EXPECTED LENGTH OF STAY: 5d 12h  ACTUAL LENGTH OF STAY:          2                 Kimberley Emery MD

## 2020-05-27 NOTE — ADVANCED PRACTICE NURSE
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 Patient with an episode of reduced responsiveness and increased work of breathing, RRT called, patient placed on BiPap, started Remdesivir  A: patient in [place] on BiPap, responding appropriately, reports breathing better and she 'had a rough morning'. Educated patient on BiPap, tolerating well. Patient requested follow up with her sister. Sister updated on patient status and questions answered related to earlier call from MD regarding code status and remdesivir monitoring. R: patient's sister Doe Us is the point of contact for the family. She was appreciative of the update and indicated understanding. Will continue to follow.  Anuj Limon RN, CCNS

## 2020-05-27 NOTE — PROGRESS NOTES
1045: RR called for hypoxia and AMS. Patient nauseous, grey, and only responds to voice and pain. ABG obtained. PO2 40. Redrawn now 40. Dr. Dory Floyd at bedside. Stated to place patient on Bipap and recheck ABG in 1 hr. Patient recovered. Able to answer all orientation questions. More alert. ABG obtained. 1800: Patient placed on 6L nasal canula. Ate a few bites of dinner. Tolerated well. Problem: Gas Exchange - Impaired  Goal: Absence of hypoxia  Outcome: Progressing Towards Goal     Problem: Breathing Pattern - Ineffective  Goal: Ability to achieve and maintain a regular respiratory rate  Outcome: Progressing Towards Goal     Problem: Body Temperature -  Risk of, Imbalanced  Goal: Ability to maintain a body temperature within defined limits  Outcome: Progressing Towards Goal     Problem: Falls - Risk of  Goal: *Absence of Falls  Description: Document Earma Thomas Fall Risk and appropriate interventions in the flowsheet.   Outcome: Progressing Towards Goal  Note: Fall Risk Interventions:            Medication Interventions: Patient to call before getting OOB, Teach patient to arise slowly    Elimination Interventions: Call light in reach, Patient to call for help with toileting needs, Toileting schedule/hourly rounds

## 2020-05-27 NOTE — PROGRESS NOTES
Problem: Airway Clearance - Ineffective  Goal: Achieve or maintain patent airway  Outcome: Progressing Towards Goal     Problem: Gas Exchange - Impaired  Goal: Absence of hypoxia  Outcome: Progressing Towards Goal     Problem:  Body Temperature -  Risk of, Imbalanced  Goal: Ability to maintain a body temperature within defined limits  Outcome: Progressing Towards Goal  Goal: Will regain or maintain usual level of consciousness  Outcome: Progressing Towards Goal

## 2020-05-27 NOTE — PROGRESS NOTES
Infectious Disease Progress Note        HPI:    Ms. Elaine Townsend was seen earlier today  She had a rapid response for respiratory distress, increasing  Needs of supplemental oxygen and difficulty arousing per discussion with nursing teams on the floor  ABG wt PAO2 in 40's  Getting on bipap  Patient denied chest pain, she said she has difficulty breathing  earlier but better now  Denied nausea, vomiting abdominal pain  Not coughing much               Physical Exam:    Vitals:   Patient Vitals for the past 24 hrs:   Temp Pulse Resp BP SpO2   05/27/20 1240 -- -- -- -- 100 %   05/27/20 1151 -- 78 (!) 34 123/63 --   05/27/20 1143 -- 74 23 119/70 92 %   05/27/20 1132 -- 77 21 (!) 88/53 100 %   05/27/20 1125 -- 76 24 105/67 96 %   05/27/20 1122 -- 76 (!) 32 107/58 (!) 87 %   05/27/20 1121 -- 77 16 (!) 88/45 (!) 86 %   05/27/20 1107 98.5 °F (36.9 °C) 77 19 (!) 88/45 --   05/27/20 0713 98.3 °F (36.8 °C) 77 25 126/75 92 %   05/27/20 0545 -- -- -- -- 98 %   05/27/20 0313 98.3 °F (36.8 °C) 78 18 126/59 98 %   05/26/20 2325 98.3 °F (36.8 °C) 79 24 111/55 97 %   05/26/20 1906 98.2 °F (36.8 °C) 77 22 116/60 97 %   05/26/20 1600 97.9 °F (36.6 °C) 77 20 102/52 100 %   05/26/20 1452 -- -- -- -- 100 %     · GEN: NAD  · HEENT: no scleral icterus,  no cervical lymphadenopathy, no sinus tenderness, no thrush  · CV: S1, S2 heard regularly,   · Lungs: Diminished breath sounds no wheezing or crackles heard  · Abdomen: soft, non distended, non tender,  no CVA tenderness   · Genitourinary: no murphy  · Extremities: no edema  · Neuro: Alert, oriented to self, and situation ,  moves all extremities to commands, verbal   · Skin: no rash  · Psych: good affect, good eye contact, non tearful   ·  musculoskeletal : no erythema of ankles, knees noted       Labs:   Recent Results (from the past 24 hour(s))   CARBOXY HEMOGLOBIN    Collection Time: 05/26/20  2:16 PM   Result Value Ref Range    Carboxy-Hgb 2.6 (H) 1 - 2 %    Methemoglobin 0.5 0 - 1.4 %    tHb 12.9 (L) 14 - 17 g/dL    Oxyhemoglobin 91.2 (L) 94 - 97 %    O2 SATURATION 94 (L) 95 - 99 %   CBC WITH AUTOMATED DIFF    Collection Time: 05/27/20  3:36 AM   Result Value Ref Range    WBC 8.3 3.6 - 11.0 K/uL    RBC 3.67 (L) 3.80 - 5.20 M/uL    HGB 10.7 (L) 11.5 - 16.0 g/dL    HCT 35.0 35.0 - 47.0 %    MCV 95.4 80.0 - 99.0 FL    MCH 29.2 26.0 - 34.0 PG    MCHC 30.6 30.0 - 36.5 g/dL    RDW 15.1 (H) 11.5 - 14.5 %    PLATELET 164 828 - 511 K/uL    MPV 11.0 8.9 - 12.9 FL    NRBC 0.0 0  WBC    ABSOLUTE NRBC 0.00 0.00 - 0.01 K/uL    NEUTROPHILS 79 (H) 32 - 75 %    LYMPHOCYTES 17 12 - 49 %    MONOCYTES 2 (L) 5 - 13 %    EOSINOPHILS 0 0 - 7 %    BASOPHILS 0 0 - 1 %    IMMATURE GRANULOCYTES 2 (H) 0.0 - 0.5 %    ABS. NEUTROPHILS 6.5 1.8 - 8.0 K/UL    ABS. LYMPHOCYTES 1.4 0.8 - 3.5 K/UL    ABS. MONOCYTES 0.2 0.0 - 1.0 K/UL    ABS. EOSINOPHILS 0.0 0.0 - 0.4 K/UL    ABS. BASOPHILS 0.0 0.0 - 0.1 K/UL    ABS. IMM. GRANS. 0.1 (H) 0.00 - 0.04 K/UL    DF AUTOMATED     METABOLIC PANEL, COMPREHENSIVE    Collection Time: 05/27/20  3:36 AM   Result Value Ref Range    Sodium 137 136 - 145 mmol/L    Potassium 3.8 3.5 - 5.1 mmol/L    Chloride 103 97 - 108 mmol/L    CO2 29 21 - 32 mmol/L    Anion gap 5 5 - 15 mmol/L    Glucose 88 65 - 100 mg/dL    BUN 11 6 - 20 MG/DL    Creatinine 1.13 (H) 0.55 - 1.02 MG/DL    BUN/Creatinine ratio 10 (L) 12 - 20      GFR est AA >60 >60 ml/min/1.73m2    GFR est non-AA 50 (L) >60 ml/min/1.73m2    Calcium 8.4 (L) 8.5 - 10.1 MG/DL    Bilirubin, total 0.2 0.2 - 1.0 MG/DL    ALT (SGPT) 62 12 - 78 U/L    AST (SGOT) 120 (H) 15 - 37 U/L    Alk.  phosphatase 53 45 - 117 U/L    Protein, total 7.2 6.4 - 8.2 g/dL    Albumin 3.1 (L) 3.5 - 5.0 g/dL    Globulin 4.1 (H) 2.0 - 4.0 g/dL    A-G Ratio 0.8 (L) 1.1 - 2.2     PROTHROMBIN TIME + INR    Collection Time: 05/27/20  3:36 AM   Result Value Ref Range    INR 1.0 0.9 - 1.1      Prothrombin time 9.9 9.0 - 11.1 sec   PTT    Collection Time: 05/27/20  3:36 AM   Result Value Ref Range    aPTT 31.3 22.1 - 32.0 sec    aPTT, therapeutic range     58.0 - 77.0 SECS   FIBRINOGEN    Collection Time: 05/27/20  3:36 AM   Result Value Ref Range    Fibrinogen 530 (H) 200 - 475 mg/dL   D DIMER    Collection Time: 05/27/20  3:36 AM   Result Value Ref Range    D-dimer 0.22 0.00 - 0.65 mg/L FEU   C REACTIVE PROTEIN, QT    Collection Time: 05/27/20  3:36 AM   Result Value Ref Range    C-Reactive protein 9.99 (H) 0.00 - 0.60 mg/dL   FERRITIN    Collection Time: 05/27/20  3:36 AM   Result Value Ref Range    Ferritin 712 (H) 26 - 388 NG/ML   LD    Collection Time: 05/27/20  3:36 AM   Result Value Ref Range     (H) 81 - 246 U/L   NT-PRO BNP    Collection Time: 05/27/20  3:36 AM   Result Value Ref Range    NT pro- <125 PG/ML   PROCALCITONIN    Collection Time: 05/27/20  3:36 AM   Result Value Ref Range    Procalcitonin <0.05 ng/mL   POC EG7    Collection Time: 05/27/20 11:33 AM   Result Value Ref Range    Calcium, ionized (POC) 1.13 1.12 - 1.32 mmol/L    FIO2 (POC) 44 %    pH (POC) 7.432 7.35 - 7.45      pCO2 (POC) 42.1 35.0 - 45.0 MMHG    pO2 (POC) 40 (LL) 80 - 100 MMHG    HCO3 (POC) 28.0 (H) 22 - 26 MMOL/L    Base excess (POC) 4 mmol/L    sO2 (POC) 77 (L) 92 - 97 %    Site LEFT RADIAL      Device: NASAL CANNULA      Flow rate (POC) 6 L/M    Allens test (POC) YES      Specimen type (POC) VENOUS BLOOD      Total resp. rate 24     POC EG7    Collection Time: 05/27/20 11:47 AM   Result Value Ref Range    Calcium, ionized (POC) 1.12 1.12 - 1.32 mmol/L    FIO2 (POC) 44 %    pH (POC) 7.393 7.35 - 7.45      pCO2 (POC) 45.4 (H) 35.0 - 45.0 MMHG    pO2 (POC) 44 (LL) 80 - 100 MMHG    HCO3 (POC) 27.7 (H) 22 - 26 MMOL/L    Base excess (POC) 3 mmol/L    sO2 (POC) 79 (L) 92 - 97 %    Site LEFT RADIAL      Device: NASAL CANNULA      Flow rate (POC) 6 L/M    Allens test (POC) YES      Specimen type (POC) ARTERIAL      Total resp.  rate 26     GLUCOSE, POC    Collection Time: 05/27/20 12:03 PM   Result Value Ref Range    Glucose (POC) 130 (H) 65 - 100 mg/dL    Performed by Lani Cordova 6 pending  Component Value Flag Ref Range Units Status   CRP, High sensitivity >9.5          , 642    Ferritin 532            Microbiology Data:    sputum       Component Value Ref Range & Units Status   Special Requests: NO SPECIAL REQUESTS    Preliminary   GRAM STAIN FEW WBCS SEEN    Preliminary   GRAM STAIN    Preliminary   OCCASIONAL EPITHELIAL CELLS SEEN    GRAM STAIN    Preliminary   RARE GRAM POSITIVE COCCI IN CLUSTERS    Culture result:    Preliminary   MODERATE NORMAL RESPIRATORY SAMUEL SO FAR    Result History       Component Value Ref Range & Units Status   Special Requests: NO SPECIAL REQUESTS    Final   [place] Count [id number]   COLONIES/mL    Final   Culture result:    Final   MIXED UROGENITAL SAMUEL ISOLATED        Component Value Flag Ref Range Units Status   Specimen source Nasopharyngeal      Final   SARS-CoV-2 Detected  Abnormal   NOTD   Final   Comment:     TB Quantiferon 10/25/18  Component Value Ref Range & Units Status   QuantiFERON Incubation Incubation performed. Final   QuantiFERON Criteria Comment   Final   The QuantiFERON-TB Gold Plus result is determined by subtracting   the Nil value from either TB antigen (Ag) tube. The mitogen tube   serves as a control for the test.    QuantiFERON TB1 Ag 0.19  IU/mL Final   QuantiFERON TB2 Ag 0.21  IU/mL Final   QuantiFERON Nil Value 0.13  IU/mL Final   QuantiFERON Mitogen Value >10.00  IU/mL Final   QuantiFERON Plus Negative  Negative Final   Narrative               Pathology Results:  B/L mastectomy 2016, seromas  2015 R breast mastectomy invasive ductal carcinoma, ductal carcinoma in situ  2 intrammammary lymph nodes negative for tumor     Imaging:     Portable AP view of the chest is obtained, comparison to 2013. Inspiration is  very shallow limiting the examination.  Interstitial infiltrates not excluded.     IMPRESSION  impression: Limited by shallow inspiration, cannot exclude interstitial  infiltrates bilaterally.         Assessment / Plan:     Problem List as of 5/27/2020 Date Reviewed: 11/13/2019          Codes Class Noted - Resolved    Hypoxia ICD-10-CM: R09.02  ICD-9-CM: 799.02  5/25/2020 - Present        COVID-19 ICD-10-CM: U07.1  ICD-9-CM: 079.89  5/25/2020 - Present        Severe obesity ([address] ICD-10-CM: E66.01  ICD-9-CM: 278.01  11/11/2019 - Present        Costochondritis ICD-10-CM: M94.0  ICD-9-CM: 733.6  10/9/2018 - Present        BMI 33.0-33.9,adult ICD-10-CM: Q34.03  ICD-9-CM: V85.33  9/14/2018 - Present        Chest wall pain following surgery ICD-10-CM: R07.89, G89.18  ICD-9-CM: 786.59, 338.18  2/9/2018 - Present        Chest wall pain, chronic ICD-10-CM: R07.89, G89.29  ICD-9-CM: 786.52, 338.29  2/9/2018 - Present        H/O mastectomy, bilateral ICD-10-CM: Z90.13  ICD-9-CM: V45.71  2/9/2018 - Present        Vitamin D deficiency ICD-10-CM: E55.9  ICD-9-CM: 268.9  2/21/2017 - Present        Lymphedema ICD-10-CM: I89.0  ICD-9-CM: 457.1  11/9/2016 - Present        Hypothyroidism due to acquired atrophy of thyroid ICD-10-CM: E03.4  ICD-9-CM: 244.8, 246.8  5/14/2015 - Present        HLD (hyperlipidemia) ICD-10-CM: E78.5  ICD-9-CM: 272.4  5/14/2015 - Present        History of breast cancer ICD-10-CM: Z85.3  ICD-9-CM: V10.3  6/18/2013 - Present        Chronic headaches ICD-10-CM: R51  ICD-9-CM: 784.0  6/18/2013 - Present        Family history of cancer ICD-10-CM: Z80.9  ICD-9-CM: V16.9  6/18/2013 - Present        Skin moles ICD-10-CM: D22.9  ICD-9-CM: 216.9  6/18/2013 - Present        RESOLVED: Routine general medical examination at a health care facility ICD-10-CM: Z00.00  ICD-9-CM: V70.0  6/18/2013 - 9/30/2019             Ms. Johnnie Grace is a 73-year-old lady history of breast cancer status post radiation therapy in 2013 with recurrence and status post mastectomy in 2015, hypothyroidism, arthritis with SARS-CoV-2 infection/pneumonia. Her latest oxygen status has been reported as 88% on 5 L nasal cannula  Her inflammatory markers is elevated and IL-6 is pending    She denied being in any other antiviral trial or COVID trials    She is getting inhaled nitric oxide  has been seen by the pulmonary team    1) Sars COV 2 infection, pneumonia, hypoxia   Discussed with patient regarding investigational drug Remdesivir which she is agreeable to getting   Discussed potential side effects, liver, kidney issues that we will need to monitor for    Monitor Qtc closely as on azithromycin ( stop after 5 days)  If QT /QTC > 500 msec, stop Azithromycin   Procal < 0.05  On vitamin c , zinc  On [place] per pulm   Monitor cbc , cmp daily for now               2) Hx of Breast cancer   S/p mastectomy, hx of radiation therapy     3) hypothyroidsm     4) Arthritis hx     5) DVT px       Discussed with primary/pulm teams, pharmacy and nursing teams     Thank for the opportunity to participate in the care of this patient. Please contact with questions or concerns.        Kim Jovel,    2:03 PM

## 2020-05-27 NOTE — PROGRESS NOTES
Pulmonary, Critical Care, and Sleep Medicine~Progress Note    Name: Ezequiel Cardoza MRN: [id number]   : 1960 Hospital: [address]   Date: 2020 10:42 AM Admission: 2020     Impression Plan   1. Acute hypoxic resp failure   2. COVID19 PNA   3. Obesity   4. Hx of total mastectomy  1. High risk of decline   2. Started pulsed [place] on   3. lovenox   4. IL6 is pending, but do not suspect that we will need to start on actemra   5. azithromax   6. Vit c/zinc   7. On Remdesivir now   8. Inflammatory markers      Daily Progression:    Sitting up in chair    I have reviewed the labs and previous days notes. Pertinent items are noted in HPI. OBJECTIVE:     Vital Signs:       Visit Vitals  /75 (BP 1 Location: Right arm, BP Patient Position: At rest)   Pulse 77   Temp 98.3 °F (36.8 °C)   Resp 25   Ht 5' 7.01\" (1.702 m)   Wt 108.1 kg (238 lb 4.8 oz)   SpO2 92%   BMI 37.31 kg/m²      Temp (24hrs), Av.2 °F (36.8 °C), Min:97.9 °F (36.6 °C), Max:98.3 °F (36.8 °C)     Intake/Output:     Last shift: No intake/output data recorded.     Last 3 shifts:  1901 -  0700  In: 1562.5 [P.O.:640; I.V.:922.5]  Out: 600 [Urine:600]          Intake/Output Summary (Last 24 hours) at 2020 1013  Last data filed at 2020 1826  Gross per 24 hour   Intake 440 ml   Output --   Net 440 ml       Physical Exam:                                        Exam Findings Other   General: No resp distress noted, appears stated age    [de-identified]:  No ulcers, JVD not elevated, no cervical LAD    Chest: No pectus deformity, normal chest rise b/l    HEART:  Deferred     Lungs:  Deferred     ABD: Soft/NT, non rigid mildly distended    EXT: No cyanosis/clubbing/edema, normal peripheral pulses    Skin: No rashes or ulcers, no mottling    Neuro: A/O x 3        Medications:  Current Facility-Administered Medications   Medication Dose Route Frequency  lidocaine 4 % patch 2 Patch  2 Patch TransDERmal Q24H    levothyroxine (SYNTHROID) tablet 250 mcg  250 mcg Oral 6am    allopurinoL (ZYLOPRIM) tablet 100 mg  100 mg Oral DAILY    acetaminophen (TYLENOL) tablet 650 mg  650 mg Oral Q6H PRN    Or    acetaminophen (TYLENOL) suppository 650 mg  650 mg Rectal Q6H PRN    fluticasone propionate (FLONASE) 50 mcg/actuation nasal spray 2 Spray  2 Spray Both Nostrils DAILY    albuterol (PROVENTIL HFA, VENTOLIN HFA, PROAIR HFA) inhaler 2 Puff  2 Puff Inhalation Q6H PRN    zinc sulfate (ZINCATE) 220 (50) mg capsule 1 Cap  1 Cap Oral DAILY    ascorbic acid (vitamin C) (VITAMIN C) tablet 500 mg  500 mg Oral BID    enoxaparin (LOVENOX) injection 40 mg  40 mg SubCUTAneous Q12H    azithromycin (ZITHROMAX) 500 mg in 0.9% sodium chloride (MBP/ADV) 250 mL  500 mg IntraVENous Q24H    guaiFENesin (ROBITUSSIN) 100 mg/5 mL oral liquid 100 mg  100 mg Oral Q4H PRN    sodium chloride (NS) flush 5-40 mL  5-40 mL IntraVENous Q8H    sodium chloride (NS) flush 5-40 mL  5-40 mL IntraVENous PRN    ondansetron (ZOFRAN) injection 4 mg  4 mg IntraVENous Q6H PRN       Labs:  ABG No results for input(s): PHI, PCO2I, PO2I, HCO3I, SO2I, FIO2I in the last 72 hours.      CBC Recent Labs     05/27/20  0336 05/26/20  0330 05/25/20  1007   WBC 8.3 7.6 8.6   HGB 10.7* 11.8 11.9   HCT 35.0 38.4 38.4    269 270   MCV 95.4 95.5 94.8   MCH 29.2 29.4 14.6        Metabolic  Panel Recent Labs     05/27/20  0336 05/26/20  0330 05/25/20  1045 05/25/20  1007    135*  --  139   K 3.8 3.6  --  3.8    102  --  99   CO2 29 27  --  32   GLU 88 109*  --  99   BUN 11 16  --  12   CREA 1.13* 1.30*  --  1.21*   CA 8.4* 8.5  --  8.6   ALB 3.1* 3.4*  --  3.9   ALT 62 69  --  78   INR 1.0 0.9 1.0  --         Pertinent Labs                Toi Alejandra PA-C  5/27/2020

## 2020-05-27 NOTE — PROGRESS NOTES
JUSTIN Cartridge replaced.  Pt looks cmfortable on 2 LPM N/C, SAT 97%     05/27/20 0100   Nitric Oxide   Tank PSI   (207187-00)   Tank Serial # [id number]   Delivery Mode Non-invasive  (N/C 2 LPM)

## 2020-05-27 NOTE — PROGRESS NOTES
responded to RRT.  talked with charge RN. Please contact [phone] [facility] for further support.  follow up as needed.      SUSAN AndrewDiv, MACE   [phone]

## 2020-05-27 NOTE — PROGRESS NOTES
Patient is on bipap of 12/6 after RRT was called, ABG results were low and pulse ox is questionable. Per Dr. Dillon Manzano, the patient can resume the JUSTIN pulse trial once she comes off the bipap and is on 10L or less.

## 2020-05-28 LAB
ALBUMIN SERPL-MCNC: 2.9 G/DL (ref 3.5–5)
ALBUMIN/GLOB SERPL: 0.6 {RATIO} (ref 1.1–2.2)
ALP SERPL-CCNC: 58 U/L (ref 45–117)
ALT SERPL-CCNC: 58 U/L (ref 12–78)
ANION GAP SERPL CALC-SCNC: 7 MMOL/L (ref 5–15)
APTT PPP: 30.9 SEC (ref 22.1–32)
AST SERPL-CCNC: 107 U/L (ref 15–37)
BILIRUB SERPL-MCNC: 0.3 MG/DL (ref 0.2–1)
BNP SERPL-MCNC: 196 PG/ML
BUN SERPL-MCNC: 11 MG/DL (ref 6–20)
BUN/CREAT SERPL: 11 (ref 12–20)
CALCIUM SERPL-MCNC: 8.4 MG/DL (ref 8.5–10.1)
CHLORIDE SERPL-SCNC: 104 MMOL/L (ref 97–108)
CO2 SERPL-SCNC: 27 MMOL/L (ref 21–32)
CREAT SERPL-MCNC: 1 MG/DL (ref 0.55–1.02)
CRP SERPL-MCNC: 14.8 MG/DL (ref 0–0.6)
D DIMER PPP FEU-MCNC: 0.31 MG/L FEU (ref 0–0.65)
FERRITIN SERPL-MCNC: 786 NG/ML (ref 8–252)
FIBRINOGEN PPP-MCNC: 576 MG/DL (ref 200–475)
GLOBULIN SER CALC-MCNC: 4.5 G/DL (ref 2–4)
GLUCOSE SERPL-MCNC: 98 MG/DL (ref 65–100)
INR PPP: 1 (ref 0.9–1.1)
POTASSIUM SERPL-SCNC: 3.7 MMOL/L (ref 3.5–5.1)
PROCALCITONIN SERPL-MCNC: 0.19 NG/ML
PROT SERPL-MCNC: 7.4 G/DL (ref 6.4–8.2)
PROTHROMBIN TIME: 9.9 SEC (ref 9–11.1)
SODIUM SERPL-SCNC: 138 MMOL/L (ref 136–145)
THERAPEUTIC RANGE,PTTT: NORMAL SECS (ref 58–77)

## 2020-05-28 PROCEDURE — 85384 FIBRINOGEN ACTIVITY: CPT

## 2020-05-28 PROCEDURE — 74011000250 HC RX REV CODE- 250: Performed by: INTERNAL MEDICINE

## 2020-05-28 PROCEDURE — 84145 PROCALCITONIN (PCT): CPT

## 2020-05-28 PROCEDURE — 86140 C-REACTIVE PROTEIN: CPT

## 2020-05-28 PROCEDURE — 83880 ASSAY OF NATRIURETIC PEPTIDE: CPT

## 2020-05-28 PROCEDURE — 85730 THROMBOPLASTIN TIME PARTIAL: CPT

## 2020-05-28 PROCEDURE — 65660000000 HC RM CCU STEPDOWN

## 2020-05-28 PROCEDURE — 94660 CPAP INITIATION&MGMT: CPT

## 2020-05-28 PROCEDURE — 74011250637 HC RX REV CODE- 250/637: Performed by: INTERNAL MEDICINE

## 2020-05-28 PROCEDURE — 74011250637 HC RX REV CODE- 250/637: Performed by: HOSPITALIST

## 2020-05-28 PROCEDURE — 80053 COMPREHEN METABOLIC PANEL: CPT

## 2020-05-28 PROCEDURE — C9113 INJ PANTOPRAZOLE SODIUM, VIA: HCPCS | Performed by: INTERNAL MEDICINE

## 2020-05-28 PROCEDURE — 74011000258 HC RX REV CODE- 258: Performed by: INTERNAL MEDICINE

## 2020-05-28 PROCEDURE — 82728 ASSAY OF FERRITIN: CPT

## 2020-05-28 PROCEDURE — 77010033711 HC HIGH FLOW OXYGEN

## 2020-05-28 PROCEDURE — 77010033678 HC OXYGEN DAILY

## 2020-05-28 PROCEDURE — 65660000001 HC RM ICU INTERMED STEPDOWN

## 2020-05-28 PROCEDURE — 74011250636 HC RX REV CODE- 250/636: Performed by: INTERNAL MEDICINE

## 2020-05-28 PROCEDURE — 85379 FIBRIN DEGRADATION QUANT: CPT

## 2020-05-28 PROCEDURE — 74011250637 HC RX REV CODE- 250/637: Performed by: FAMILY MEDICINE

## 2020-05-28 PROCEDURE — 74011250636 HC RX REV CODE- 250/636: Performed by: HOSPITALIST

## 2020-05-28 PROCEDURE — 85610 PROTHROMBIN TIME: CPT

## 2020-05-28 PROCEDURE — 36415 COLL VENOUS BLD VENIPUNCTURE: CPT

## 2020-05-28 RX ORDER — DOCUSATE SODIUM 100 MG/1
100 CAPSULE, LIQUID FILLED ORAL DAILY
Status: DISCONTINUED | OUTPATIENT
Start: 2020-05-29 | End: 2020-06-09 | Stop reason: HOSPADM

## 2020-05-28 RX ADMIN — Medication 500 MG: at 10:02

## 2020-05-28 RX ADMIN — REMDESIVIR 100 MG: 5 INJECTION INTRAVENOUS at 13:26

## 2020-05-28 RX ADMIN — ONDANSETRON 4 MG: 2 INJECTION INTRAMUSCULAR; INTRAVENOUS at 22:42

## 2020-05-28 RX ADMIN — Medication 500 MG: at 18:44

## 2020-05-28 RX ADMIN — SODIUM CHLORIDE 40 MG: 9 INJECTION INTRAMUSCULAR; INTRAVENOUS; SUBCUTANEOUS at 09:58

## 2020-05-28 RX ADMIN — ZINC SULFATE 220 MG (50 MG) CAPSULE 1 CAPSULE: CAPSULE at 10:01

## 2020-05-28 RX ADMIN — Medication 30 ML: at 14:00

## 2020-05-28 RX ADMIN — Medication 10 ML: at 06:00

## 2020-05-28 RX ADMIN — AZITHROMYCIN 500 MG: 500 INJECTION, POWDER, LYOPHILIZED, FOR SOLUTION INTRAVENOUS at 13:37

## 2020-05-28 RX ADMIN — ENOXAPARIN SODIUM 40 MG: 40 INJECTION SUBCUTANEOUS at 13:27

## 2020-05-28 RX ADMIN — FLUTICASONE PROPIONATE 2 SPRAY: 50 SPRAY, METERED NASAL at 09:00

## 2020-05-28 RX ADMIN — LEVOTHYROXINE SODIUM 250 MCG: 125 TABLET ORAL at 07:20

## 2020-05-28 RX ADMIN — Medication 10 ML: at 22:43

## 2020-05-28 RX ADMIN — SODIUM CHLORIDE 40 MG: 9 INJECTION INTRAMUSCULAR; INTRAVENOUS; SUBCUTANEOUS at 20:18

## 2020-05-28 RX ADMIN — ALLOPURINOL 100 MG: 100 TABLET ORAL at 10:02

## 2020-05-28 RX ADMIN — BUTALBITAL, ACETAMINOPHEN, AND CAFFEINE 1 TABLET: 50; 325; 40 TABLET ORAL at 13:40

## 2020-05-28 NOTE — PROGRESS NOTES
Infectious Disease Progress Note        HPI:    Ms. Johnnie myles  Says she feels better today  Overall, less short of breath  Not coughing  Denied nausea, vomiting diarrhea fever, chills                 Physical Exam:    Vitals:   Patient Vitals for the past 24 hrs:   Temp Pulse Resp BP SpO2   05/28/20 1011 98.5 °F (36.9 °C) 78 27 107/54 --   05/27/20 2351 98.6 °F (37 °C) 81 23 137/82 92 %   05/27/20 2216 -- -- -- -- 94 %   05/27/20 2029 98.5 °F (36.9 °C) 84 23 137/71 94 %   05/27/20 1528 -- -- -- 114/69 --   05/27/20 1526 -- -- -- -- 100 %   05/27/20 1513 -- 77 22 -- --   05/27/20 1459 98.9 °F (37.2 °C) 81 22 109/62 100 %   05/27/20 1458 -- -- -- 118/64 --   05/27/20 1443 -- -- -- 109/62 --   05/27/20 1428 -- -- -- 113/63 --   05/27/20 1413 -- -- -- 120/69 --   05/27/20 1358 -- 75 -- 109/72 --   05/27/20 1343 -- 77 -- 116/70 --   05/27/20 1328 -- 74 -- 111/66 --   05/27/20 1313 -- 74 -- 99/59 --   05/27/20 1258 -- 74 -- 113/59 --   05/27/20 1243 -- 76 -- 112/56 --   05/27/20 1240 -- -- -- -- 100 %   05/27/20 1228 -- 76 -- 119/70 --   05/27/20 1213 -- 76 -- 123/69 --   05/27/20 1158 -- 76 25 127/69 --   05/27/20 1151 -- 78 (!) 34 123/63 --   05/27/20 1143 -- 74 23 119/70 92 %   05/27/20 1132 -- 77 21 (!) 88/53 100 %   05/27/20 1125 -- 76 24 105/67 96 %   05/27/20 1122 -- 76 (!) 32 107/58 (!) 87 %   05/27/20 1121 -- 77 16 (!) 88/45 (!) 86 %     · GEN: NAD  · HEENT: no scleral icterus,  bipap  · CV: S1, S2 heard regularly,   · Lungs: Diminished breath sounds no wheezing or crackles heard  · Abdomen: soft, non distended, non tender,  no CVA tenderness   · Genitourinary: no murphy  · Extremities: no edema  · Neuro: Alert, oriented to self, and situation ,  moves all extremities to commands, verbal   · Skin: no rash  · Psych: good affect, good eye contact, non tearful       Labs:   Recent Results (from the past 24 hour(s))   POC EG7    Collection Time: 05/27/20 11:33 AM   Result Value Ref Range    Calcium, ionized (POC) 1. 13 1.12 - 1.32 mmol/L    FIO2 (POC) 44 %    pH (POC) 7.432 7.35 - 7.45      pCO2 (POC) 42.1 35.0 - 45.0 MMHG    pO2 (POC) 40 (LL) 80 - 100 MMHG    HCO3 (POC) 28.0 (H) 22 - 26 MMOL/L    Base excess (POC) 4 mmol/L    sO2 (POC) 77 (L) 92 - 97 %    Site LEFT RADIAL      Device: NASAL CANNULA      Flow rate (POC) 6 L/M    Allens test (POC) YES      Specimen type (POC) VENOUS BLOOD      Total resp. rate 24     POC EG7    Collection Time: 05/27/20 11:47 AM   Result Value Ref Range    Calcium, ionized (POC) 1.12 1.12 - 1.32 mmol/L    FIO2 (POC) 44 %    pH (POC) 7.393 7.35 - 7.45      pCO2 (POC) 45.4 (H) 35.0 - 45.0 MMHG    pO2 (POC) 44 (LL) 80 - 100 MMHG    HCO3 (POC) 27.7 (H) 22 - 26 MMOL/L    Base excess (POC) 3 mmol/L    sO2 (POC) 79 (L) 92 - 97 %    Site LEFT RADIAL      Device: NASAL CANNULA      Flow rate (POC) 6 L/M    Allens test (POC) YES      Specimen type (POC) ARTERIAL      Total resp. rate 26     GLUCOSE, POC    Collection Time: 05/27/20 12:03 PM   Result Value Ref Range    Glucose (POC) 130 (H) 65 - 100 mg/dL    Performed by Luciana Galvan    POC EG7    Collection Time: 05/27/20  3:23 PM   Result Value Ref Range    Calcium, ionized (POC) 1.11 (L) 1.12 - 1.32 mmol/L    FIO2 (POC) 100 %    pH (POC) 7.374 7.35 - 7.45      pCO2 (POC) 49.8 (H) 35.0 - 45.0 MMHG    pO2 (POC) 79 (L) 80 - 100 MMHG    HCO3 (POC) 29.1 (H) 22 - 26 MMOL/L    Base excess (POC) 4 mmol/L    sO2 (POC) 95 92 - 97 %    Site LEFT RADIAL      Device: BIPAP      PEEP/CPAP (POC) 6 cmH2O    PIP (POC) 12      Allens test (POC) YES      Specimen type (POC) ARTERIAL      Total resp.  rate 19      Spontaneous timed YES         IL 6 pending  Component Value Flag Ref Range Units Status   CRP, High sensitivity >9.5          , 642    Ferritin 532            Microbiology Data:    sputum       Component Value Ref Range & Units Status   Special Requests: NO SPECIAL REQUESTS    Preliminary   GRAM STAIN FEW WBCS SEEN    Preliminary   GRAM STAIN   Preliminary   OCCASIONAL EPITHELIAL CELLS SEEN    GRAM STAIN    Preliminary   RARE GRAM POSITIVE COCCI IN CLUSTERS    Culture result:    Preliminary   MODERATE NORMAL RESPIRATORY SAMUEL SO FAR    Result History       Component Value Ref Range & Units Status   Special Requests: NO SPECIAL REQUESTS    Final   [place] Count [id number]   COLONIES/mL    Final   Culture result:    Final   MIXED UROGENITAL SAMUEL ISOLATED        Component Value Flag Ref Range Units Status   Specimen source Nasopharyngeal      Final   SARS-CoV-2 Detected  Abnormal   NOTD   Final   Comment:     TB Quantiferon 10/25/18  Component Value Ref Range & Units Status   QuantiFERON Incubation Incubation performed. Final   QuantiFERON Criteria Comment   Final   The QuantiFERON-TB Gold Plus result is determined by subtracting   the Nil value from either TB antigen (Ag) tube. The mitogen tube   serves as a control for the test.    QuantiFERON TB1 Ag 0.19  IU/mL Final   QuantiFERON TB2 Ag 0.21  IU/mL Final   QuantiFERON Nil Value 0.13  IU/mL Final   QuantiFERON Mitogen Value >10.00  IU/mL Final   QuantiFERON Plus Negative  Negative Final   Narrative               Pathology Results:  B/L mastectomy 2016, seromas  2015 R breast mastectomy invasive ductal carcinoma, ductal carcinoma in situ  2 intrammammary lymph nodes negative for tumor     Imaging:     Portable AP view of the chest is obtained, comparison to 2013. Inspiration is  very shallow limiting the examination. Interstitial infiltrates not excluded.     IMPRESSION  impression: Limited by shallow inspiration, cannot exclude interstitial  infiltrates bilaterally.         Assessment / Plan:     Problem List as of 5/28/2020 Date Reviewed: 11/13/2019          Codes Class Noted - Resolved    Hypoxia ICD-10-CM: R09.02  ICD-9-CM: 799.02  5/25/2020 - Present        COVID-19 ICD-10-CM: U07.1  ICD-9-CM: 079.89  5/25/2020 - Present        Severe obesity ([facility] [address] ICD-10-CM: E66.01  ICD-9-CM: 278.01  11/11/2019 - Present        Costochondritis ICD-10-CM: M94.0  ICD-9-CM: 733.6  10/9/2018 - Present        BMI 33.0-33.9,adult ICD-10-CM: R50.88  ICD-9-CM: V85.33  9/14/2018 - Present        Chest wall pain following surgery ICD-10-CM: R07.89, G89.18  ICD-9-CM: 786.59, 338.18  2/9/2018 - Present        Chest wall pain, chronic ICD-10-CM: R07.89, G89.29  ICD-9-CM: 786.52, 338.29  2/9/2018 - Present        H/O mastectomy, bilateral ICD-10-CM: Z90.13  ICD-9-CM: V45.71  2/9/2018 - Present        Vitamin D deficiency ICD-10-CM: E55.9  ICD-9-CM: 268.9  2/21/2017 - Present        Lymphedema ICD-10-CM: I89.0  ICD-9-CM: 457.1  11/9/2016 - Present        Hypothyroidism due to acquired atrophy of thyroid ICD-10-CM: E03.4  ICD-9-CM: 244.8, 246.8  5/14/2015 - Present        HLD (hyperlipidemia) ICD-10-CM: E78.5  ICD-9-CM: 272.4  5/14/2015 - Present        History of breast cancer ICD-10-CM: Z85.3  ICD-9-CM: V10.3  6/18/2013 - Present        Chronic headaches ICD-10-CM: R51  ICD-9-CM: 784.0  6/18/2013 - Present        Family history of cancer ICD-10-CM: Z80.9  ICD-9-CM: V16.9  6/18/2013 - Present        Skin moles ICD-10-CM: D22.9  ICD-9-CM: 216.9  6/18/2013 - Present        RESOLVED: Routine general medical examination at a health care facility ICD-10-CM: Z00.00  ICD-9-CM: V70.0  6/18/2013 - 9/30/2019             Ms. Deon Kennedy is a 32-year-old lady history of breast cancer status post radiation therapy in 2013 with recurrence and status post mastectomy in 2015, hypothyroidism, arthritis with SARS-CoV-2 infection/pneumonia. Her latest oxygen status has been reported as 88% on 5 L nasal cannula  Her inflammatory markers is elevated and IL-6 is pending    She denied being in any other antiviral trial or COVID trials    She is getting inhaled nitric oxide  has been seen by the pulmonary team    1) Sars COV 2 infection, pneumonia, hypoxia   Started Remdesivir 5/27/20.  Denied any symptoms of infusion reaction   Discussed potential side effects, liver, kidney issues that we will need to monitor for    Monitor Qtc closely as on azithromycin ( stop after 5 days) ( stop 5/29/20)  If QT /QTC > 500 msec, stop Azithromycin   Procal < 0.05  On vitamin c , zinc  On [place] per pulm   Monitor cbc , cmp daily         2) Hx of Breast cancer   S/p mastectomy, hx of radiation therapy     3) hypothyroidsm     4) Arthritis hx     5) DVT px       D/W Dr Eduard Manning, and Nursing staff     Thank for the opportunity to participate in the care of this patient. Please contact with questions or concerns.        Kim Jovel,    11:21 AM

## 2020-05-28 NOTE — PROGRESS NOTES
Patient saturation were unstable, destaturating on mid flow at 15 liters. Patient placed on heated high flow NC 30 liters 70% FIO2 saturations 97%. Patient tolerating therapy well.

## 2020-05-28 NOTE — PROGRESS NOTES
[facility]  Hospitalist Group                                                                                          Hospitalist Progress Note  Perlita Huddleston MD  Answering service: [phone] from in house phone        Date of Service:  2020  NAME:  Justice Rodriguez  :  1960  MRN:  [id number]      Admission Summary:     Justice Rodriguez is a 64 y.o. female who presents with COVID pos+ from [address] [place] [address]. Pt said she had a test 2 days back and came back positive. She became SOB and went to ED.  Patient states that for the last 3 days she has not felt well.  She has had some body aches and nausea and fatigue.  She is also had cough.  Over the last 24 hours she started to have shortness of breath.  She comes in for evaluation of these complaints and concern for coronavirus given her workplace exposure.  She works at a facility in [employer] where there is apparently several patients with confirmed coronavirus. Interval history / Subjective:       Patient still with some shortness of breath.   Oxygen increased to high flow     Assessment & Plan:     Acute respiratory failure  -Acute hypoxic respiratory failure secondary to viral pneumonia  -COVID 19 positive as outpatient  -Pulmonology following  -Currently on high flow oxygen  -on pulsed nitric oxide therapy    COVID 19 infection  -Droplet plus precaution  -Continue vitamin C and azithromycin  -Anticoagulation per protocol  -On Remdesivir  -ID following    Hypothyroidism  -TSH elevated  -Continue Synthroid  -Follow TSH level as outpatient    Obesity  -Patient with body mass index of 37.66 kg/m²  -Outpatient follow-up for weight management    Dehydration  -Monitor renal function    Abnormal LFT's   -Likely from infectious process  -Monitor liver function  -consider USG as out pt     Code status: FULL   DVT prophylaxis: Lovenox    Care Plan discussed with: Patient/Family and Nurse  Anticipated Disposition: TBD  Anticipated Discharge: Greater than 48 hours     Hospital Problems  Date Reviewed: 11/13/2019          Codes Class Noted POA    Hypoxia ICD-10-CM: R09.02  ICD-9-CM: 799.02  5/25/2020 Unknown        COVID-19 ICD-10-CM: U07.1  ICD-9-CM: 079.89  5/25/2020 Unknown                Review of Systems:   A comprehensive review of systems was negative except for that written in the HPI. Vital Signs:    Last 24hrs VS reviewed since prior progress note. Most recent are:  Visit Vitals  /71 (BP 1 Location: Right arm, BP Patient Position: Sitting)   Pulse 87   Temp 98.7 °F (37.1 °C)   Resp 17   Ht 5' 7.01\" (1.702 m)   Wt 108.1 kg (238 lb 4.8 oz)   SpO2 97%   BMI 37.31 kg/m²         Intake/Output Summary (Last 24 hours) at 5/28/2020 1452  Last data filed at 5/28/2020 0000  Gross per 24 hour   Intake 175.83 ml   Output --   Net 175.83 ml        Physical Examination:             Constitutional:  No acute distress, cooperative, pleasant    ENT:  Oral mucosa moist, oropharynx benign. Resp:  CTA bilaterally. No wheezing/rhonchi/rales. No accessory muscle use   CV:  Regular rhythm, normal rate, no murmurs, gallops, rubs    GI:  Soft, non distended, non tender. normoactive bowel sounds, no hepatosplenomegaly     Musculoskeletal:  No edema, warm, 2+ pulses throughout    Neurologic:  Moves all extremities.   AAOx3, CN II-XII reviewed     Skin:  Good turgor, no rashes or ulcers       Data Review:    Review and/or order of clinical lab test      Labs:     Recent Labs     05/27/20  0336 05/26/20  0330   WBC 8.3 7.6   HGB 10.7* 11.8   HCT 35.0 38.4    269     Recent Labs     05/28/20  1010 05/27/20  0336 05/26/20  0330    137 135*   K 3.7 3.8 3.6    103 102   CO2 27 29 27   BUN 11 11 16   CREA 1.00 1.13* 1.30*   GLU 98 88 109*   CA 8.4* 8.4* 8.5     Recent Labs     05/28/20  1010 05/27/20  0336 05/26/20  0330   ALT 58 62 69   AP 58 53 52   TBILI 0.3 0.2 0.2   TP 7.4 7.2 7.4   ALB 2.9* 3.1* 3.4*   GLOB 4.5* 4.1* 4.0     Recent Labs 05/28/20  1010 05/27/20  0336 05/26/20  0330   INR 1.0 1.0 0.9   PTP 9.9 9.9 9.8   APTT 30.9 31.3 33.3*      Recent Labs     05/28/20  1010 05/27/20  0336   FERR 786* 712*      No results found for: FOL, RBCF   No results for input(s): PH, PCO2, PO2 in the last 72 hours. No results for input(s): CPK, CKNDX, TROIQ in the last 72 hours.     No lab exists for component: CPKMB  Lab Results   Component Value Date/Time    Cholesterol, total 310 (H) 10/25/2018 02:15 PM    HDL Cholesterol 54 10/25/2018 02:15 PM    LDL, calculated 221 (H) 10/25/2018 02:15 PM    Triglyceride 173 (H) 10/25/2018 02:15 PM    CHOL/HDL Ratio 6.8 (H) 03/09/2012 09:10 AM     Lab Results   Component Value Date/Time    Glucose (POC) 130 (H) 05/27/2020 12:03 PM     Lab Results   Component Value Date/Time    Color Yellow 10/25/2018 02:15 PM    Appearance Cloudy (A) 10/25/2018 02:15 PM    Specific gravity 1.020 07/16/2015 07:58 PM    pH (UA) 5.5 10/25/2018 02:15 PM    Protein NEGATIVE  07/16/2015 07:58 PM    Glucose NEGATIVE  07/16/2015 07:58 PM    Ketone Negative 10/25/2018 02:15 PM    Bilirubin Negative 10/25/2018 02:15 PM    Urobilinogen 0.2 07/16/2015 07:58 PM    Nitrites Negative 10/25/2018 02:15 PM    Leukocyte Esterase 2+ (A) 10/25/2018 02:15 PM    Epithelial cells FEW 07/16/2015 07:58 PM    Bacteria Few 10/25/2018 02:15 PM    WBC >30 (A) 10/25/2018 02:15 PM    RBC 3-10 (A) 10/25/2018 02:15 PM         Medications Reviewed:     Current Facility-Administered Medications   Medication Dose Route Frequency    butalbital-acetaminophen-caffeine (FIORICET, ESGIC) -40 mg per tablet 1 Tab  1 Tab Oral Q12H PRN    pantoprazole (PROTONIX) 40 mg in 0.9% sodium chloride 10 mL injection  40 mg IntraVENous Q12H    remdesivir 100 mg in 0.9% sodium chloride 250 mL IVPB  100 mg IntraVENous Q24H    lidocaine 4 % patch 2 Patch  2 Patch TransDERmal Q24H    levothyroxine (SYNTHROID) tablet 250 mcg  250 mcg Oral 6am    allopurinoL (ZYLOPRIM) tablet 100 mg  100 mg Oral DAILY    acetaminophen (TYLENOL) tablet 650 mg  650 mg Oral Q6H PRN    Or    acetaminophen (TYLENOL) suppository 650 mg  650 mg Rectal Q6H PRN    fluticasone propionate (FLONASE) 50 mcg/actuation nasal spray 2 Spray  2 Spray Both Nostrils DAILY    albuterol (PROVENTIL HFA, VENTOLIN HFA, PROAIR HFA) inhaler 2 Puff  2 Puff Inhalation Q6H PRN    zinc sulfate (ZINCATE) 220 (50) mg capsule 1 Cap  1 Cap Oral DAILY    ascorbic acid (vitamin C) (VITAMIN C) tablet 500 mg  500 mg Oral BID    enoxaparin (LOVENOX) injection 40 mg  40 mg SubCUTAneous Q12H    azithromycin (ZITHROMAX) 500 mg in 0.9% sodium chloride (MBP/ADV) 250 mL  500 mg IntraVENous Q24H    guaiFENesin (ROBITUSSIN) 100 mg/5 mL oral liquid 100 mg  100 mg Oral Q4H PRN    sodium chloride (NS) flush 5-40 mL  5-40 mL IntraVENous Q8H    sodium chloride (NS) flush 5-40 mL  5-40 mL IntraVENous PRN    ondansetron (ZOFRAN) injection 4 mg  4 mg IntraVENous Q6H PRN     ______________________________________________________________________  EXPECTED LENGTH OF STAY: 5d 12h  ACTUAL LENGTH OF STAY:          3                 Dionne Smiley MD

## 2020-05-28 NOTE — PROGRESS NOTES
Transition of Care Plan    RUR  12%   COVID 19 positive 5/25/20    Patient on Bipap    Discharge plan TBD-- pending medical progress.  Cm to follow and assist with transition of care plan    Patient hopes to be discharged home once stable-- she lives alone in her own home  -working as CNA at [employer]    Patient DNR    Named her sister, Claire Lee, decision maker [phone]    Family will transport home

## 2020-05-28 NOTE — PROGRESS NOTES
Patient was taken off of BIPAP and placed on mid Flow NC 15 Liters. Patient saturations maintain 92%. Patient would desaturated to 88% when she began to mouth breathe returned to 92% when reminded to breath through nose. She does not meet criteria for JUSTIN therapy at this time. Will wean liter flow as tolerated.      Nurse aware of changes

## 2020-05-28 NOTE — PROGRESS NOTES
Problem: Airway Clearance - Ineffective  Goal: Achieve or maintain patent airway  Outcome: Progressing Towards Goal     Problem: Gas Exchange - Impaired  Goal: Absence of hypoxia  Outcome: Progressing Towards Goal     Problem: Breathing Pattern - Ineffective  Goal: Ability to achieve and maintain a regular respiratory rate  Outcome: Progressing Towards Goal     Problem:  Body Temperature -  Risk of, Imbalanced  Goal: Ability to maintain a body temperature within defined limits  Outcome: Progressing Towards Goal     Problem: Isolation Precautions - Risk of Spread of Infection  Goal: Prevent transmission of infectious organism to others  Outcome: Progressing Towards Goal     Problem: Airway Clearance - Ineffective  Goal: Achieve or maintain patent airway  Outcome: Progressing Towards Goal

## 2020-05-29 ENCOUNTER — APPOINTMENT (OUTPATIENT)
Dept: GENERAL RADIOLOGY | Age: 60
DRG: 137 | End: 2020-05-29
Attending: PHYSICIAN ASSISTANT
Payer: MEDICAID

## 2020-05-29 LAB
ALBUMIN SERPL-MCNC: 2.8 G/DL (ref 3.5–5)
ALBUMIN/GLOB SERPL: 0.8 {RATIO} (ref 1.1–2.2)
ALP SERPL-CCNC: 60 U/L (ref 45–117)
ALT SERPL-CCNC: 58 U/L (ref 12–78)
ANION GAP SERPL CALC-SCNC: 9 MMOL/L (ref 5–15)
APTT PPP: 31 SEC (ref 22.1–32)
AST SERPL-CCNC: 101 U/L (ref 15–37)
BASOPHILS # BLD: 0 K/UL (ref 0–0.1)
BASOPHILS NFR BLD: 0 % (ref 0–1)
BILIRUB DIRECT SERPL-MCNC: <0.1 MG/DL (ref 0–0.2)
BILIRUB SERPL-MCNC: 0.3 MG/DL (ref 0.2–1)
BNP SERPL-MCNC: 159 PG/ML
BUN SERPL-MCNC: 12 MG/DL (ref 6–20)
BUN/CREAT SERPL: 12 (ref 12–20)
CALCIUM SERPL-MCNC: 7.7 MG/DL (ref 8.5–10.1)
CHLORIDE SERPL-SCNC: 101 MMOL/L (ref 97–108)
CO2 SERPL-SCNC: 29 MMOL/L (ref 21–32)
CREAT SERPL-MCNC: 1.04 MG/DL (ref 0.55–1.02)
CRP SERPL-MCNC: 18.9 MG/DL (ref 0–0.6)
D DIMER PPP FEU-MCNC: 0.4 MG/L FEU (ref 0–0.65)
DIFFERENTIAL METHOD BLD: ABNORMAL
EOSINOPHIL # BLD: 0 K/UL (ref 0–0.4)
EOSINOPHIL NFR BLD: 0 % (ref 0–7)
ERYTHROCYTE [DISTWIDTH] IN BLOOD BY AUTOMATED COUNT: 15.3 % (ref 11.5–14.5)
FIBRINOGEN PPP-MCNC: 678 MG/DL (ref 200–475)
GLOBULIN SER CALC-MCNC: 3.7 G/DL (ref 2–4)
GLUCOSE SERPL-MCNC: 96 MG/DL (ref 65–100)
HCT VFR BLD AUTO: 32.5 % (ref 35–47)
HGB BLD-MCNC: 10.2 G/DL (ref 11.5–16)
IMM GRANULOCYTES # BLD AUTO: 0.4 K/UL (ref 0–0.04)
IMM GRANULOCYTES NFR BLD AUTO: 4 % (ref 0–0.5)
INR PPP: 1 (ref 0.9–1.1)
LDH SERPL L TO P-CCNC: 709 U/L (ref 81–246)
LYMPHOCYTES # BLD: 1.4 K/UL (ref 0.8–3.5)
LYMPHOCYTES NFR BLD: 13 % (ref 12–49)
MCH RBC QN AUTO: 29.3 PG (ref 26–34)
MCHC RBC AUTO-ENTMCNC: 31.4 G/DL (ref 30–36.5)
MCV RBC AUTO: 93.4 FL (ref 80–99)
MONOCYTES # BLD: 0.2 K/UL (ref 0–1)
MONOCYTES NFR BLD: 2 % (ref 5–13)
NEUTS SEG # BLD: 8.4 K/UL (ref 1.8–8)
NEUTS SEG NFR BLD: 81 % (ref 32–75)
NRBC # BLD: 0 K/UL (ref 0–0.01)
NRBC BLD-RTO: 0 PER 100 WBC
PLATELET # BLD AUTO: 442 K/UL (ref 150–400)
PMV BLD AUTO: 10.3 FL (ref 8.9–12.9)
POTASSIUM SERPL-SCNC: 4 MMOL/L (ref 3.5–5.1)
PROCALCITONIN SERPL-MCNC: 0.15 NG/ML
PROT SERPL-MCNC: 6.5 G/DL (ref 6.4–8.2)
PROTHROMBIN TIME: 10.3 SEC (ref 9–11.1)
RBC # BLD AUTO: 3.48 M/UL (ref 3.8–5.2)
RBC MORPH BLD: ABNORMAL
RBC MORPH BLD: ABNORMAL
SODIUM SERPL-SCNC: 139 MMOL/L (ref 136–145)
THERAPEUTIC RANGE,PTTT: NORMAL SECS (ref 58–77)
WBC # BLD AUTO: 10.4 K/UL (ref 3.6–11)

## 2020-05-29 PROCEDURE — 84145 PROCALCITONIN (PCT): CPT

## 2020-05-29 PROCEDURE — 65660000000 HC RM CCU STEPDOWN

## 2020-05-29 PROCEDURE — 74011250637 HC RX REV CODE- 250/637: Performed by: INTERNAL MEDICINE

## 2020-05-29 PROCEDURE — 86140 C-REACTIVE PROTEIN: CPT

## 2020-05-29 PROCEDURE — 74011000250 HC RX REV CODE- 250: Performed by: FAMILY MEDICINE

## 2020-05-29 PROCEDURE — 94760 N-INVAS EAR/PLS OXIMETRY 1: CPT

## 2020-05-29 PROCEDURE — 74011000250 HC RX REV CODE- 250: Performed by: INTERNAL MEDICINE

## 2020-05-29 PROCEDURE — 85730 THROMBOPLASTIN TIME PARTIAL: CPT

## 2020-05-29 PROCEDURE — 74011250637 HC RX REV CODE- 250/637: Performed by: NURSE PRACTITIONER

## 2020-05-29 PROCEDURE — C9113 INJ PANTOPRAZOLE SODIUM, VIA: HCPCS | Performed by: INTERNAL MEDICINE

## 2020-05-29 PROCEDURE — 83880 ASSAY OF NATRIURETIC PEPTIDE: CPT

## 2020-05-29 PROCEDURE — 74011250636 HC RX REV CODE- 250/636: Performed by: HOSPITALIST

## 2020-05-29 PROCEDURE — 71045 X-RAY EXAM CHEST 1 VIEW: CPT

## 2020-05-29 PROCEDURE — 74011250636 HC RX REV CODE- 250/636: Performed by: PHYSICIAN ASSISTANT

## 2020-05-29 PROCEDURE — 83615 LACTATE (LD) (LDH) ENZYME: CPT

## 2020-05-29 PROCEDURE — 65660000001 HC RM ICU INTERMED STEPDOWN

## 2020-05-29 PROCEDURE — 85384 FIBRINOGEN ACTIVITY: CPT

## 2020-05-29 PROCEDURE — 74011250637 HC RX REV CODE- 250/637: Performed by: HOSPITALIST

## 2020-05-29 PROCEDURE — 80048 BASIC METABOLIC PNL TOTAL CA: CPT

## 2020-05-29 PROCEDURE — 85610 PROTHROMBIN TIME: CPT

## 2020-05-29 PROCEDURE — 74011000258 HC RX REV CODE- 258: Performed by: PHYSICIAN ASSISTANT

## 2020-05-29 PROCEDURE — 74011250636 HC RX REV CODE- 250/636: Performed by: INTERNAL MEDICINE

## 2020-05-29 PROCEDURE — 74011000250 HC RX REV CODE- 250: Performed by: NURSE PRACTITIONER

## 2020-05-29 PROCEDURE — 80076 HEPATIC FUNCTION PANEL: CPT

## 2020-05-29 PROCEDURE — 77010033711 HC HIGH FLOW OXYGEN

## 2020-05-29 PROCEDURE — 74011250637 HC RX REV CODE- 250/637: Performed by: FAMILY MEDICINE

## 2020-05-29 PROCEDURE — 94660 CPAP INITIATION&MGMT: CPT

## 2020-05-29 PROCEDURE — 74011000258 HC RX REV CODE- 258: Performed by: INTERNAL MEDICINE

## 2020-05-29 PROCEDURE — 36415 COLL VENOUS BLD VENIPUNCTURE: CPT

## 2020-05-29 PROCEDURE — 85379 FIBRIN DEGRADATION QUANT: CPT

## 2020-05-29 PROCEDURE — 85025 COMPLETE CBC W/AUTO DIFF WBC: CPT

## 2020-05-29 RX ORDER — LIDOCAINE 4 G/100G
1 PATCH TOPICAL EVERY 24 HOURS
Status: DISCONTINUED | OUTPATIENT
Start: 2020-05-30 | End: 2020-06-09 | Stop reason: HOSPADM

## 2020-05-29 RX ORDER — DIPHENHYDRAMINE HCL 25 MG
25 CAPSULE ORAL
Status: DISCONTINUED | OUTPATIENT
Start: 2020-05-29 | End: 2020-06-09 | Stop reason: HOSPADM

## 2020-05-29 RX ORDER — POLYETHYLENE GLYCOL 3350 17 G/17G
17 POWDER, FOR SOLUTION ORAL DAILY
Status: DISCONTINUED | OUTPATIENT
Start: 2020-05-29 | End: 2020-06-09 | Stop reason: HOSPADM

## 2020-05-29 RX ORDER — LIDOCAINE 4 G/100G
1 PATCH TOPICAL EVERY 24 HOURS
Status: DISCONTINUED | OUTPATIENT
Start: 2020-05-29 | End: 2020-06-09 | Stop reason: HOSPADM

## 2020-05-29 RX ADMIN — ENOXAPARIN SODIUM 40 MG: 40 INJECTION SUBCUTANEOUS at 13:57

## 2020-05-29 RX ADMIN — SODIUM CHLORIDE 40 MG: 9 INJECTION INTRAMUSCULAR; INTRAVENOUS; SUBCUTANEOUS at 21:53

## 2020-05-29 RX ADMIN — SALINE NASAL SPRAY 2 SPRAY: 1.5 SOLUTION NASAL at 18:34

## 2020-05-29 RX ADMIN — BUTALBITAL, ACETAMINOPHEN, AND CAFFEINE 1 TABLET: 50; 325; 40 TABLET ORAL at 16:45

## 2020-05-29 RX ADMIN — FLUTICASONE PROPIONATE 2 SPRAY: 50 SPRAY, METERED NASAL at 08:07

## 2020-05-29 RX ADMIN — Medication 500 MG: at 18:34

## 2020-05-29 RX ADMIN — Medication 10 ML: at 21:54

## 2020-05-29 RX ADMIN — REMDESIVIR 100 MG: 5 INJECTION INTRAVENOUS at 13:09

## 2020-05-29 RX ADMIN — POLYETHYLENE GLYCOL 3350 17 G: 17 POWDER, FOR SOLUTION ORAL at 16:20

## 2020-05-29 RX ADMIN — TOCILIZUMAB 400 MG: 20 INJECTION, SOLUTION, CONCENTRATE INTRAVENOUS at 11:23

## 2020-05-29 RX ADMIN — AZITHROMYCIN 500 MG: 500 INJECTION, POWDER, LYOPHILIZED, FOR SOLUTION INTRAVENOUS at 16:51

## 2020-05-29 RX ADMIN — ONDANSETRON 4 MG: 2 INJECTION INTRAMUSCULAR; INTRAVENOUS at 07:58

## 2020-05-29 RX ADMIN — METHYLPREDNISOLONE SODIUM SUCCINATE 60 MG: 40 INJECTION, POWDER, FOR SOLUTION INTRAMUSCULAR; INTRAVENOUS at 11:23

## 2020-05-29 RX ADMIN — LEVOTHYROXINE SODIUM 250 MCG: 125 TABLET ORAL at 05:41

## 2020-05-29 RX ADMIN — GUAIFENESIN 100 MG: 200 SOLUTION ORAL at 01:35

## 2020-05-29 RX ADMIN — METHYLPREDNISOLONE SODIUM SUCCINATE 60 MG: 40 INJECTION, POWDER, FOR SOLUTION INTRAMUSCULAR; INTRAVENOUS at 18:34

## 2020-05-29 RX ADMIN — ENOXAPARIN SODIUM 40 MG: 40 INJECTION SUBCUTANEOUS at 01:35

## 2020-05-29 RX ADMIN — DOCUSATE SODIUM 100 MG: 100 CAPSULE, LIQUID FILLED ORAL at 01:35

## 2020-05-29 RX ADMIN — Medication 10 ML: at 13:57

## 2020-05-29 RX ADMIN — METHYLPREDNISOLONE SODIUM SUCCINATE 60 MG: 40 INJECTION, POWDER, FOR SOLUTION INTRAMUSCULAR; INTRAVENOUS at 23:57

## 2020-05-29 RX ADMIN — SALINE NASAL SPRAY 2 SPRAY: 1.5 SOLUTION NASAL at 21:53

## 2020-05-29 RX ADMIN — Medication 10 ML: at 05:42

## 2020-05-29 RX ADMIN — SODIUM CHLORIDE 40 MG: 9 INJECTION INTRAMUSCULAR; INTRAVENOUS; SUBCUTANEOUS at 08:02

## 2020-05-29 NOTE — PROGRESS NOTES
[facility]  Hospitalist Group                                                                                          Hospitalist Progress Note  Tan Swain MD  Answering service: [phone] from in house phone        Date of Service:  2020  NAME:  Hiro Gallegos  :  1960  MRN:  [id number]      Admission Summary:     Hiro Gallegos is a 64 y.o. female who presents with COVID pos+ from [address] [place] [address]. Pt said she had a test 2 days back and came back positive. She became SOB and went to ED.  Patient states that for the last 3 days she has not felt well.  She has had some body aches and nausea and fatigue.  She is also had cough.  Over the last 24 hours she started to have shortness of breath.  She comes in for evaluation of these complaints and concern for coronavirus given her workplace exposure.  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 Interval history / Subjective:       Patient still with some shortness of breath.   Oxygen increased to high flow     Assessment & Plan:     Acute respiratory failure  -Acute hypoxic respiratory failure secondary to viral pneumonia  -COVID 19 positive as outpatient  -Pulmonology following  -Currently on high flow oxygen, at 55 L  -on pulsed nitric oxide therapy    COVID 19 infection  -Droplet plus precaution  -Continue vitamin C and azithromycin  -Anticoagulation per protocol  -Started Remdesivir , on pulsed nitric oxide therapy  -Started Actemra  -ID and pulmonology following    Hypothyroidism  -TSH elevated  -Continue Synthroid  -Follow TSH level as outpatient    Obesity  -Patient with body mass index of 37.66 kg/m²  -Outpatient follow-up for weight management    Dehydration  -Monitor renal function    Abnormal LFT's   -Likely from infectious process  -Monitor liver function  -consider USG as out pt     Code status: FULL   DVT prophylaxis: Lovenox    Care Plan discussed with: Patient/Family and Nurse  Anticipated Disposition: TBD  Anticipated Discharge: Greater than 48 hours     Tried to get in touch with patient's sister Edward Malloy for the past 2 days and left message. Hospital Problems  Date Reviewed: 11/13/2019          Codes Class Noted POA    Hypoxia ICD-10-CM: R09.02  ICD-9-CM: 799.02  5/25/2020 Unknown        COVID-19 ICD-10-CM: U07.1  ICD-9-CM: 079.89  5/25/2020 Unknown                Review of Systems:   A comprehensive review of systems was negative except for that written in the HPI. Vital Signs:    Last 24hrs VS reviewed since prior progress note. Most recent are:  Visit Vitals  /45 (BP 1 Location: Right arm)   Pulse 91   Temp 98.9 °F (37.2 °C)   Resp 30   Ht 5' 7.01\" (1.702 m)   Wt 109.4 kg (241 lb 1.6 oz)   SpO2 90%   BMI 37.75 kg/m²       No intake or output data in the 24 hours ending 05/29/20 1506     Physical Examination:             Constitutional:  No acute distress, cooperative, pleasant    ENT:  Oral mucosa moist, oropharynx benign. Resp:  CTA bilaterally. No wheezing/rhonchi/rales. No accessory muscle use   CV:  Regular rhythm, normal rate, no murmurs, gallops, rubs    GI:  Soft, non distended, non tender. normoactive bowel sounds, no hepatosplenomegaly     Musculoskeletal:  No edema, warm, 2+ pulses throughout    Neurologic:  Moves all extremities.   AAOx3, CN II-XII reviewed     Skin:  Good turgor, no rashes or ulcers       Data Review:    Review and/or order of clinical lab test      Labs:     Recent Labs     05/29/20  0629 05/27/20  0336   WBC 10.4 8.3   HGB 10.2* 10.7*   HCT 32.5* 35.0   * 298     Recent Labs     05/29/20  0629 05/28/20  1010 05/27/20  0336    138 137   K 4.0 3.7 3.8    104 103   CO2 29 27 29   BUN 12 11 11   CREA 1.04* 1.00 1.13*   GLU 96 98 88   CA 7.7* 8.4* 8.4*     Recent Labs     05/28/20  1010 05/27/20  0336   ALT 58 62   AP 58 53   TBILI 0.3 0.2   TP 7.4 7.2   ALB 2.9* 3.1*   GLOB 4.5* 4.1* Recent Labs     05/29/20  0631 05/28/20  1010 05/27/20  0336   INR 1.0 1.0 1.0   PTP 10.3 9.9 9.9   APTT 31.0 30.9 31.3      Recent Labs     05/28/20  1010 05/27/20  0336   FERR 786* 712*      No results found for: FOL, RBCF   No results for input(s): PH, PCO2, PO2 in the last 72 hours. No results for input(s): CPK, CKNDX, TROIQ in the last 72 hours.     No lab exists for component: CPKMB  Lab Results   Component Value Date/Time    Cholesterol, total 310 (H) 10/25/2018 02:15 PM    HDL Cholesterol 54 10/25/2018 02:15 PM    LDL, calculated 221 (H) 10/25/2018 02:15 PM    Triglyceride 173 (H) 10/25/2018 02:15 PM    CHOL/HDL Ratio 6.8 (H) 03/09/2012 09:10 AM     Lab Results   Component Value Date/Time    Glucose (POC) 130 (H) 05/27/2020 12:03 PM     Lab Results   Component Value Date/Time    Color Yellow 10/25/2018 02:15 PM    Appearance Cloudy (A) 10/25/2018 02:15 PM    Specific gravity 1.020 07/16/2015 07:58 PM    pH (UA) 5.5 10/25/2018 02:15 PM    Protein NEGATIVE  07/16/2015 07:58 PM    Glucose NEGATIVE  07/16/2015 07:58 PM    Ketone Negative 10/25/2018 02:15 PM    Bilirubin Negative 10/25/2018 02:15 PM    Urobilinogen 0.2 07/16/2015 07:58 PM    Nitrites Negative 10/25/2018 02:15 PM    Leukocyte Esterase 2+ (A) 10/25/2018 02:15 PM    Epithelial cells FEW 07/16/2015 07:58 PM    Bacteria Few 10/25/2018 02:15 PM    WBC >30 (A) 10/25/2018 02:15 PM    RBC 3-10 (A) 10/25/2018 02:15 PM         Medications Reviewed:     Current Facility-Administered Medications   Medication Dose Route Frequency    methylPREDNISolone (PF) (SOLU-MEDROL) injection 60 mg  60 mg IntraVENous Q6H    diphenhydrAMINE (BENADRYL) capsule 25 mg  25 mg Oral Q6H PRN    polyethylene glycol (MIRALAX) packet 17 g  17 g Oral DAILY    docusate sodium (COLACE) capsule 100 mg  100 mg Oral DAILY    butalbital-acetaminophen-caffeine (FIORICET, ESGIC) -40 mg per tablet 1 Tab  1 Tab Oral Q12H PRN    pantoprazole (PROTONIX) 40 mg in 0.9% sodium chloride 10 mL injection  40 mg IntraVENous Q12H    remdesivir 100 mg in 0.9% sodium chloride 250 mL IVPB  100 mg IntraVENous Q24H    lidocaine 4 % patch 2 Patch  2 Patch TransDERmal Q24H    levothyroxine (SYNTHROID) tablet 250 mcg  250 mcg Oral 6am    allopurinoL (ZYLOPRIM) tablet 100 mg  100 mg Oral DAILY    acetaminophen (TYLENOL) tablet 650 mg  650 mg Oral Q6H PRN    Or    acetaminophen (TYLENOL) suppository 650 mg  650 mg Rectal Q6H PRN    fluticasone propionate (FLONASE) 50 mcg/actuation nasal spray 2 Spray  2 Spray Both Nostrils DAILY    albuterol (PROVENTIL HFA, VENTOLIN HFA, PROAIR HFA) inhaler 2 Puff  2 Puff Inhalation Q6H PRN    zinc sulfate (ZINCATE) 220 (50) mg capsule 1 Cap  1 Cap Oral DAILY    ascorbic acid (vitamin C) (VITAMIN C) tablet 500 mg  500 mg Oral BID    enoxaparin (LOVENOX) injection 40 mg  40 mg SubCUTAneous Q12H    azithromycin (ZITHROMAX) 500 mg in 0.9% sodium chloride (MBP/ADV) 250 mL  500 mg IntraVENous Q24H    guaiFENesin (ROBITUSSIN) 100 mg/5 mL oral liquid 100 mg  100 mg Oral Q4H PRN    sodium chloride (NS) flush 5-40 mL  5-40 mL IntraVENous Q8H    sodium chloride (NS) flush 5-40 mL  5-40 mL IntraVENous PRN    ondansetron (ZOFRAN) injection 4 mg  4 mg IntraVENous Q6H PRN     ______________________________________________________________________  EXPECTED LENGTH OF STAY: 5d 12h  ACTUAL LENGTH OF STAY:          4                 Garrison Matthews MD

## 2020-05-29 NOTE — PROGRESS NOTES
Infectious Disease Progress Note        HPI:    Ms. Lilly Shoulder seen  Says she feels sick to her stomach when she moves  breathing better per her  Not coughing much   Not had a bowel movement yet                 Physical Exam:    Vitals:   Patient Vitals for the past 24 hrs:   Temp Pulse Resp BP SpO2   05/29/20 1119 98.9 °F (37.2 °C) 91 30 110/45 90 %   05/29/20 0819 -- -- -- -- 94 %   05/29/20 0800 98.4 °F (36.9 °C) 82 30 116/82 91 %   05/29/20 0405 -- -- -- -- 94 %   05/29/20 0335 97.9 °F (36.6 °C) 79 (!) 36 133/71 94 %   05/29/20 0115 -- -- -- -- 97 %   05/28/20 2249 98.6 °F (37 °C) 80 (!) 34 108/65 97 %   05/28/20 1844 98.7 °F (37.1 °C) 82 17 94/51 93 %   05/28/20 1612 98.5 °F (36.9 °C) 87 26 138/73 (!) 81 %   05/28/20 1542 98.5 °F (36.9 °C) 81 28 92/56 93 %     · GEN: NAD  · HEENT: no scleral icterus,  No thrush  · CV: S1, S2 heard regularly,   · Lungs: Diminished breath sounds  · Abdomen: soft,  distended, non tender,  no CVA tenderness   · Genitourinary: no murphy  · Extremities: no edema  · Neuro: Alert, oriented to self, and situation ,  moves all extremities to commands, verbal   · Skin: no rash        Labs:   Recent Results (from the past 24 hour(s))   C REACTIVE PROTEIN, QT    Collection Time: 05/29/20  6:29 AM   Result Value Ref Range    C-Reactive protein 18.90 (H) 0.00 - 0.60 mg/dL   LD    Collection Time: 05/29/20  6:29 AM   Result Value Ref Range     (H) 81 - 246 U/L   NT-PRO BNP    Collection Time: 05/29/20  6:29 AM   Result Value Ref Range    NT pro- (H) <125 PG/ML   PROCALCITONIN    Collection Time: 05/29/20  6:29 AM   Result Value Ref Range    Procalcitonin 3.81 ng/mL   METABOLIC PANEL, BASIC    Collection Time: 05/29/20  6:29 AM   Result Value Ref Range    Sodium 139 136 - 145 mmol/L    Potassium 4.0 3.5 - 5.1 mmol/L    Chloride 101 97 - 108 mmol/L    CO2 29 21 - 32 mmol/L    Anion gap 9 5 - 15 mmol/L    Glucose 96 65 - 100 mg/dL    BUN 12 6 - 20 MG/DL    Creatinine 1.04 (H) 0.55 - 1.02 MG/DL    BUN/Creatinine ratio 12 12 - 20      GFR est AA >60 >60 ml/min/1.73m2    GFR est non-AA 55 (L) >60 ml/min/1.73m2    Calcium 7.7 (L) 8.5 - 10.1 MG/DL   CBC WITH AUTOMATED DIFF    Collection Time: 05/29/20  6:29 AM   Result Value Ref Range    WBC 10.4 3.6 - 11.0 K/uL    RBC 3.48 (L) 3.80 - 5.20 M/uL    HGB 10.2 (L) 11.5 - 16.0 g/dL    HCT 32.5 (L) 35.0 - 47.0 %    MCV 93.4 80.0 - 99.0 FL    MCH 29.3 26.0 - 34.0 PG    MCHC 31.4 30.0 - 36.5 g/dL    RDW 15.3 (H) 11.5 - 14.5 %    PLATELET 247 (H) 311 - 400 K/uL    MPV 10.3 8.9 - 12.9 FL    NRBC 0.0 0  WBC    ABSOLUTE NRBC 0.00 0.00 - 0.01 K/uL    NEUTROPHILS 81 (H) 32 - 75 %    LYMPHOCYTES 13 12 - 49 %    MONOCYTES 2 (L) 5 - 13 %    EOSINOPHILS 0 0 - 7 %    BASOPHILS 0 0 - 1 %    IMMATURE GRANULOCYTES 4 (H) 0.0 - 0.5 %    ABS. NEUTROPHILS 8.4 (H) 1.8 - 8.0 K/UL    ABS. LYMPHOCYTES 1.4 0.8 - 3.5 K/UL    ABS. MONOCYTES 0.2 0.0 - 1.0 K/UL    ABS. EOSINOPHILS 0.0 0.0 - 0.4 K/UL    ABS. BASOPHILS 0.0 0.0 - 0.1 K/UL    ABS. IMM.  GRANS. 0.4 (H) 0.00 - 0.04 K/UL    DF SMEAR SCANNED      RBC COMMENTS ANISOCYTOSIS  1+        RBC COMMENTS TARGET CELLS  RARE       PROTHROMBIN TIME + INR    Collection Time: 05/29/20  6:31 AM   Result Value Ref Range    INR 1.0 0.9 - 1.1      Prothrombin time 10.3 9.0 - 11.1 sec   PTT    Collection Time: 05/29/20  6:31 AM   Result Value Ref Range    aPTT 31.0 22.1 - 32.0 sec    aPTT, therapeutic range     58.0 - 77.0 SECS   FIBRINOGEN    Collection Time: 05/29/20  6:31 AM   Result Value Ref Range    Fibrinogen 678 (H) 200 - 475 mg/dL   D DIMER    Collection Time: 05/29/20  6:31 AM   Result Value Ref Range    D-dimer 0.40 0.00 - 0.65 mg/L FEU       IL 6 pending  Component Value Flag Ref Range Units Status   CRP, High sensitivity >9.5          , 642    Ferritin 532            Microbiology Data:    sputum       Component Value Ref Range & Units Status   Special Requests: NO SPECIAL REQUESTS    Preliminary   GRAM STAIN FEW WBCS SEEN   Preliminary   GRAM STAIN    Preliminary   OCCASIONAL EPITHELIAL CELLS SEEN    GRAM STAIN    Preliminary   RARE GRAM POSITIVE COCCI IN CLUSTERS    Culture result:    Preliminary   MODERATE NORMAL RESPIRATORY SAMUEL SO FAR    Result History       Component Value Ref Range & Units Status   Special Requests: NO SPECIAL REQUESTS    Final   [place] Count [id number]   COLONIES/mL    Final   Culture result:    Final   MIXED UROGENITAL SAMUEL ISOLATED        Component Value Flag Ref Range Units Status   Specimen source Nasopharyngeal      Final   SARS-CoV-2 Detected  Abnormal   NOTD   Final   Comment:     TB Quantiferon 10/25/18  Component Value Ref Range & Units Status   QuantiFERON Incubation Incubation performed. Final   QuantiFERON Criteria Comment   Final   The QuantiFERON-TB Gold Plus result is determined by subtracting   the Nil value from either TB antigen (Ag) tube. The mitogen tube   serves as a control for the test.    QuantiFERON TB1 Ag 0.19  IU/mL Final   QuantiFERON TB2 Ag 0.21  IU/mL Final   QuantiFERON Nil Value 0.13  IU/mL Final   QuantiFERON Mitogen Value >10.00  IU/mL Final   QuantiFERON Plus Negative  Negative Final   Narrative               Pathology Results:  B/L mastectomy 2016, seromas  2015 R breast mastectomy invasive ductal carcinoma, ductal carcinoma in situ  2 intrammammary lymph nodes negative for tumor     Imaging:     Portable AP view of the chest is obtained, comparison to 2013. Inspiration is  very shallow limiting the examination. Interstitial infiltrates not excluded.     IMPRESSION  impression: Limited by shallow inspiration, cannot exclude interstitial  infiltrates bilaterally.         Assessment / Plan:     Problem List as of 5/29/2020 Date Reviewed: 11/13/2019          Codes Class Noted - Resolved    Hypoxia ICD-10-CM: R09.02  ICD-9-CM: 799.02  5/25/2020 - Present        COVID-19 ICD-10-CM: U07.1  ICD-9-CM: 079.89  5/25/2020 - Present        Severe obesity ([facility] [address] ICD-10-CM: E66.01  ICD-9-CM: 278.01  11/11/2019 - Present        Costochondritis ICD-10-CM: M94.0  ICD-9-CM: 733.6  10/9/2018 - Present        BMI 33.0-33.9,adult ICD-10-CM: F65.48  ICD-9-CM: V85.33  9/14/2018 - Present        Chest wall pain following surgery ICD-10-CM: R07.89, G89.18  ICD-9-CM: 786.59, 338.18  2/9/2018 - Present        Chest wall pain, chronic ICD-10-CM: R07.89, G89.29  ICD-9-CM: 786.52, 338.29  2/9/2018 - Present        H/O mastectomy, bilateral ICD-10-CM: Z90.13  ICD-9-CM: V45.71  2/9/2018 - Present        Vitamin D deficiency ICD-10-CM: E55.9  ICD-9-CM: 268.9  2/21/2017 - Present        Lymphedema ICD-10-CM: I89.0  ICD-9-CM: 457.1  11/9/2016 - Present        Hypothyroidism due to acquired atrophy of thyroid ICD-10-CM: E03.4  ICD-9-CM: 244.8, 246.8  5/14/2015 - Present        HLD (hyperlipidemia) ICD-10-CM: E78.5  ICD-9-CM: 272.4  5/14/2015 - Present        History of breast cancer ICD-10-CM: Z85.3  ICD-9-CM: V10.3  6/18/2013 - Present        Chronic headaches ICD-10-CM: R51  ICD-9-CM: 784.0  6/18/2013 - Present        Family history of cancer ICD-10-CM: Z80.9  ICD-9-CM: V16.9  6/18/2013 - Present        Skin moles ICD-10-CM: D22.9  ICD-9-CM: 216.9  6/18/2013 - Present        RESOLVED: Routine general medical examination at a health care facility ICD-10-CM: Z00.00  ICD-9-CM: V70.0  6/18/2013 - 9/30/2019             Ms. Kami Agrawal is a 70-year-old lady history of breast cancer status post radiation therapy in 2013 with recurrence and status post mastectomy in 2015, hypothyroidism, arthritis with SARS-CoV-2 infection/pneumonia. 1) Sars COV 2 infection, pneumonia, hypoxia   Started Remdesivir 5/27/20.  Denied any symptoms of infusion reaction   Discussed potential side effects, liver, kidney issues that we will need to monitor for    Monitor Qtc closely as on azithromycin ( stop after 5 days) ( stop 5/29/20)  Procal < 0.05  On vitamin c , zinc  On Sloane per pulm   Started on Remdesivir 5/28/20  Actemra being given by pulm , last quantiferon in 2018 noted negative  Recommend checking hiv, hep c for screening once consent obtained if not done in past   Monitor cbc , cmp daily         2) Hx of Breast cancer   S/p mastectomy, hx of radiation therapy     3) hypothyroidsm     4) Arthritis hx     5) DVT px       D/W Dr Joy Farnks, and Nursing staff     Thank for the opportunity to participate in the care of this patient. Please contact with questions or concerns.        Kim Jovel,    2:09 PM

## 2020-05-29 NOTE — PROGRESS NOTES
Pulmonary, Critical Care, and Sleep Medicine~Progress Note    Name: Sunil Jenkins MRN: [id number]   : 1960 Hospital: [facility]    Date: 2020 10:42 AM Admission: 2020     Impression Plan   1. Acute hypoxic resp failure   2. COVID19 PNA   3. Obesity   4. Hx of total mastectomy  1. High risk of decline; for which is in   2. Started pulsed Sloane on ; now on hold secondary to NIV/ highflow. If she can come off today then we will need to restart it today. 3. lovenox   4. IL6 is 91, start on actemra today, one dose; ID had no objections   5. If there is a clinical response can consider repeat dose 12 hours later  6. Start steroids   7. azithromax completing   8. Vit c/zinc   9. On Remdesivir now   10. Discussed with pharm, ID, and attending      Daily Progression: On 55lpm and 100% fiO2; sats in low 90s and upper 80s   Looks fatigued   D dimer is 0.4  Inflammatory markers are up     I have reviewed the labs and previous days notes. Pertinent items are noted in HPI. OBJECTIVE:     Vital Signs:       Visit Vitals  /82 (BP 1 Location: Right arm, BP Patient Position: At rest)   Pulse 82   Temp 98.4 °F (36.9 °C)   Resp 30   Ht 5' 7.01\" (1.702 m)   Wt 109.4 kg (241 lb 1.6 oz)   SpO2 94%   BMI 37.75 kg/m²      Temp (24hrs), Av.5 °F (36.9 °C), Min:97.9 °F (36.6 °C), Max:98.7 °F (37.1 °C)     Intake/Output:     Last shift: No intake/output data recorded.     Last 3 shifts:  1901 -  0700  In: 175.8 [I.V.:175.8]  Out: -         No intake or output data in the 24 hours ending 20 1030    Physical Exam:                                        Exam Findings Other   General: No resp distress noted, appears stated age    HEENT:  No ulcers, JVD not elevated, no cervical LAD    Chest: No pectus deformity, normal chest rise b/l    HEART:  Deferred     Lungs:  Deferred     ABD: Soft/NT, non rigid mildly distended    EXT: No cyanosis/clubbing/edema, normal peripheral pulses    Skin: No rashes or ulcers, no mottling    Neuro: A/O x 3        Medications:  Current Facility-Administered Medications   Medication Dose Route Frequency    tocilizumab (ACTEMRA) 400 mg in 0.9% sodium chloride 100 mL infusion  400 mg IntraVENous ONCE    methylPREDNISolone (PF) (SOLU-MEDROL) injection 60 mg  60 mg IntraVENous Q6H    diphenhydrAMINE (BENADRYL) capsule 25 mg  25 mg Oral Q6H PRN    docusate sodium (COLACE) capsule 100 mg  100 mg Oral DAILY    butalbital-acetaminophen-caffeine (FIORICET, ESGIC) -40 mg per tablet 1 Tab  1 Tab Oral Q12H PRN    pantoprazole (PROTONIX) 40 mg in 0.9% sodium chloride 10 mL injection  40 mg IntraVENous Q12H    remdesivir 100 mg in 0.9% sodium chloride 250 mL IVPB  100 mg IntraVENous Q24H    lidocaine 4 % patch 2 Patch  2 Patch TransDERmal Q24H    levothyroxine (SYNTHROID) tablet 250 mcg  250 mcg Oral 6am    allopurinoL (ZYLOPRIM) tablet 100 mg  100 mg Oral DAILY    acetaminophen (TYLENOL) tablet 650 mg  650 mg Oral Q6H PRN    Or    acetaminophen (TYLENOL) suppository 650 mg  650 mg Rectal Q6H PRN    fluticasone propionate (FLONASE) 50 mcg/actuation nasal spray 2 Spray  2 Spray Both Nostrils DAILY    albuterol (PROVENTIL HFA, VENTOLIN HFA, PROAIR HFA) inhaler 2 Puff  2 Puff Inhalation Q6H PRN    zinc sulfate (ZINCATE) 220 (50) mg capsule 1 Cap  1 Cap Oral DAILY    ascorbic acid (vitamin C) (VITAMIN C) tablet 500 mg  500 mg Oral BID    enoxaparin (LOVENOX) injection 40 mg  40 mg SubCUTAneous Q12H    azithromycin (ZITHROMAX) 500 mg in 0.9% sodium chloride (MBP/ADV) 250 mL  500 mg IntraVENous Q24H    guaiFENesin (ROBITUSSIN) 100 mg/5 mL oral liquid 100 mg  100 mg Oral Q4H PRN    sodium chloride (NS) flush 5-40 mL  5-40 mL IntraVENous Q8H    sodium chloride (NS) flush 5-40 mL  5-40 mL IntraVENous PRN    ondansetron (ZOFRAN) injection 4 mg  4 mg IntraVENous Q6H PRN       Labs:  ABG Recent Labs     05/27/20  1523 05/27/20  1147 05/27/20  1133   PHI 7.374 7.393 7.432   PCO2I 49.8* 45.4* 42.1   PO2I 79* 44* 40*   HCO3I 29.1* 27.7* 28.0*   SO2I 95 79* 77*   FIO2I 100 44 44        CBC Recent Labs     05/29/20  0629 05/27/20  0336   WBC 10.4 8.3   HGB 10.2* 10.7*   HCT 32.5* 35.0   * 298   MCV 93.4 95.4   MCH 29.3 18.3        Metabolic  Panel Recent Labs     05/29/20  0631 05/29/20  0629 05/28/20  1010 05/27/20  0336   NA  --  139 138 137   K  --  4.0 3.7 3.8   CL  --  101 104 103   CO2  --  29 27 29   GLU  --  96 98 88   BUN  --  12 11 11   CREA  --  1.04* 1.00 1.13*   CA  --  7.7* 8.4* 8.4*   ALB  --   --  2.9* 3.1*   ALT  --   --  58 62   INR 1.0  --  1.0 1.0        Pertinent Labs                Toi Hernandez PA-C  5/29/2020

## 2020-05-29 NOTE — PROGRESS NOTES
2115  Pt O2 Sats at 77%. I turned FIO2 to 100%. Only able to sats up to 85%. Placed on nonrebreather. O2 Sats increased to 90%. Respiratory in rapid at the moment. Will contact respiratory afte3r rapid. 2120  Respiratory at the bedside. 2155  Spoke to respiratory due to pt still being 91% on HFNC plus nonrebreather. Respiratory recommended that she go on BiPAP for the night. Spoke with Hospitalist, Sj Mcconnell, order being placed now. 2005  Called respiratory to place pt on BiPap.     0600  Attempted to get pt's lab and was unsuccessful. Lesly Boo stated she would attempt.

## 2020-05-29 NOTE — PROGRESS NOTES
Bedside shift change report given to Saji Hall (oncoming nurse) by Kerin Mortimer, RN (offgoing nurse). Report included the following information SBAR, Kardex, Intake/Output, MAR, Accordion and Recent Results. Problem: Airway Clearance - Ineffective  Goal: Achieve or maintain patent airway  Outcome: Progressing Towards Goal     Problem: Gas Exchange - Impaired  Goal: Absence of hypoxia  Outcome: Progressing Towards Goal     Problem: Falls - Risk of  Goal: *Absence of Falls  Description: Document Allan Fall Risk and appropriate interventions in the flowsheet.   Outcome: Progressing Towards Goal  Note: Fall Risk Interventions:  Mobility Interventions: Communicate number of staff needed for ambulation/transfer, Patient to call before getting OOB, Utilize walker, cane, or other assistive device, Strengthening exercises (ROM-active/passive)         Medication Interventions: Patient to call before getting OOB, Teach patient to arise slowly    Elimination Interventions: Call light in reach, Patient to call for help with toileting needs, Toileting schedule/hourly rounds

## 2020-05-30 LAB
ALBUMIN SERPL-MCNC: 2.5 G/DL (ref 3.5–5)
ALBUMIN/GLOB SERPL: 0.5 {RATIO} (ref 1.1–2.2)
ALP SERPL-CCNC: 70 U/L (ref 45–117)
ALT SERPL-CCNC: 58 U/L (ref 12–78)
ANION GAP SERPL CALC-SCNC: 5 MMOL/L (ref 5–15)
APTT PPP: 29.7 SEC (ref 22.1–32)
AST SERPL-CCNC: 94 U/L (ref 15–37)
BASOPHILS # BLD: 0 K/UL (ref 0–0.1)
BASOPHILS NFR BLD: 0 % (ref 0–1)
BILIRUB SERPL-MCNC: 0.3 MG/DL (ref 0.2–1)
BUN SERPL-MCNC: 13 MG/DL (ref 6–20)
BUN/CREAT SERPL: 13 (ref 12–20)
CALCIUM SERPL-MCNC: 7.9 MG/DL (ref 8.5–10.1)
CHLORIDE SERPL-SCNC: 103 MMOL/L (ref 97–108)
CO2 SERPL-SCNC: 29 MMOL/L (ref 21–32)
CREAT SERPL-MCNC: 1.03 MG/DL (ref 0.55–1.02)
D DIMER PPP FEU-MCNC: 0.46 MG/L FEU (ref 0–0.65)
D DIMER PPP FEU-MCNC: 0.52 MG/L FEU (ref 0–0.65)
DIFFERENTIAL METHOD BLD: ABNORMAL
EOSINOPHIL # BLD: 0 K/UL (ref 0–0.4)
EOSINOPHIL NFR BLD: 0 % (ref 0–7)
ERYTHROCYTE [DISTWIDTH] IN BLOOD BY AUTOMATED COUNT: 15.2 % (ref 11.5–14.5)
FIBRINOGEN PPP-MCNC: 747 MG/DL (ref 200–475)
FIBRINOGEN PPP-MCNC: 778 MG/DL (ref 200–475)
GLOBULIN SER CALC-MCNC: 5 G/DL (ref 2–4)
GLUCOSE SERPL-MCNC: 146 MG/DL (ref 65–100)
HCT VFR BLD AUTO: 33.7 % (ref 35–47)
HCV AB SERPL QL IA: NONREACTIVE
HCV COMMENT,HCGAC: NORMAL
HGB BLD-MCNC: 10.5 G/DL (ref 11.5–16)
IMM GRANULOCYTES # BLD AUTO: 0 K/UL
IMM GRANULOCYTES NFR BLD AUTO: 0 %
INR PPP: 1 (ref 0.9–1.1)
LDH SERPL L TO P-CCNC: 932 U/L (ref 81–246)
LYMPHOCYTES # BLD: 0.8 K/UL (ref 0.8–3.5)
LYMPHOCYTES NFR BLD: 8 % (ref 12–49)
MCH RBC QN AUTO: 29.2 PG (ref 26–34)
MCHC RBC AUTO-ENTMCNC: 31.2 G/DL (ref 30–36.5)
MCV RBC AUTO: 93.6 FL (ref 80–99)
MONOCYTES # BLD: 0.3 K/UL (ref 0–1)
MONOCYTES NFR BLD: 3 % (ref 5–13)
NEUTS BAND NFR BLD MANUAL: 4 % (ref 0–6)
NEUTS SEG # BLD: 9.3 K/UL (ref 1.8–8)
NEUTS SEG NFR BLD: 85 % (ref 32–75)
NRBC # BLD: 0.03 K/UL (ref 0–0.01)
NRBC BLD-RTO: 0.3 PER 100 WBC
PLATELET # BLD AUTO: 551 K/UL (ref 150–400)
PMV BLD AUTO: 10.2 FL (ref 8.9–12.9)
POTASSIUM SERPL-SCNC: 4.3 MMOL/L (ref 3.5–5.1)
PROT SERPL-MCNC: 7.5 G/DL (ref 6.4–8.2)
PROTHROMBIN TIME: 10.4 SEC (ref 9–11.1)
RBC # BLD AUTO: 3.6 M/UL (ref 3.8–5.2)
RBC MORPH BLD: ABNORMAL
SODIUM SERPL-SCNC: 137 MMOL/L (ref 136–145)
THERAPEUTIC RANGE,PTTT: NORMAL SECS (ref 58–77)
WBC # BLD AUTO: 10.4 K/UL (ref 3.6–11)

## 2020-05-30 PROCEDURE — 85384 FIBRINOGEN ACTIVITY: CPT

## 2020-05-30 PROCEDURE — 74011000250 HC RX REV CODE- 250: Performed by: INTERNAL MEDICINE

## 2020-05-30 PROCEDURE — 85379 FIBRIN DEGRADATION QUANT: CPT

## 2020-05-30 PROCEDURE — C9113 INJ PANTOPRAZOLE SODIUM, VIA: HCPCS | Performed by: INTERNAL MEDICINE

## 2020-05-30 PROCEDURE — 85610 PROTHROMBIN TIME: CPT

## 2020-05-30 PROCEDURE — 74011250636 HC RX REV CODE- 250/636: Performed by: PHYSICIAN ASSISTANT

## 2020-05-30 PROCEDURE — 74011000258 HC RX REV CODE- 258: Performed by: INTERNAL MEDICINE

## 2020-05-30 PROCEDURE — 74011250636 HC RX REV CODE- 250/636: Performed by: INTERNAL MEDICINE

## 2020-05-30 PROCEDURE — 74011250637 HC RX REV CODE- 250/637: Performed by: NURSE PRACTITIONER

## 2020-05-30 PROCEDURE — 83615 LACTATE (LD) (LDH) ENZYME: CPT

## 2020-05-30 PROCEDURE — 77030038269 HC DRN EXT URIN PURWCK BARD -A

## 2020-05-30 PROCEDURE — 85730 THROMBOPLASTIN TIME PARTIAL: CPT

## 2020-05-30 PROCEDURE — 85025 COMPLETE CBC W/AUTO DIFF WBC: CPT

## 2020-05-30 PROCEDURE — 74011250636 HC RX REV CODE- 250/636: Performed by: HOSPITALIST

## 2020-05-30 PROCEDURE — 36415 COLL VENOUS BLD VENIPUNCTURE: CPT

## 2020-05-30 PROCEDURE — 86803 HEPATITIS C AB TEST: CPT

## 2020-05-30 PROCEDURE — 74011000250 HC RX REV CODE- 250: Performed by: FAMILY MEDICINE

## 2020-05-30 PROCEDURE — 74011250637 HC RX REV CODE- 250/637: Performed by: HOSPITALIST

## 2020-05-30 PROCEDURE — 74011250637 HC RX REV CODE- 250/637: Performed by: INTERNAL MEDICINE

## 2020-05-30 PROCEDURE — 74011250637 HC RX REV CODE- 250/637: Performed by: FAMILY MEDICINE

## 2020-05-30 PROCEDURE — 65660000001 HC RM ICU INTERMED STEPDOWN

## 2020-05-30 PROCEDURE — 80053 COMPREHEN METABOLIC PANEL: CPT

## 2020-05-30 PROCEDURE — 94660 CPAP INITIATION&MGMT: CPT

## 2020-05-30 RX ADMIN — SALINE NASAL SPRAY 2 SPRAY: 1.5 SOLUTION NASAL at 08:29

## 2020-05-30 RX ADMIN — SODIUM CHLORIDE 40 MG: 9 INJECTION INTRAMUSCULAR; INTRAVENOUS; SUBCUTANEOUS at 08:28

## 2020-05-30 RX ADMIN — METHYLPREDNISOLONE SODIUM SUCCINATE 60 MG: 40 INJECTION, POWDER, FOR SOLUTION INTRAMUSCULAR; INTRAVENOUS at 12:21

## 2020-05-30 RX ADMIN — ZINC SULFATE 220 MG (50 MG) CAPSULE 1 CAPSULE: CAPSULE at 08:28

## 2020-05-30 RX ADMIN — REMDESIVIR 100 MG: 5 INJECTION INTRAVENOUS at 12:21

## 2020-05-30 RX ADMIN — Medication 500 MG: at 08:28

## 2020-05-30 RX ADMIN — METHYLPREDNISOLONE SODIUM SUCCINATE 60 MG: 40 INJECTION, POWDER, FOR SOLUTION INTRAMUSCULAR; INTRAVENOUS at 18:23

## 2020-05-30 RX ADMIN — Medication 10 ML: at 12:21

## 2020-05-30 RX ADMIN — Medication 10 ML: at 06:57

## 2020-05-30 RX ADMIN — LEVOTHYROXINE SODIUM 250 MCG: 125 TABLET ORAL at 06:57

## 2020-05-30 RX ADMIN — DOCUSATE SODIUM 100 MG: 100 CAPSULE, LIQUID FILLED ORAL at 08:28

## 2020-05-30 RX ADMIN — METHYLPREDNISOLONE SODIUM SUCCINATE 60 MG: 40 INJECTION, POWDER, FOR SOLUTION INTRAMUSCULAR; INTRAVENOUS at 06:56

## 2020-05-30 RX ADMIN — FLUTICASONE PROPIONATE 2 SPRAY: 50 SPRAY, METERED NASAL at 08:30

## 2020-05-30 RX ADMIN — ENOXAPARIN SODIUM 40 MG: 40 INJECTION SUBCUTANEOUS at 18:25

## 2020-05-30 RX ADMIN — SODIUM CHLORIDE 40 MG: 9 INJECTION INTRAMUSCULAR; INTRAVENOUS; SUBCUTANEOUS at 21:41

## 2020-05-30 RX ADMIN — Medication 10 ML: at 21:41

## 2020-05-30 RX ADMIN — ALLOPURINOL 100 MG: 100 TABLET ORAL at 08:28

## 2020-05-30 RX ADMIN — BUTALBITAL, ACETAMINOPHEN, AND CAFFEINE 1 TABLET: 50; 325; 40 TABLET ORAL at 07:11

## 2020-05-30 RX ADMIN — POLYETHYLENE GLYCOL 3350 17 G: 17 POWDER, FOR SOLUTION ORAL at 08:28

## 2020-05-30 RX ADMIN — Medication 500 MG: at 18:30

## 2020-05-30 RX ADMIN — ENOXAPARIN SODIUM 40 MG: 40 INJECTION SUBCUTANEOUS at 03:23

## 2020-05-30 NOTE — PROGRESS NOTES
0000- pt desat to 60% on 55L/100% HFNC. Pt at first refused bipap, but eventually allowed it to help recover. Pt stated she had been repositioning herself in bed when her oxygen dropped low. Spo2 now 95% on bipap, but will decrease to 89-90 when pt is moving. RR 39. Problem: Gas Exchange - Impaired  Goal: Absence of hypoxia  Outcome: Progressing Towards Goal  Note: Pt's spo2 will decrease to 85% when patient is talking, but while at rest, spo2 remains WDL     Problem: Breathing Pattern - Ineffective  Goal: Ability to achieve and maintain a regular respiratory rate  Outcome: Progressing Towards Goal  Note: RR ranges from 17-25 while pt is at rest.     Problem: Isolation Precautions - Risk of Spread of Infection  Goal: Prevent transmission of infectious organism to others  Outcome: Progressing Towards Goal  Note: Droplet plus isolation precautions maintained. Airborne precautions continued, as pt is still on high flow oxygen. Problem: Nutrition Deficits  Goal: Optimize nutrtional status  Outcome: Progressing Towards Goal  Note: Encouraged oral intake. Pt declined snacks.

## 2020-05-30 NOTE — PROGRESS NOTES
[facility] Adult  Hospitalist Group                                                                                          Hospitalist Progress Note  Guille Hines MD  Answering service: [phone] from in house phone        Date of Service:  2020  NAME:  Deandra Maya  :  1960  MRN:  [id number]      Admission Summary:     Deandra Maya is a 64 y.o. female who presents with COVID pos+ from [address] [place] [address]. Pt said she had a test 2 days back and came back positive. She became SOB and went to ED.  Patient states that for the last 3 days she has not felt well.  She has had some body aches and nausea and fatigue.  She is also had cough.  Over the last 24 hours she started to have shortness of breath.  She comes in for evaluation of these complaints and concern for coronavirus given her workplace exposure.  She works at a facility in [employer] where there is apparently several patients with confirmed coronavirus. Interval history / Subjective:       Patient still with some shortness of breath. Oxygen increased to high flow     Assessment & Plan:     Acute respiratory failure  -Acute hypoxic respiratory failure secondary to viral pneumonia  -COVID 19 positive as outpatient  -Pulmonology following  -Currently on high flow oxygen, at 55 L    COVID 19 infection  -Droplet plus precaution  -Continue vitamin C and azithromycin  -Anticoagulation per protocol  -Started Remdesivir   -Actemra one dose   -On steroid  -ID and pulmonology following    Hypothyroidism  -TSH elevated  -Continue Synthroid  -Follow TSH level as outpatient    Obesity  -Patient with body mass index of 37.66 kg/m²  -Outpatient follow-up for weight management    Dehydration  -Monitor renal function    Abnormal LFT's   -Likely from infectious process  -Monitor liver function  -consider USG as out pt     Code status: We discussed CODE STATUS with the patient.   She is okay with intubation but no other resuscitative efforts. DVT prophylaxis: Lovenox    Care Plan discussed with: Patient/Family and Nurse  Anticipated Disposition: TBD  Anticipated Discharge: Greater than 48 hours     Tried to get in touch with patient's sister Horace Orozco for the past 2 days and left message. Hospital Problems  Date Reviewed: 11/13/2019          Codes Class Noted POA    Hypoxia ICD-10-CM: R09.02  ICD-9-CM: 799.02  5/25/2020 Unknown        COVID-19 ICD-10-CM: U07.1  ICD-9-CM: 079.89  5/25/2020 Unknown                Review of Systems:   A comprehensive review of systems was negative except for that written in the HPI. Vital Signs:    Last 24hrs VS reviewed since prior progress note. Most recent are:  Visit Vitals  /76 (BP 1 Location: Left arm, BP Patient Position: At rest)   Pulse 79   Temp 98.8 °F (37.1 °C)   Resp 30   Ht 5' 7.01\" (1.702 m)   Wt 109.8 kg (242 lb)   SpO2 99%   BMI 37.89 kg/m²         Intake/Output Summary (Last 24 hours) at 5/30/2020 1524  Last data filed at 5/30/2020 0349  Gross per 24 hour   Intake 0 ml   Output 250 ml   Net -250 ml        Physical Examination:             Constitutional:  No acute distress, cooperative, pleasant    ENT:  Oral mucosa moist, oropharynx benign. Resp:  CTA bilaterally. No wheezing/rhonchi/rales. No accessory muscle use   CV:  Regular rhythm, normal rate, no murmurs, gallops, rubs    GI:  Soft, non distended, non tender. normoactive bowel sounds, no hepatosplenomegaly     Musculoskeletal:  No edema, warm, 2+ pulses throughout    Neurologic:  Moves all extremities.   AAOx3, CN II-XII reviewed     Skin:  Good turgor, no rashes or ulcers       Data Review:    Review and/or order of clinical lab test      Labs:     Recent Labs     05/30/20  0344 05/29/20  0629   WBC 10.4 10.4   HGB 10.5* 10.2*   HCT 33.7* 32.5*   * 442*     Recent Labs     05/30/20  0344 05/29/20  0629 05/28/20  1010    139 138   K 4.3 4.0 3.7    101 104   CO2 29 29 27   BUN 13 12 11   CREA 1.03* 1.04* 1.00   * 96 98   CA 7.9* 7.7* 8.4*     Recent Labs     05/30/20  0344 05/29/20  0629 05/28/20  1010   ALT 58 58 58   AP 70 60 58   TBILI 0.3 0.3 0.3   TP 7.5 6.5 7.4   ALB 2.5* 2.8* 2.9*   GLOB 5.0* 3.7 4.5*     Recent Labs     05/30/20  0344 05/29/20  0631 05/28/20  1010   INR 1.0 1.0 1.0   PTP 10.4 10.3 9.9   APTT 29.7 31.0 30.9      Recent Labs     05/28/20  1010   FERR 786*      No results found for: FOL, RBCF   No results for input(s): PH, PCO2, PO2 in the last 72 hours. No results for input(s): CPK, CKNDX, TROIQ in the last 72 hours.     No lab exists for component: CPKMB  Lab Results   Component Value Date/Time    Cholesterol, total 310 (H) 10/25/2018 02:15 PM    HDL Cholesterol 54 10/25/2018 02:15 PM    LDL, calculated 221 (H) 10/25/2018 02:15 PM    Triglyceride 173 (H) 10/25/2018 02:15 PM    CHOL/HDL Ratio 6.8 (H) 03/09/2012 09:10 AM     Lab Results   Component Value Date/Time    Glucose (POC) 130 (H) 05/27/2020 12:03 PM     Lab Results   Component Value Date/Time    Color Yellow 10/25/2018 02:15 PM    Appearance Cloudy (A) 10/25/2018 02:15 PM    Specific gravity 1.020 07/16/2015 07:58 PM    pH (UA) 5.5 10/25/2018 02:15 PM    Protein NEGATIVE  07/16/2015 07:58 PM    Glucose NEGATIVE  07/16/2015 07:58 PM    Ketone Negative 10/25/2018 02:15 PM    Bilirubin Negative 10/25/2018 02:15 PM    Urobilinogen 0.2 07/16/2015 07:58 PM    Nitrites Negative 10/25/2018 02:15 PM    Leukocyte Esterase 2+ (A) 10/25/2018 02:15 PM    Epithelial cells FEW 07/16/2015 07:58 PM    Bacteria Few 10/25/2018 02:15 PM    WBC >30 (A) 10/25/2018 02:15 PM    RBC 3-10 (A) 10/25/2018 02:15 PM         Medications Reviewed:     Current Facility-Administered Medications   Medication Dose Route Frequency    methylPREDNISolone (PF) (SOLU-MEDROL) injection 60 mg  60 mg IntraVENous Q6H    diphenhydrAMINE (BENADRYL) capsule 25 mg  25 mg Oral Q6H PRN    polyethylene glycol (MIRALAX) packet 17 g  17 g Oral DAILY    sodium chloride (OCEAN) 0.65 % nasal squeeze bottle 2 Spray  2 Spray Both Nostrils Q2H PRN    lidocaine 4 % patch 1 Patch  1 Patch TransDERmal Q24H    lidocaine 4 % patch 1 Patch  1 Patch TransDERmal Q24H    docusate sodium (COLACE) capsule 100 mg  100 mg Oral DAILY    butalbital-acetaminophen-caffeine (FIORICET, ESGIC) -40 mg per tablet 1 Tab  1 Tab Oral Q12H PRN    pantoprazole (PROTONIX) 40 mg in 0.9% sodium chloride 10 mL injection  40 mg IntraVENous Q12H    remdesivir 100 mg in 0.9% sodium chloride 250 mL IVPB  100 mg IntraVENous Q24H    levothyroxine (SYNTHROID) tablet 250 mcg  250 mcg Oral 6am    allopurinoL (ZYLOPRIM) tablet 100 mg  100 mg Oral DAILY    acetaminophen (TYLENOL) tablet 650 mg  650 mg Oral Q6H PRN    Or    acetaminophen (TYLENOL) suppository 650 mg  650 mg Rectal Q6H PRN    fluticasone propionate (FLONASE) 50 mcg/actuation nasal spray 2 Spray  2 Spray Both Nostrils DAILY    albuterol (PROVENTIL HFA, VENTOLIN HFA, PROAIR HFA) inhaler 2 Puff  2 Puff Inhalation Q6H PRN    zinc sulfate (ZINCATE) 220 (50) mg capsule 1 Cap  1 Cap Oral DAILY    ascorbic acid (vitamin C) (VITAMIN C) tablet 500 mg  500 mg Oral BID    enoxaparin (LOVENOX) injection 40 mg  40 mg SubCUTAneous Q12H    guaiFENesin (ROBITUSSIN) 100 mg/5 mL oral liquid 100 mg  100 mg Oral Q4H PRN    sodium chloride (NS) flush 5-40 mL  5-40 mL IntraVENous Q8H    sodium chloride (NS) flush 5-40 mL  5-40 mL IntraVENous PRN    ondansetron (ZOFRAN) injection 4 mg  4 mg IntraVENous Q6H PRN     ______________________________________________________________________  EXPECTED LENGTH OF STAY: 5d 12h  ACTUAL LENGTH OF STAY:          5                 Dionne Smiley MD

## 2020-05-30 NOTE — PROGRESS NOTES
Infectious Disease Progress Note        HPI:    Ms. Lilly Shoulder seen earlier  Says stomach upset is better  Says feels slightly better  denied cough   Myalgias better   Denied fever, chills , nausea, vomiting ,             Physical Exam:    Vitals:   Patient Vitals for the past 24 hrs:   Temp Pulse Resp BP SpO2   05/30/20 0842 -- -- -- -- 94 %   05/30/20 0837 -- -- -- -- 95 %   05/30/20 0706 98.9 °F (37.2 °C) 78 22 138/77 96 %   05/30/20 0348 99 °F (37.2 °C) 72 24 131/72 97 %   05/30/20 0005 99.4 °F (37.4 °C) 78 (!) 36 119/70 96 %   05/29/20 1937 98.9 °F (37.2 °C) 87 24 126/83 90 %   05/29/20 1617 98.7 °F (37.1 °C) 83 27 100/64 90 %   05/29/20 1119 98.9 °F (37.2 °C) 91 30 110/45 90 %     · GEN: NAD  · HEENT: no scleral icterus,  No thrush  · CV: S1, S2 heard regularly,   · Lungs: Diminished breath sounds  · Abdomen: soft,  distended, non tender,  no CVA tenderness   · Genitourinary: no murphy  · Extremities: no edema  · Skin: no rash        Labs:   Recent Results (from the past 24 hour(s))   PROTHROMBIN TIME + INR    Collection Time: 05/30/20  3:44 AM   Result Value Ref Range    INR 1.0 0.9 - 1.1      Prothrombin time 10.4 9.0 - 11.1 sec   PTT    Collection Time: 05/30/20  3:44 AM   Result Value Ref Range    aPTT 29.7 22.1 - 32.0 sec    aPTT, therapeutic range     58.0 - 77.0 SECS   FIBRINOGEN    Collection Time: 05/30/20  3:44 AM   Result Value Ref Range    Fibrinogen 747 (H) 200 - 475 mg/dL   D DIMER    Collection Time: 05/30/20  3:44 AM   Result Value Ref Range    D-dimer 0.46 0.00 - 0.65 mg/L FEU   METABOLIC PANEL, COMPREHENSIVE    Collection Time: 05/30/20  3:44 AM   Result Value Ref Range    Sodium 137 136 - 145 mmol/L    Potassium 4.3 3.5 - 5.1 mmol/L    Chloride 103 97 - 108 mmol/L    CO2 29 21 - 32 mmol/L    Anion gap 5 5 - 15 mmol/L    Glucose 146 (H) 65 - 100 mg/dL    BUN 13 6 - 20 MG/DL    Creatinine 1.03 (H) 0.55 - 1.02 MG/DL    BUN/Creatinine ratio 13 12 - 20      GFR est AA >60 >60 ml/min/1.73m2    GFR est non-AA 55 (L) >60 ml/min/1.73m2    Calcium 7.9 (L) 8.5 - 10.1 MG/DL    Bilirubin, total 0.3 0.2 - 1.0 MG/DL    ALT (SGPT) 58 12 - 78 U/L    AST (SGOT) 94 (H) 15 - 37 U/L    Alk. phosphatase 70 45 - 117 U/L    Protein, total 7.5 6.4 - 8.2 g/dL    Albumin 2.5 (L) 3.5 - 5.0 g/dL    Globulin 5.0 (H) 2.0 - 4.0 g/dL    A-G Ratio 0.5 (L) 1.1 - 2.2     CBC WITH AUTOMATED DIFF    Collection Time: 05/30/20  3:44 AM   Result Value Ref Range    WBC 10.4 3.6 - 11.0 K/uL    RBC 3.60 (L) 3.80 - 5.20 M/uL    HGB 10.5 (L) 11.5 - 16.0 g/dL    HCT 33.7 (L) 35.0 - 47.0 %    MCV 93.6 80.0 - 99.0 FL    MCH 29.2 26.0 - 34.0 PG    MCHC 31.2 30.0 - 36.5 g/dL    RDW 15.2 (H) 11.5 - 14.5 %    PLATELET 329 (H) 680 - 400 K/uL    MPV 10.2 8.9 - 12.9 FL    NRBC 0.3 (H) 0  WBC    ABSOLUTE NRBC 0.03 (H) 0.00 - 0.01 K/uL    NEUTROPHILS 85 (H) 32 - 75 %    BAND NEUTROPHILS 4 0 - 6 %    LYMPHOCYTES 8 (L) 12 - 49 %    MONOCYTES 3 (L) 5 - 13 %    EOSINOPHILS 0 0 - 7 %    BASOPHILS 0 0 - 1 %    IMMATURE GRANULOCYTES 0 %    ABS. NEUTROPHILS 9.3 (H) 1.8 - 8.0 K/UL    ABS. LYMPHOCYTES 0.8 0.8 - 3.5 K/UL    ABS. MONOCYTES 0.3 0.0 - 1.0 K/UL    ABS. EOSINOPHILS 0.0 0.0 - 0.4 K/UL    ABS. BASOPHILS 0.0 0.0 - 0.1 K/UL    ABS. IMM.  GRANS. 0.0 K/UL    DF MANUAL      RBC COMMENTS ANISOCYTOSIS  1+       LD    Collection Time: 05/30/20  3:44 AM   Result Value Ref Range     (H) 81 - 246 U/L       IL 6 pending  Component Value Flag Ref Range Units Status   CRP, High sensitivity >9.5          , 642    Ferritin 532            Microbiology Data:    sputum       Component Value Ref Range & Units Status   Special Requests: NO SPECIAL REQUESTS    Preliminary   GRAM STAIN FEW WBCS SEEN    Preliminary   GRAM STAIN    Preliminary   OCCASIONAL EPITHELIAL CELLS SEEN    GRAM STAIN    Preliminary   RARE GRAM POSITIVE COCCI IN CLUSTERS    Culture result:    Preliminary   MODERATE NORMAL RESPIRATORY SAMUEL SO FAR    Result History       Component Value Ref Range & Units Status   Special Requests: NO SPECIAL REQUESTS    Final   [place] Count 05779   COLONIES/mL    Final   Culture result:    Final   MIXED UROGENITAL SAMUEL ISOLATED        Component Value Flag Ref Range Units Status   Specimen source Nasopharyngeal      Final   SARS-CoV-2 Detected  Abnormal   NOTD   Final   Comment:     TB Quantiferon 10/25/18  Component Value Ref Range & Units Status   QuantiFERON Incubation Incubation performed. Final   QuantiFERON Criteria Comment   Final   The QuantiFERON-TB Gold Plus result is determined by subtracting   the Nil value from either TB antigen (Ag) tube. The mitogen tube   serves as a control for the test.    QuantiFERON TB1 Ag 0.19  IU/mL Final   QuantiFERON TB2 Ag 0.21  IU/mL Final   QuantiFERON Nil Value 0.13  IU/mL Final   QuantiFERON Mitogen Value >10.00  IU/mL Final   QuantiFERON Plus Negative  Negative Final   Narrative               Pathology Results:  B/L mastectomy 2016, seromas  2015 R breast mastectomy invasive ductal carcinoma, ductal carcinoma in situ  2 intrammammary lymph nodes negative for tumor     Imaging:     Portable AP view of the chest is obtained, comparison to 2013. Inspiration is  very shallow limiting the examination. Interstitial infiltrates not excluded.     IMPRESSION  impression: Limited by shallow inspiration, cannot exclude interstitial  infiltrates bilaterally.         Assessment / Plan:     Problem List as of 5/30/2020 Date Reviewed: 11/13/2019          Codes Class Noted - Resolved    Hypoxia ICD-10-CM: R09.02  ICD-9-CM: 799.02  5/25/2020 - Present        COVID-19 ICD-10-CM: U07.1  ICD-9-CM: 079.89  5/25/2020 - Present        Se[facility] ([facility] ICD-10-CM: E66.01  ICD-9-CM: 278.01  11/11/2019 - Present        Costochondritis ICD-10-CM: M94.0  ICD-9-CM: 733.6  10/9/2018 - Present        BMI 33.0-33.9,adult ICD-10-CM: I30.35  ICD-9-CM: V85.33  9/14/2018 - Present        Chest wall pain following surgery ICD-10-CM: R07.89, G89.18  ICD-9-CM: 786.59, 338.18  2/9/2018 - Present        Chest wall pain, chronic ICD-10-CM: R07.89, G89.29  ICD-9-CM: 786.52, 338.29  2/9/2018 - Present        H/O mastectomy, bilateral ICD-10-CM: Z90.13  ICD-9-CM: V45.71  2/9/2018 - Present        Vitamin D deficiency ICD-10-CM: E55.9  ICD-9-CM: 268.9  2/21/2017 - Present        Lymphedema ICD-10-CM: I89.0  ICD-9-CM: 457.1  11/9/2016 - Present        Hypothyroidism due to acquired atrophy of thyroid ICD-10-CM: E03.4  ICD-9-CM: 244.8, 246.8  5/14/2015 - Present        HLD (hyperlipidemia) ICD-10-CM: E78.5  ICD-9-CM: 272.4  5/14/2015 - Present        History of breast cancer ICD-10-CM: Z85.3  ICD-9-CM: V10.3  6/18/2013 - Present        Chronic headaches ICD-10-CM: R51  ICD-9-CM: 784.0  6/18/2013 - Present        Family history of cancer ICD-10-CM: Z80.9  ICD-9-CM: V16.9  6/18/2013 - Present        Skin moles ICD-10-CM: D22.9  ICD-9-CM: 216.9  6/18/2013 - Present        RESOLVED: Routine general medical examination at a health care facility ICD-10-CM: Z00.00  ICD-9-CM: V70.0  6/18/2013 - 9/30/2019             Ms. Guerline Gamboa is a 79-year-old lady history of breast cancer status post radiation therapy in 2013 with recurrence and status post mastectomy in 2015, hypothyroidism, arthritis with SARS-CoV-2 infection/pneumonia. 1) Sars COV 2 infection, pneumonia, hypoxia   Started Remdesivir 5/27/20.  Denied any symptoms of infusion reaction   Discussed potential side effects, liver, kidney issues that we will need to monitor for    Procal < 0.05  On vitamin c , zinc  Completed Zithromax   Started on Remdesivir 5/28/20  Actemra and steroids started by pulm 5/29 , last quantiferon in 2018 noted negative  Recommend checking hiv, hep c for screening once consent obtained if not done in past   Monitor cbc , cmp daily         2) Hx of Breast cancer   S/p mastectomy, hx of radiation therapy     3) hypothyroidsm     4) Arthritis hx     5) DVT px         Thank for the opportunity to participate in the care of this patient. Please contact with questions or concerns.        Kat Burnette DO   9:49 AM

## 2020-05-31 LAB
ALBUMIN SERPL-MCNC: 2.5 G/DL (ref 3.5–5)
ALBUMIN/GLOB SERPL: 0.5 {RATIO} (ref 1.1–2.2)
ALP SERPL-CCNC: 81 U/L (ref 45–117)
ALT SERPL-CCNC: 52 U/L (ref 12–78)
ANION GAP SERPL CALC-SCNC: 6 MMOL/L (ref 5–15)
APTT PPP: 27.6 SEC (ref 22.1–32)
AST SERPL-CCNC: 69 U/L (ref 15–37)
BILIRUB SERPL-MCNC: 0.2 MG/DL (ref 0.2–1)
BUN SERPL-MCNC: 20 MG/DL (ref 6–20)
BUN/CREAT SERPL: 19 (ref 12–20)
CALCIUM SERPL-MCNC: 7.7 MG/DL (ref 8.5–10.1)
CHLORIDE SERPL-SCNC: 103 MMOL/L (ref 97–108)
CO2 SERPL-SCNC: 29 MMOL/L (ref 21–32)
CREAT SERPL-MCNC: 1.04 MG/DL (ref 0.55–1.02)
CRP SERPL-MCNC: 10.7 MG/DL (ref 0–0.6)
D DIMER PPP FEU-MCNC: 0.62 MG/L FEU (ref 0–0.65)
FIBRINOGEN PPP-MCNC: 632 MG/DL (ref 200–475)
GLOBULIN SER CALC-MCNC: 4.6 G/DL (ref 2–4)
GLUCOSE SERPL-MCNC: 166 MG/DL (ref 65–100)
INR PPP: 1.1 (ref 0.9–1.1)
LDH SERPL L TO P-CCNC: 926 U/L (ref 81–246)
POTASSIUM SERPL-SCNC: 3.6 MMOL/L (ref 3.5–5.1)
PROT SERPL-MCNC: 7.1 G/DL (ref 6.4–8.2)
PROTHROMBIN TIME: 10.9 SEC (ref 9–11.1)
SODIUM SERPL-SCNC: 138 MMOL/L (ref 136–145)
THERAPEUTIC RANGE,PTTT: NORMAL SECS (ref 58–77)

## 2020-05-31 PROCEDURE — 80053 COMPREHEN METABOLIC PANEL: CPT

## 2020-05-31 PROCEDURE — 85610 PROTHROMBIN TIME: CPT

## 2020-05-31 PROCEDURE — 86140 C-REACTIVE PROTEIN: CPT

## 2020-05-31 PROCEDURE — 74011000250 HC RX REV CODE- 250: Performed by: FAMILY MEDICINE

## 2020-05-31 PROCEDURE — 74011250636 HC RX REV CODE- 250/636: Performed by: PHYSICIAN ASSISTANT

## 2020-05-31 PROCEDURE — 74011000258 HC RX REV CODE- 258: Performed by: INTERNAL MEDICINE

## 2020-05-31 PROCEDURE — 74011250637 HC RX REV CODE- 250/637: Performed by: NURSE PRACTITIONER

## 2020-05-31 PROCEDURE — 74011250637 HC RX REV CODE- 250/637: Performed by: HOSPITALIST

## 2020-05-31 PROCEDURE — 74011250637 HC RX REV CODE- 250/637: Performed by: FAMILY MEDICINE

## 2020-05-31 PROCEDURE — 74011250636 HC RX REV CODE- 250/636: Performed by: INTERNAL MEDICINE

## 2020-05-31 PROCEDURE — 85730 THROMBOPLASTIN TIME PARTIAL: CPT

## 2020-05-31 PROCEDURE — 74011250636 HC RX REV CODE- 250/636: Performed by: HOSPITALIST

## 2020-05-31 PROCEDURE — 83615 LACTATE (LD) (LDH) ENZYME: CPT

## 2020-05-31 PROCEDURE — 36415 COLL VENOUS BLD VENIPUNCTURE: CPT

## 2020-05-31 PROCEDURE — 94660 CPAP INITIATION&MGMT: CPT

## 2020-05-31 PROCEDURE — 65660000001 HC RM ICU INTERMED STEPDOWN

## 2020-05-31 PROCEDURE — 85379 FIBRIN DEGRADATION QUANT: CPT

## 2020-05-31 PROCEDURE — C9113 INJ PANTOPRAZOLE SODIUM, VIA: HCPCS | Performed by: INTERNAL MEDICINE

## 2020-05-31 PROCEDURE — 74011000250 HC RX REV CODE- 250: Performed by: INTERNAL MEDICINE

## 2020-05-31 PROCEDURE — 85384 FIBRINOGEN ACTIVITY: CPT

## 2020-05-31 RX ORDER — FUROSEMIDE 10 MG/ML
40 INJECTION INTRAMUSCULAR; INTRAVENOUS ONCE
Status: COMPLETED | OUTPATIENT
Start: 2020-05-31 | End: 2020-05-31

## 2020-05-31 RX ADMIN — DOCUSATE SODIUM 100 MG: 100 CAPSULE, LIQUID FILLED ORAL at 08:32

## 2020-05-31 RX ADMIN — Medication 10 ML: at 21:10

## 2020-05-31 RX ADMIN — ALLOPURINOL 100 MG: 100 TABLET ORAL at 08:32

## 2020-05-31 RX ADMIN — FUROSEMIDE 40 MG: 10 INJECTION, SOLUTION INTRAMUSCULAR; INTRAVENOUS at 12:15

## 2020-05-31 RX ADMIN — ENOXAPARIN SODIUM 40 MG: 40 INJECTION SUBCUTANEOUS at 18:12

## 2020-05-31 RX ADMIN — METHYLPREDNISOLONE SODIUM SUCCINATE 60 MG: 40 INJECTION, POWDER, FOR SOLUTION INTRAMUSCULAR; INTRAVENOUS at 23:39

## 2020-05-31 RX ADMIN — REMDESIVIR 100 MG: 5 INJECTION INTRAVENOUS at 12:15

## 2020-05-31 RX ADMIN — METHYLPREDNISOLONE SODIUM SUCCINATE 60 MG: 40 INJECTION, POWDER, FOR SOLUTION INTRAMUSCULAR; INTRAVENOUS at 12:15

## 2020-05-31 RX ADMIN — SODIUM CHLORIDE 40 MG: 9 INJECTION INTRAMUSCULAR; INTRAVENOUS; SUBCUTANEOUS at 08:32

## 2020-05-31 RX ADMIN — FLUTICASONE PROPIONATE 2 SPRAY: 50 SPRAY, METERED NASAL at 08:35

## 2020-05-31 RX ADMIN — SODIUM CHLORIDE 40 MG: 9 INJECTION INTRAMUSCULAR; INTRAVENOUS; SUBCUTANEOUS at 21:09

## 2020-05-31 RX ADMIN — ONDANSETRON 4 MG: 2 INJECTION INTRAMUSCULAR; INTRAVENOUS at 01:00

## 2020-05-31 RX ADMIN — Medication 10 ML: at 05:20

## 2020-05-31 RX ADMIN — LEVOTHYROXINE SODIUM 250 MCG: 125 TABLET ORAL at 07:08

## 2020-05-31 RX ADMIN — ZINC SULFATE 220 MG (50 MG) CAPSULE 1 CAPSULE: CAPSULE at 08:32

## 2020-05-31 RX ADMIN — POLYETHYLENE GLYCOL 3350 17 G: 17 POWDER, FOR SOLUTION ORAL at 08:32

## 2020-05-31 RX ADMIN — ENOXAPARIN SODIUM 40 MG: 40 INJECTION SUBCUTANEOUS at 05:20

## 2020-05-31 RX ADMIN — SALINE NASAL SPRAY 2 SPRAY: 1.5 SOLUTION NASAL at 05:32

## 2020-05-31 RX ADMIN — METHYLPREDNISOLONE SODIUM SUCCINATE 60 MG: 40 INJECTION, POWDER, FOR SOLUTION INTRAMUSCULAR; INTRAVENOUS at 07:07

## 2020-05-31 RX ADMIN — Medication 500 MG: at 18:12

## 2020-05-31 RX ADMIN — Medication 500 MG: at 08:32

## 2020-05-31 RX ADMIN — METHYLPREDNISOLONE SODIUM SUCCINATE 60 MG: 40 INJECTION, POWDER, FOR SOLUTION INTRAMUSCULAR; INTRAVENOUS at 00:43

## 2020-05-31 RX ADMIN — METHYLPREDNISOLONE SODIUM SUCCINATE 60 MG: 40 INJECTION, POWDER, FOR SOLUTION INTRAMUSCULAR; INTRAVENOUS at 18:12

## 2020-05-31 NOTE — PROGRESS NOTES
Problem: Gas Exchange - Impaired  Goal: Absence of hypoxia  Outcome: Not Progressing Towards Goal  Pt could not tolerate Hi flow, put back on cont. BIPAP- 100 FiO2    Problem: Breathing Pattern - Ineffective  Goal: Ability to achieve and maintain a regular respiratory rate  Outcome: Not Progressing Towards Goal  Pt continues to be tachypneic, and pt dyspneic on exertion      1930 Bedside shift change report given to Jocelynn Steinberg (oncoming nurse) by Jose Zabala (offgoing nurse). Report included the following information SBAR, Kardex, Intake/Output, MAR, Accordion, and Recent Results.

## 2020-05-31 NOTE — PROGRESS NOTES
Pt able to be in prone position for most of the night, pt able to reposition self to L side lying position as needed. 0500- multiple (3) attempts required to obtain AM labs    Problem: Airway Clearance - Ineffective  Goal: Achieve or maintain patent airway  Outcome: Progressing Towards Goal     Problem: Gas Exchange - Impaired  Goal: Absence of hypoxia  Outcome: Progressing Towards Goal  Note: Pt education provided on prone positioning. Pt tolerated position well.  Stayed in prone position or side lying position for most of the night  Goal: Promote optimal lung function  Outcome: Progressing Towards Goal     Problem: Breathing Pattern - Ineffective  Goal: Ability to achieve and maintain a regular respiratory rate  Outcome: Progressing Towards Goal

## 2020-05-31 NOTE — PROGRESS NOTES
[address] [facility]                                                                                          Hospitalist Progress Note  Lynn Mcknight MD  Answering service: [phone] from in house phone        Date of Service:  2020  NAME:  Thomas Pratt  :  1960  MRN:  [id number]      Admission Summary:     Thomas Pratt is a 64 y.o. female who presents with COVID pos+ from [address] [place] [address]. Pt said she had a test 2 days back and came back positive. She became SOB and went to ED.  Patient states that for the last 3 days she has not felt well.  She has had some body aches and nausea and fatigue.  She is also had cough.  Over the last 24 hours she started to have shortness of breath.  She comes in for evaluation of these complaints and concern for coronavirus given her workplace exposure.  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 Interval history / Subjective:       Patient still with some shortness of breath. Oxygen increased to high flow     Assessment & Plan:     Acute respiratory failure  -Acute hypoxic respiratory failure secondary to viral pneumonia  -COVID 19 positive as outpatient  -Pulmonology following  -Currently om BiPAP    COVID 19 infection  -Droplet plus precaution  -Continue vitamin C and azithromycin  -Anticoagulation per protocol  -Started Remdesivir   -Actemra one dose   -On steroid  -ID and pulmonology following    Hypothyroidism  -TSH elevated  -Continue Synthroid  -Follow TSH level as outpatient    Obesity  -Patient with body mass index of 37.66 kg/m²  -Outpatient follow-up for weight management    Dehydration  -Monitor renal function    Abnormal LFT's   -Likely from infectious process  -Monitor liver function  -consider USG as out pt     Code status: We discussed CODE STATUS with the patient. She is okay with intubation but no other resuscitative efforts.     DVT prophylaxis: Lovenox    Care Plan discussed with: Patient/Family and Nurse  Anticipated Disposition: TBD  Anticipated Discharge: Greater than 48 hours     Tried to get in touch with patient's sister Armstead Siemens for the past 2 days and left message. Hospital Problems  Date Reviewed: 11/13/2019          Codes Class Noted POA    Hypoxia ICD-10-CM: R09.02  ICD-9-CM: 799.02  5/25/2020 Unknown        COVID-19 ICD-10-CM: U07.1  ICD-9-CM: 079.89  5/25/2020 Unknown                Review of Systems:   A comprehensive review of systems was negative except for that written in the HPI. Vital Signs:    Last 24hrs VS reviewed since prior progress note. Most recent are:  Visit Vitals  /76 (BP 1 Location: Left arm, BP Patient Position: Sitting)   Pulse 71   Temp 98 °F (36.7 °C)   Resp 30   Ht 5' 7.01\" (1.702 m)   Wt 109.5 kg (241 lb 6.5 oz)   SpO2 93%   BMI 37.80 kg/m²         Intake/Output Summary (Last 24 hours) at 5/31/2020 1300  Last data filed at 5/31/2020 0500  Gross per 24 hour   Intake 250 ml   Output --   Net 250 ml        Physical Examination:             Constitutional:  No acute distress, cooperative, pleasant    ENT:  Oral mucosa moist, oropharynx benign. Resp:  CTA bilaterally. No wheezing/rhonchi/rales. No accessory muscle use   CV:  Regular rhythm, normal rate, no murmurs, gallops, rubs    GI:  Soft, non distended, non tender. normoactive bowel sounds, no hepatosplenomegaly     Musculoskeletal:  No edema, warm, 2+ pulses throughout    Neurologic:  Moves all extremities.   AAOx3, CN II-XII reviewed     Skin:  Good turgor, no rashes or ulcers       Data Review:    Review and/or order of clinical lab test      Labs:     Recent Labs     05/30/20  0344 05/29/20  0629   WBC 10.4 10.4   HGB 10.5* 10.2*   HCT 33.7* 32.5*   * 442*     Recent Labs     05/31/20  0502 05/30/20  0344 05/29/20  0629    137 139   K 3.6 4.3 4.0    103 101   CO2 29 29 29   BUN 20 13 12   CREA 1.04* 1.03* 1.04*   GLU 166* 146* 96   CA 7.7* 7.9* 7.7*     Recent Labs     05/31/20  0502 05/30/20  0344 05/29/20  0629   ALT 52 58 58   AP 81 70 60   TBILI 0.2 0.3 0.3   TP 7.1 7.5 6.5   ALB 2.5* 2.5* 2.8*   GLOB 4.6* 5.0* 3.7     Recent Labs     05/31/20  0502 05/30/20  0344 05/29/20  0631   INR 1.1 1.0 1.0   PTP 10.9 10.4 10.3   APTT 27.6 29.7 31.0      No results for input(s): FE, TIBC, PSAT, FERR in the last 72 hours. No results found for: FOL, RBCF   No results for input(s): PH, PCO2, PO2 in the last 72 hours. No results for input(s): CPK, CKNDX, TROIQ in the last 72 hours.     No lab exists for component: CPKMB  Lab Results   Component Value Date/Time    Cholesterol, total 310 (H) 10/25/2018 02:15 PM    HDL Cholesterol 54 10/25/2018 02:15 PM    LDL, calculated 221 (H) 10/25/2018 02:15 PM    Triglyceride 173 (H) 10/25/2018 02:15 PM    CHOL/HDL Ratio 6.8 (H) 03/09/2012 09:10 AM     Lab Results   Component Value Date/Time    Glucose (POC) 130 (H) 05/27/2020 12:03 PM     Lab Results   Component Value Date/Time    Color Yellow 10/25/2018 02:15 PM    Appearance Cloudy (A) 10/25/2018 02:15 PM    Specific gravity 1.020 07/16/2015 07:58 PM    pH (UA) 5.5 10/25/2018 02:15 PM    Protein NEGATIVE  07/16/2015 07:58 PM    Glucose NEGATIVE  07/16/2015 07:58 PM    Ketone Negative 10/25/2018 02:15 PM    Bilirubin Negative 10/25/2018 02:15 PM    Urobilinogen 0.2 07/16/2015 07:58 PM    Nitrites Negative 10/25/2018 02:15 PM    Leukocyte Esterase 2+ (A) 10/25/2018 02:15 PM    Epithelial cells FEW 07/16/2015 07:58 PM    Bacteria Few 10/25/2018 02:15 PM    WBC >30 (A) 10/25/2018 02:15 PM    RBC 3-10 (A) 10/25/2018 02:15 PM         Medications Reviewed:     Current Facility-Administered Medications   Medication Dose Route Frequency    methylPREDNISolone (PF) (SOLU-MEDROL) injection 60 mg  60 mg IntraVENous Q6H    diphenhydrAMINE (BENADRYL) capsule 25 mg  25 mg Oral Q6H PRN    polyethylene glycol (MIRALAX) packet 17 g  17 g Oral DAILY    sodium chloride (OCEAN) 0.65 % nasal squeeze bottle 2 Spray  2 Spray Both Nostrils Q2H PRN    lidocaine 4 % patch 1 Patch  1 Patch TransDERmal Q24H    lidocaine 4 % patch 1 Patch  1 Patch TransDERmal Q24H    docusate sodium (COLACE) capsule 100 mg  100 mg Oral DAILY    butalbital-acetaminophen-caffeine (FIORICET, ESGIC) -40 mg per tablet 1 Tab  1 Tab Oral Q12H PRN    pantoprazole (PROTONIX) 40 mg in 0.9% sodium chloride 10 mL injection  40 mg IntraVENous Q12H    levothyroxine (SYNTHROID) tablet 250 mcg  250 mcg Oral 6am    allopurinoL (ZYLOPRIM) tablet 100 mg  100 mg Oral DAILY    acetaminophen (TYLENOL) tablet 650 mg  650 mg Oral Q6H PRN    Or    acetaminophen (TYLENOL) suppository 650 mg  650 mg Rectal Q6H PRN    fluticasone propionate (FLONASE) 50 mcg/actuation nasal spray 2 Spray  2 Spray Both Nostrils DAILY    albuterol (PROVENTIL HFA, VENTOLIN HFA, PROAIR HFA) inhaler 2 Puff  2 Puff Inhalation Q6H PRN    zinc sulfate (ZINCATE) 220 (50) mg capsule 1 Cap  1 Cap Oral DAILY    ascorbic acid (vitamin C) (VITAMIN C) tablet 500 mg  500 mg Oral BID    enoxaparin (LOVENOX) injection 40 mg  40 mg SubCUTAneous Q12H    guaiFENesin (ROBITUSSIN) 100 mg/5 mL oral liquid 100 mg  100 mg Oral Q4H PRN    sodium chloride (NS) flush 5-40 mL  5-40 mL IntraVENous Q8H    sodium chloride (NS) flush 5-40 mL  5-40 mL IntraVENous PRN    ondansetron (ZOFRAN) injection 4 mg  4 mg IntraVENous Q6H PRN     ______________________________________________________________________  EXPECTED LENGTH OF STAY: 5d 12h  ACTUAL LENGTH OF STAY:          6                 Pepe Fisher MD

## 2020-05-31 NOTE — PROGRESS NOTES
[address] [facility]ospitalist Group                                      Advance Care Planning Note    Name: Teresa Fiore  YOB: 1960  MRN: [id number]  Admission Date: 5/25/2020  9:16 AM    Date of discussion: 5/31/2020    Active Diagnoses:    Hospital Problems  Date Reviewed: 11/13/2019          Codes Class Noted POA    Hypoxia ICD-10-CM: R09.02  ICD-9-CM: 799.02  5/25/2020 Unknown        COVID-19 ICD-10-CM: U07.1  ICD-9-CM: 079.89  5/25/2020 Unknown              These active diagnoses are of sufficient risk that focused discussion on advance care planning is indicated in order to allow the patient to thoughtfully consider personal goals of care, and if situations arise that prevent the ability to personally give input, to ensure appropriate representation of their personal desires for different levels and aggressiveness of care. Discussion:     Persons present and participating in discussion: Teresa MacarenaKelsey MD    Discussion:     Discussed with patient her wishes for resuscitation. Explained resuscitative measures to the patient including chest compression, shock, IV medication, intubation and mechanical ventilation. Patient wants intubation and mechanical ventilation if that would save her life, however does not want any other resuscitative measures. Time Spent:     Total time spent face-to-face in education and discussion: 15 minutes.      Kelsey Lindo MD  Date of Service:  5/31/2020  2:25 PM

## 2020-05-31 NOTE — PROGRESS NOTES
2200- pt education provided on proning benefits and techniques. Pt verbalized understanding. Pt assisted to prone position. Tolerating well.

## 2020-05-31 NOTE — PROGRESS NOTES
Infectious Disease Progress Note        HPI:    Ms. Chris Mon seen earlier  Feels better overall   denied cough   Myalgias better   Denied fever, chills , nausea, vomiting ,             Physical Exam:    Vitals:   Patient Vitals for the past 24 hrs:   Temp Pulse Resp BP SpO2   05/31/20 1221 -- -- -- -- 93 %   05/31/20 0934 -- -- -- -- 94 %   05/31/20 0720 98 °F (36.7 °C) 71 30 134/76 95 %   05/31/20 0500 97.5 °F (36.4 °C) 71 25 128/90 97 %   05/31/20 0044 98.3 °F (36.8 °C) 73 25 123/80 98 %   05/30/20 1900 97.5 °F (36.4 °C) 72 30 116/71 100 %   05/30/20 1644 98 °F (36.7 °C) 92 (!) 33 150/76 94 %   05/30/20 1414 -- -- -- -- 99 %     · GEN: NAD  · HEENT: no scleral icterus,  No thrush  · CV: S1, S2 heard regularly,   · Lungs: Diminished breath sounds  · Abdomen: soft,  distended, non tender,  no CVA tenderness   · Genitourinary: no murphy  · Extremities: no edema  · Skin: no rash        Labs:   Recent Results (from the past 24 hour(s))   PROTHROMBIN TIME + INR    Collection Time: 05/31/20  5:02 AM   Result Value Ref Range    INR 1.1 0.9 - 1.1      Prothrombin time 10.9 9.0 - 11.1 sec   PTT    Collection Time: 05/31/20  5:02 AM   Result Value Ref Range    aPTT 27.6 22.1 - 32.0 sec    aPTT, therapeutic range     58.0 - 77.0 SECS   FIBRINOGEN    Collection Time: 05/31/20  5:02 AM   Result Value Ref Range    Fibrinogen 632 (H) 200 - 475 mg/dL   D DIMER    Collection Time: 05/31/20  5:02 AM   Result Value Ref Range    D-dimer 0.62 0.00 - 0.65 mg/L FEU   METABOLIC PANEL, COMPREHENSIVE    Collection Time: 05/31/20  5:02 AM   Result Value Ref Range    Sodium 138 136 - 145 mmol/L    Potassium 3.6 3.5 - 5.1 mmol/L    Chloride 103 97 - 108 mmol/L    CO2 29 21 - 32 mmol/L    Anion gap 6 5 - 15 mmol/L    Glucose 166 (H) 65 - 100 mg/dL    BUN 20 6 - 20 MG/DL    Creatinine 1.04 (H) 0.55 - 1.02 MG/DL    BUN/Creatinine ratio 19 12 - 20      GFR est AA >60 >60 ml/min/1.73m2    GFR est non-AA 55 (L) >60 ml/min/1.73m2    Calcium 7.7 (L) 8.5 - 10.1 MG/DL    Bilirubin, total 0.2 0.2 - 1.0 MG/DL    ALT (SGPT) 52 12 - 78 U/L    AST (SGOT) 69 (H) 15 - 37 U/L    Alk. phosphatase 81 45 - 117 U/L    Protein, total 7.1 6.4 - 8.2 g/dL    Albumin 2.5 (L) 3.5 - 5.0 g/dL    Globulin 4.6 (H) 2.0 - 4.0 g/dL    A-G Ratio 0.5 (L) 1.1 - 2.2     LD    Collection Time: 05/31/20  5:02 AM   Result Value Ref Range     (H) 81 - 246 U/L   C REACTIVE PROTEIN, QT    Collection Time: 05/31/20  5:02 AM   Result Value Ref Range    C-Reactive protein 10.70 (H) 0.00 - 0.60 mg/dL       IL 6 pending  Component Value Flag Ref Range Units Status   CRP, High sensitivity >9.5          , 642    Ferritin 532            Microbiology Data:    sputum       Component Value Ref Range & Units Status   Special Requests: NO SPECIAL REQUESTS    Preliminary   GRAM STAIN FEW WBCS SEEN    Preliminary   GRAM STAIN    Preliminary   OCCASIONAL EPITHELIAL CELLS SEEN    GRAM STAIN    Preliminary   RARE GRAM POSITIVE COCCI IN CLUSTERS    Culture result:    Preliminary   MODERATE NORMAL RESPIRATORY SAMUEL SO FAR    Result History       Component Value Ref Range & Units Status   Special Requests: NO SPECIAL REQUESTS    Final   [place] Count [id number]   COLONIES/mL    Final   Culture result:    Final   MIXED UROGENITAL SAMUEL ISOLATED        Component Value Flag Ref Range Units Status   Specimen source Nasopharyngeal      Final   SARS-CoV-2 Detected  Abnormal   NOTD   Final   Comment:     TB Quantiferon 10/25/18  Component Value Ref Range & Units Status   QuantiFERON Incubation Incubation performed. Final   QuantiFERON Criteria Comment   Final   The QuantiFERON-TB Gold Plus result is determined by subtracting   the Nil value from either TB antigen (Ag) tube.  The mitogen tube   serves as a control for the test.    QuantiFERON TB1 Ag 0.19  IU/mL Final   QuantiFERON TB2 Ag 0.21  IU/mL Final   QuantiFERON Nil Value 0.13  IU/mL Final   QuantiFERON Mitogen Value >10.00  IU/mL Final   QuantiFERON Plus Negative Negative Final   Narrative               Pathology Results:  B/L mastectomy 2016, seromas  2015 R breast mastectomy invasive ductal carcinoma, ductal carcinoma in situ  2 intrammammary lymph nodes negative for tumor     Imaging:     Portable AP view of the chest is obtained, comparison to 2013. Inspiration is  very shallow limiting the examination. Interstitial infiltrates not excluded.     IMPRESSION  impression: Limited by shallow inspiration, cannot exclude interstitial  infiltrates bilaterally.         Assessment / Plan:     Problem List as of 5/31/2020 Date Reviewed: 11/13/2019          Codes Class Noted - Resolved    Hypoxia ICD-10-CM: R09.02  ICD-9-CM: 799.02  5/25/2020 - Present        COVID-19 ICD-10-CM: U07.1  ICD-9-CM: 079.89  5/25/2020 - Present        Severe obesity ([address] ICD-10-CM: E66.01  ICD-9-CM: 278.01  11/11/2019 - Present        Costochondritis ICD-10-CM: M94.0  ICD-9-CM: 733.6  10/9/2018 - Present        BMI 33.0-33.9,adult ICD-10-CM: Y13.69  ICD-9-CM: V85.33  9/14/2018 - Present        Chest wall pain following surgery ICD-10-CM: R07.89, G89.18  ICD-9-CM: 786.59, 338.18  2/9/2018 - Present        Chest wall pain, chronic ICD-10-CM: R07.89, G89.29  ICD-9-CM: 786.52, 338.29  2/9/2018 - Present        H/O mastectomy, bilateral ICD-10-CM: Z90.13  ICD-9-CM: V45.71  2/9/2018 - Present        Vitamin D deficiency ICD-10-CM: E55.9  ICD-9-CM: 268.9  2/21/2017 - Present        Lymphedema ICD-10-CM: I89.0  ICD-9-CM: 457.1  11/9/2016 - Present        Hypothyroidism due to acquired atrophy of thyroid ICD-10-CM: E03.4  ICD-9-CM: 244.8, 246.8  5/14/2015 - Present        HLD (hyperlipidemia) ICD-10-CM: E78.5  ICD-9-CM: 272.4  5/14/2015 - Present        History of breast cancer ICD-10-CM: Z85.3  ICD-9-CM: V10.3  6/18/2013 - Present        Chronic headaches ICD-10-CM: R51  ICD-9-CM: 784.0  6/18/2013 - Present        Family history of cancer ICD-10-CM: Z80.9  ICD-9-CM: V16.9  6/18/2013 - Present        Skin moles ICD-10-CM: D22.9  ICD-9-CM: 216.9  6/18/2013 - Present        RESOLVED: Routine general medical examination at a health care facility ICD-10-CM: Z00.00  ICD-9-CM: V70.0  6/18/2013 - 9/30/2019             Ms. Reema Jensen is a 29-year-old lady history of breast cancer status post radiation therapy in 2013 with recurrence and status post mastectomy in 2015, hypothyroidism, arthritis with SARS-CoV-2 infection/pneumonia. 1) Sars COV 2 infection, pneumonia, hypoxia    On vitamin c , zinc  Completed Zithromax   Started on Remdesivir 5/28/20 and monitoring closely  Actemra and steroids started by pulm 5/29 , last quantiferon in 2018 noted negative  Monitor cbc , cmp daily while on Remdesivir           2) Hx of Breast cancer   S/p mastectomy, hx of radiation therapy     3) hypothyroidsm     4) Arthritis hx     5) DVT px         Thank for the opportunity to participate in the care of this patient. Please contact with questions or concerns.        Luke Carroll,    12:26 PM

## 2020-05-31 NOTE — PROGRESS NOTES
Pulmonary, Critical Care, and Sleep Medicine~Progress Note    Name: Duong Malik MRN: [id number]   : 1960 Hospital: [facility] [place] [address]   Date: 2020 10:42 AM Admission: 2020     Impression Plan   1. Acute hypoxic resp failure   2. COVID19 PNA   3. Obesity   4. Hx of total mastectomy  1. High risk of decline  2. Started pulsed Sloane on ; now on hold secondary to NIV/ highflow. Can start back when on NC  3. lovenox   4. IL6 is 91, received actemra   5. Start steroids   6. azithromax completing   7. Vit c/zinc   8. One dose of lasix and monitor renal function  9. On Remdesivir now   10. Discussed with  ID, and attending      Daily Progression:    On bipap, tachypneic  But says feels a little better      On 55lpm and 100% fiO2; sats in low 90s and upper 80s   Looks fatigued   D dimer is 0.4  Inflammatory markers are up     I have reviewed the labs and previous days notes. Pertinent items are noted in HPI. OBJECTIVE:     Vital Signs:       Visit Vitals  /72 (BP 1 Location: Left arm, BP Patient Position: At rest)   Pulse 65   Temp 98.7 °F (37.1 °C)   Resp (!) 34   Ht 5' 7.01\" (1.702 m)   Wt 109.5 kg (241 lb 6.5 oz)   SpO2 93%   BMI 37.80 kg/m²      Temp (24hrs), Av °F (36.7 °C), Min:97.5 °F (36.4 °C), Max:98.7 °F (37.1 °C)     Intake/Output:     Last shift: No intake/output data recorded.     Last 3 shifts:  1901 -  0700  In: 250 [I.V.:250]  Out: 250 [Urine:250]          Intake/Output Summary (Last 24 hours) at 2020 1423  Last data filed at 2020 0500  Gross per 24 hour   Intake 250 ml   Output --   Net 250 ml       Physical Exam:                                        Exam Findings Other   General: No resp distress noted, appears stated age    HEENT:  No ulcers, JVD not elevated, no cervical LAD    Chest: No pectus deformity, normal chest rise b/l    HEART:  Deferred     Lungs: Deferred     ABD: Soft/NT, non rigid mildly distended    EXT: No cyanosis/clubbing/edema, normal peripheral pulses    Skin: No rashes or ulcers, no mottling    Neuro: A/O x 3        Medications:  Current Facility-Administered Medications   Medication Dose Route Frequency    methylPREDNISolone (PF) (SOLU-MEDROL) injection 60 mg  60 mg IntraVENous Q6H    diphenhydrAMINE (BENADRYL) capsule 25 mg  25 mg Oral Q6H PRN    polyethylene glycol (MIRALAX) packet 17 g  17 g Oral DAILY    sodium chloride (OCEAN) 0.65 % nasal squeeze bottle 2 Spray  2 Spray Both Nostrils Q2H PRN    lidocaine 4 % patch 1 Patch  1 Patch TransDERmal Q24H    lidocaine 4 % patch 1 Patch  1 Patch TransDERmal Q24H    docusate sodium (COLACE) capsule 100 mg  100 mg Oral DAILY    butalbital-acetaminophen-caffeine (FIORICET, ESGIC) -40 mg per tablet 1 Tab  1 Tab Oral Q12H PRN    pantoprazole (PROTONIX) 40 mg in 0.9% sodium chloride 10 mL injection  40 mg IntraVENous Q12H    levothyroxine (SYNTHROID) tablet 250 mcg  250 mcg Oral 6am    allopurinoL (ZYLOPRIM) tablet 100 mg  100 mg Oral DAILY    acetaminophen (TYLENOL) tablet 650 mg  650 mg Oral Q6H PRN    Or    acetaminophen (TYLENOL) suppository 650 mg  650 mg Rectal Q6H PRN    fluticasone propionate (FLONASE) 50 mcg/actuation nasal spray 2 Spray  2 Spray Both Nostrils DAILY    albuterol (PROVENTIL HFA, VENTOLIN HFA, PROAIR HFA) inhaler 2 Puff  2 Puff Inhalation Q6H PRN    zinc sulfate (ZINCATE) 220 (50) mg capsule 1 Cap  1 Cap Oral DAILY    ascorbic acid (vitamin C) (VITAMIN C) tablet 500 mg  500 mg Oral BID    enoxaparin (LOVENOX) injection 40 mg  40 mg SubCUTAneous Q12H    guaiFENesin (ROBITUSSIN) 100 mg/5 mL oral liquid 100 mg  100 mg Oral Q4H PRN    sodium chloride (NS) flush 5-40 mL  5-40 mL IntraVENous Q8H    sodium chloride (NS) flush 5-40 mL  5-40 mL IntraVENous PRN    ondansetron (ZOFRAN) injection 4 mg  4 mg IntraVENous Q6H PRN       Labs:  ABG No results for input(s): PHI, PCO2I, PO2I, HCO3I, SO2I, FIO2I in the last 72 hours.      CBC Recent Labs     05/30/20  0344 05/29/20  0629   WBC 10.4 10.4   HGB 10.5* 10.2*   HCT 33.7* 32.5*   * 442*   MCV 93.6 93.4   MCH 29.2 06.0        Metabolic  Panel Recent Labs     05/31/20  0502 05/30/20  0344 05/29/20  0631 05/29/20  0629    137  --  139   K 3.6 4.3  --  4.0    103  --  101   CO2 29 29  --  29   * 146*  --  96   BUN 20 13  --  12   CREA 1.04* 1.03*  --  1.04*   CA 7.7* 7.9*  --  7.7*   ALB 2.5* 2.5*  --  2.8*   ALT 52 58  --  58   INR 1.1 1.0 1.0  --         Pertinent Labs                Amber Alvarez MD  5/31/2020

## 2020-06-01 LAB
ALBUMIN SERPL-MCNC: 2.7 G/DL (ref 3.5–5)
ALBUMIN/GLOB SERPL: 0.6 {RATIO} (ref 1.1–2.2)
ALP SERPL-CCNC: 108 U/L (ref 45–117)
ALT SERPL-CCNC: 53 U/L (ref 12–78)
ANION GAP SERPL CALC-SCNC: 8 MMOL/L (ref 5–15)
APTT PPP: 24.9 SEC (ref 22.1–32)
AST SERPL-CCNC: 56 U/L (ref 15–37)
BASOPHILS # BLD: 0 K/UL (ref 0–0.1)
BASOPHILS NFR BLD: 0 % (ref 0–1)
BILIRUB SERPL-MCNC: 0.3 MG/DL (ref 0.2–1)
BNP SERPL-MCNC: 462 PG/ML
BUN SERPL-MCNC: 23 MG/DL (ref 6–20)
BUN/CREAT SERPL: 22 (ref 12–20)
CALCIUM SERPL-MCNC: 8 MG/DL (ref 8.5–10.1)
CHLORIDE SERPL-SCNC: 101 MMOL/L (ref 97–108)
CO2 SERPL-SCNC: 29 MMOL/L (ref 21–32)
CREAT SERPL-MCNC: 1.06 MG/DL (ref 0.55–1.02)
CRP SERPL-MCNC: 5.99 MG/DL (ref 0–0.6)
D DIMER PPP FEU-MCNC: 1.45 MG/L FEU (ref 0–0.65)
DIFFERENTIAL METHOD BLD: ABNORMAL
EOSINOPHIL # BLD: 0 K/UL (ref 0–0.4)
EOSINOPHIL NFR BLD: 0 % (ref 0–7)
ERYTHROCYTE [DISTWIDTH] IN BLOOD BY AUTOMATED COUNT: 14.7 % (ref 11.5–14.5)
FIBRINOGEN PPP-MCNC: 601 MG/DL (ref 200–475)
GLOBULIN SER CALC-MCNC: 4.4 G/DL (ref 2–4)
GLUCOSE SERPL-MCNC: 156 MG/DL (ref 65–100)
HCT VFR BLD AUTO: 37.2 % (ref 35–47)
HGB BLD-MCNC: 11.6 G/DL (ref 11.5–16)
IMM GRANULOCYTES # BLD AUTO: 0 K/UL
IMM GRANULOCYTES NFR BLD AUTO: 0 %
INR PPP: 1.1 (ref 0.9–1.1)
LDH SERPL L TO P-CCNC: 740 U/L (ref 81–246)
LYMPHOCYTES # BLD: 0.3 K/UL (ref 0.8–3.5)
LYMPHOCYTES NFR BLD: 2 % (ref 12–49)
MCH RBC QN AUTO: 28.9 PG (ref 26–34)
MCHC RBC AUTO-ENTMCNC: 31.2 G/DL (ref 30–36.5)
MCV RBC AUTO: 92.8 FL (ref 80–99)
METAMYELOCYTES NFR BLD MANUAL: 2 %
MONOCYTES # BLD: 0.3 K/UL (ref 0–1)
MONOCYTES NFR BLD: 2 % (ref 5–13)
NEUTS BAND NFR BLD MANUAL: 1 % (ref 0–6)
NEUTS SEG # BLD: 16.4 K/UL (ref 1.8–8)
NEUTS SEG NFR BLD: 93 % (ref 32–75)
NRBC # BLD: 0.05 K/UL (ref 0–0.01)
NRBC BLD-RTO: 0.3 PER 100 WBC
PLATELET # BLD AUTO: 648 K/UL (ref 150–400)
PLATELET COMMENTS,PCOM: ABNORMAL
PMV BLD AUTO: 10.1 FL (ref 8.9–12.9)
POTASSIUM SERPL-SCNC: 3.8 MMOL/L (ref 3.5–5.1)
PROT SERPL-MCNC: 7.1 G/DL (ref 6.4–8.2)
PROTHROMBIN TIME: 10.9 SEC (ref 9–11.1)
RBC # BLD AUTO: 4.01 M/UL (ref 3.8–5.2)
RBC MORPH BLD: ABNORMAL
SODIUM SERPL-SCNC: 138 MMOL/L (ref 136–145)
THERAPEUTIC RANGE,PTTT: NORMAL SECS (ref 58–77)
WBC # BLD AUTO: 17.4 K/UL (ref 3.6–11)

## 2020-06-01 PROCEDURE — 86140 C-REACTIVE PROTEIN: CPT

## 2020-06-01 PROCEDURE — 36415 COLL VENOUS BLD VENIPUNCTURE: CPT

## 2020-06-01 PROCEDURE — 74011250637 HC RX REV CODE- 250/637: Performed by: HOSPITALIST

## 2020-06-01 PROCEDURE — 85610 PROTHROMBIN TIME: CPT

## 2020-06-01 PROCEDURE — 74011000250 HC RX REV CODE- 250: Performed by: INTERNAL MEDICINE

## 2020-06-01 PROCEDURE — 80053 COMPREHEN METABOLIC PANEL: CPT

## 2020-06-01 PROCEDURE — 74011250636 HC RX REV CODE- 250/636: Performed by: INTERNAL MEDICINE

## 2020-06-01 PROCEDURE — 85730 THROMBOPLASTIN TIME PARTIAL: CPT

## 2020-06-01 PROCEDURE — 74011000250 HC RX REV CODE- 250: Performed by: FAMILY MEDICINE

## 2020-06-01 PROCEDURE — 74011250636 HC RX REV CODE- 250/636: Performed by: HOSPITALIST

## 2020-06-01 PROCEDURE — 94762 N-INVAS EAR/PLS OXIMTRY CONT: CPT

## 2020-06-01 PROCEDURE — 74011250637 HC RX REV CODE- 250/637: Performed by: FAMILY MEDICINE

## 2020-06-01 PROCEDURE — 83615 LACTATE (LD) (LDH) ENZYME: CPT

## 2020-06-01 PROCEDURE — 74011250636 HC RX REV CODE- 250/636: Performed by: PHYSICIAN ASSISTANT

## 2020-06-01 PROCEDURE — 85384 FIBRINOGEN ACTIVITY: CPT

## 2020-06-01 PROCEDURE — 83880 ASSAY OF NATRIURETIC PEPTIDE: CPT

## 2020-06-01 PROCEDURE — 74011250637 HC RX REV CODE- 250/637: Performed by: NURSE PRACTITIONER

## 2020-06-01 PROCEDURE — C9113 INJ PANTOPRAZOLE SODIUM, VIA: HCPCS | Performed by: INTERNAL MEDICINE

## 2020-06-01 PROCEDURE — 85379 FIBRIN DEGRADATION QUANT: CPT

## 2020-06-01 PROCEDURE — 85025 COMPLETE CBC W/AUTO DIFF WBC: CPT

## 2020-06-01 PROCEDURE — 65660000001 HC RM ICU INTERMED STEPDOWN

## 2020-06-01 PROCEDURE — 77010033711 HC HIGH FLOW OXYGEN

## 2020-06-01 RX ORDER — FUROSEMIDE 10 MG/ML
20 INJECTION INTRAMUSCULAR; INTRAVENOUS ONCE
Status: COMPLETED | OUTPATIENT
Start: 2020-06-01 | End: 2020-06-01

## 2020-06-01 RX ORDER — PANTOPRAZOLE SODIUM 40 MG/1
40 TABLET, DELAYED RELEASE ORAL 2 TIMES DAILY
Status: DISCONTINUED | OUTPATIENT
Start: 2020-06-01 | End: 2020-06-09 | Stop reason: HOSPADM

## 2020-06-01 RX ADMIN — METHYLPREDNISOLONE SODIUM SUCCINATE 40 MG: 40 INJECTION, POWDER, FOR SOLUTION INTRAMUSCULAR; INTRAVENOUS at 14:48

## 2020-06-01 RX ADMIN — FLUTICASONE PROPIONATE 2 SPRAY: 50 SPRAY, METERED NASAL at 10:23

## 2020-06-01 RX ADMIN — PANTOPRAZOLE SODIUM 40 MG: 40 TABLET, DELAYED RELEASE ORAL at 17:39

## 2020-06-01 RX ADMIN — Medication 500 MG: at 17:39

## 2020-06-01 RX ADMIN — Medication 10 ML: at 14:00

## 2020-06-01 RX ADMIN — POLYETHYLENE GLYCOL 3350 17 G: 17 POWDER, FOR SOLUTION ORAL at 10:17

## 2020-06-01 RX ADMIN — METHYLPREDNISOLONE SODIUM SUCCINATE 40 MG: 40 INJECTION, POWDER, FOR SOLUTION INTRAMUSCULAR; INTRAVENOUS at 21:24

## 2020-06-01 RX ADMIN — SALINE NASAL SPRAY 2 SPRAY: 1.5 SOLUTION NASAL at 10:22

## 2020-06-01 RX ADMIN — SODIUM CHLORIDE 40 MG: 9 INJECTION INTRAMUSCULAR; INTRAVENOUS; SUBCUTANEOUS at 10:20

## 2020-06-01 RX ADMIN — METHYLPREDNISOLONE SODIUM SUCCINATE 60 MG: 40 INJECTION, POWDER, FOR SOLUTION INTRAMUSCULAR; INTRAVENOUS at 07:04

## 2020-06-01 RX ADMIN — ZINC SULFATE 220 MG (50 MG) CAPSULE 1 CAPSULE: CAPSULE at 10:20

## 2020-06-01 RX ADMIN — ALLOPURINOL 100 MG: 100 TABLET ORAL at 10:20

## 2020-06-01 RX ADMIN — LEVOTHYROXINE SODIUM 250 MCG: 125 TABLET ORAL at 07:04

## 2020-06-01 RX ADMIN — ENOXAPARIN SODIUM 40 MG: 40 INJECTION SUBCUTANEOUS at 14:48

## 2020-06-01 RX ADMIN — SODIUM PHOSPHATE, DIBASIC AND SODIUM PHOSPHATE, MONOBASIC 1 ENEMA: 7; 19 ENEMA RECTAL at 14:47

## 2020-06-01 RX ADMIN — Medication 10 ML: at 07:04

## 2020-06-01 RX ADMIN — ENOXAPARIN SODIUM 40 MG: 40 INJECTION SUBCUTANEOUS at 04:15

## 2020-06-01 RX ADMIN — Medication 500 MG: at 10:20

## 2020-06-01 RX ADMIN — DOCUSATE SODIUM 100 MG: 100 CAPSULE, LIQUID FILLED ORAL at 10:20

## 2020-06-01 RX ADMIN — Medication 10 ML: at 21:24

## 2020-06-01 RX ADMIN — FUROSEMIDE 20 MG: 10 INJECTION, SOLUTION INTRAMUSCULAR; INTRAVENOUS at 10:20

## 2020-06-01 NOTE — PROGRESS NOTES
Clinical Pharmacy Note: IV to PO Automatic Conversion  Please note: Delmy Saleh medication(s) (Pantoprazole) has/have been changed from IV to PO based on the following critiera:    Patient is taking scheduled oral medications  Patient is tolerating tube feeds at goal rate or a full liquid, soft or regular diet    This IV to PO conversion is based on the [employer] approved automatic conversion policy for eligible patients. Please call with questions.

## 2020-06-01 NOTE — ROUTINE PROCESS
Verbal shift change report given to [employer] (oncoming nurse) by June Lara RN (offgoing nurse). Report included the following information SBAR, Kardex, Intake/Output, MAR, Med Rec Status and Cardiac Rhythm NSR.

## 2020-06-01 NOTE — PROGRESS NOTES
Transition of Care Plan   RUR 13%  COVID-19  Positive 5/25/20    Patient requiring Hi-Flow nasal cannula 50 L/min    Disposition   Pending medical progress. . 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

## 2020-06-01 NOTE — PROGRESS NOTES
Pulmonary, Critical Care, and Sleep Medicine~Progress Note    Name: Duong Mailk MRN: [id number]   : 1960 Hospital: [facility]   Date: 2020 10:42 AM Admission: 2020     Impression Plan   1. Acute hypoxic resp failure   2. COVID19 PNA   3. Obesity   4. Hx of total mastectomy  1. High risk of decline  2. Started pulsed Sloane on ; now on hold secondary to NIV/ highflow. Can start back when on NC  3. Still on high flow and FiO2 requirements. I discussed with RT to wean. 4. NIV at night and for WOB   5. lovenox   6. IL6 is 91, received actemra x 1 dose  7. Wean steroids   8. azithromax completed    9. Vit c/zinc   10. Chest x-ray tomorrow   11. One dose of lasix and monitor renal function  12. s/p Remdesivir     Daily Progression:      D dimer 1.45  CRP 5.99 down  LD down to 740      On bipap, tachypneic  But says feels a little better      On 55lpm and 100% fiO2; sats in low 90s and upper 80s   Looks fatigued   D dimer is 0.4  Inflammatory markers are up     I have reviewed the labs and previous days notes. Pertinent items are noted in HPI.     OBJECTIVE:     Vital Signs:       Visit Vitals  /59   Pulse 70   Temp 98.5 °F (36.9 °C)   Resp (!) 33   Ht 5' 7.01\" (1.702 m)   Wt 109.5 kg (241 lb 5.1 oz)   SpO2 95%   BMI 37.79 kg/m²      Temp (24hrs), Av.4 °F (36.9 °C), Min:96.9 °F (36.1 °C), Max:98.7 °F (37.1 °C)     Intake/Output:     Last shift: 701 -  190  In: -   Out: 400 [Urine:400]    Last 3 shifts: 1901 -  0700  In: 250 [I.V.:250]  Out: 1400 [Urine:1400]          Intake/Output Summary (Last 24 hours) at 2020 0948  Last data filed at 2020 0716  Gross per 24 hour   Intake --   Output 1800 ml   Net -1800 ml       Physical Exam:                                        Exam Findings Other   General: No resp distress noted, appears stated age    HEENT:  No ulcers, JVD not elevated, no cervical LAD    Chest: No pectus deformity, normal chest rise b/l    HEART:  Deferred     Lungs:  Deferred     ABD: Soft/NT, non rigid mildly distended    EXT: No cyanosis/clubbing/edema, normal peripheral pulses    Skin: No rashes or ulcers, no mottling    Neuro: A/O x 3        Medications:  Current Facility-Administered Medications   Medication Dose Route Frequency    methylPREDNISolone (PF) (SOLU-MEDROL) injection 60 mg  60 mg IntraVENous Q6H    diphenhydrAMINE (BENADRYL) capsule 25 mg  25 mg Oral Q6H PRN    polyethylene glycol (MIRALAX) packet 17 g  17 g Oral DAILY    sodium chloride (OCEAN) 0.65 % nasal squeeze bottle 2 Spray  2 Spray Both Nostrils Q2H PRN    lidocaine 4 % patch 1 Patch  1 Patch TransDERmal Q24H    lidocaine 4 % patch 1 Patch  1 Patch TransDERmal Q24H    docusate sodium (COLACE) capsule 100 mg  100 mg Oral DAILY    butalbital-acetaminophen-caffeine (FIORICET, ESGIC) -40 mg per tablet 1 Tab  1 Tab Oral Q12H PRN    pantoprazole (PROTONIX) 40 mg in 0.9% sodium chloride 10 mL injection  40 mg IntraVENous Q12H    levothyroxine (SYNTHROID) tablet 250 mcg  250 mcg Oral 6am    allopurinoL (ZYLOPRIM) tablet 100 mg  100 mg Oral DAILY    acetaminophen (TYLENOL) tablet 650 mg  650 mg Oral Q6H PRN    Or    acetaminophen (TYLENOL) suppository 650 mg  650 mg Rectal Q6H PRN    fluticasone propionate (FLONASE) 50 mcg/actuation nasal spray 2 Spray  2 Spray Both Nostrils DAILY    albuterol (PROVENTIL HFA, VENTOLIN HFA, PROAIR HFA) inhaler 2 Puff  2 Puff Inhalation Q6H PRN    zinc sulfate (ZINCATE) 220 (50) mg capsule 1 Cap  1 Cap Oral DAILY    ascorbic acid (vitamin C) (VITAMIN C) tablet 500 mg  500 mg Oral BID    enoxaparin (LOVENOX) injection 40 mg  40 mg SubCUTAneous Q12H    guaiFENesin (ROBITUSSIN) 100 mg/5 mL oral liquid 100 mg  100 mg Oral Q4H PRN    sodium chloride (NS) flush 5-40 mL  5-40 mL IntraVENous Q8H    sodium chloride (NS) flush 5-40 mL  5-40 mL IntraVENous PRN    ondansetron (ZOFRAN) injection 4 mg  4 mg IntraVENous Q6H PRN       Labs:  ABG No results for input(s): PHI, PCO2I, PO2I, HCO3I, SO2I, FIO2I in the last 72 hours.      CBC Recent Labs     06/01/20  0419 05/30/20  0344   WBC 17.4* 10.4   HGB 11.6 10.5*   HCT 37.2 33.7*   * 551*   MCV 92.8 93.6   MCH 28.9 71.7        Metabolic  Panel Recent Labs     06/01/20  0419 05/31/20  0502 05/30/20  0344    138 137   K 3.8 3.6 4.3    103 103   CO2 29 29 29   * 166* 146*   BUN 23* 20 13   CREA 1.06* 1.04* 1.03*   CA 8.0* 7.7* 7.9*   ALB 2.7* 2.5* 2.5*   ALT 53 52 58   INR 1.1 1.1 1.0        Pertinent Labs                Toi Pearl PA-C  6/1/2020

## 2020-06-01 NOTE — PROGRESS NOTES
[facility]  Hospitalist Group                                                                                          Hospitalist Progress Note  Hema Clifford MD  Answering service: [phone] from in house phone        Date of Service:  2020  NAME:  Duong Malik  :  1960  MRN:  [id number]      Admission Summary:     Duong Malik is a 64 y.o. female who presents with COVID pos+ from [address] [place] [address]. Pt said she had a test 2 days back and came back positive. She became SOB and went to ED.  Patient states that for the last 3 days she has not felt well.  She has had some body aches and nausea and fatigue.  She is also had cough.  Over the last 24 hours she started to have shortness of breath.  She comes in for evaluation of these complaints and concern for coronavirus given her workplace exposure.  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 Interval history / Subjective:       Patient still with some shortness of breath. Oxygen increased to high flow     Assessment & Plan:     Acute respiratory failure  -Acute hypoxic respiratory failure secondary to viral pneumonia  -COVID 19 positive as outpatient  -Pulmonology following  -Currently on 50 L high flow    COVID 19 infection  -Droplet plus precaution  -Continue vitamin C and azithromycin  -Anticoagulation per protocol  -Started Remdesivir   -Actemra one dose   -On steroid  -ID and pulmonology following    Hypothyroidism  -TSH elevated  -Continue Synthroid  -Follow TSH level as outpatient    Obesity  -Patient with body mass index of 37.66 kg/m²  -Outpatient follow-up for weight management    Dehydration  -Monitor renal function    Abnormal LFT's   -Likely from infectious process  -Monitor liver function  -consider [facility] as out pt     Code status: We discussed CODE STATUS with the patient. She is okay with intubation but no other resuscitative efforts.     DVT prophylaxis: Lovenox    Care Plan discussed with: Patient/Family and Nurse  Anticipated Disposition: TBD  Anticipated Discharge: Greater than 48 hours     Tried to get in touch with patient's sister Benjamin Bateman for the past 2 days and left message. Hospital Problems  Date Reviewed: 11/13/2019          Codes Class Noted POA    Hypoxia ICD-10-CM: R09.02  ICD-9-CM: 799.02  5/25/2020 Unknown        COVID-19 ICD-10-CM: U07.1  ICD-9-CM: 079.89  5/25/2020 Unknown                Review of Systems:   A comprehensive review of systems was negative except for that written in the HPI. Vital Signs:    Last 24hrs VS reviewed since prior progress note. Most recent are:  Visit Vitals  /69 (BP 1 Location: Right arm, BP Patient Position: At rest)   Pulse 82   Temp 98.6 °F (37 °C)   Resp (!) 39   Ht 5' 7.01\" (1.702 m)   Wt 109.5 kg (241 lb 5.1 oz)   SpO2 97%   BMI 37.79 kg/m²         Intake/Output Summary (Last 24 hours) at 6/1/2020 1403  Last data filed at 6/1/2020 1248  Gross per 24 hour   Intake 240 ml   Output 3100 ml   Net -2860 ml        Physical Examination:             Constitutional:  No acute distress, cooperative, pleasant    ENT:  Oral mucosa moist, oropharynx benign. Resp:  CTA bilaterally. No wheezing/rhonchi/rales. No accessory muscle use   CV:  Regular rhythm, normal rate, no murmurs, gallops, rubs    GI:  Soft, non distended, non tender. normoactive bowel sounds, no hepatosplenomegaly     Musculoskeletal:  No edema, warm, 2+ pulses throughout    Neurologic:  Moves all extremities.   AAOx3, CN II-XII reviewed     Skin:  Good turgor, no rashes or ulcers       Data Review:    Review and/or order of clinical lab test      Labs:     Recent Labs     06/01/20 0419 05/30/20  0344   WBC 17.4* 10.4   HGB 11.6 10.5*   HCT 37.2 33.7*   * 551*     Recent Labs     06/01/20  0419 05/31/20  0502 05/30/20  0344    138 137   K 3.8 3.6 4.3    103 103   CO2 29 29 29   BUN 23* 20 13   CREA 1.06* 1.04* 1.03*   * 166* 146*   CA 8.0* 7.7* 7.9*     Recent Labs     06/01/20 0419 05/31/20  0502 05/30/20  0344   ALT 53 52 58    81 70   TBILI 0.3 0.2 0.3   TP 7.1 7.1 7.5   ALB 2.7* 2.5* 2.5*   GLOB 4.4* 4.6* 5.0*     Recent Labs     06/01/20 0419 05/31/20  0502 05/30/20  0344   INR 1.1 1.1 1.0   PTP 10.9 10.9 10.4   APTT 24.9 27.6 29.7      No results for input(s): FE, TIBC, PSAT, FERR in the last 72 hours. No results found for: FOL, RBCF   No results for input(s): PH, PCO2, PO2 in the last 72 hours. No results for input(s): CPK, CKNDX, TROIQ in the last 72 hours.     No lab exists for component: CPKMB  Lab Results   Component Value Date/Time    Cholesterol, total 310 (H) 10/25/2018 02:15 PM    HDL Cholesterol 54 10/25/2018 02:15 PM    LDL, calculated 221 (H) 10/25/2018 02:15 PM    Triglyceride 173 (H) 10/25/2018 02:15 PM    CHOL/HDL Ratio 6.8 (H) 03/09/2012 09:10 AM     Lab Results   Component Value Date/Time    Glucose (POC) 130 (H) 05/27/2020 12:03 PM     Lab Results   Component Value Date/Time    Color Yellow 10/25/2018 02:15 PM    Appearance Cloudy (A) 10/25/2018 02:15 PM    Specific gravity 1.020 07/16/2015 07:58 PM    pH (UA) 5.5 10/25/2018 02:15 PM    Protein NEGATIVE  07/16/2015 07:58 PM    Glucose NEGATIVE  07/16/2015 07:58 PM    Ketone Negative 10/25/2018 02:15 PM    Bilirubin Negative 10/25/2018 02:15 PM    Urobilinogen 0.2 07/16/2015 07:58 PM    Nitrites Negative 10/25/2018 02:15 PM    Leukocyte Esterase 2+ (A) 10/25/2018 02:15 PM    Epithelial cells FEW 07/16/2015 07:58 PM    Bacteria Few 10/25/2018 02:15 PM    WBC >30 (A) 10/25/2018 02:15 PM    RBC 3-10 (A) 10/25/2018 02:15 PM         Medications Reviewed:     Current Facility-Administered Medications   Medication Dose Route Frequency    methylPREDNISolone (PF) (SOLU-MEDROL) injection 40 mg  40 mg IntraVENous Q8H    diphenhydrAMINE (BENADRYL) capsule 25 mg  25 mg Oral Q6H PRN    polyethylene glycol (MIRALAX) packet 17 g  17 g Oral DAILY  sodium chloride (OCEAN) 0.65 % nasal squeeze bottle 2 Spray  2 Spray Both Nostrils Q2H PRN    lidocaine 4 % patch 1 Patch  1 Patch TransDERmal Q24H    lidocaine 4 % patch 1 Patch  1 Patch TransDERmal Q24H    docusate sodium (COLACE) capsule 100 mg  100 mg Oral DAILY    butalbital-acetaminophen-caffeine (FIORICET, ESGIC) -40 mg per tablet 1 Tab  1 Tab Oral Q12H PRN    pantoprazole (PROTONIX) 40 mg in 0.9% sodium chloride 10 mL injection  40 mg IntraVENous Q12H    levothyroxine (SYNTHROID) tablet 250 mcg  250 mcg Oral 6am    allopurinoL (ZYLOPRIM) tablet 100 mg  100 mg Oral DAILY    acetaminophen (TYLENOL) tablet 650 mg  650 mg Oral Q6H PRN    Or    acetaminophen (TYLENOL) suppository 650 mg  650 mg Rectal Q6H PRN    fluticasone propionate (FLONASE) 50 mcg/actuation nasal spray 2 Spray  2 Spray Both Nostrils DAILY    albuterol (PROVENTIL HFA, VENTOLIN HFA, PROAIR HFA) inhaler 2 Puff  2 Puff Inhalation Q6H PRN    zinc sulfate (ZINCATE) 220 (50) mg capsule 1 Cap  1 Cap Oral DAILY    ascorbic acid (vitamin C) (VITAMIN C) tablet 500 mg  500 mg Oral BID    enoxaparin (LOVENOX) injection 40 mg  40 mg SubCUTAneous Q12H    guaiFENesin (ROBITUSSIN) 100 mg/5 mL oral liquid 100 mg  100 mg Oral Q4H PRN    sodium chloride (NS) flush 5-40 mL  5-40 mL IntraVENous Q8H    sodium chloride (NS) flush 5-40 mL  5-40 mL IntraVENous PRN    ondansetron (ZOFRAN) injection 4 mg  4 mg IntraVENous Q6H PRN     ______________________________________________________________________  EXPECTED LENGTH OF STAY: 5d 12h  ACTUAL LENGTH OF STAY:          7                 Guille Hines MD

## 2020-06-01 NOTE — PROGRESS NOTES
Problem: Gas Exchange - Impaired  Goal: Absence of hypoxia  Outcome: Progressing Towards Goal  Note: Patient using bipap at night and PRN during the day, able to tolerate high flow and maintain oxygen saturations greater than 92%, RT to wean as tolerated. Dyspnea on exertion. Problem: Falls - Risk of  Goal: *Absence of Falls  Description: Document Clydene Bone Fall Risk and appropriate interventions in the flowsheet. Outcome: Progressing Towards Goal  Note: Fall Risk Interventions:  Mobility Interventions: Assess mobility with egress test, Communicate number of staff needed for ambulation/transfer, OT consult for ADLs, Patient to call before getting OOB, PT Consult for mobility concerns, PT Consult for assist device competence, Strengthening exercises (ROM-active/passive), Utilize gait belt for transfers/ambulation, Utilize walker, cane, or other assistive device         Medication Interventions: Evaluate medications/consider consulting pharmacy, Patient to call before getting OOB, Teach patient to arise slowly, Utilize gait belt for transfers/ambulation    Elimination Interventions: Call light in reach, Elevated toilet seat, Patient to call for help with toileting needs, Toilet paper/wipes in reach, Toileting schedule/hourly rounds, Stay With Me (per policy)              Problem: Pressure Injury - Risk of  Goal: *Prevention of pressure injury  Description: Document Wei Scale and appropriate interventions in the flowsheet.   Outcome: Progressing Towards Goal  Note: Pressure Injury Interventions:       Moisture Interventions: Absorbent underpads, Apply protective barrier, creams and emollients, Assess need for specialty bed, Check for incontinence Q2 hours and as needed, Internal/External urinary devices, Limit adult briefs, Maintain skin hydration (lotion/cream), Minimize layers, Moisture barrier, Offer toileting Q_hr    Activity Interventions: Assess need for specialty bed, Increase time out of bed, PT/OT evaluation    Mobility Interventions: Assess need for specialty bed, Chair cushion, HOB 30 degrees or less, Float heels, Turn and reposition approx. every two hours(pillow and wedges)    Nutrition Interventions: Document food/fluid/supplement intake, Discuss nutritional consult with provider, Offer support with meals,snacks and hydration    Friction and Shear Interventions: Apply protective barrier, creams and emollients, Feet elevated on foot rest, HOB 30 degrees or less, Lift sheet, Minimize layers, Sit at 90-degree angle, Transfer aides:transfer board/Jorge Luis lift/ceiling lift, Transferring/repositioning devices, Trapeze to reposition        Bedside shift change report given to Bijal Bermudez (oncoming nurse) by Vance Peters  (offgoing nurse).  Report included the following information SBAR, Kardex, Procedure Summary, Intake/Output, MAR, Recent Results and Cardiac Rhythm SR.

## 2020-06-01 NOTE — PROGRESS NOTES
Problem: Airway Clearance - Ineffective  Goal: Achieve or maintain patent airway  Outcome: Progressing Towards Goal     Problem: Gas Exchange - Impaired  Goal: Absence of hypoxia  Outcome: Progressing Towards Goal  Goal: Promote optimal lung function  Outcome: Progressing Towards Goal     Problem: Breathing Pattern - Ineffective  Goal: Ability to achieve and maintain a regular respiratory rate  Outcome: Progressing Towards Goal     Problem:  Body Temperature -  Risk of, Imbalanced  Goal: Ability to maintain a body temperature within defined limits  Outcome: Progressing Towards Goal  Goal: Will regain or maintain usual level of consciousness  Outcome: Progressing Towards Goal  Goal: Complications related to the disease process, condition or treatment will be avoided or minimized  Outcome: Progressing Towards Goal     Problem: Nutrition Deficits  Goal: Optimize nutrtional status  Outcome: Progressing Towards Goal     Problem: Fatigue  Goal: Verbalize increase energy and improved vitality  Outcome: Progressing Towards Goal     Problem: Patient Education: Go to Patient Education Activity  Goal: Patient/Family Education  Outcome: Progressing Towards Goal

## 2020-06-01 NOTE — PROGRESS NOTES
Infectious Disease Progress Note        HPI:    Ms. Olga Hays seen  Her main issues not having had a bowel movement in days, says she feels bloated  denied cough   Myalgias better   Denied fever, chills , nausea, vomiting ,             Physical Exam:    Vitals:   Patient Vitals for the past 24 hrs:   Temp Pulse Resp BP SpO2   06/01/20 1248 -- -- (!) 39 -- 97 %   06/01/20 1223 98.6 °F (37 °C) 82 26 111/69 91 %   06/01/20 1020 -- 87 -- 110/61 --   06/01/20 0959 -- -- -- -- 94 %   06/01/20 0716 98.5 °F (36.9 °C) 70 (!) 33 121/59 95 %   06/01/20 0415 98.7 °F (37.1 °C) 67 29 112/50 98 %   05/31/20 2330 -- -- -- -- 96 %   05/31/20 2315 98.6 °F (37 °C) 81 24 130/72 (!) 88 %   05/31/20 2158 -- -- -- -- 97 %   05/31/20 1900 98.7 °F (37.1 °C) 67 (!) 32 131/64 92 %   05/31/20 1830 -- 67 (!) 31 114/61 --   05/31/20 1527 -- -- -- -- 96 %   05/31/20 1451 96.9 °F (36.1 °C) 66 20 109/66 100 %     · GEN: NAD  · HEENT: no scleral icterus,  No thrush  · CV: S1, S2 heard regularly,   · Lungs: Diminished breath sounds  · Abdomen: soft,  distended, non tender,  no CVA tenderness   · Genitourinary: no murphy  · Extremities: no edema  · Skin: no rash        Labs:   Recent Results (from the past 24 hour(s))   PROTHROMBIN TIME + INR    Collection Time: 06/01/20  4:19 AM   Result Value Ref Range    INR 1.1 0.9 - 1.1      Prothrombin time 10.9 9.0 - 11.1 sec   PTT    Collection Time: 06/01/20  4:19 AM   Result Value Ref Range    aPTT 24.9 22.1 - 32.0 sec    aPTT, therapeutic range     58.0 - 77.0 SECS   FIBRINOGEN    Collection Time: 06/01/20  4:19 AM   Result Value Ref Range    Fibrinogen 601 (H) 200 - 475 mg/dL   D DIMER    Collection Time: 06/01/20  4:19 AM   Result Value Ref Range    D-dimer 1.45 (H) 0.00 - 0.65 mg/L FEU   METABOLIC PANEL, COMPREHENSIVE    Collection Time: 06/01/20  4:19 AM   Result Value Ref Range    Sodium 138 136 - 145 mmol/L    Potassium 3.8 3.5 - 5.1 mmol/L    Chloride 101 97 - 108 mmol/L    CO2 29 21 - 32 mmol/L Anion gap 8 5 - 15 mmol/L    Glucose 156 (H) 65 - 100 mg/dL    BUN 23 (H) 6 - 20 MG/DL    Creatinine 1.06 (H) 0.55 - 1.02 MG/DL    BUN/Creatinine ratio 22 (H) 12 - 20      GFR est AA >60 >60 ml/min/1.73m2    GFR est non-AA 53 (L) >60 ml/min/1.73m2    Calcium 8.0 (L) 8.5 - 10.1 MG/DL    Bilirubin, total 0.3 0.2 - 1.0 MG/DL    ALT (SGPT) 53 12 - 78 U/L    AST (SGOT) 56 (H) 15 - 37 U/L    Alk. phosphatase 108 45 - 117 U/L    Protein, total 7.1 6.4 - 8.2 g/dL    Albumin 2.7 (L) 3.5 - 5.0 g/dL    Globulin 4.4 (H) 2.0 - 4.0 g/dL    A-G Ratio 0.6 (L) 1.1 - 2.2     C REACTIVE PROTEIN, QT    Collection Time: 06/01/20  4:19 AM   Result Value Ref Range    C-Reactive protein 5.99 (H) 0.00 - 0.60 mg/dL   NT-PRO BNP    Collection Time: 06/01/20  4:19 AM   Result Value Ref Range    NT pro- (H) <125 PG/ML   LD    Collection Time: 06/01/20  4:19 AM   Result Value Ref Range     (H) 81 - 246 U/L   CBC WITH AUTOMATED DIFF    Collection Time: 06/01/20  4:19 AM   Result Value Ref Range    WBC 17.4 (H) 3.6 - 11.0 K/uL    RBC 4.01 3.80 - 5.20 M/uL    HGB 11.6 11.5 - 16.0 g/dL    HCT 37.2 35.0 - 47.0 %    MCV 92.8 80.0 - 99.0 FL    MCH 28.9 26.0 - 34.0 PG    MCHC 31.2 30.0 - 36.5 g/dL    RDW 14.7 (H) 11.5 - 14.5 %    PLATELET 655 (H) 658 - 400 K/uL    MPV 10.1 8.9 - 12.9 FL    NRBC 0.3 (H) 0  WBC    ABSOLUTE NRBC 0.05 (H) 0.00 - 0.01 K/uL    NEUTROPHILS 93 (H) 32 - 75 %    BAND NEUTROPHILS 1 0 - 6 %    LYMPHOCYTES 2 (L) 12 - 49 %    MONOCYTES 2 (L) 5 - 13 %    EOSINOPHILS 0 0 - 7 %    BASOPHILS 0 0 - 1 %    METAMYELOCYTES 2 (H) 0 %    IMMATURE GRANULOCYTES 0 %    ABS. NEUTROPHILS 16.4 (H) 1.8 - 8.0 K/UL    ABS. LYMPHOCYTES 0.3 (L) 0.8 - 3.5 K/UL    ABS. MONOCYTES 0.3 0.0 - 1.0 K/UL    ABS. EOSINOPHILS 0.0 0.0 - 0.4 K/UL    ABS. BASOPHILS 0.0 0.0 - 0.1 K/UL    ABS. IMM.  GRANS. 0.0 K/UL    DF MANUAL      PLATELET COMMENTS Large Platelets      RBC COMMENTS ANISOCYTOSIS  1+           IL 6 pending  Component Value Flag Ref Range Units Status   CRP, High sensitivity >9.5          , 642    Ferritin 532            Microbiology Data:    sputum       Component Value Ref Range & Units Status   Special Requests: NO SPECIAL REQUESTS    Preliminary   GRAM STAIN FEW WBCS SEEN    Preliminary   GRAM STAIN    Preliminary   OCCASIONAL EPITHELIAL CELLS SEEN    GRAM STAIN    Preliminary   RARE GRAM POSITIVE COCCI IN CLUSTERS    Culture result:    Preliminary   MODERATE NORMAL RESPIRATORY SAMUEL SO FAR    Result History       Component Value Ref Range & Units Status   Special Requests: NO SPECIAL REQUESTS    Final   [place] Count [address]   COLONIES/mL    Final   Culture result:    Final   MIXED UROGENITAL SAMUEL ISOLATED        Component Value Flag Ref Range Units Status   Specimen source Nasopharyngeal      Final   SARS-CoV-2 Detected  Abnormal   NOTD   Final   Comment:     TB Quantiferon 10/25/18  Component Value Ref Range & Units Status   QuantiFERON Incubation Incubation performed. Final   QuantiFERON Criteria Comment   Final   The QuantiFERON-TB Gold Plus result is determined by subtracting   the Nil value from either TB antigen (Ag) tube. The mitogen tube   serves as a control for the test.    QuantiFERON TB1 Ag 0.19  IU/mL Final   QuantiFERON TB2 Ag 0.21  IU/mL Final   QuantiFERON Nil Value 0.13  IU/mL Final   QuantiFERON Mitogen Value >10.00  IU/mL Final   QuantiFERON Plus Negative  Negative Final   Narrative               Pathology Results:  B/L mastectomy 2016, seromas  2015 R breast mastectomy invasive ductal carcinoma, ductal carcinoma in situ  2 intrammammary lymph nodes negative for tumor     Imaging:     Portable AP view of the chest is obtained, comparison to 2013. Inspiration is  very shallow limiting the examination. Interstitial infiltrates not excluded.     IMPRESSION  impression: Limited by shallow inspiration, cannot exclude interstitial  infiltrates bilaterally.         Assessment / Plan:     Problem List as of 6/1/2020 Date Reviewed: 11/13/2019          Codes Class Noted - Resolved    Hypoxia ICD-10-CM: R09.02  ICD-9-CM: 799.02  5/25/2020 - Present        COVID-19 ICD-10-CM: U07.1  ICD-9-CM: 079.89  5/25/2020 - Present        Severe obesity ([address] ICD-10-CM: E66.01  ICD-9-CM: 278.01  11/11/2019 - Present        Costochondritis ICD-10-CM: M94.0  ICD-9-CM: 733.6  10/9/2018 - Present        BMI 33.0-33.9,adult ICD-10-CM: A46.17  ICD-9-CM: V85.33  9/14/2018 - Present        Chest wall pain following surgery ICD-10-CM: R07.89, G89.18  ICD-9-CM: 786.59, 338.18  2/9/2018 - Present        Chest wall pain, chronic ICD-10-CM: R07.89, G89.29  ICD-9-CM: 786.52, 338.29  2/9/2018 - Present        H/O mastectomy, bilateral ICD-10-CM: Z90.13  ICD-9-CM: V45.71  2/9/2018 - Present        Vitamin D deficiency ICD-10-CM: E55.9  ICD-9-CM: 268.9  2/21/2017 - Present        Lymphedema ICD-10-CM: I89.0  ICD-9-CM: 457.1  11/9/2016 - Present        Hypothyroidism due to acquired atrophy of thyroid ICD-10-CM: E03.4  ICD-9-CM: 244.8, 246.8  5/14/2015 - Present        HLD (hyperlipidemia) ICD-10-CM: E78.5  ICD-9-CM: 272.4  5/14/2015 - Present        History of breast cancer ICD-10-CM: Z85.3  ICD-9-CM: V10.3  6/18/2013 - Present        Chronic headaches ICD-10-CM: R51  ICD-9-CM: 784.0  6/18/2013 - Present        Family history of cancer ICD-10-CM: Z80.9  ICD-9-CM: V16.9  6/18/2013 - Present        Skin moles ICD-10-CM: D22.9  ICD-9-CM: 216.9  6/18/2013 - Present        RESOLVED: Routine general medical examination at a health care facility ICD-10-CM: Z00.00  ICD-9-CM: V70.0  6/18/2013 - 9/30/2019             Ms. Seven Murillo is a 61-year-old lady history of breast cancer status post radiation therapy in 2013 with recurrence and status post mastectomy in 2015, hypothyroidism, arthritis with SARS-CoV-2 infection/pneumonia.       1) Sars COV 2 infection, pneumonia, hypoxia    On vitamin c , zinc  Completed Zithromax   Started on Remdesivir 5/28/20 and monitoring closely  Actemra and steroids started by pulm 5/29 , last quantiferon in 2018 noted negative  Monitor cbc , cmp daily while on Remdesivir   Concerned that she is not improving clinically  [place] per pulm    2) Constipation per primary team     3) Hx of Breast cancer   S/p mastectomy, hx of radiation therapy     4) hypothyroidsm     5) Arthritis hx     6) DVT px         Thank for the opportunity to participate in the care of this patient. Please contact with questions or concerns.        Kim Jovel,    1:32 PM

## 2020-06-02 ENCOUNTER — APPOINTMENT (OUTPATIENT)
Dept: GENERAL RADIOLOGY | Age: 60
DRG: 137 | End: 2020-06-02
Attending: PHYSICIAN ASSISTANT
Payer: MEDICAID

## 2020-06-02 LAB
APTT PPP: 23.9 SEC (ref 22.1–32)
CRP SERPL-MCNC: 3.21 MG/DL (ref 0–0.6)
D DIMER PPP FEU-MCNC: 2.38 MG/L FEU (ref 0–0.65)
FERRITIN SERPL-MCNC: 584 NG/ML (ref 8–252)
FIBRINOGEN PPP-MCNC: 420 MG/DL (ref 200–475)
INR PPP: 1.1 (ref 0.9–1.1)
LDH SERPL L TO P-CCNC: 733 U/L (ref 81–246)
PROCALCITONIN SERPL-MCNC: 0.12 NG/ML
PROTHROMBIN TIME: 10.9 SEC (ref 9–11.1)
THERAPEUTIC RANGE,PTTT: NORMAL SECS (ref 58–77)

## 2020-06-02 PROCEDURE — 85610 PROTHROMBIN TIME: CPT

## 2020-06-02 PROCEDURE — 94762 N-INVAS EAR/PLS OXIMTRY CONT: CPT

## 2020-06-02 PROCEDURE — 94660 CPAP INITIATION&MGMT: CPT

## 2020-06-02 PROCEDURE — 85384 FIBRINOGEN ACTIVITY: CPT

## 2020-06-02 PROCEDURE — 74011250637 HC RX REV CODE- 250/637: Performed by: HOSPITALIST

## 2020-06-02 PROCEDURE — 36415 COLL VENOUS BLD VENIPUNCTURE: CPT

## 2020-06-02 PROCEDURE — 74011250636 HC RX REV CODE- 250/636: Performed by: PHYSICIAN ASSISTANT

## 2020-06-02 PROCEDURE — 86140 C-REACTIVE PROTEIN: CPT

## 2020-06-02 PROCEDURE — 84145 PROCALCITONIN (PCT): CPT

## 2020-06-02 PROCEDURE — 85379 FIBRIN DEGRADATION QUANT: CPT

## 2020-06-02 PROCEDURE — 85730 THROMBOPLASTIN TIME PARTIAL: CPT

## 2020-06-02 PROCEDURE — 82728 ASSAY OF FERRITIN: CPT

## 2020-06-02 PROCEDURE — 83615 LACTATE (LD) (LDH) ENZYME: CPT

## 2020-06-02 PROCEDURE — 71045 X-RAY EXAM CHEST 1 VIEW: CPT

## 2020-06-02 PROCEDURE — 74011250637 HC RX REV CODE- 250/637: Performed by: PHYSICIAN ASSISTANT

## 2020-06-02 PROCEDURE — 77010033711 HC HIGH FLOW OXYGEN

## 2020-06-02 PROCEDURE — 74011250637 HC RX REV CODE- 250/637: Performed by: FAMILY MEDICINE

## 2020-06-02 PROCEDURE — 74011250636 HC RX REV CODE- 250/636: Performed by: HOSPITALIST

## 2020-06-02 PROCEDURE — 74011000250 HC RX REV CODE- 250: Performed by: FAMILY MEDICINE

## 2020-06-02 PROCEDURE — 65660000001 HC RM ICU INTERMED STEPDOWN

## 2020-06-02 RX ORDER — FUROSEMIDE 10 MG/ML
20 INJECTION INTRAMUSCULAR; INTRAVENOUS 2 TIMES DAILY
Status: COMPLETED | OUTPATIENT
Start: 2020-06-02 | End: 2020-06-02

## 2020-06-02 RX ORDER — POTASSIUM CHLORIDE 750 MG/1
20 TABLET, FILM COATED, EXTENDED RELEASE ORAL ONCE
Status: COMPLETED | OUTPATIENT
Start: 2020-06-02 | End: 2020-06-02

## 2020-06-02 RX ADMIN — Medication 500 MG: at 18:35

## 2020-06-02 RX ADMIN — POLYETHYLENE GLYCOL 3350 17 G: 17 POWDER, FOR SOLUTION ORAL at 10:32

## 2020-06-02 RX ADMIN — PANTOPRAZOLE SODIUM 40 MG: 40 TABLET, DELAYED RELEASE ORAL at 18:35

## 2020-06-02 RX ADMIN — METHYLPREDNISOLONE SODIUM SUCCINATE 40 MG: 40 INJECTION, POWDER, FOR SOLUTION INTRAMUSCULAR; INTRAVENOUS at 06:16

## 2020-06-02 RX ADMIN — FUROSEMIDE 20 MG: 10 INJECTION, SOLUTION INTRAMUSCULAR; INTRAVENOUS at 12:45

## 2020-06-02 RX ADMIN — FLUTICASONE PROPIONATE 2 SPRAY: 50 SPRAY, METERED NASAL at 10:38

## 2020-06-02 RX ADMIN — Medication 10 ML: at 13:04

## 2020-06-02 RX ADMIN — ZINC SULFATE 220 MG (50 MG) CAPSULE 1 CAPSULE: CAPSULE at 10:32

## 2020-06-02 RX ADMIN — POTASSIUM CHLORIDE 20 MEQ: 750 TABLET, FILM COATED, EXTENDED RELEASE ORAL at 10:50

## 2020-06-02 RX ADMIN — FUROSEMIDE 20 MG: 10 INJECTION, SOLUTION INTRAMUSCULAR; INTRAVENOUS at 18:34

## 2020-06-02 RX ADMIN — ENOXAPARIN SODIUM 40 MG: 40 INJECTION SUBCUTANEOUS at 03:56

## 2020-06-02 RX ADMIN — Medication 10 ML: at 06:00

## 2020-06-02 RX ADMIN — Medication 10 ML: at 23:02

## 2020-06-02 RX ADMIN — LEVOTHYROXINE SODIUM 250 MCG: 125 TABLET ORAL at 06:16

## 2020-06-02 RX ADMIN — METHYLPREDNISOLONE SODIUM SUCCINATE 40 MG: 40 INJECTION, POWDER, FOR SOLUTION INTRAMUSCULAR; INTRAVENOUS at 23:02

## 2020-06-02 RX ADMIN — PANTOPRAZOLE SODIUM 40 MG: 40 TABLET, DELAYED RELEASE ORAL at 10:32

## 2020-06-02 RX ADMIN — ENOXAPARIN SODIUM 40 MG: 40 INJECTION SUBCUTANEOUS at 13:01

## 2020-06-02 RX ADMIN — Medication 10 ML: at 14:00

## 2020-06-02 RX ADMIN — ALLOPURINOL 100 MG: 100 TABLET ORAL at 10:32

## 2020-06-02 NOTE — PROGRESS NOTES
NUTRITION COMPLETE ASSESSMENT    RECOMMENDATIONS:   ONS added with meals for ease/concentration of nutrients     Interventions/Plan:   Food/Nutrient Delivery: Regular diet with Ensure High Protein TID at meals, Magic Cup BID    Assessment:   Reason for Assessment: MD Consult    Diet: Regular  Supplements: Ensure High Protein TID with meals  Meal Intake:   Patient Vitals for the past 168 hrs:   % Diet Eaten   06/01/20 1700 50 %   06/01/20 1200 15 %   06/01/20 0800 15 %   05/26/20 1826 10 %         Nutritionally Significant Medications:   Vit C, Colace, Synthroid, Protonix, Miralax, Zincate  To start: Lasix, Solumedrol     Subjective:   Assessment completed by chart review, Staff Interviewed, Pt visit/interview limited by isolation precautions    Objective:  64 y.o. female admitted with Hypoxia [R09.02]  COVID-19 [U07.1]    Pt has a past medical history of Arthritis, Cancer ([facility] [address] H/O total mastectomy (11/30/2015), Hypercholesterolemia, Other ill-defined conditions(799.89), and Thyroid disease. Pt screened for LOS. Noted on 50 L HFNC with PRN bipap. RN indicates intake decreased with bipap use. Likes mandarin oranges, otherwise not eating much. Ensure High Protein added with meals. Will follow up with supplement acceptance.     Wt Readings from Last 20 Encounters:   06/02/20 108.7 kg (239 lb 11.2 oz)   11/11/19 103.9 kg (229 lb)   11/11/19 104.1 kg (229 lb 8 oz)   10/15/19 99.3 kg (219 lb)   12/27/18 101.2 kg (223 lb)   10/25/18 98.2 kg (216 lb 6.4 oz)   10/25/18 98 kg (216 lb)   10/23/18 98.2 kg (216 lb 9.6 oz)   10/01/18 96.6 kg (213 lb)   09/06/18 98.2 kg (216 lb 9.6 oz)   06/05/18 96.3 kg (212 lb 3.2 oz)   03/23/18 95.5 kg (210 lb 9.6 oz)   03/09/18 96 kg (211 lb 9.6 oz)   02/19/18 96.2 kg (212 lb)   02/09/18 96.2 kg (212 lb)   05/16/17 93.9 kg (207 lb)   04/19/17 97.7 kg (215 lb 6.4 oz)   02/21/17 99.3 kg (219 lb)   11/09/16 101.6 kg (224 lb)   06/20/16 98.9 kg (218 lb) Intake/Output:    Intake/Output Summary (Last 24 hours) at 6/2/2020 1652  Last data filed at 6/1/2020 1700  Gross per 24 hour   Intake 240 ml   Output --   Net 240 ml     Last BM: 6/1 - loose    Physical Findings:    Edema:   LUE: trace  LLE: 1+ non-pitting  RUE: trace  RLE: 1+ non-pitting    Skin Integrity: intact  Nutrition focused physical exam: deferred at this time    Anthropometrics:  Weight Loss Metrics 6/2/2020 11/11/2019 11/11/2019 10/15/2019 12/27/2018 10/25/2018 10/25/2018   Today's Wt 239 lb 11.2 oz 229 lb 8 oz 229 lb 219 lb 223 lb 216 lb 216 lb 6.4 oz   BMI 37.54 kg/m2 35.94 kg/m2 35.87 kg/m2 34.3 kg/m2 34.93 kg/m2 33.83 kg/m2 33.89 kg/m2      Weight Source: Bed  Height: 5' 7\" (170.2 cm),    Body mass index is 37.54 kg/m². IBW : 61.2 kg (135 lb), % IBW (Calculated): 177.56 %   ,      Labs:      Recent Labs     06/01/20  0419 05/31/20  0502   * 166*   BUN 23* 20   CREA 1.06* 1.04*    138   K 3.8 3.6    103   CO2 29 29   CA 8.0* 7.7*       Recent Labs     06/01/20  0419 05/31/20  0502   ALT 53 52    81   TBILI 0.3 0.2   TP 7.1 7.1   ALB 2.7* 2.5*   GLOB 4.4* 4.6*       No results for input(s): LAC in the last 72 hours. Recent Labs     06/01/20  0419   WBC 17.4*   HGB 11.6   HCT 37.2   *       No results for input(s): PREALB in the last 72 hours. No results for input(s): TRIGL in the last 72 hours. No results for input(s): GLUCPOC in the last 72 hours. Lab Results   Component Value Date/Time    Hemoglobin A1c 6.4 (H) 10/25/2018 02:15 PM       Cultural, Taoist and ethnic food preferences identified: None  Food Allergies: NKFA  Diet Restrictions: Cultural/Catholic Preference(s): None     Estimated Nutrition Needs:   Kcals/day: 2000 Kcals/day  Protein: 100 g(20%)  Fluid: 2000 ml(1 mL/kcal)  Based On:  Eileen Gupta(BMR 1700 x 1.2)  Weight Used: Actual wt(108.7 kg)    Pt expected to meet estimated nutrient needs:  Unable to predict at this time  Nutrition Diagnosis:   1.  Inadequate oral intake related to acute illness as evidenced by PO intake 10-50% of meals    2.   related to   as evidenced by      3.   related to   as evidenced by      Goals:     PO >/=50% of meals with at least 1 supplement daily over next 5-7 days     Monitoring & Evaluation:   Total energy intake, Protein intake  Weight/weight change, Electrolyte and renal profile, Glucose profile, GI, GI profile, CV-pulmonary       Previous Nutrition Goals Met:   N/A  Previous Recommendations:    N/A    Education & Discharge Needs:   [x] None Identified or too early to determine   [] Identified and addressed - see intervention/plan   [x] Participated in care plan, discharge planning, and/or interdisciplinary rounds        Karma Del Valle RD  Available via [facility]  Or Pager# [phone]

## 2020-06-02 NOTE — PROGRESS NOTES
Infectious Disease Progress Note        HPI:    Ms. Posada  seen earlier   Desaturates with movement or talking  Myalgias better   Denied fever, chills , nausea, vomiting ,   Not had BM yet , says she had enema         Physical Exam:    Vitals:   Patient Vitals for the past 24 hrs:   Temp Pulse Resp BP SpO2   06/02/20 1900 98.1 °F (36.7 °C) 77 23 129/80 95 %   06/02/20 1834 -- 78 -- 129/80 --   06/02/20 1612 -- -- -- -- 100 %   06/02/20 1504 98.2 °F (36.8 °C) 76 25 102/67 100 %   06/02/20 1245 98.3 °F (36.8 °C) 89 27 137/82 93 %   06/02/20 1200 -- -- -- -- 93 %   06/02/20 1030 -- -- -- -- 93 %   06/02/20 1000 -- 72 25 -- 100 %   06/02/20 0900 -- 60 22 -- 100 %   06/02/20 0800 -- 77 24 -- 98 %   06/02/20 0701 97.9 °F (36.6 °C) 71 21 104/76 99 %   06/02/20 0700 -- 71 18 104/76 97 %   06/02/20 0402 98.6 °F (37 °C) 72 14 128/69 96 %   06/01/20 2325 99 °F (37.2 °C) 84 24 114/66 94 %     · GEN: NAD  · HEENT: no scleral icterus,  No thrush  · CV: S1, S2 heard regularly,   · Lungs: Diminished breath sounds  · Abdomen: soft,  distended, non tender    · Extremities: no edema  · Skin: no rash        Labs:   Recent Results (from the past 24 hour(s))   FERRITIN    Collection Time: 06/02/20  3:46 AM   Result Value Ref Range    Ferritin 584 (H) 8 - 252 NG/ML   LD    Collection Time: 06/02/20  3:46 AM   Result Value Ref Range     (H) 81 - 246 U/L   C REACTIVE PROTEIN, QT    Collection Time: 06/02/20  3:46 AM   Result Value Ref Range    C-Reactive protein 3.21 (H) 0.00 - 0.60 mg/dL   PROCALCITONIN    Collection Time: 06/02/20  3:46 AM   Result Value Ref Range    Procalcitonin 0.12 ng/mL   PROTHROMBIN TIME + INR    Collection Time: 06/02/20  3:47 AM   Result Value Ref Range    INR 1.1 0.9 - 1.1      Prothrombin time 10.9 9.0 - 11.1 sec   PTT    Collection Time: 06/02/20  3:47 AM   Result Value Ref Range    aPTT 23.9 22.1 - 32.0 sec    aPTT, therapeutic range     58.0 - 77.0 SECS   FIBRINOGEN    Collection Time: 06/02/20  3:47 AM   Result Value Ref Range    Fibrinogen 420 200 - 475 mg/dL   D DIMER    Collection Time: 06/02/20  3:47 AM   Result Value Ref Range    D-dimer 2.38 (H) 0.00 - 0.65 mg/L FEU       IL 6 pending  Component Value Flag Ref Range Units Status   CRP, High sensitivity >9.5          , 642    Ferritin 532            Microbiology Data:    sputum       Component Value Ref Range & Units Status   Special Requests: NO SPECIAL REQUESTS    Preliminary   GRAM STAIN FEW WBCS SEEN    Preliminary   GRAM STAIN    Preliminary   OCCASIONAL EPITHELIAL CELLS SEEN    GRAM STAIN    Preliminary   RARE GRAM POSITIVE COCCI IN CLUSTERS    Culture result:    Preliminary   MODERATE NORMAL RESPIRATORY SAMUEL SO FAR    Result History       Component Value Ref Range & Units Status   Special Requests: NO SPECIAL REQUESTS    Final   [place] Count [id number]   COLONIES/mL    Final   Culture result:    Final   MIXED UROGENITAL SAMUEL ISOLATED        Component Value Flag Ref Range Units Status   Specimen source Nasopharyngeal      Final   SARS-CoV-2 Detected  Abnormal   NOTD   Final   Comment:     TB Quantiferon 10/25/18  Component Value Ref Range & Units Status   QuantiFERON Incubation Incubation performed. Final   QuantiFERON Criteria Comment   Final   The QuantiFERON-TB Gold Plus result is determined by subtracting   the Nil value from either TB antigen (Ag) tube. The mitogen tube   serves as a control for the test.    QuantiFERON TB1 Ag 0.19  IU/mL Final   QuantiFERON TB2 Ag 0.21  IU/mL Final   QuantiFERON Nil Value 0.13  IU/mL Final   QuantiFERON Mitogen Value >10.00  IU/mL Final   QuantiFERON Plus Negative  Negative Final   Narrative               Pathology Results:  B/L mastectomy 2016, seromas  2015 R breast mastectomy invasive ductal carcinoma, ductal carcinoma in situ  2 intrammammary lymph nodes negative for tumor     Imaging:     Portable AP view of the chest is obtained, comparison to 2013.  Inspiration is  very shallow limiting the examination. Interstitial infiltrates not excluded.     IMPRESSION  impression: Limited by shallow inspiration, cannot exclude interstitial  infiltrates bilaterally.         Assessment / Plan:     Problem List as of 6/2/2020 Date Reviewed: 11/13/2019          Codes Class Noted - Resolved    Hypoxia ICD-10-CM: R09.02  ICD-9-CM: 799.02  5/25/2020 - Present        COVID-19 ICD-10-CM: U07.1  ICD-9-CM: 079.89  5/25/2020 - Present        Severe obesity ([address] ICD-10-CM: E66.01  ICD-9-CM: 278.01  11/11/2019 - Present        Costochondritis ICD-10-CM: M94.0  ICD-9-CM: 733.6  10/9/2018 - Present        BMI 33.0-33.9,adult ICD-10-CM: E34.25  ICD-9-CM: V85.33  9/14/2018 - Present        Chest wall pain following surgery ICD-10-CM: R07.89, G89.18  ICD-9-CM: 786.59, 338.18  2/9/2018 - Present        Chest wall pain, chronic ICD-10-CM: R07.89, G89.29  ICD-9-CM: 786.52, 338.29  2/9/2018 - Present        H/O mastectomy, bilateral ICD-10-CM: Z90.13  ICD-9-CM: V45.71  2/9/2018 - Present        Vitamin D deficiency ICD-10-CM: E55.9  ICD-9-CM: 268.9  2/21/2017 - Present        Lymphedema ICD-10-CM: I89.0  ICD-9-CM: 457.1  11/9/2016 - Present        Hypothyroidism due to acquired atrophy of thyroid ICD-10-CM: E03.4  ICD-9-CM: 244.8, 246.8  5/14/2015 - Present        HLD (hyperlipidemia) ICD-10-CM: E78.5  ICD-9-CM: 272.4  5/14/2015 - Present        History of breast cancer ICD-10-CM: Z85.3  ICD-9-CM: V10.3  6/18/2013 - Present        Chronic headaches ICD-10-CM: R51  ICD-9-CM: 784.0  6/18/2013 - Present        Family history of cancer ICD-10-CM: Z80.9  ICD-9-CM: V16.9  6/18/2013 - Present        Skin moles ICD-10-CM: D22.9  ICD-9-CM: 216.9  6/18/2013 - Present        RESOLVED: Routine general medical examination at a health care facility ICD-10-CM: Z00.00  ICD-9-CM: V70.0  6/18/2013 - 9/30/2019             Ms. Tri Fajardo is a 61-year-old lady history of breast cancer status post radiation therapy in 2013 with recurrence and status post mastectomy in 2015, hypothyroidism, arthritis with SARS-CoV-2 infection/pneumonia. 1) Sars COV 2 infection, pneumonia, hypoxia    On vitamin c , zinc  Completed Zithromax   Started on Remdesivir 5/28/20 and monitoring closely  Actemra and steroids started by pulm 5/29 , last quantiferon in 2018 noted negative  Monitor cbc , cmp daily while on Remdesivir   Concerned that she is not improving clinically  [place] per pulm    2) Leukocytosis, monitoring , could be from steroids     2) Constipation per primary team     3) Hx of Breast cancer   S/p mastectomy, hx of radiation therapy     4) hypothyroidsm     5) Arthritis hx     6) DVT px         Thank for the opportunity to participate in the care of this patient. Please contact with questions or concerns.        Kim Jovel,    1:32 PM

## 2020-06-02 NOTE — PROGRESS NOTES
[facility] Adult  Hospitalist Group                                                                                          Hospitalist Progress Note  Pepe Fisher MD  Answering service: [phone] from in house phone        Date of Service:  2020  NAME:  Jamin Hutchinson  :  1960  MRN:  [id number]      Admission Summary:     Jamin Hutchinson is a 64 y.o. female who presents with COVID pos+ from [address] [place] [address]. Pt said she had a test 2 days back and came back positive. She became SOB and went to ED.  Patient states that for the last 3 days she has not felt well.  She has had some body aches and nausea and fatigue.  She is also had cough.  Over the last 24 hours she started to have shortness of breath.  She comes in for evaluation of these complaints and concern for coronavirus given her workplace exposure.  She works at a facility in [employer] where there is apparently several patients with confirmed coronavirus. Interval history / Subjective:       Patient still with some shortness of breath. Still with high flow oxygen. Assessment & Plan:     Acute respiratory failure  -Acute hypoxic respiratory failure secondary to viral pneumonia  -COVID 19 positive as outpatient  -Pulmonology following  -Currently on 50 L high flow    COVID 19 infection  -Droplet plus precaution  -Continue vitamin C and azithromycin  -Anticoagulation per protocol  -Completed ramp to severe  -Actemra one dose   -On steroid  -ID and pulmonology following    Leukocytosis  -Likely from steroid use    Hypothyroidism  -TSH elevated  -Continue Synthroid  -Follow TSH level as outpatient    Obesity  -Patient with body mass index of 37.66 kg/m²  -Outpatient follow-up for weight management    Dehydration  -Monitor renal function    Abnormal LFT's   -Likely from infectious process  -Monitor liver function  -consider USG as out pt     Code status: We discussed CODE STATUS with the patient.   She is okay with intubation but no other resuscitative efforts. DVT prophylaxis: Lovenox    Care Plan discussed with: Patient/Family and Nurse  Anticipated Disposition: TBD  Anticipated Discharge: Greater than 48 hours     Tried to get in touch with patient's sister Genny Reid for the past 2 days and left message. Hospital Problems  Date Reviewed: 11/13/2019          Codes Class Noted POA    Hypoxia ICD-10-CM: R09.02  ICD-9-CM: 799.02  5/25/2020 Unknown        COVID-19 ICD-10-CM: U07.1  ICD-9-CM: 079.89  5/25/2020 Unknown                Review of Systems:   A comprehensive review of systems was negative except for that written in the HPI. Vital Signs:    Last 24hrs VS reviewed since prior progress note. Most recent are:  Visit Vitals  /82 (BP 1 Location: Right arm, BP Patient Position: At rest)   Pulse 89   Temp 98.3 °F (36.8 °C)   Resp 27   Ht 5' 7\" (1.702 m)   Wt 108.7 kg (239 lb 11.2 oz)   SpO2 93%   BMI 37.54 kg/m²         Intake/Output Summary (Last 24 hours) at 6/2/2020 1307  Last data filed at 6/1/2020 1700  Gross per 24 hour   Intake 240 ml   Output --   Net 240 ml        Physical Examination:             Constitutional:  No acute distress, cooperative, pleasant    ENT:  Oral mucosa moist, oropharynx benign. Resp:  CTA bilaterally. No wheezing/rhonchi/rales. No accessory muscle use   CV:  Regular rhythm, normal rate, no murmurs, gallops, rubs    GI:  Soft, non distended, non tender. normoactive bowel sounds, no hepatosplenomegaly     Musculoskeletal:  No edema, warm, 2+ pulses throughout    Neurologic:  Moves all extremities.   AAOx3, CN II-XII reviewed     Skin:  Good turgor, no rashes or ulcers       Data Review:    Review and/or order of clinical lab test      Labs:     Recent Labs     06/01/20  0419   WBC 17.4*   HGB 11.6   HCT 37.2   *     Recent Labs     06/01/20  0419 05/31/20  0502    138   K 3.8 3.6    103   CO2 29 29   BUN 23* 20   CREA 1.06* 1.04*   * 166*   CA 8.0* 7.7*     Recent Labs     06/01/20  0419 05/31/20  0502   ALT 53 52    81   TBILI 0.3 0.2   TP 7.1 7.1   ALB 2.7* 2.5*   GLOB 4.4* 4.6*     Recent Labs     06/02/20  0347 06/01/20  0419 05/31/20  0502   INR 1.1 1.1 1.1   PTP 10.9 10.9 10.9   APTT 23.9 24.9 27.6      Recent Labs     06/02/20  0346   FERR 584*      No results found for: FOL, RBCF   No results for input(s): PH, PCO2, PO2 in the last 72 hours. No results for input(s): CPK, CKNDX, TROIQ in the last 72 hours.     No lab exists for component: CPKMB  Lab Results   Component Value Date/Time    Cholesterol, total 310 (H) 10/25/2018 02:15 PM    HDL Cholesterol 54 10/25/2018 02:15 PM    LDL, calculated 221 (H) 10/25/2018 02:15 PM    Triglyceride 173 (H) 10/25/2018 02:15 PM    CHOL/HDL Ratio 6.8 (H) 03/09/2012 09:10 AM     Lab Results   Component Value Date/Time    Glucose (POC) 130 (H) 05/27/2020 12:03 PM     Lab Results   Component Value Date/Time    Color Yellow 10/25/2018 02:15 PM    Appearance Cloudy (A) 10/25/2018 02:15 PM    Specific gravity 1.020 07/16/2015 07:58 PM    pH (UA) 5.5 10/25/2018 02:15 PM    Protein NEGATIVE  07/16/2015 07:58 PM    Glucose NEGATIVE  07/16/2015 07:58 PM    Ketone Negative 10/25/2018 02:15 PM    Bilirubin Negative 10/25/2018 02:15 PM    Urobilinogen 0.2 07/16/2015 07:58 PM    Nitrites Negative 10/25/2018 02:15 PM    Leukocyte Esterase 2+ (A) 10/25/2018 02:15 PM    Epithelial cells FEW 07/16/2015 07:58 PM    Bacteria Few 10/25/2018 02:15 PM    WBC >30 (A) 10/25/2018 02:15 PM    RBC 3-10 (A) 10/25/2018 02:15 PM         Medications Reviewed:     Current Facility-Administered Medications   Medication Dose Route Frequency    methylPREDNISolone (PF) (SOLU-MEDROL) injection 40 mg  40 mg IntraVENous Q12H    furosemide (LASIX) injection 20 mg  20 mg IntraVENous BID    pantoprazole (PROTONIX) tablet 40 mg  40 mg Oral BID    diphenhydrAMINE (BENADRYL) capsule 25 mg  25 mg Oral Q6H PRN    polyethylene glycol (MIRALAX) packet 17 g  17 g Oral DAILY    sodium chloride (OCEAN) 0.65 % nasal squeeze bottle 2 Spray  2 Spray Both Nostrils Q2H PRN    lidocaine 4 % patch 1 Patch  1 Patch TransDERmal Q24H    lidocaine 4 % patch 1 Patch  1 Patch TransDERmal Q24H    docusate sodium (COLACE) capsule 100 mg  100 mg Oral DAILY    butalbital-acetaminophen-caffeine (FIORICET, ESGIC) -40 mg per tablet 1 Tab  1 Tab Oral Q12H PRN    levothyroxine (SYNTHROID) tablet 250 mcg  250 mcg Oral 6am    allopurinoL (ZYLOPRIM) tablet 100 mg  100 mg Oral DAILY    acetaminophen (TYLENOL) tablet 650 mg  650 mg Oral Q6H PRN    Or    acetaminophen (TYLENOL) suppository 650 mg  650 mg Rectal Q6H PRN    fluticasone propionate (FLONASE) 50 mcg/actuation nasal spray 2 Spray  2 Spray Both Nostrils DAILY    albuterol (PROVENTIL HFA, VENTOLIN HFA, PROAIR HFA) inhaler 2 Puff  2 Puff Inhalation Q6H PRN    zinc sulfate (ZINCATE) 220 (50) mg capsule 1 Cap  1 Cap Oral DAILY    ascorbic acid (vitamin C) (VITAMIN C) tablet 500 mg  500 mg Oral BID    enoxaparin (LOVENOX) injection 40 mg  40 mg SubCUTAneous Q12H    guaiFENesin (ROBITUSSIN) 100 mg/5 mL oral liquid 100 mg  100 mg Oral Q4H PRN    sodium chloride (NS) flush 5-40 mL  5-40 mL IntraVENous Q8H    sodium chloride (NS) flush 5-40 mL  5-40 mL IntraVENous PRN    ondansetron (ZOFRAN) injection 4 mg  4 mg IntraVENous Q6H PRN     ______________________________________________________________________  EXPECTED LENGTH OF STAY: 5d 12h  ACTUAL LENGTH OF STAY:          8                 Jane Porter MD

## 2020-06-02 NOTE — PROGRESS NOTES
1815: Pt up to [facility] with standby assist, O2 sats at 95%.     1900: Pt weaned to 55% O2 on HiFlo NC. O2 sats at 100%, pt tolerated well. Problem: Gas Exchange - Impaired  Goal: Absence of hypoxia  6/2/2020 1647 by Patricia Moran  Outcome: Progressing Towards Goal  · O2 sats at >93% throughout shift. Pt tolerates HFNC at 60% with slight O2 sat deterioration on exertion. Problem: Body Temperature -  Risk of, Imbalanced  Goal: Complications related to the disease process, condition or treatment will be avoided or minimized  Outcome: Progressing Towards Goal  · Temp trends >98F throughout shift. Problem: Nutrition Deficits  Goal: Optimize nutrtional status  6/2/2020 1647 by Patricia Moran  Outcome: Progressing Towards Goal  · Pt ate 100% of breakfast, lunch, and two tv tray dinners. But needs encouragement supplementing meals with Ensure drinks. Problem: Risk for Fluid Volume Deficit  Goal: Maintain normal heart rhythm  6/2/2020 1647 by Patricia Moran  Outcome: Progressing Towards Goal  · HR 71-89 throughout shift.     Goal: Maintain absence of muscle cramping  6/2/2020 1647 by Patricia Moran  Outcome: Progressing Towards Goal  · Pt's reports \"no cramping, nausea, or other pain\" during hourly rounds     Problem: Loneliness or Risk for Loneliness  Goal: Demonstrate positive use of time alone when socialization is not possible  6/2/2020 1647 by Patricia Moran  Outcome: Progressing Towards Goal  · Pt completed crossword puzzles and requests more reading material on COVID-care interventions     Problem: Fatigue  Goal: Verbalize increase energy and improved vitality  6/2/2020 1647 by Patricia Moran  Outcome: Progressing Towards Goal  · Pt states she is feeling much better than when she arrived

## 2020-06-02 NOTE — PROGRESS NOTES
Pulmonary, Critical Care, and Sleep Medicine~Progress Note    Name: Shameka Ji MRN: [id number]   : 1960 [facility]   Date: 2020 10:42 AM Admission: 2020     Impression Plan   1. Acute hypoxic resp failure   2. COVID19 PNA   3. Obesity   4. Hx of total mastectomy  1. High risk of decline  2. Started pulsed [place] on ; now on hold secondary to NIV/ highflow. Can start back when on NC  3. Still on high flow and FiO2 requirements. Wean please   4. NIV at night and for WOB   5. lovenox   6. IL6 is 91, received actemra x 1 dose  7. Wean steroids   8. azithromax completed    9. Vit c/zinc   10. Diuresis again today   11. s/p Remdesivir     Daily Progression:      D dimer is continuing to rise but at 2.38  Other labs are trending downward   Chest film has shown a slight improvment      D dimer 1.45  CRP 5.99 down  LD down to 740      On bipap, tachypneic  But says feels a little better      On 55lpm and 100% fiO2; sats in low 90s and upper 80s   Looks fatigued   D dimer is 0.4  Inflammatory markers are up     I have reviewed the labs and previous days notes. Pertinent items are noted in HPI. OBJECTIVE:     Vital Signs:       Visit Vitals  /76 (BP 1 Location: Right arm, BP Patient Position: At rest)   Pulse 71   Temp 97.9 °F (36.6 °C)   Resp 21   Ht 5' 7.01\" (1.702 m)   Wt 108.7 kg (239 lb 11.2 oz)   SpO2 99%   BMI 37.53 kg/m²      Temp (24hrs), Av.5 °F (36.9 °C), Min:97.9 °F (36.6 °C), Max:99 °F (37.2 °C)     Intake/Output:     Last shift: No intake/output data recorded.     Last 3 shifts: 1 -  0700  In: 720 [P.O.:720]  Out: 1700 [Urine:1700]          Intake/Output Summary (Last 24 hours) at 2020 1006  Last data filed at 2020 1700  Gross per 24 hour   Intake 480 ml   Output 1300 ml   Net -820 ml       Physical Exam:                                        Exam Findings Other   General: No resp distress noted, appears stated age    [de-identified]:  No ulcers, JVD not elevated, no cervical LAD    Chest: No pectus deformity, normal chest rise b/l    HEART:  Deferred     Lungs:  Deferred     ABD: Soft/NT, non rigid mildly distended    EXT: No cyanosis/clubbing/edema, normal peripheral pulses    Skin: No rashes or ulcers, no mottling    Neuro: A/O x 3        Medications:  Current Facility-Administered Medications   Medication Dose Route Frequency    methylPREDNISolone (PF) (SOLU-MEDROL) injection 40 mg  40 mg IntraVENous Q8H    pantoprazole (PROTONIX) tablet 40 mg  40 mg Oral BID    diphenhydrAMINE (BENADRYL) capsule 25 mg  25 mg Oral Q6H PRN    polyethylene glycol (MIRALAX) packet 17 g  17 g Oral DAILY    sodium chloride (OCEAN) 0.65 % nasal squeeze bottle 2 Spray  2 Spray Both Nostrils Q2H PRN    lidocaine 4 % patch 1 Patch  1 Patch TransDERmal Q24H    lidocaine 4 % patch 1 Patch  1 Patch TransDERmal Q24H    docusate sodium (COLACE) capsule 100 mg  100 mg Oral DAILY    butalbital-acetaminophen-caffeine (FIORICET, ESGIC) -40 mg per tablet 1 Tab  1 Tab Oral Q12H PRN    levothyroxine (SYNTHROID) tablet 250 mcg  250 mcg Oral 6am    allopurinoL (ZYLOPRIM) tablet 100 mg  100 mg Oral DAILY    acetaminophen (TYLENOL) tablet 650 mg  650 mg Oral Q6H PRN    Or    acetaminophen (TYLENOL) suppository 650 mg  650 mg Rectal Q6H PRN    fluticasone propionate (FLONASE) 50 mcg/actuation nasal spray 2 Spray  2 Spray Both Nostrils DAILY    albuterol (PROVENTIL HFA, VENTOLIN HFA, PROAIR HFA) inhaler 2 Puff  2 Puff Inhalation Q6H PRN    zinc sulfate (ZINCATE) 220 (50) mg capsule 1 Cap  1 Cap Oral DAILY    ascorbic acid (vitamin C) (VITAMIN C) tablet 500 mg  500 mg Oral BID    enoxaparin (LOVENOX) injection 40 mg  40 mg SubCUTAneous Q12H    guaiFENesin (ROBITUSSIN) 100 mg/5 mL oral liquid 100 mg  100 mg Oral Q4H PRN    sodium chloride (NS) flush 5-40 mL  5-40 mL IntraVENous Q8H  sodium chloride (NS) flush 5-40 mL  5-40 mL IntraVENous PRN    ondansetron (ZOFRAN) injection 4 mg  4 mg IntraVENous Q6H PRN       Labs:  ABG No results for input(s): PHI, PCO2I, PO2I, HCO3I, SO2I, FIO2I in the last 72 hours.      CBC Recent Labs     06/01/20  0419   WBC 17.4*   HGB 11.6   HCT 37.2   *   MCV 92.8   MCH 24.1        Metabolic  Panel Recent Labs     06/02/20  0347 06/01/20  0419 05/31/20  0502   NA  --  138 138   K  --  3.8 3.6   CL  --  101 103   CO2  --  29 29   GLU  --  156* 166*   BUN  --  23* 20   CREA  --  1.06* 1.04*   CA  --  8.0* 7.7*   ALB  --  2.7* 2.5*   ALT  --  53 52   INR 1.1 1.1 1.1        Pertinent Labs                Toi Fischer PA-C  6/2/2020

## 2020-06-02 NOTE — PROGRESS NOTES
Problem: Gas Exchange - Impaired  Goal: Absence of hypoxia  Outcome: Progressing Towards Goal  Pt continued on hi-flow nasal cannula. Orders to wean as appropriate. Patient on bipap PRN. Goal: Promote optimal lung function  Outcome: Progressing Towards Goal  Patient instructed to cough and deep breath. Problem: Fatigue  Goal: Verbalize increase energy and improved vitality  Outcome: Progressing Towards Goal   Patient turns and transfers well. Slight dyspnea on exertion but O2 saturation remains stable. Last 3 Recorded Weights in this Encounter    05/31/20 0500 06/01/20 0415 06/02/20 0402   Weight: 109.5 kg (241 lb 6.5 oz) 109.5 kg (241 lb 5.1 oz) 108.7 kg (239 lb 11.2 oz)       Bedside shift change report given to Edison Ca RN (oncoming nurse) by Guillermo Bird RN (offgoing nurse). Report included the following information SBAR, Kardex, Recent Results and Cardiac Rhythm NSR.

## 2020-06-03 LAB
ALBUMIN SERPL-MCNC: 2.8 G/DL (ref 3.5–5)
ALBUMIN/GLOB SERPL: 0.7 {RATIO} (ref 1.1–2.2)
ALP SERPL-CCNC: 105 U/L (ref 45–117)
ALT SERPL-CCNC: 59 U/L (ref 12–78)
ANION GAP SERPL CALC-SCNC: 7 MMOL/L (ref 5–15)
APTT PPP: 23.7 SEC (ref 22.1–32)
AST SERPL-CCNC: 54 U/L (ref 15–37)
BASOPHILS # BLD: 0 K/UL (ref 0–0.1)
BASOPHILS NFR BLD: 0 % (ref 0–1)
BILIRUB SERPL-MCNC: 0.2 MG/DL (ref 0.2–1)
BNP SERPL-MCNC: 136 PG/ML
BUN SERPL-MCNC: 30 MG/DL (ref 6–20)
BUN/CREAT SERPL: 28 (ref 12–20)
CALCIUM SERPL-MCNC: 7.9 MG/DL (ref 8.5–10.1)
CHLORIDE SERPL-SCNC: 99 MMOL/L (ref 97–108)
CO2 SERPL-SCNC: 29 MMOL/L (ref 21–32)
CREAT SERPL-MCNC: 1.06 MG/DL (ref 0.55–1.02)
CRP SERPL-MCNC: 1.99 MG/DL (ref 0–0.6)
D DIMER PPP FEU-MCNC: 4.09 MG/L FEU (ref 0–0.65)
DIFFERENTIAL METHOD BLD: ABNORMAL
EOSINOPHIL # BLD: 0 K/UL (ref 0–0.4)
EOSINOPHIL NFR BLD: 0 % (ref 0–7)
ERYTHROCYTE [DISTWIDTH] IN BLOOD BY AUTOMATED COUNT: 14.8 % (ref 11.5–14.5)
FERRITIN SERPL-MCNC: 552 NG/ML (ref 26–388)
FIBRINOGEN PPP-MCNC: 398 MG/DL (ref 200–475)
GLOBULIN SER CALC-MCNC: 3.8 G/DL (ref 2–4)
GLUCOSE SERPL-MCNC: 169 MG/DL (ref 65–100)
HCT VFR BLD AUTO: 35.1 % (ref 35–47)
HGB BLD-MCNC: 10.9 G/DL (ref 11.5–16)
IMM GRANULOCYTES # BLD AUTO: 0 K/UL
IMM GRANULOCYTES NFR BLD AUTO: 0 %
INR PPP: 1 (ref 0.9–1.1)
LDH SERPL L TO P-CCNC: 739 U/L (ref 81–246)
LYMPHOCYTES # BLD: 1.1 K/UL (ref 0.8–3.5)
LYMPHOCYTES NFR BLD: 8 % (ref 12–49)
MCH RBC QN AUTO: 29.1 PG (ref 26–34)
MCHC RBC AUTO-ENTMCNC: 31.1 G/DL (ref 30–36.5)
MCV RBC AUTO: 93.6 FL (ref 80–99)
METAMYELOCYTES NFR BLD MANUAL: 1 %
MONOCYTES # BLD: 0.4 K/UL (ref 0–1)
MONOCYTES NFR BLD: 3 % (ref 5–13)
NEUTS BAND NFR BLD MANUAL: 1 % (ref 0–6)
NEUTS SEG # BLD: 12.5 K/UL (ref 1.8–8)
NEUTS SEG NFR BLD: 87 % (ref 32–75)
NRBC # BLD: 0.04 K/UL (ref 0–0.01)
NRBC BLD-RTO: 0.3 PER 100 WBC
PLATELET # BLD AUTO: 581 K/UL (ref 150–400)
PLATELET COMMENTS,PCOM: ABNORMAL
PMV BLD AUTO: 10.1 FL (ref 8.9–12.9)
POTASSIUM SERPL-SCNC: 4.2 MMOL/L (ref 3.5–5.1)
PROT SERPL-MCNC: 6.6 G/DL (ref 6.4–8.2)
PROTHROMBIN TIME: 10.7 SEC (ref 9–11.1)
RBC # BLD AUTO: 3.75 M/UL (ref 3.8–5.2)
RBC MORPH BLD: ABNORMAL
SODIUM SERPL-SCNC: 135 MMOL/L (ref 136–145)
THERAPEUTIC RANGE,PTTT: NORMAL SECS (ref 58–77)
WBC # BLD AUTO: 14.2 K/UL (ref 3.6–11)

## 2020-06-03 PROCEDURE — 36415 COLL VENOUS BLD VENIPUNCTURE: CPT

## 2020-06-03 PROCEDURE — 94762 N-INVAS EAR/PLS OXIMTRY CONT: CPT

## 2020-06-03 PROCEDURE — 65660000001 HC RM ICU INTERMED STEPDOWN

## 2020-06-03 PROCEDURE — 85025 COMPLETE CBC W/AUTO DIFF WBC: CPT

## 2020-06-03 PROCEDURE — 85379 FIBRIN DEGRADATION QUANT: CPT

## 2020-06-03 PROCEDURE — 85610 PROTHROMBIN TIME: CPT

## 2020-06-03 PROCEDURE — 74011250637 HC RX REV CODE- 250/637: Performed by: FAMILY MEDICINE

## 2020-06-03 PROCEDURE — 74011000250 HC RX REV CODE- 250: Performed by: FAMILY MEDICINE

## 2020-06-03 PROCEDURE — 74011250636 HC RX REV CODE- 250/636: Performed by: HOSPITALIST

## 2020-06-03 PROCEDURE — 94660 CPAP INITIATION&MGMT: CPT

## 2020-06-03 PROCEDURE — 85730 THROMBOPLASTIN TIME PARTIAL: CPT

## 2020-06-03 PROCEDURE — 82728 ASSAY OF FERRITIN: CPT

## 2020-06-03 PROCEDURE — 86140 C-REACTIVE PROTEIN: CPT

## 2020-06-03 PROCEDURE — 74011250637 HC RX REV CODE- 250/637: Performed by: HOSPITALIST

## 2020-06-03 PROCEDURE — 77010033711 HC HIGH FLOW OXYGEN

## 2020-06-03 PROCEDURE — 97530 THERAPEUTIC ACTIVITIES: CPT

## 2020-06-03 PROCEDURE — 85384 FIBRINOGEN ACTIVITY: CPT

## 2020-06-03 PROCEDURE — 83880 ASSAY OF NATRIURETIC PEPTIDE: CPT

## 2020-06-03 PROCEDURE — 80053 COMPREHEN METABOLIC PANEL: CPT

## 2020-06-03 PROCEDURE — 97161 PT EVAL LOW COMPLEX 20 MIN: CPT

## 2020-06-03 PROCEDURE — 83615 LACTATE (LD) (LDH) ENZYME: CPT

## 2020-06-03 PROCEDURE — 74011250636 HC RX REV CODE- 250/636: Performed by: PHYSICIAN ASSISTANT

## 2020-06-03 PROCEDURE — 97110 THERAPEUTIC EXERCISES: CPT

## 2020-06-03 RX ADMIN — Medication 10 ML: at 07:32

## 2020-06-03 RX ADMIN — ENOXAPARIN SODIUM 40 MG: 40 INJECTION SUBCUTANEOUS at 13:36

## 2020-06-03 RX ADMIN — Medication 500 MG: at 10:09

## 2020-06-03 RX ADMIN — Medication 10 ML: at 20:55

## 2020-06-03 RX ADMIN — LEVOTHYROXINE SODIUM 250 MCG: 125 TABLET ORAL at 07:33

## 2020-06-03 RX ADMIN — ZINC SULFATE 220 MG (50 MG) CAPSULE 1 CAPSULE: CAPSULE at 10:09

## 2020-06-03 RX ADMIN — ENOXAPARIN SODIUM 40 MG: 40 INJECTION SUBCUTANEOUS at 02:09

## 2020-06-03 RX ADMIN — Medication 500 MG: at 18:43

## 2020-06-03 RX ADMIN — Medication 10 ML: at 13:36

## 2020-06-03 RX ADMIN — ALLOPURINOL 100 MG: 100 TABLET ORAL at 10:09

## 2020-06-03 RX ADMIN — PANTOPRAZOLE SODIUM 40 MG: 40 TABLET, DELAYED RELEASE ORAL at 18:43

## 2020-06-03 RX ADMIN — METHYLPREDNISOLONE SODIUM SUCCINATE 20 MG: 40 INJECTION, POWDER, FOR SOLUTION INTRAMUSCULAR; INTRAVENOUS at 20:54

## 2020-06-03 RX ADMIN — POLYETHYLENE GLYCOL 3350 17 G: 17 POWDER, FOR SOLUTION ORAL at 10:10

## 2020-06-03 RX ADMIN — METHYLPREDNISOLONE SODIUM SUCCINATE 40 MG: 40 INJECTION, POWDER, FOR SOLUTION INTRAMUSCULAR; INTRAVENOUS at 10:09

## 2020-06-03 RX ADMIN — PANTOPRAZOLE SODIUM 40 MG: 40 TABLET, DELAYED RELEASE ORAL at 10:10

## 2020-06-03 NOTE — PROGRESS NOTES
[facility]                                                                                          Hospitalist Progress Note  Lynn Mcknight MD  Answering service: [phone] from in house phone        Date of Service:  6/3/2020  NAME:  Thomas Pratt  :  1960  MRN:  [id number]      Admission Summary:     Thomas Pratt is a 64 y.o. female who presents with COVID pos+ from [address] [place] [address]. Pt said she had a test 2 days back and came back positive. She became SOB and went to ED.  Patient states that for the last 3 days she has not felt well.  She has had some body aches and nausea and fatigue.  She is also had cough.  Over the last 24 hours she started to have shortness of breath.  She comes in for evaluation of these complaints and concern for coronavirus given her workplace exposure.  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 Interval history / Subjective:       Patient still with some shortness of breath. Still with high flow oxygen. Assessment & Plan:     Acute respiratory failure  -Acute hypoxic respiratory failure secondary to viral pneumonia  -COVID 19 positive as outpatient  -Pulmonology following  -Weaning oxygen ,now on 35 L high flow    COVID 19 infection  -Droplet plus precaution  -Continue vitamin C and azithromycin  -Anticoagulation per protocol  -Completed Remdesivir  -Actemra one dose   -On steroid  -ID and pulmonology following    Leukocytosis  -Likely from steroid use    Hypothyroidism  -TSH elevated  -Continue Synthroid  -Follow TSH level as outpatient    Obesity  -Patient with body mass index of 37.66 kg/m²  -Outpatient follow-up for weight management    Dehydration  -Monitor renal function    Abnormal LFT's   -Likely from infectious process  -Monitor liver function  -consider USG as out pt     Code status: We discussed CODE STATUS with the patient.   She is okay with intubation but no other resuscitative efforts. DVT prophylaxis: Lovenox    Care Plan discussed with: Patient/Family and Nurse  Anticipated Disposition: TBD  Anticipated Discharge: Greater than 48 hours      Hospital Problems  Date Reviewed: 11/13/2019          Codes Class Noted POA    Hypoxia ICD-10-CM: R09.02  ICD-9-CM: 799.02  5/25/2020 Unknown        COVID-19 ICD-10-CM: U07.1  ICD-9-CM: 079.89  5/25/2020 Unknown                Review of Systems:   A comprehensive review of systems was negative except for that written in the HPI. Vital Signs:    Last 24hrs VS reviewed since prior progress note. Most recent are:  Visit Vitals  /79 (BP 1 Location: Left arm, BP Patient Position: At rest)   Pulse 76   Temp 98.6 °F (37 °C)   Resp 18   Ht 5' 7\" (1.702 m)   Wt 109.1 kg (240 lb 8.4 oz)   SpO2 97%   BMI 37.67 kg/m²         Intake/Output Summary (Last 24 hours) at 6/3/2020 1355  Last data filed at 6/3/2020 0030  Gross per 24 hour   Intake 720 ml   Output 0 ml   Net 720 ml        Physical Examination:             Constitutional:  No acute distress, cooperative, pleasant    ENT:  Oral mucosa moist, oropharynx benign. Resp:  CTA bilaterally. No wheezing/rhonchi/rales. No accessory muscle use   CV:  Regular rhythm, normal rate, no murmurs, gallops, rubs    GI:  Soft, non distended, non tender. normoactive bowel sounds, no hepatosplenomegaly     Musculoskeletal:  No edema, warm, 2+ pulses throughout    Neurologic:  Moves all extremities.   AAOx3, CN II-XII reviewed     Skin:  Good turgor, no rashes or ulcers       Data Review:    Review and/or order of clinical lab test      Labs:     Recent Labs     06/03/20 0451 06/01/20  0419   WBC 14.2* 17.4*   HGB 10.9* 11.6   HCT 35.1 37.2   * 648*     Recent Labs     06/03/20  0451 06/01/20  0419   * 138   K 4.2 3.8   CL 99 101   CO2 29 29   BUN 30* 23*   CREA 1.06* 1.06*   * 156*   CA 7.9* 8.0*     Recent Labs     06/03/20  0451 06/01/20  0419   ALT 59 53    108   TBILI 0.2 0.3   TP 6.6 7.1   ALB 2.8* 2.7*   GLOB 3.8 4.4*     Recent Labs     06/03/20  0451 06/02/20  0347 06/01/20  0419   INR 1.0 1.1 1.1   PTP 10.7 10.9 10.9   APTT 23.7 23.9 24.9      Recent Labs     06/03/20  0451 06/02/20  0346   FERR 552* 584*      No results found for: FOL, RBCF   No results for input(s): PH, PCO2, PO2 in the last 72 hours. No results for input(s): CPK, CKNDX, TROIQ in the last 72 hours.     No lab exists for component: CPKMB  Lab Results   Component Value Date/Time    Cholesterol, total 310 (H) 10/25/2018 02:15 PM    HDL Cholesterol 54 10/25/2018 02:15 PM    LDL, calculated 221 (H) 10/25/2018 02:15 PM    Triglyceride 173 (H) 10/25/2018 02:15 PM    CHOL/HDL Ratio 6.8 (H) 03/09/2012 09:10 AM     Lab Results   Component Value Date/Time    Glucose (POC) 130 (H) 05/27/2020 12:03 PM     Lab Results   Component Value Date/Time    Color Yellow 10/25/2018 02:15 PM    Appearance Cloudy (A) 10/25/2018 02:15 PM    Specific gravity 1.020 07/16/2015 07:58 PM    pH (UA) 5.5 10/25/2018 02:15 PM    Protein NEGATIVE  07/16/2015 07:58 PM    Glucose NEGATIVE  07/16/2015 07:58 PM    Ketone Negative 10/25/2018 02:15 PM    Bilirubin Negative 10/25/2018 02:15 PM    Urobilinogen 0.2 07/16/2015 07:58 PM    Nitrites Negative 10/25/2018 02:15 PM    Leukocyte Esterase 2+ (A) 10/25/2018 02:15 PM    Epithelial cells FEW 07/16/2015 07:58 PM    Bacteria Few 10/25/2018 02:15 PM    WBC >30 (A) 10/25/2018 02:15 PM    RBC 3-10 (A) 10/25/2018 02:15 PM         Medications Reviewed:     Current Facility-Administered Medications   Medication Dose Route Frequency    methylPREDNISolone (PF) (SOLU-MEDROL) injection 20 mg  20 mg IntraVENous Q12H    pantoprazole (PROTONIX) tablet 40 mg  40 mg Oral BID    diphenhydrAMINE (BENADRYL) capsule 25 mg  25 mg Oral Q6H PRN    polyethylene glycol (MIRALAX) packet 17 g  17 g Oral DAILY    sodium chloride (OCEAN) 0.65 % nasal squeeze bottle 2 Spray  2 Spray Both Nostrils Q2H PRN    lidocaine 4 % patch 1 Patch  1 Patch TransDERmal Q24H    lidocaine 4 % patch 1 Patch  1 Patch TransDERmal Q24H    docusate sodium (COLACE) capsule 100 mg  100 mg Oral DAILY    butalbital-acetaminophen-caffeine (FIORICET, ESGIC) -40 mg per tablet 1 Tab  1 Tab Oral Q12H PRN    levothyroxine (SYNTHROID) tablet 250 mcg  250 mcg Oral 6am    allopurinoL (ZYLOPRIM) tablet 100 mg  100 mg Oral DAILY    acetaminophen (TYLENOL) tablet 650 mg  650 mg Oral Q6H PRN    Or    acetaminophen (TYLENOL) suppository 650 mg  650 mg Rectal Q6H PRN    fluticasone propionate (FLONASE) 50 mcg/actuation nasal spray 2 Spray  2 Spray Both Nostrils DAILY    albuterol (PROVENTIL HFA, VENTOLIN HFA, PROAIR HFA) inhaler 2 Puff  2 Puff Inhalation Q6H PRN    zinc sulfate (ZINCATE) 220 (50) mg capsule 1 Cap  1 Cap Oral DAILY    ascorbic acid (vitamin C) (VITAMIN C) tablet 500 mg  500 mg Oral BID    enoxaparin (LOVENOX) injection 40 mg  40 mg SubCUTAneous Q12H    guaiFENesin (ROBITUSSIN) 100 mg/5 mL oral liquid 100 mg  100 mg Oral Q4H PRN    sodium chloride (NS) flush 5-40 mL  5-40 mL IntraVENous Q8H    sodium chloride (NS) flush 5-40 mL  5-40 mL IntraVENous PRN    ondansetron (ZOFRAN) injection 4 mg  4 mg IntraVENous Q6H PRN     ______________________________________________________________________  EXPECTED LENGTH OF STAY: 5d 12h  ACTUAL LENGTH OF STAY:          9                 Misbah Jefferson MD

## 2020-06-03 NOTE — PROGRESS NOTES
PHYSICAL THERAPY EVALUATION/DISCHARGE  Patient: Sunil Jenkins (68 y.o. female)  Date: 6/3/2020  Primary Diagnosis: Hypoxia [R09.02]  COVID-19 [U07.1]       Precautions:    ASSESSMENT  Based on the objective data described below, the patient presents with decreased activity tolerance s/p admission for COVID-19. 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 This date, pt received seated in chair and agreeable to work with therapy. Pt with intact strength, coordination, sensation, and ROM. She transferred sit<>stand with SBA. Performed standing exercises including marches and mini squats. Pt's O2 sats remained >98% on hi flow O2. Provided pt packet of seated exercises to perform throughout the day and discussed importance of mobilizing, transferring to/from chair for meals, and exercising throughout the day to maintain and improve strength and functional endurance. Pt able to perform return demonstration of each exercise and was agreeable to perform them during her admission. Pt has no further acute care PT needs at this time, will sign off. Functional Outcome Measure: The patient scored 55/100 on the Barthel Index outcome measure which is indicative of 45% impairment and dependence on others for basic mobility and self care tasks. Other factors to consider for discharge: lives alone     Further skilled acute physical therapy is not indicated at this time.      PLAN :  Recommendation for discharge: (in order for the patient to meet his/her long term goals)  Physical therapy at least 2 days/week in the home AND ensure assist and/or supervision for safety with mobility and ADLs    This discharge recommendation:  Has not yet been discussed the attending provider and/or case management    IF patient discharges home will need the following DME: none       SUBJECTIVE:   Patient stated [facility] do I go about getting disability since I won't be able to work for a while?    OBJECTIVE DATA SUMMARY:   HISTORY:    Past Medical History:   Diagnosis Date    Arthritis     Cancer ([facility] [address]     breast cancer    H/O total mastectomy 11/30/2015    Hypercholesterolemia     Other ill-defined conditions(799.89)     completed radiation 9/12    Thyroid disease     hypothyroidism     Past Surgical History:   Procedure Laterality Date    HX BREAST LUMPECTOMY Right 2012    HX GYN      hysterectomy; TBL    HX HEENT      tonsillectomy    HX MASTECTOMY Bilateral 2015    HX ORTHOPAEDIC      right knee ACL    HX OTHER SURGICAL      lumptectomy right breast       Prior level of function: Pt lives alone and was fully independent. Works as a CNA    Home Situation  Home Environment: Private residence  # Steps to Enter: 6  Rails to Enter: Yes  Hand Rails : Bilateral  One/Two Story Residence: One story  Living Alone: Yes  Support Systems: Family member(s)  Patient Expects to be Discharged to[de-identified] Private residence  Current DME Used/Available at Home: None    EXAMINATION/PRESENTATION/DECISION MAKING:   Critical Behavior:  Neurologic State: Alert  Orientation Level: Oriented X4  Cognition: Follows commands, Appropriate decision making, Appropriate for age attention/concentration, Appropriate safety awareness     Hearing: Auditory  Auditory Impairment: None    Range Of Motion:  AROM: Within functional limits           PROM: Within functional limits           Strength:    Strength: Within functional limits                    Tone & Sensation:   Tone: Normal              Sensation: Intact               Coordination:  Coordination: Within functional limits    Functional Mobility:  Bed Mobility:  Supine to Sit: (received in chair)  Sit to Supine: (ended session in chair)    Transfers:  Sit to Stand: Stand-by assistance  Stand to Sit: Stand-by assistance    Balance:   Sitting: Intact  Standing: Impaired; Without support  Standing - Static: Good  Standing - Dynamic : Good    Functional Measure:  Barthel Index:    Bathin  Bladder: 10  Bowels: 10  Groomin  Dressin  Feeding: 10  Mobility: 0  Stairs: 0  Toilet Use: 5  Transfer (Bed to Chair and Back): 10  Total: 55/100       The Barthel ADL Index: Guidelines  1. The index should be used as a record of what a patient does, not as a record of what a patient could do. 2. The main aim is to establish degree of independence from any help, physical or verbal, however minor and for whatever reason. 3. The need for supervision renders the patient not independent. 4. A patient's performance should be established using the best available evidence. Asking the patient, friends/relatives and nurses are the usual sources, but direct observation and common sense are also important. However direct testing is not needed. 5. Usually the patient's performance over the preceding 24-48 hours is important, but occasionally longer periods will be relevant. 6. Middle categories imply that the patient supplies over 50 per cent of the effort. 7. Use of aids to be independent is allowed. Stefania Escalante., Barthel, D.W. (7085). Functional evaluation: the Barthel Index. [address] [facility] (14)2. SARA BritoF, Griffin Noyola., [facility], [address] [place] [address] (). Measuring the change indisability after inpatient rehabilitation; comparison of the responsiveness of the Barthel Index and Functional [place] Measure. Journal of Neurology, Neurosurgery, and Psychiatry, 66(4), 837-830. Karel Baker, N.J.A, ALEX Delaney, & aHmmad Bean MLamontA. (2004.) Assessment of post-stroke quality of life in cost-effectiveness studies: The usefulness of the Barthel Index and the EuroQoL-5D.  Quality of Life Research, 15, 783-37           Physical Therapy Evaluation Charge Determination   History Examination Presentation Decision-Making   MEDIUM  Complexity : 1-2 comorbidities / personal factors will impact the outcome/ POC  HIGH Complexity : 4+ Standardized tests and measures addressing body structure, function, activity limitation and / or participation in recreation  LOW Complexity : Stable, uncomplicated  Other outcome measures Barthel  MEDIUM      Based on the above components, the patient evaluation is determined to be of the following complexity level: LOW     Activity Tolerance:   Fair  Please refer to the flowsheet for vital signs taken during this treatment. After treatment patient left in no apparent distress:   Sitting in chair and Call bell within reach    COMMUNICATION/EDUCATION:   The patients plan of care was discussed with: Registered nurse. Fall prevention education was provided and the patient/caregiver indicated understanding., Patient/family have participated as able in goal setting and plan of care. and Patient/family agree to work toward stated goals and plan of care.     Thank you for this referral.  Laine Hook, PT, DPT   Time Calculation: 28 mins

## 2020-06-03 NOTE — PROGRESS NOTES
0930: Pt up to bedside; small stool passed. Pt tolerated states \"her change in diet and increase in fluids has helped move her bowels\". 1900: Pt demonstrates improvement in Incentive Spirometer from 800ml to 1000ml by end of shift, SO2 at 99%. Problem: Airway Clearance - Ineffective  Goal: Achieve or maintain patent airway  Outcome: Progressing Towards Goal     Problem: Gas Exchange - Impaired  Goal: Absence of hypoxia  Outcome: Progressing Towards Goal     Problem: Breathing Pattern - Ineffective  Goal: Ability to achieve and maintain a regular respiratory rate  Outcome: Progressing Towards Goal    Pt verbalized plan to practice incentive spirometry three times per hour. Problem: Nutrition Deficits  Goal: Optimize nutrtional status  Outcome: Progressing Towards Goal    Pt demonstrates appropriate meal planning; salad for dinner and supplemental Ensure drinks with breakfast, lunch, and dinner. Verbal shift change report given to Sushila Hernandez RN (oncoming nurse) by Kaleigh Ardon RN (offgoing nurse). Report included the following information SBAR, Kardex, Intake/Output, MAR, Accordion, Recent Results and Cardiac Rhythm NSR.

## 2020-06-03 NOTE — PROGRESS NOTES
0100: Bedside and Verbal shift change report given to 1011 Constantia Caprice Broderick RN (oncoming nurse) by Marylene Spice, RN (offgoing nurse). Report included the following information SBAR, Kardex, Intake/Output, MAR, Accordion, Recent Results, Med Rec Status and Cardiac Rhythm NSR.     Hourly rounds completed throughout shift

## 2020-06-03 NOTE — PROGRESS NOTES
Verbal shift change report given to Jose Eugeen RN (oncoming nurse) by Andrez Gupta RN (offgoing nurse). Report included the following information SBAR, Kardex, Intake/Output, MAR, Recent Results and Cardiac Rhythm NSR with 1st deg avb. Problem: Gas Exchange - Impaired  Goal: Absence of hypoxia  Outcome: Progressing Towards Goal  Note: Pt spo2 wdl on high flow and bipap. High flow worn while awake. Bipap while sleeping. Problem: Breathing Pattern - Ineffective  Goal: Ability to achieve and maintain a regular respiratory rate  Outcome: Progressing Towards Goal     Problem: Body Temperature -  Risk of, Imbalanced  Goal: Complications related to the disease process, condition or treatment will be avoided or minimized  Outcome: Progressing Towards Goal     Problem: Isolation Precautions - Risk of Spread of Infection  Goal: Prevent transmission of infectious organism to others  Outcome: Progressing Towards Goal  Note: Isolation precautions continued. Julio C Brown

## 2020-06-03 NOTE — PROGRESS NOTES
Pulmonary, Critical Care, and Sleep Medicine~Progress Note    Name: Deandra Maya MRN: [id number]   : 1960 Hospital: [facility]    Date: 6/3/2020 10:42 AM Admission: 2020     Impression Plan   1. Acute hypoxic resp failure   2. COVID19 PNA   3. Obesity   4. Hx of total mastectomy  1. High risk of decline  2. Started pulsed Sloane on ; now on hold secondary to NIV/ highflow. Can start back when on NC  3. Still on high flow and FiO2 requirements. Wean please   4. NIV at night and for WOB   5. lovenox   6. IL6 is 91, received actemra x 1 dose  7. Wean steroids   8. azithromax completed    9. Vit c/zinc   10. s/p Remdesivir     Daily Progression:    6/3  ddimer up  O2 down to 35lpm and 94%  Sitting on the side of the bed       D dimer is continuing to rise but at 2.38  Other labs are trending downward   Chest film has shown a slight improvment      D dimer 1.45  CRP 5.99 down  LD down to 740      On bipap, tachypneic  But says feels a little better      On 55lpm and 100% fiO2; sats in low 90s and upper 80s   Looks fatigued   D dimer is 0.4  Inflammatory markers are up     I have reviewed the labs and previous days notes. Pertinent items are noted in HPI. OBJECTIVE:     Vital Signs:       Visit Vitals  /83   Pulse 66   Temp 97.7 °F (36.5 °C)   Resp 22   Ht 5' 7\" (1.702 m)   Wt 109.1 kg (240 lb 8.4 oz)   SpO2 100%   BMI 37.67 kg/m²      Temp (24hrs), Av.2 °F (36.8 °C), Min:97.7 °F (36.5 °C), Max:98.5 °F (36.9 °C)     Intake/Output:     Last shift: No intake/output data recorded.     Last 3 shifts:  1901 -  0700  In: 720 [P.O.:720]  Out: 0           Intake/Output Summary (Last 24 hours) at 6/3/2020 1024  Last data filed at 6/3/2020 0030  Gross per 24 hour   Intake 720 ml   Output 0 ml   Net 720 ml       Physical Exam:                                        Exam Findings Other   General: No resp distress noted, appears stated age    [de-identified]:  No ulcers, JVD not elevated, no cervical LAD    Chest: No pectus deformity, normal chest rise b/l    HEART:  Deferred     Lungs:  Deferred     ABD: Soft/NT, non rigid mildly distended    EXT: No cyanosis/clubbing/edema, normal peripheral pulses    Skin: No rashes or ulcers, no mottling    Neuro: A/O x 3        Medications:  Current Facility-Administered Medications   Medication Dose Route Frequency    methylPREDNISolone (PF) (SOLU-MEDROL) injection 40 mg  40 mg IntraVENous Q12H    pantoprazole (PROTONIX) tablet 40 mg  40 mg Oral BID    diphenhydrAMINE (BENADRYL) capsule 25 mg  25 mg Oral Q6H PRN    polyethylene glycol (MIRALAX) packet 17 g  17 g Oral DAILY    sodium chloride (OCEAN) 0.65 % nasal squeeze bottle 2 Spray  2 Spray Both Nostrils Q2H PRN    lidocaine 4 % patch 1 Patch  1 Patch TransDERmal Q24H    lidocaine 4 % patch 1 Patch  1 Patch TransDERmal Q24H    docusate sodium (COLACE) capsule 100 mg  100 mg Oral DAILY    butalbital-acetaminophen-caffeine (FIORICET, ESGIC) -40 mg per tablet 1 Tab  1 Tab Oral Q12H PRN    levothyroxine (SYNTHROID) tablet 250 mcg  250 mcg Oral 6am    allopurinoL (ZYLOPRIM) tablet 100 mg  100 mg Oral DAILY    acetaminophen (TYLENOL) tablet 650 mg  650 mg Oral Q6H PRN    Or    acetaminophen (TYLENOL) suppository 650 mg  650 mg Rectal Q6H PRN    fluticasone propionate (FLONASE) 50 mcg/actuation nasal spray 2 Spray  2 Spray Both Nostrils DAILY    albuterol (PROVENTIL HFA, VENTOLIN HFA, PROAIR HFA) inhaler 2 Puff  2 Puff Inhalation Q6H PRN    zinc sulfate (ZINCATE) 220 (50) mg capsule 1 Cap  1 Cap Oral DAILY    ascorbic acid (vitamin C) (VITAMIN C) tablet 500 mg  500 mg Oral BID    enoxaparin (LOVENOX) injection 40 mg  40 mg SubCUTAneous Q12H    guaiFENesin (ROBITUSSIN) 100 mg/5 mL oral liquid 100 mg  100 mg Oral Q4H PRN    sodium chloride (NS) flush 5-40 mL  5-40 mL IntraVENous Q8H    sodium chloride (NS) flush 5-40 mL  5-40 mL IntraVENous PRN    ondansetron (ZOFRAN) injection 4 mg  4 mg IntraVENous Q6H PRN       Labs:  ABG No results for input(s): PHI, PCO2I, PO2I, HCO3I, SO2I, FIO2I in the last 72 hours.      CBC Recent Labs     06/03/20  0451 06/01/20 0419   WBC 14.2* 17.4*   HGB 10.9* 11.6   HCT 35.1 37.2   * 648*   MCV 93.6 92.8   MCH 29.1 05.0        Metabolic  Panel Recent Labs     06/03/20  0451 06/02/20  0347 06/01/20 0419   *  --  138   K 4.2  --  3.8   CL 99  --  101   CO2 29  --  29   *  --  156*   BUN 30*  --  23*   CREA 1.06*  --  1.06*   CA 7.9*  --  8.0*   ALB 2.8*  --  2.7*   ALT 59  --  53   INR 1.0 1.1 1.1        Pertinent Labs                Toi Raymond PA-C  6/3/2020

## 2020-06-03 NOTE — PROGRESS NOTES
ID note    Patient seen and chart reviewed     Stable but still requiring high flow  Wean as able  Note pulm on board  Completed Remdesivir , one dose actemra, on steroids, supportive care, Sloane    Will follow peripherally, please call with questions     Kim Jovel,   2:42 PM

## 2020-06-04 LAB
ANION GAP SERPL CALC-SCNC: 6 MMOL/L (ref 5–15)
APTT PPP: 22.7 SEC (ref 22.1–32)
BASOPHILS # BLD: 0 K/UL (ref 0–0.1)
BASOPHILS NFR BLD: 0 % (ref 0–1)
BNP SERPL-MCNC: 126 PG/ML
BUN SERPL-MCNC: 27 MG/DL (ref 6–20)
BUN/CREAT SERPL: 26 (ref 12–20)
CALCIUM SERPL-MCNC: 8.8 MG/DL (ref 8.5–10.1)
CHLORIDE SERPL-SCNC: 98 MMOL/L (ref 97–108)
CO2 SERPL-SCNC: 30 MMOL/L (ref 21–32)
CREAT SERPL-MCNC: 1.02 MG/DL (ref 0.55–1.02)
D DIMER PPP FEU-MCNC: 2.37 MG/L FEU (ref 0–0.65)
DIFFERENTIAL METHOD BLD: ABNORMAL
EOSINOPHIL # BLD: 0 K/UL (ref 0–0.4)
EOSINOPHIL NFR BLD: 0 % (ref 0–7)
ERYTHROCYTE [DISTWIDTH] IN BLOOD BY AUTOMATED COUNT: 14.6 % (ref 11.5–14.5)
FIBRINOGEN PPP-MCNC: 344 MG/DL (ref 200–475)
GLUCOSE SERPL-MCNC: 153 MG/DL (ref 65–100)
HCT VFR BLD AUTO: 34.5 % (ref 35–47)
HGB BLD-MCNC: 10.6 G/DL (ref 11.5–16)
IMM GRANULOCYTES # BLD AUTO: 0 K/UL
IMM GRANULOCYTES NFR BLD AUTO: 0 %
INR PPP: 1 (ref 0.9–1.1)
LYMPHOCYTES # BLD: 0.5 K/UL (ref 0.8–3.5)
LYMPHOCYTES NFR BLD: 3 % (ref 12–49)
MCH RBC QN AUTO: 29 PG (ref 26–34)
MCHC RBC AUTO-ENTMCNC: 30.7 G/DL (ref 30–36.5)
MCV RBC AUTO: 94.5 FL (ref 80–99)
MONOCYTES # BLD: 0.2 K/UL (ref 0–1)
MONOCYTES NFR BLD: 1 % (ref 5–13)
NEUTS BAND NFR BLD MANUAL: 2 % (ref 0–6)
NEUTS SEG # BLD: 15.7 K/UL (ref 1.8–8)
NEUTS SEG NFR BLD: 94 % (ref 32–75)
NRBC # BLD: 0.03 K/UL (ref 0–0.01)
NRBC BLD-RTO: 0.2 PER 100 WBC
PLATELET # BLD AUTO: 563 K/UL (ref 150–400)
PLATELET COMMENTS,PCOM: ABNORMAL
PMV BLD AUTO: 10 FL (ref 8.9–12.9)
POTASSIUM SERPL-SCNC: 4.6 MMOL/L (ref 3.5–5.1)
PROTHROMBIN TIME: 10.3 SEC (ref 9–11.1)
RBC # BLD AUTO: 3.65 M/UL (ref 3.8–5.2)
RBC MORPH BLD: ABNORMAL
SODIUM SERPL-SCNC: 134 MMOL/L (ref 136–145)
THERAPEUTIC RANGE,PTTT: NORMAL SECS (ref 58–77)
WBC # BLD AUTO: 16.4 K/UL (ref 3.6–11)

## 2020-06-04 PROCEDURE — 85730 THROMBOPLASTIN TIME PARTIAL: CPT

## 2020-06-04 PROCEDURE — 74011250636 HC RX REV CODE- 250/636: Performed by: PHYSICIAN ASSISTANT

## 2020-06-04 PROCEDURE — 77010033711 HC HIGH FLOW OXYGEN

## 2020-06-04 PROCEDURE — 83880 ASSAY OF NATRIURETIC PEPTIDE: CPT

## 2020-06-04 PROCEDURE — 74011250637 HC RX REV CODE- 250/637: Performed by: NURSE PRACTITIONER

## 2020-06-04 PROCEDURE — 36415 COLL VENOUS BLD VENIPUNCTURE: CPT

## 2020-06-04 PROCEDURE — 85025 COMPLETE CBC W/AUTO DIFF WBC: CPT

## 2020-06-04 PROCEDURE — 74011000250 HC RX REV CODE- 250: Performed by: FAMILY MEDICINE

## 2020-06-04 PROCEDURE — 65660000001 HC RM ICU INTERMED STEPDOWN

## 2020-06-04 PROCEDURE — 85610 PROTHROMBIN TIME: CPT

## 2020-06-04 PROCEDURE — 74011250637 HC RX REV CODE- 250/637: Performed by: FAMILY MEDICINE

## 2020-06-04 PROCEDURE — 94762 N-INVAS EAR/PLS OXIMTRY CONT: CPT

## 2020-06-04 PROCEDURE — 80048 BASIC METABOLIC PNL TOTAL CA: CPT

## 2020-06-04 PROCEDURE — 74011250637 HC RX REV CODE- 250/637: Performed by: HOSPITALIST

## 2020-06-04 PROCEDURE — 85379 FIBRIN DEGRADATION QUANT: CPT

## 2020-06-04 PROCEDURE — 74011250636 HC RX REV CODE- 250/636: Performed by: HOSPITALIST

## 2020-06-04 PROCEDURE — 85384 FIBRINOGEN ACTIVITY: CPT

## 2020-06-04 RX ADMIN — SALINE NASAL SPRAY 2 SPRAY: 1.5 SOLUTION NASAL at 21:42

## 2020-06-04 RX ADMIN — Medication 500 MG: at 08:43

## 2020-06-04 RX ADMIN — Medication 500 MG: at 18:54

## 2020-06-04 RX ADMIN — POLYETHYLENE GLYCOL 3350 17 G: 17 POWDER, FOR SOLUTION ORAL at 08:43

## 2020-06-04 RX ADMIN — Medication 10 ML: at 21:38

## 2020-06-04 RX ADMIN — DOCUSATE SODIUM 100 MG: 100 CAPSULE, LIQUID FILLED ORAL at 08:43

## 2020-06-04 RX ADMIN — ENOXAPARIN SODIUM 40 MG: 40 INJECTION SUBCUTANEOUS at 03:00

## 2020-06-04 RX ADMIN — METHYLPREDNISOLONE SODIUM SUCCINATE 20 MG: 40 INJECTION, POWDER, FOR SOLUTION INTRAMUSCULAR; INTRAVENOUS at 21:38

## 2020-06-04 RX ADMIN — ALLOPURINOL 100 MG: 100 TABLET ORAL at 08:44

## 2020-06-04 RX ADMIN — Medication 10 ML: at 07:16

## 2020-06-04 RX ADMIN — ZINC SULFATE 220 MG (50 MG) CAPSULE 1 CAPSULE: CAPSULE at 08:44

## 2020-06-04 RX ADMIN — METHYLPREDNISOLONE SODIUM SUCCINATE 20 MG: 40 INJECTION, POWDER, FOR SOLUTION INTRAMUSCULAR; INTRAVENOUS at 08:44

## 2020-06-04 RX ADMIN — Medication 10 ML: at 14:00

## 2020-06-04 RX ADMIN — PANTOPRAZOLE SODIUM 40 MG: 40 TABLET, DELAYED RELEASE ORAL at 08:43

## 2020-06-04 RX ADMIN — ENOXAPARIN SODIUM 40 MG: 40 INJECTION SUBCUTANEOUS at 15:08

## 2020-06-04 RX ADMIN — FLUTICASONE PROPIONATE 2 SPRAY: 50 SPRAY, METERED NASAL at 08:45

## 2020-06-04 RX ADMIN — LEVOTHYROXINE SODIUM 250 MCG: 125 TABLET ORAL at 07:16

## 2020-06-04 RX ADMIN — PANTOPRAZOLE SODIUM 40 MG: 40 TABLET, DELAYED RELEASE ORAL at 18:54

## 2020-06-04 NOTE — PROGRESS NOTES
[facility]  Hospitalist Group                                                                                          Hospitalist Progress Note  Genevieve Malik MD  Answering service: [phone] from in house phone        Date of Service:  2020  NAME:  Jasson Griffith  :  1960  MRN:  [id number]      Admission Summary:     Jasson Griffith is a 64 y.o. female who presents with COVID pos+ from [address] [place] [address]. Pt said she had a test 2 days back and came back positive. She became SOB and went to ED.  Patient states that for the last 3 days she has not felt well.  She has had some body aches and nausea and fatigue.  She is also had cough.  Over the last 24 hours she started to have shortness of breath.  She comes in for evaluation of these complaints and concern for coronavirus given her workplace exposure.  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 Interval history / Subjective:       Still on high flow oxygen however able to down titrate from 35 L to 25 L. SOB mildly improved however still limited with exertional capacity.      Assessment & Plan:     Acute respiratory failure, seems like slowly improving  -Acute hypoxic respiratory failure secondary to viral pneumonia  -COVID 19 positive as outpatient  -Pulmonology following  -Weaning high flow O2 from 35-25 LPM    COVID 19 infection  -Droplet plus precaution  -Continue vitamin C and azithromycin  -Anticoagulation per protocol  -Completed Remdesivir  -Actemra one dose   -On steroid, continue weaning  -ID and pulmonology following  -No JUSTIN therapy on high flow O2    Leukocytosis  -Likely from steroid use    Hypothyroidism  -TSH elevated  -Continue Synthroid  -Follow TSH level as outpatient    Obesity  -Patient with body mass index of 37.66 kg/m²  -Outpatient follow-up for weight management    Dehydration  -Monitor renal function    Abnormal LFT's   -Likely from infectious process  -Monitor liver function  -consider USG as out pt     Code status: We discussed CODE STATUS with the patient. She is okay with intubation but no other resuscitative efforts. DVT prophylaxis: Lovenox    Care Plan discussed with: Patient/Family and Nurse  Anticipated Disposition: TBD  Anticipated Discharge: Greater than 48 hours      Hospital Problems  Date Reviewed: 11/13/2019          Codes Class Noted POA    Hypoxia ICD-10-CM: R09.02  ICD-9-CM: 799.02  5/25/2020 Unknown        COVID-19 ICD-10-CM: U07.1  ICD-9-CM: 079.89  5/25/2020 Unknown                Review of Systems:   A comprehensive review of systems was negative except for that written in the HPI. Vital Signs:    Last 24hrs VS reviewed since prior progress note. Most recent are:  Visit Vitals  /74   Pulse 82   Temp 98.3 °F (36.8 °C)   Resp 21   Ht 5' 7\" (1.702 m)   Wt 109.1 kg (240 lb 8.4 oz)   SpO2 96%   BMI 37.67 kg/m²         Intake/Output Summary (Last 24 hours) at 6/4/2020 1740  Last data filed at 6/4/2020 0316  Gross per 24 hour   Intake 287 ml   Output 800 ml   Net -513 ml        Physical Examination:             Constitutional:  No acute distress, cooperative, pleasant    ENT:  Oral mucosa moist, oropharynx benign. Resp:  CTA bilaterally. No wheezing/rhonchi/rales. No accessory muscle use   CV:  Regular rhythm, normal rate, no murmurs, gallops, rubs    GI:  Soft, non distended, non tender. normoactive bowel sounds, no hepatosplenomegaly     Musculoskeletal:  No edema, warm, 2+ pulses throughout    Neurologic:  Moves all extremities.   AAOx3, CN II-XII reviewed     Skin:  Good turgor, no rashes or ulcers       Data Review:    Review and/or order of clinical lab test      Labs:     Recent Labs     06/04/20 0252 06/03/20  0451   WBC 16.4* 14.2*   HGB 10.6* 10.9*   HCT 34.5* 35.1   * 581*     Recent Labs     06/04/20  0252 06/03/20  0451   * 135*   K 4.6 4.2   CL 98 99   CO2 30 29   BUN 27* 30*   CREA 1. 02 1.06*   * 169*   CA 8.8 7.9*     Recent Labs     06/03/20  0451   ALT 59      TBILI 0.2   TP 6.6   ALB 2.8*   GLOB 3.8     Recent Labs     06/04/20  0258 06/03/20  0451 06/02/20  0347   INR 1.0 1.0 1.1   PTP 10.3 10.7 10.9   APTT 22.7 23.7 23.9      Recent Labs     06/03/20  0451 06/02/20  0346   FERR 552* 584*      No results found for: FOL, RBCF   No results for input(s): PH, PCO2, PO2 in the last 72 hours. No results for input(s): CPK, CKNDX, TROIQ in the last 72 hours.     No lab exists for component: CPKMB  Lab Results   Component Value Date/Time    Cholesterol, total 310 (H) 10/25/2018 02:15 PM    HDL Cholesterol 54 10/25/2018 02:15 PM    LDL, calculated 221 (H) 10/25/2018 02:15 PM    Triglyceride 173 (H) 10/25/2018 02:15 PM    CHOL/HDL Ratio 6.8 (H) 03/09/2012 09:10 AM     Lab Results   Component Value Date/Time    Glucose (POC) 130 (H) 05/27/2020 12:03 PM     Lab Results   Component Value Date/Time    Color Yellow 10/25/2018 02:15 PM    Appearance Cloudy (A) 10/25/2018 02:15 PM    Specific gravity 1.020 07/16/2015 07:58 PM    pH (UA) 5.5 10/25/2018 02:15 PM    Protein NEGATIVE  07/16/2015 07:58 PM    Glucose NEGATIVE  07/16/2015 07:58 PM    Ketone Negative 10/25/2018 02:15 PM    Bilirubin Negative 10/25/2018 02:15 PM    Urobilinogen 0.2 07/16/2015 07:58 PM    Nitrites Negative 10/25/2018 02:15 PM    Leukocyte Esterase 2+ (A) 10/25/2018 02:15 PM    Epithelial cells FEW 07/16/2015 07:58 PM    Bacteria Few 10/25/2018 02:15 PM    WBC >30 (A) 10/25/2018 02:15 PM    RBC 3-10 (A) 10/25/2018 02:15 PM         Medications Reviewed:     Current Facility-Administered Medications   Medication Dose Route Frequency    [START ON 6/5/2020] predniSONE (DELTASONE) tablet 30 mg  30 mg Oral DAILY WITH BREAKFAST    methylPREDNISolone (PF) (SOLU-MEDROL) injection 20 mg  20 mg IntraVENous Q12H    pantoprazole (PROTONIX) tablet 40 mg  40 mg Oral BID    diphenhydrAMINE (BENADRYL) capsule 25 mg  25 mg Oral Q6H PRN    polyethylene glycol (MIRALAX) packet 17 g  17 g Oral DAILY    sodium chloride (OCEAN) 0.65 % nasal squeeze bottle 2 Spray  2 Spray Both Nostrils Q2H PRN    lidocaine 4 % patch 1 Patch  1 Patch TransDERmal Q24H    lidocaine 4 % patch 1 Patch  1 Patch TransDERmal Q24H    docusate sodium (COLACE) capsule 100 mg  100 mg Oral DAILY    butalbital-acetaminophen-caffeine (FIORICET, ESGIC) -40 mg per tablet 1 Tab  1 Tab Oral Q12H PRN    levothyroxine (SYNTHROID) tablet 250 mcg  250 mcg Oral 6am    allopurinoL (ZYLOPRIM) tablet 100 mg  100 mg Oral DAILY    acetaminophen (TYLENOL) tablet 650 mg  650 mg Oral Q6H PRN    Or    acetaminophen (TYLENOL) suppository 650 mg  650 mg Rectal Q6H PRN    fluticasone propionate (FLONASE) 50 mcg/actuation nasal spray 2 Spray  2 Spray Both Nostrils DAILY    albuterol (PROVENTIL HFA, VENTOLIN HFA, PROAIR HFA) inhaler 2 Puff  2 Puff Inhalation Q6H PRN    zinc sulfate (ZINCATE) 220 (50) mg capsule 1 Cap  1 Cap Oral DAILY    ascorbic acid (vitamin C) (VITAMIN C) tablet 500 mg  500 mg Oral BID    enoxaparin (LOVENOX) injection 40 mg  40 mg SubCUTAneous Q12H    guaiFENesin (ROBITUSSIN) 100 mg/5 mL oral liquid 100 mg  100 mg Oral Q4H PRN    sodium chloride (NS) flush 5-40 mL  5-40 mL IntraVENous Q8H    sodium chloride (NS) flush 5-40 mL  5-40 mL IntraVENous PRN    ondansetron (ZOFRAN) injection 4 mg  4 mg IntraVENous Q6H PRN     ______________________________________________________________________  EXPECTED LENGTH OF STAY: 5d 12h  ACTUAL LENGTH OF STAY:          10                 Iris Whalen MD

## 2020-06-04 NOTE — PROGRESS NOTES
Verbal shift change report given to Cem Bocanegra RN (oncoming nurse) by Hillary Le RN (offgoing nurse). Report included the following information SBAR, Kardex, Intake/Output, MAR, Recent Results and Cardiac Rhythm NSR with 1st deg AVB. Pt spo2 remained wdl on high flow oxygen overnight. Problem: Airway Clearance - Ineffective  Goal: Achieve or maintain patent airway  Outcome: Progressing Towards Goal     Problem: Gas Exchange - Impaired  Goal: Absence of hypoxia  Outcome: Progressing Towards Goal  Goal: Promote optimal lung function  Outcome: Progressing Towards Goal     Problem: Breathing Pattern - Ineffective  Goal: Ability to achieve and maintain a regular respiratory rate  Outcome: Progressing Towards Goal     Problem: Isolation Precautions - Risk of Spread of Infection  Goal: Prevent transmission of infectious organism to others  Outcome: Progressing Towards Goal   Airborne and droplet precautions continued.

## 2020-06-04 NOTE — PROGRESS NOTES
Weaned patient from 75% FIO2 to 65%    Patient saturations range from 96 to 97% with decrease in FIO2. Patient appear comfortable but stated she feels short winded at time when conversing. Nurse informed.

## 2020-06-04 NOTE — PROGRESS NOTES
Patient saturations 98% on 25 Flow and 80 FIO2.  Patient weaned to 25 Flow and 75 FIO2 saturations 96%

## 2020-06-04 NOTE — PROGRESS NOTES
Pulmonary, Critical Care, and Sleep Medicine~Progress Note    Name: Lenka Weeks MRN: [id number]   : 1960 Hospital: [facility]   Date: 2020 10:42 AM Admission: 2020     Impression Plan   1. Acute hypoxic resp failure   2. COVID19 PNA   3. Obesity   4. Hx of total mastectomy  1. High risk of decline  2. Started pulsed Sloane on , stopped due to high flow  3. Still on high flow and FiO2 requirements. Wean please. Can probably go to midflow today   4. NIV at night and for WOB   5. lovenox   6. IL6 is 91, received actemra x 1 dose  7. Wean steroids   8. azithromax completed    9. Vit c/zinc   10. s/p Remdesivir     Daily Progression:      Down to 25lpm and 94%  Sitting on the side of the bed   D dimer back down     6/3  ddimer up  O2 down to 35lpm and 94%  Sitting on the side of the bed       D dimer is continuing to rise but at 2.38  Other labs are trending downward   Chest film has shown a slight improvment      D dimer 1.45  CRP 5.99 down  LD down to 740      On bipap, tachypneic  But says feels a little better      On 55lpm and 100% fiO2; sats in low 90s and upper 80s   Looks fatigued   D dimer is 0.4  Inflammatory markers are up     I have reviewed the labs and previous days notes. Pertinent items are noted in HPI. OBJECTIVE:     Vital Signs:       Visit Vitals  /90   Pulse 81   Temp 97.7 °F (36.5 °C)   Resp 19   Ht 5' 7\" (1.702 m)   Wt 109.1 kg (240 lb 8.4 oz)   SpO2 98%   BMI 37.67 kg/m²      Temp (24hrs), Av.4 °F (36.9 °C), Min:97.7 °F (36.5 °C), Max:98.8 °F (37.1 °C)     Intake/Output:     Last shift: No intake/output data recorded.     Last 3 shifts:  1901 -  0700  In: 1063 [P.O.:1063]  Out: 800 [Urine:800]          Intake/Output Summary (Last 24 hours) at 2020 0945  Last data filed at 2020 0316  Gross per 24 hour   Intake 1063 ml   Output 800 ml   Net 263 ml Physical Exam:                                        Exam Findings Other   General: No resp distress noted, appears stated age    [de-identified]:  No ulcers, JVD not elevated, no cervical LAD    Chest: No pectus deformity, normal chest rise b/l    HEART:  Deferred     Lungs:  Deferred     ABD: Soft/NT, non rigid mildly distended    EXT: No cyanosis/clubbing/edema, normal peripheral pulses    Skin: No rashes or ulcers, no mottling    Neuro: A/O x 3        Medications:  Current Facility-Administered Medications   Medication Dose Route Frequency    methylPREDNISolone (PF) (SOLU-MEDROL) injection 20 mg  20 mg IntraVENous Q12H    pantoprazole (PROTONIX) tablet 40 mg  40 mg Oral BID    diphenhydrAMINE (BENADRYL) capsule 25 mg  25 mg Oral Q6H PRN    polyethylene glycol (MIRALAX) packet 17 g  17 g Oral DAILY    sodium chloride (OCEAN) 0.65 % nasal squeeze bottle 2 Spray  2 Spray Both Nostrils Q2H PRN    lidocaine 4 % patch 1 Patch  1 Patch TransDERmal Q24H    lidocaine 4 % patch 1 Patch  1 Patch TransDERmal Q24H    docusate sodium (COLACE) capsule 100 mg  100 mg Oral DAILY    butalbital-acetaminophen-caffeine (FIORICET, ESGIC) -40 mg per tablet 1 Tab  1 Tab Oral Q12H PRN    levothyroxine (SYNTHROID) tablet 250 mcg  250 mcg Oral 6am    allopurinoL (ZYLOPRIM) tablet 100 mg  100 mg Oral DAILY    acetaminophen (TYLENOL) tablet 650 mg  650 mg Oral Q6H PRN    Or    acetaminophen (TYLENOL) suppository 650 mg  650 mg Rectal Q6H PRN    fluticasone propionate (FLONASE) 50 mcg/actuation nasal spray 2 Spray  2 Spray Both Nostrils DAILY    albuterol (PROVENTIL HFA, VENTOLIN HFA, PROAIR HFA) inhaler 2 Puff  2 Puff Inhalation Q6H PRN    zinc sulfate (ZINCATE) 220 (50) mg capsule 1 Cap  1 Cap Oral DAILY    ascorbic acid (vitamin C) (VITAMIN C) tablet 500 mg  500 mg Oral BID    enoxaparin (LOVENOX) injection 40 mg  40 mg SubCUTAneous Q12H    guaiFENesin (ROBITUSSIN) 100 mg/5 mL oral liquid 100 mg  100 mg Oral Q4H PRN  sodium chloride (NS) flush 5-40 mL  5-40 mL IntraVENous Q8H    sodium chloride (NS) flush 5-40 mL  5-40 mL IntraVENous PRN    ondansetron (ZOFRAN) injection 4 mg  4 mg IntraVENous Q6H PRN       Labs:  ABG No results for input(s): PHI, PCO2I, PO2I, HCO3I, SO2I, FIO2I in the last 72 hours.      CBC Recent Labs     06/04/20 0252 06/03/20 0451   WBC 16.4* 14.2*   HGB 10.6* 10.9*   HCT 34.5* 35.1   * 581*   MCV 94.5 93.6   MCH 29.0 71.6        Metabolic  Panel Recent Labs     06/04/20 0258 06/04/20 0252 06/03/20 0451 06/02/20  0347   NA  --  134* 135*  --    K  --  4.6 4.2  --    CL  --  98 99  --    CO2  --  30 29  --    GLU  --  153* 169*  --    BUN  --  27* 30*  --    CREA  --  1.02 1.06*  --    CA  --  8.8 7.9*  --    ALB  --   --  2.8*  --    ALT  --   --  59  --    INR 1.0  --  1.0 1.1        Pertinent Labs                Toi Fountain PA-C  6/4/2020

## 2020-06-05 LAB
APTT PPP: 23.1 SEC (ref 22.1–32)
D DIMER PPP FEU-MCNC: 1.44 MG/L FEU (ref 0–0.65)
FIBRINOGEN PPP-MCNC: 329 MG/DL (ref 200–475)
INR PPP: 1 (ref 0.9–1.1)
PROTHROMBIN TIME: 10.4 SEC (ref 9–11.1)
THERAPEUTIC RANGE,PTTT: NORMAL SECS (ref 58–77)

## 2020-06-05 PROCEDURE — 77010033678 HC OXYGEN DAILY

## 2020-06-05 PROCEDURE — 74011000250 HC RX REV CODE- 250: Performed by: FAMILY MEDICINE

## 2020-06-05 PROCEDURE — 85379 FIBRIN DEGRADATION QUANT: CPT

## 2020-06-05 PROCEDURE — 74011250637 HC RX REV CODE- 250/637: Performed by: HOSPITALIST

## 2020-06-05 PROCEDURE — 77010033711 HC HIGH FLOW OXYGEN

## 2020-06-05 PROCEDURE — 85384 FIBRINOGEN ACTIVITY: CPT

## 2020-06-05 PROCEDURE — 94760 N-INVAS EAR/PLS OXIMETRY 1: CPT

## 2020-06-05 PROCEDURE — 85730 THROMBOPLASTIN TIME PARTIAL: CPT

## 2020-06-05 PROCEDURE — 74011250636 HC RX REV CODE- 250/636: Performed by: HOSPITALIST

## 2020-06-05 PROCEDURE — 85610 PROTHROMBIN TIME: CPT

## 2020-06-05 PROCEDURE — 74011636637 HC RX REV CODE- 636/637: Performed by: PHYSICIAN ASSISTANT

## 2020-06-05 PROCEDURE — 65660000001 HC RM ICU INTERMED STEPDOWN

## 2020-06-05 PROCEDURE — 36415 COLL VENOUS BLD VENIPUNCTURE: CPT

## 2020-06-05 PROCEDURE — 74011250637 HC RX REV CODE- 250/637: Performed by: FAMILY MEDICINE

## 2020-06-05 PROCEDURE — 74011250637 HC RX REV CODE- 250/637: Performed by: INTERNAL MEDICINE

## 2020-06-05 PROCEDURE — 94762 N-INVAS EAR/PLS OXIMTRY CONT: CPT

## 2020-06-05 RX ADMIN — PANTOPRAZOLE SODIUM 40 MG: 40 TABLET, DELAYED RELEASE ORAL at 09:16

## 2020-06-05 RX ADMIN — ENOXAPARIN SODIUM 40 MG: 40 INJECTION SUBCUTANEOUS at 02:29

## 2020-06-05 RX ADMIN — PANTOPRAZOLE SODIUM 40 MG: 40 TABLET, DELAYED RELEASE ORAL at 19:32

## 2020-06-05 RX ADMIN — Medication 10 ML: at 07:25

## 2020-06-05 RX ADMIN — PREDNISONE 30 MG: 20 TABLET ORAL at 09:15

## 2020-06-05 RX ADMIN — ALLOPURINOL 100 MG: 100 TABLET ORAL at 09:16

## 2020-06-05 RX ADMIN — Medication 500 MG: at 09:16

## 2020-06-05 RX ADMIN — Medication 10 ML: at 22:00

## 2020-06-05 RX ADMIN — Medication 500 MG: at 19:32

## 2020-06-05 RX ADMIN — Medication 10 ML: at 15:48

## 2020-06-05 RX ADMIN — ZINC SULFATE 220 MG (50 MG) CAPSULE 1 CAPSULE: CAPSULE at 09:16

## 2020-06-05 RX ADMIN — BUTALBITAL, ACETAMINOPHEN, AND CAFFEINE 1 TABLET: 50; 325; 40 TABLET ORAL at 03:48

## 2020-06-05 RX ADMIN — LEVOTHYROXINE SODIUM 250 MCG: 125 TABLET ORAL at 07:24

## 2020-06-05 RX ADMIN — FLUTICASONE PROPIONATE 2 SPRAY: 50 SPRAY, METERED NASAL at 09:21

## 2020-06-05 RX ADMIN — ENOXAPARIN SODIUM 40 MG: 40 INJECTION SUBCUTANEOUS at 15:42

## 2020-06-05 NOTE — PROGRESS NOTES
Pulmonary, Critical Care, and Sleep Medicine~Progress Note    Name: Elana Avila MRN: [id number]   : 1960 Hospital: [address] [address]   Date: 2020 10:42 AM Admission: 2020     Impression Plan   1. Acute hypoxic resp failure   2. COVID19 PNA   3. Obesity   4. Hx of total mastectomy  1. High risk of decline  2. Started pulsed Sloane on , stopped due to high flow  3. NIV at night and for WOB   4. lovenox   5. IL6 is 91, received actemra x 1 dose  6. Wean steroids   7. azithromax completed    8. Vit c/zinc   9. s/p Remdesivir  10. Prn over the weekend      Daily Progression:    /  Down to 6lpm   Sitting on the side of the bed and looks better     /  Down to 25lpm and 94%  Sitting on the side of the bed   D dimer back down     6/3  ddimer up  O2 down to 35lpm and 94%  Sitting on the side of the bed       D dimer is continuing to rise but at 2.38  Other labs are trending downward   Chest film has shown a slight improvment      D dimer 1.45  CRP 5.99 down  LD down to 740      On bipap, tachypneic  But says feels a little better      On 55lpm and 100% fiO2; sats in low 90s and upper 80s   Looks fatigued   D dimer is 0.4  Inflammatory markers are up     I have reviewed the labs and previous days notes. Pertinent items are noted in HPI. OBJECTIVE:     Vital Signs:       Visit Vitals  /81 (BP 1 Location: Left arm, BP Patient Position: At rest)   Pulse 77   Temp 97.8 °F (36.6 °C)   Resp 24   Ht 5' 7\" (1.702 m)   Wt 108.3 kg (238 lb 11.2 oz)   SpO2 99%   BMI 37.39 kg/m²      Temp (24hrs), Av.3 °F (36.8 °C), Min:97.8 °F (36.6 °C), Max:98.7 °F (37.1 °C)     Intake/Output:     Last shift: No intake/output data recorded.     Last 3 shifts:  1901 - 06/05 0700  In: 70 [P.O.:70]  Out: 1700 [Urine:1700]          Intake/Output Summary (Last 24 hours) at 2020 2437  Last data filed at 2020 0330  Gross per 24 hour   Intake 20 ml   Output 900 ml   Net -880 ml       Physical Exam:                                        Exam Findings Other   General: No resp distress noted, appears stated age    [de-identified]:  No ulcers, JVD not elevated, no cervical LAD    Chest: No pectus deformity, normal chest rise b/l    HEART:  Deferred     Lungs:  Deferred     ABD: Soft/NT, non rigid mildly distended    EXT: No cyanosis/clubbing/edema, normal peripheral pulses    Skin: No rashes or ulcers, no mottling    Neuro: A/O x 3        Medications:  Current Facility-Administered Medications   Medication Dose Route Frequency    predniSONE (DELTASONE) tablet 30 mg  30 mg Oral DAILY WITH BREAKFAST    pantoprazole (PROTONIX) tablet 40 mg  40 mg Oral BID    diphenhydrAMINE (BENADRYL) capsule 25 mg  25 mg Oral Q6H PRN    polyethylene glycol (MIRALAX) packet 17 g  17 g Oral DAILY    sodium chloride (OCEAN) 0.65 % nasal squeeze bottle 2 Spray  2 Spray Both Nostrils Q2H PRN    lidocaine 4 % patch 1 Patch  1 Patch TransDERmal Q24H    lidocaine 4 % patch 1 Patch  1 Patch TransDERmal Q24H    docusate sodium (COLACE) capsule 100 mg  100 mg Oral DAILY    butalbital-acetaminophen-caffeine (FIORICET, ESGIC) -40 mg per tablet 1 Tab  1 Tab Oral Q12H PRN    levothyroxine (SYNTHROID) tablet 250 mcg  250 mcg Oral 6am    allopurinoL (ZYLOPRIM) tablet 100 mg  100 mg Oral DAILY    acetaminophen (TYLENOL) tablet 650 mg  650 mg Oral Q6H PRN    Or    acetaminophen (TYLENOL) suppository 650 mg  650 mg Rectal Q6H PRN    fluticasone propionate (FLONASE) 50 mcg/actuation nasal spray 2 Spray  2 Spray Both Nostrils DAILY    albuterol (PROVENTIL HFA, VENTOLIN HFA, PROAIR HFA) inhaler 2 Puff  2 Puff Inhalation Q6H PRN    zinc sulfate (ZINCATE) 220 (50) mg capsule 1 Cap  1 Cap Oral DAILY    ascorbic acid (vitamin C) (VITAMIN C) tablet 500 mg  500 mg Oral BID    enoxaparin (LOVENOX) injection 40 mg  40 mg SubCUTAneous Q12H    guaiFENesin (ROBITUSSIN) 100 mg/5 mL oral liquid 100 mg  100 mg Oral Q4H PRN    sodium chloride (NS) flush 5-40 mL  5-40 mL IntraVENous Q8H    sodium chloride (NS) flush 5-40 mL  5-40 mL IntraVENous PRN    ondansetron (ZOFRAN) injection 4 mg  4 mg IntraVENous Q6H PRN       Labs:  ABG No results for input(s): PHI, PCO2I, PO2I, HCO3I, SO2I, FIO2I in the last 72 hours.      CBC Recent Labs     06/04/20 0252 06/03/20 0451   WBC 16.4* 14.2*   HGB 10.6* 10.9*   HCT 34.5* 35.1   * 581*   MCV 94.5 93.6   MCH 29.0 75.6        Metabolic  Panel Recent Labs     06/05/20  0243 06/04/20 0258 06/04/20 0252 06/03/20 0451   NA  --   --  134* 135*   K  --   --  4.6 4.2   CL  --   --  98 99   CO2  --   --  30 29   GLU  --   --  153* 169*   BUN  --   --  27* 30*   CREA  --   --  1.02 1.06*   CA  --   --  8.8 7.9*   ALB  --   --   --  2.8*   ALT  --   --   --  59   INR 1.0 1.0  --  1.0        Pertinent Labs                Toi Redd PA-C  6/5/2020

## 2020-06-05 NOTE — PROGRESS NOTES
Verbal shift change report given to Brandie Aj, WINDY and Lidia Seaman, RN (oncoming nurse) by Koko Goldberg RN (offgoing nurse). Report included the following information SBAR, Kardex, ED Summary, Intake/Output, MAR, Recent Results, and Cardiac Rhythm NSR with 1st deg AVB . Last 3 Recorded Weights in this Encounter    06/02/20 1229 06/03/20 0636 06/05/20 0330   Weight: 108.7 kg (239 lb 11.2 oz) 109.1 kg (240 lb 8.4 oz) 108.3 kg (238 lb 11.2 oz)     Intake/Output Summary (Last 24 hours) at 6/5/2020 0748  Last data filed at 6/5/2020 0330  Gross per 24 hour   Intake 20 ml   Output 900 ml   Net -880 ml     Problem: Airway Clearance - Ineffective  Goal: Achieve or maintain patent airway  Outcome: Progressing Towards Goal   Pt coughing up small amounts of clear sputum. Problem: Gas Exchange - Impaired  Goal: Absence of hypoxia  Outcome: Progressing Towards Goal  RT able to wean pt to 20L high flow nasal cannula with fio2 of 50%. Pt tolerating well- spo2 remains greater than 92% even during activity. Goal: Promote optimal lung function  Outcome: Progressing Towards Goal   Reinforced education about incentive spirometry purpose and goals. Pt verbalized understanding and demonstrated use of device. Problem: Breathing Pattern - Ineffective  Goal: Ability to achieve and maintain a regular respiratory rate  Outcome: Progressing Towards Goal  Problem: Isolation Precautions - Risk of Spread of Infection  Goal: Prevent transmission of infectious organism to others  Outcome: Progressing Towards Goal   Airborne and droplet plus isolation precautions continued overnight. Problem: Nutrition Deficits  Goal: Optimize nutrtional status  Outcome: Progressing Towards Goal   Pt's appetite improved since this weekend.

## 2020-06-06 LAB
APTT PPP: 20.8 SEC (ref 22.1–32)
D DIMER PPP FEU-MCNC: 0.83 MG/L FEU (ref 0–0.65)
FIBRINOGEN PPP-MCNC: 316 MG/DL (ref 200–475)
INR PPP: 1 (ref 0.9–1.1)
PROTHROMBIN TIME: 10.2 SEC (ref 9–11.1)
THERAPEUTIC RANGE,PTTT: ABNORMAL SECS (ref 58–77)

## 2020-06-06 PROCEDURE — 85379 FIBRIN DEGRADATION QUANT: CPT

## 2020-06-06 PROCEDURE — 74011250637 HC RX REV CODE- 250/637: Performed by: HOSPITALIST

## 2020-06-06 PROCEDURE — 77010033678 HC OXYGEN DAILY

## 2020-06-06 PROCEDURE — 74011636637 HC RX REV CODE- 636/637: Performed by: PHYSICIAN ASSISTANT

## 2020-06-06 PROCEDURE — 74011000250 HC RX REV CODE- 250: Performed by: FAMILY MEDICINE

## 2020-06-06 PROCEDURE — 74011250637 HC RX REV CODE- 250/637: Performed by: INTERNAL MEDICINE

## 2020-06-06 PROCEDURE — 85610 PROTHROMBIN TIME: CPT

## 2020-06-06 PROCEDURE — 74011250636 HC RX REV CODE- 250/636: Performed by: HOSPITALIST

## 2020-06-06 PROCEDURE — 65660000001 HC RM ICU INTERMED STEPDOWN

## 2020-06-06 PROCEDURE — 94760 N-INVAS EAR/PLS OXIMETRY 1: CPT

## 2020-06-06 PROCEDURE — 85730 THROMBOPLASTIN TIME PARTIAL: CPT

## 2020-06-06 PROCEDURE — 36415 COLL VENOUS BLD VENIPUNCTURE: CPT

## 2020-06-06 PROCEDURE — 74011250637 HC RX REV CODE- 250/637: Performed by: FAMILY MEDICINE

## 2020-06-06 PROCEDURE — 85384 FIBRINOGEN ACTIVITY: CPT

## 2020-06-06 RX ADMIN — Medication 10 ML: at 05:18

## 2020-06-06 RX ADMIN — Medication 500 MG: at 18:40

## 2020-06-06 RX ADMIN — ZINC SULFATE 220 MG (50 MG) CAPSULE 1 CAPSULE: CAPSULE at 10:36

## 2020-06-06 RX ADMIN — BUTALBITAL, ACETAMINOPHEN, AND CAFFEINE 1 TABLET: 50; 325; 40 TABLET ORAL at 23:06

## 2020-06-06 RX ADMIN — Medication 10 ML: at 23:06

## 2020-06-06 RX ADMIN — PANTOPRAZOLE SODIUM 40 MG: 40 TABLET, DELAYED RELEASE ORAL at 10:36

## 2020-06-06 RX ADMIN — PANTOPRAZOLE SODIUM 40 MG: 40 TABLET, DELAYED RELEASE ORAL at 18:40

## 2020-06-06 RX ADMIN — Medication 10 ML: at 14:00

## 2020-06-06 RX ADMIN — Medication 500 MG: at 10:36

## 2020-06-06 RX ADMIN — LEVOTHYROXINE SODIUM 250 MCG: 125 TABLET ORAL at 05:16

## 2020-06-06 RX ADMIN — ALLOPURINOL 100 MG: 100 TABLET ORAL at 10:36

## 2020-06-06 RX ADMIN — PREDNISONE 30 MG: 20 TABLET ORAL at 10:36

## 2020-06-06 RX ADMIN — ENOXAPARIN SODIUM 40 MG: 40 INJECTION SUBCUTANEOUS at 13:49

## 2020-06-06 RX ADMIN — ENOXAPARIN SODIUM 40 MG: 40 INJECTION SUBCUTANEOUS at 01:34

## 2020-06-06 RX ADMIN — FLUTICASONE PROPIONATE 2 SPRAY: 50 SPRAY, METERED NASAL at 10:37

## 2020-06-06 NOTE — PROGRESS NOTES
Problem: Gas Exchange - Impaired  Goal: Absence of hypoxia  Note: Pt transitioned from 20L and 50% FiO2 to 6L NC. O2 saturations remain above 90% throughout shift - will continue to wean as tolerated. Pt is RA at baseline. Problem: Falls - Risk of  Goal: *Absence of Falls  Description: Document Mercedez Blood Fall Risk and appropriate interventions in the flowsheet. Outcome: Progressing Towards Goal  Note: Fall Risk Interventions:  Mobility Interventions: Patient to call before getting OOB         Medication Interventions: Teach patient to arise slowly, Patient to call before getting OOB    Elimination Interventions: Call light in reach(bedside commode near the bed)              Problem: Pressure Injury - Risk of  Goal: *Prevention of pressure injury  Description: Document Wei Scale and appropriate interventions in the flowsheet. Outcome: Progressing Towards Goal  Note: Pressure Injury Interventions:       Moisture Interventions: Absorbent underpads, Apply protective barrier, creams and emollients, Moisture barrier, Limit adult briefs    Activity Interventions: Increase time out of bed    Mobility Interventions: HOB 30 degrees or less, Pressure redistribution bed/mattress (bed type), PT/OT evaluation    Nutrition Interventions: Document food/fluid/supplement intake    Friction and Shear Interventions: HOB 30 degrees or less, Minimize layers, Apply protective barrier, creams and emollients, Lift team/patient mobility team           Bedside shift change report given to Lulu Chahal RN (oncoming nurse) by Alem Watt RN (offgoing nurse). Report included the following information SBAR, Kardex, ED Summary, Intake/Output, MAR, Accordion, Recent Results, and Cardiac Rhythm NSR .

## 2020-06-06 NOTE — PROGRESS NOTES
[facility] Adult  Hospitalist Group                                                                                          Hospitalist Progress Note  Lilli Lomax MD  Answering service: [phone] OR [phone] from in house phone        Date of Service:  2020  NAME:  Beronica Duncan  :  1960  MRN:  [id number]      Admission Summary:     Beronica Duncan is a 64 y.o. female who presents with COVID pos+ from [address] [place] [address]. Pt said she had a test 2 days back and came back positive. She became SOB and went to ED.  Patient states that for the last 3 days she has not felt well.  She has had some body aches and nausea and fatigue.  She is also had cough.  Over the last 24 hours she started to have shortness of breath.  She comes in for evaluation of these complaints and concern for coronavirus given her workplace exposure.  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 Interval history / Subjective:   O2 requirement significantly improved. Breathing is better. No other concerns. Pulm stopped JUSTIN tx.       Assessment & Plan:     Acute respiratory failure, seems like slowly improving  -Acute hypoxic respiratory failure secondary to viral pneumonia  -COVID 19 positive as outpatient  -Pulmonology following  -Weaning high flow O2 from 35-25 LPM    COVID 19 infection  -Droplet plus precaution  -Continue vitamin C and azithromycin  -Anticoagulation per protocol  -Completed Remdesivir  -Actemra one dose   -On steroid, continue weaning  -ID and pulmonology following  -No JUSTIN therapy per Pulm now with improving recovery    Leukocytosis  -Likely from steroid use    Hypothyroidism  -TSH elevated  -Continue Synthroid  -Follow TSH level as outpatient    Obesity  -Patient with body mass index of 37.66 kg/m²  -Outpatient follow-up for weight management    Dehydration  -Monitor renal function    Abnormal LFT's   -Likely from infectious process  -Monitor liver function  -consider USG as out pt     Code status: We discussed CODE STATUS with the patient. She is okay with intubation but no other resuscitative efforts. DVT prophylaxis: Lovenox    Care Plan discussed with: Patient/Family and Nurse  Anticipated Disposition: TBD  Anticipated Discharge: Greater than 48 hours      Hospital Problems  Date Reviewed: 11/13/2019          Codes Class Noted POA    Hypoxia ICD-10-CM: R09.02  ICD-9-CM: 799.02  5/25/2020 Unknown        COVID-19 ICD-10-CM: U07.1  ICD-9-CM: 079.89  5/25/2020 Unknown                Review of Systems:   A comprehensive review of systems was negative except for that written in the HPI. Vital Signs:    Last 24hrs VS reviewed since prior progress note. Most recent are:  Visit Vitals  /88 (BP 1 Location: Right arm, BP Patient Position: Sitting; At rest)   Pulse 91   Temp 98.6 °F (37 °C)   Resp 22   Ht 5' 7\" (1.702 m)   Wt 108.3 kg (238 lb 11.2 oz)   SpO2 98%   BMI 37.39 kg/m²         Intake/Output Summary (Last 24 hours) at 6/5/2020 2204  Last data filed at 6/5/2020 0330  Gross per 24 hour   Intake 20 ml   Output 500 ml   Net -480 ml        Physical Examination:             Constitutional:  No acute distress, cooperative, pleasant    ENT:  Oral mucosa moist, oropharynx benign. Resp:  CTA bilaterally. No wheezing/rhonchi/rales. No accessory muscle use   CV:  Regular rhythm, normal rate, no murmurs, gallops, rubs    GI:  Soft, non distended, non tender. normoactive bowel sounds, no hepatosplenomegaly     Musculoskeletal:  No edema, warm, 2+ pulses throughout    Neurologic:  Moves all extremities. AAOx3, CN II-XII reviewed     Skin:  Good turgor, no rashes or ulcers       Data Review:    Review and/or order of clinical lab test    Xr Chest Port    Result Date: 6/2/2020  IMPRESSION: There is diffuse bilateral pulmonary opacities which appear slightly improved.      Xr Chest Port    Result Date: 5/29/2020  IMPRESSION: Interval development of moderate to severe bilateral airspace disease consistent with bilateral pneumonia. Xr Chest Port    Result Date: 5/25/2020  impression: Limited by shallow inspiration, cannot exclude interstitial infiltrates bilaterally. Labs:     Recent Labs     06/04/20  0252 06/03/20  0451   WBC 16.4* 14.2*   HGB 10.6* 10.9*   HCT 34.5* 35.1   * 581*     Recent Labs     06/04/20  0252 06/03/20  0451   * 135*   K 4.6 4.2   CL 98 99   CO2 30 29   BUN 27* 30*   CREA 1.02 1.06*   * 169*   CA 8.8 7.9*     Recent Labs     06/03/20  0451   ALT 59      TBILI 0.2   TP 6.6   ALB 2.8*   GLOB 3.8     Recent Labs     06/05/20  0243 06/04/20  0258 06/03/20  0451   INR 1.0 1.0 1.0   PTP 10.4 10.3 10.7   APTT 23.1 22.7 23.7      Recent Labs     06/03/20  0451   FERR 552*      No results found for: FOL, RBCF   No results for input(s): PH, PCO2, PO2 in the last 72 hours. No results for input(s): CPK, CKNDX, TROIQ in the last 72 hours.     No lab exists for component: CPKMB  Lab Results   Component Value Date/Time    Cholesterol, total 310 (H) 10/25/2018 02:15 PM    HDL Cholesterol 54 10/25/2018 02:15 PM    LDL, calculated 221 (H) 10/25/2018 02:15 PM    Triglyceride 173 (H) 10/25/2018 02:15 PM    CHOL/HDL Ratio 6.8 (H) 03/09/2012 09:10 AM     Lab Results   Component Value Date/Time    Glucose (POC) 130 (H) 05/27/2020 12:03 PM     Lab Results   Component Value Date/Time    Color Yellow 10/25/2018 02:15 PM    Appearance Cloudy (A) 10/25/2018 02:15 PM    Specific gravity 1.020 07/16/2015 07:58 PM    pH (UA) 5.5 10/25/2018 02:15 PM    Protein NEGATIVE  07/16/2015 07:58 PM    Glucose NEGATIVE  07/16/2015 07:58 PM    Ketone Negative 10/25/2018 02:15 PM    Bilirubin Negative 10/25/2018 02:15 PM    Urobilinogen 0.2 07/16/2015 07:58 PM    Nitrites Negative 10/25/2018 02:15 PM    Leukocyte Esterase 2+ (A) 10/25/2018 02:15 PM    Epithelial cells FEW 07/16/2015 07:58 PM    Bacteria Few 10/25/2018 02:15 PM    WBC >30 (A) 10/25/2018 02:15 PM    RBC 3-10 (A) 10/25/2018 02:15 PM         Medications Reviewed:     Current Facility-Administered Medications   Medication Dose Route Frequency    predniSONE (DELTASONE) tablet 30 mg  30 mg Oral DAILY WITH BREAKFAST    pantoprazole (PROTONIX) tablet 40 mg  40 mg Oral BID    diphenhydrAMINE (BENADRYL) capsule 25 mg  25 mg Oral Q6H PRN    polyethylene glycol (MIRALAX) packet 17 g  17 g Oral DAILY    sodium chloride (OCEAN) 0.65 % nasal squeeze bottle 2 Spray  2 Spray Both Nostrils Q2H PRN    lidocaine 4 % patch 1 Patch  1 Patch TransDERmal Q24H    lidocaine 4 % patch 1 Patch  1 Patch TransDERmal Q24H    docusate sodium (COLACE) capsule 100 mg  100 mg Oral DAILY    butalbital-acetaminophen-caffeine (FIORICET, ESGIC) -40 mg per tablet 1 Tab  1 Tab Oral Q12H PRN    levothyroxine (SYNTHROID) tablet 250 mcg  250 mcg Oral 6am    allopurinoL (ZYLOPRIM) tablet 100 mg  100 mg Oral DAILY    acetaminophen (TYLENOL) tablet 650 mg  650 mg Oral Q6H PRN    Or    acetaminophen (TYLENOL) suppository 650 mg  650 mg Rectal Q6H PRN    fluticasone propionate (FLONASE) 50 mcg/actuation nasal spray 2 Spray  2 Spray Both Nostrils DAILY    albuterol (PROVENTIL HFA, VENTOLIN HFA, PROAIR HFA) inhaler 2 Puff  2 Puff Inhalation Q6H PRN    zinc sulfate (ZINCATE) 220 (50) mg capsule 1 Cap  1 Cap Oral DAILY    ascorbic acid (vitamin C) (VITAMIN C) tablet 500 mg  500 mg Oral BID    enoxaparin (LOVENOX) injection 40 mg  40 mg SubCUTAneous Q12H    guaiFENesin (ROBITUSSIN) 100 mg/5 mL oral liquid 100 mg  100 mg Oral Q4H PRN    sodium chloride (NS) flush 5-40 mL  5-40 mL IntraVENous Q8H    sodium chloride (NS) flush 5-40 mL  5-40 mL IntraVENous PRN    ondansetron (ZOFRAN) injection 4 mg  4 mg IntraVENous Q6H PRN     ______________________________________________________________________  EXPECTED LENGTH OF STAY: 5d 12h  ACTUAL LENGTH OF STAY:          Anish Perez MD

## 2020-06-06 NOTE — PROGRESS NOTES
Problem: Gas Exchange - Impaired  Goal: Absence of hypoxia  Outcome: Progressing Towards Goal  Note: Pt weaned today from 5L NC to 3L NC. O2 saturations remain above 92% throughout shift. Will continue to wean as tolerated as pt is RA at baseline. Pt also encouraged to sit up in bed to promote optimal lung expansion. Problem: Falls - Risk of  Goal: *Absence of Falls  Description: Document Celine Baker Fall Risk and appropriate interventions in the flowsheet. Outcome: Progressing Towards Goal  Note: Fall Risk Interventions:  Mobility Interventions: Patient to call before getting OOB         Medication Interventions: Patient to call before getting OOB    Elimination Interventions: Call light in reach              Problem: Pressure Injury - Risk of  Goal: *Prevention of pressure injury  Description: Document Wei Scale and appropriate interventions in the flowsheet.   Outcome: Progressing Towards Goal  Note: Pressure Injury Interventions:       Moisture Interventions: Absorbent underpads, Offer toileting Q_hr    Activity Interventions: Increase time out of bed, Assess need for specialty bed    Mobility Interventions: HOB 30 degrees or less, Pressure redistribution bed/mattress (bed type)    Nutrition Interventions: Document food/fluid/supplement intake    Friction and Shear Interventions: HOB 30 degrees or less, Minimize layers

## 2020-06-06 NOTE — PROGRESS NOTES
Pt weaned to 5L NC overnight. O2 94%. 0800: Bedside shift change report given to Brandie Aj RN (oncoming nurse) by Zack Carvalho RN (offgoing nurse). Report included the following information SBAR, Kardex, ED Summary, Intake/Output, MAR, Accordion, Recent Results and Cardiac Rhythm NSR. Problem: Airway Clearance - Ineffective  Goal: Achieve or maintain patent airway  Outcome: Progressing Towards Goal     Problem: Gas Exchange - Impaired  Goal: Absence of hypoxia  Outcome: Progressing Towards Goal     Problem: Breathing Pattern - Ineffective  Goal: Ability to achieve and maintain a regular respiratory rate  Outcome: Progressing Towards Goal     Problem: Nutrition Deficits  Goal: Optimize nutrtional status  Outcome: Progressing Towards Goal     Problem: Risk for Fluid Volume Deficit  Goal: Maintain normal heart rhythm  Outcome: Progressing Towards Goal     Problem: Fatigue  Goal: Verbalize increase energy and improved vitality  Outcome: Progressing Towards Goal     Problem: Airway Clearance - Ineffective  Goal: Achieve or maintain patent airway  Outcome: Progressing Towards Goal     Problem: Gas Exchange - Impaired  Goal: Absence of hypoxia  Outcome: Progressing Towards Goal  Goal: Promote optimal lung function  Outcome: Progressing Towards Goal     Problem: Breathing Pattern - Ineffective  Goal: Ability to achieve and maintain a regular respiratory rate  Outcome: Progressing Towards Goal     Problem:  Body Temperature -  Risk of, Imbalanced  Goal: Ability to maintain a body temperature within defined limits  Outcome: Progressing Towards Goal     Problem: Nutrition Deficits  Goal: Optimize nutrtional status  Outcome: Progressing Towards Goal

## 2020-06-06 NOTE — PROGRESS NOTES
[facility] Adult  Hospitalist Group                                                                                          Hospitalist Progress Note  Leti France MD  Answering service: [phone] from in house phone        Date of Service:  2020  NAME:  Xiomara Bauman  :  1960  MRN:  [id number]      Admission Summary:     Xiomara Bauman is a 64 y.o. female who presents with COVID pos+ from [address] [place] [address]. Pt said she had a test 2 days back and came back positive. She became SOB and went to ED.  Patient states that for the last 3 days she has not felt well.  She has had some body aches and nausea and fatigue.  She is also had cough.  Over the last 24 hours she started to have shortness of breath.  She comes in for evaluation of these complaints and concern for coronavirus given her workplace exposure.  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 Interval history / Subjective:   Feeling better. Breathing is better. Continue to wean down on O2 requirement now around 4-5 LPM NC. No chest tightness or congestion. Cough present. Sputum turning whitish. No other concerns.      Assessment & Plan:     Acute respiratory failure, seems like slowly improving  -Acute hypoxic respiratory failure secondary to viral pneumonia  -COVID 19 positive as outpatient  -Pulmonology following  -Weaning successful from high flow O2 from 35 LPM down to 4-5 LPM  -Continue to wean  -Consider disposition planning once O2 requirement down to 2 LPM or below    COVID 19 infection  -Droplet plus precaution  -Continue vitamin C and azithromycin  -Anticoagulation per protocol  -Completed Remdesivir  -Actemra one dose   -On steroid, continue weaning  -ID and pulmonology following  -No JUSTIN therapy per Pulm now with improving recovery    Leukocytosis  -Likely from steroid use    Hypothyroidism  -TSH elevated  -Continue Synthroid  -Follow TSH level as outpatient    Obesity  -Patient with body mass index of 37.66 kg/m²  -Outpatient follow-up for weight management    Dehydration  -Monitor renal function    Abnormal LFT's   -Likely from infectious process  -Monitor liver function  -consider USG as out pt     Code status: We discussed CODE STATUS with the patient. She is okay with intubation but no other resuscitative efforts. DVT prophylaxis: Lovenox    Care Plan discussed with: Patient/Family and Nurse  Anticipated Disposition: TBD  Anticipated Discharge: Greater than 48 hours      Hospital Problems  Date Reviewed: 11/13/2019          Codes Class Noted POA    Hypoxia ICD-10-CM: R09.02  ICD-9-CM: 799.02  5/25/2020 Unknown        COVID-19 ICD-10-CM: U07.1  ICD-9-CM: 079.89  5/25/2020 Unknown                Review of Systems:   A comprehensive review of systems was negative except for that written in the HPI. Vital Signs:    Last 24hrs VS reviewed since prior progress note. Most recent are:  Visit Vitals  /87 (BP 1 Location: Left arm, BP Patient Position: At rest)   Pulse 100   Temp 98.5 °F (36.9 °C)   Resp 26   Ht 5' 7\" (1.702 m)   Wt 108.9 kg (240 lb 1.3 oz)   SpO2 96%   BMI 37.60 kg/m²         Intake/Output Summary (Last 24 hours) at 6/6/2020 1639  Last data filed at 6/6/2020 1032  Gross per 24 hour   Intake --   Output 1850 ml   Net -1850 ml        Physical Examination:             Constitutional:  No acute distress, cooperative, pleasant    ENT:  Oral mucosa moist, oropharynx benign. Resp:  CTA bilaterally. No wheezing/rhonchi/rales. No accessory muscle use   CV:  Regular rhythm, normal rate, no murmurs, gallops, rubs    GI:  Soft, non distended, non tender. normoactive bowel sounds, no hepatosplenomegaly     Musculoskeletal:  No edema, warm, 2+ pulses throughout    Neurologic:  Moves all extremities.   AAOx3, CN II-XII reviewed     Skin:  Good turgor, no rashes or ulcers       Data Review:    Review and/or order of clinical lab test    Xr Chest Port    Result Date: 6/2/2020  IMPRESSION: There is diffuse bilateral pulmonary opacities which appear slightly improved. Xr Chest Port    Result Date: 5/29/2020  IMPRESSION: Interval development of moderate to severe bilateral airspace disease consistent with bilateral pneumonia. Xr Chest Port    Result Date: 5/25/2020  impression: Limited by shallow inspiration, cannot exclude interstitial infiltrates bilaterally. Labs:     Recent Labs     06/04/20  0252   WBC 16.4*   HGB 10.6*   HCT 34.5*   *     Recent Labs     06/04/20  0252   *   K 4.6   CL 98   CO2 30   BUN 27*   CREA 1.02   *   CA 8.8     No results for input(s): ALT, AP, TBIL, TBILI, TP, ALB, GLOB, GGT, AML, LPSE in the last 72 hours. No lab exists for component: SGOT, GPT, AMYP, HLPSE  Recent Labs     06/06/20  0133 06/05/20  0243 06/04/20  0258   INR 1.0 1.0 1.0   PTP 10.2 10.4 10.3   APTT 20.8* 23.1 22.7      No results for input(s): FE, TIBC, PSAT, FERR in the last 72 hours. No results found for: FOL, RBCF   No results for input(s): PH, PCO2, PO2 in the last 72 hours. No results for input(s): CPK, CKNDX, TROIQ in the last 72 hours.     No lab exists for component: CPKMB  Lab Results   Component Value Date/Time    Cholesterol, total 310 (H) 10/25/2018 02:15 PM    HDL Cholesterol 54 10/25/2018 02:15 PM    LDL, calculated 221 (H) 10/25/2018 02:15 PM    Triglyceride 173 (H) 10/25/2018 02:15 PM    CHOL/HDL Ratio 6.8 (H) 03/09/2012 09:10 AM     Lab Results   Component Value Date/Time    Glucose (POC) 130 (H) 05/27/2020 12:03 PM     Lab Results   Component Value Date/Time    Color Yellow 10/25/2018 02:15 PM    Appearance Cloudy (A) 10/25/2018 02:15 PM    Specific gravity 1.020 07/16/2015 07:58 PM    pH (UA) 5.5 10/25/2018 02:15 PM    Protein NEGATIVE  07/16/2015 07:58 PM    Glucose NEGATIVE  07/16/2015 07:58 PM    Ketone Negative 10/25/2018 02:15 PM    Bilirubin Negative 10/25/2018 02:15 PM    Urobilinogen 0.2 07/16/2015 07:58 PM    Nitrites Negative 10/25/2018 02:15 PM    Leukocyte Esterase 2+ (A) 10/25/2018 02:15 PM    Epithelial cells FEW 07/16/2015 07:58 PM    Bacteria Few 10/25/2018 02:15 PM    WBC >30 (A) 10/25/2018 02:15 PM    RBC 3-10 (A) 10/25/2018 02:15 PM         Medications Reviewed:     Current Facility-Administered Medications   Medication Dose Route Frequency    predniSONE (DELTASONE) tablet 30 mg  30 mg Oral DAILY WITH BREAKFAST    pantoprazole (PROTONIX) tablet 40 mg  40 mg Oral BID    diphenhydrAMINE (BENADRYL) capsule 25 mg  25 mg Oral Q6H PRN    polyethylene glycol (MIRALAX) packet 17 g  17 g Oral DAILY    sodium chloride (OCEAN) 0.65 % nasal squeeze bottle 2 Spray  2 Spray Both Nostrils Q2H PRN    lidocaine 4 % patch 1 Patch  1 Patch TransDERmal Q24H    lidocaine 4 % patch 1 Patch  1 Patch TransDERmal Q24H    docusate sodium (COLACE) capsule 100 mg  100 mg Oral DAILY    butalbital-acetaminophen-caffeine (FIORICET, ESGIC) -40 mg per tablet 1 Tab  1 Tab Oral Q12H PRN    levothyroxine (SYNTHROID) tablet 250 mcg  250 mcg Oral 6am    allopurinoL (ZYLOPRIM) tablet 100 mg  100 mg Oral DAILY    acetaminophen (TYLENOL) tablet 650 mg  650 mg Oral Q6H PRN    Or    acetaminophen (TYLENOL) suppository 650 mg  650 mg Rectal Q6H PRN    fluticasone propionate (FLONASE) 50 mcg/actuation nasal spray 2 Spray  2 Spray Both Nostrils DAILY    albuterol (PROVENTIL HFA, VENTOLIN HFA, PROAIR HFA) inhaler 2 Puff  2 Puff Inhalation Q6H PRN    zinc sulfate (ZINCATE) 220 (50) mg capsule 1 Cap  1 Cap Oral DAILY    ascorbic acid (vitamin C) (VITAMIN C) tablet 500 mg  500 mg Oral BID    enoxaparin (LOVENOX) injection 40 mg  40 mg SubCUTAneous Q12H    guaiFENesin (ROBITUSSIN) 100 mg/5 mL oral liquid 100 mg  100 mg Oral Q4H PRN    sodium chloride (NS) flush 5-40 mL  5-40 mL IntraVENous Q8H    sodium chloride (NS) flush 5-40 mL  5-40 mL IntraVENous PRN    ondansetron (ZOFRAN) injection 4 mg  4 mg IntraVENous Q6H PRN     ______________________________________________________________________  EXPECTED LENGTH OF STAY: 5d 12h  ACTUAL LENGTH OF STAY:          Ronnie Sanon MD

## 2020-06-07 LAB
ALBUMIN SERPL-MCNC: 3.1 G/DL (ref 3.5–5)
ALBUMIN/GLOB SERPL: 0.8 {RATIO} (ref 1.1–2.2)
ALP SERPL-CCNC: 76 U/L (ref 45–117)
ALT SERPL-CCNC: 51 U/L (ref 12–78)
ANION GAP SERPL CALC-SCNC: 8 MMOL/L (ref 5–15)
APTT PPP: 20.8 SEC (ref 22.1–32)
AST SERPL-CCNC: 23 U/L (ref 15–37)
BASOPHILS # BLD: 0.2 K/UL (ref 0–0.1)
BASOPHILS NFR BLD: 1 % (ref 0–1)
BILIRUB SERPL-MCNC: 0.2 MG/DL (ref 0.2–1)
BUN SERPL-MCNC: 23 MG/DL (ref 6–20)
BUN/CREAT SERPL: 26 (ref 12–20)
CALCIUM SERPL-MCNC: 9.4 MG/DL (ref 8.5–10.1)
CHLORIDE SERPL-SCNC: 100 MMOL/L (ref 97–108)
CO2 SERPL-SCNC: 26 MMOL/L (ref 21–32)
CREAT SERPL-MCNC: 0.9 MG/DL (ref 0.55–1.02)
D DIMER PPP FEU-MCNC: 0.46 MG/L FEU (ref 0–0.65)
DIFFERENTIAL METHOD BLD: ABNORMAL
EOSINOPHIL # BLD: 0 K/UL (ref 0–0.4)
EOSINOPHIL NFR BLD: 0 % (ref 0–7)
ERYTHROCYTE [DISTWIDTH] IN BLOOD BY AUTOMATED COUNT: 14.8 % (ref 11.5–14.5)
FIBRINOGEN PPP-MCNC: 322 MG/DL (ref 200–475)
GLOBULIN SER CALC-MCNC: 4 G/DL (ref 2–4)
GLUCOSE SERPL-MCNC: 128 MG/DL (ref 65–100)
HCT VFR BLD AUTO: 35.3 % (ref 35–47)
HGB BLD-MCNC: 10.9 G/DL (ref 11.5–16)
IMM GRANULOCYTES # BLD AUTO: 0 K/UL
IMM GRANULOCYTES NFR BLD AUTO: 0 %
INR PPP: 1 (ref 0.9–1.1)
LYMPHOCYTES # BLD: 1.9 K/UL (ref 0.8–3.5)
LYMPHOCYTES NFR BLD: 11 % (ref 12–49)
MAGNESIUM SERPL-MCNC: 2.3 MG/DL (ref 1.6–2.4)
MCH RBC QN AUTO: 28.7 PG (ref 26–34)
MCHC RBC AUTO-ENTMCNC: 30.9 G/DL (ref 30–36.5)
MCV RBC AUTO: 92.9 FL (ref 80–99)
METAMYELOCYTES NFR BLD MANUAL: 2 %
MONOCYTES # BLD: 1.2 K/UL (ref 0–1)
MONOCYTES NFR BLD: 7 % (ref 5–13)
MYELOCYTES NFR BLD MANUAL: 2 %
NEUTS BAND NFR BLD MANUAL: 2 % (ref 0–6)
NEUTS SEG # BLD: 13.2 K/UL (ref 1.8–8)
NEUTS SEG NFR BLD: 75 % (ref 32–75)
NRBC # BLD: 0.03 K/UL (ref 0–0.01)
NRBC BLD-RTO: 0.2 PER 100 WBC
PLATELET # BLD AUTO: 503 K/UL (ref 150–400)
PLATELET COMMENTS,PCOM: ABNORMAL
PMV BLD AUTO: 10 FL (ref 8.9–12.9)
POTASSIUM SERPL-SCNC: 4.3 MMOL/L (ref 3.5–5.1)
PROT SERPL-MCNC: 7.1 G/DL (ref 6.4–8.2)
PROTHROMBIN TIME: 10.1 SEC (ref 9–11.1)
RBC # BLD AUTO: 3.8 M/UL (ref 3.8–5.2)
RBC MORPH BLD: ABNORMAL
RBC MORPH BLD: ABNORMAL
SODIUM SERPL-SCNC: 134 MMOL/L (ref 136–145)
THERAPEUTIC RANGE,PTTT: ABNORMAL SECS (ref 58–77)
WBC # BLD AUTO: 17.1 K/UL (ref 3.6–11)

## 2020-06-07 PROCEDURE — 87635 SARS-COV-2 COVID-19 AMP PRB: CPT

## 2020-06-07 PROCEDURE — 85610 PROTHROMBIN TIME: CPT

## 2020-06-07 PROCEDURE — 83735 ASSAY OF MAGNESIUM: CPT

## 2020-06-07 PROCEDURE — 74011000250 HC RX REV CODE- 250: Performed by: FAMILY MEDICINE

## 2020-06-07 PROCEDURE — 65270000029 HC RM PRIVATE

## 2020-06-07 PROCEDURE — 74011636637 HC RX REV CODE- 636/637: Performed by: PHYSICIAN ASSISTANT

## 2020-06-07 PROCEDURE — 80053 COMPREHEN METABOLIC PANEL: CPT

## 2020-06-07 PROCEDURE — 85730 THROMBOPLASTIN TIME PARTIAL: CPT

## 2020-06-07 PROCEDURE — 85025 COMPLETE CBC W/AUTO DIFF WBC: CPT

## 2020-06-07 PROCEDURE — 36415 COLL VENOUS BLD VENIPUNCTURE: CPT

## 2020-06-07 PROCEDURE — 74011250637 HC RX REV CODE- 250/637: Performed by: FAMILY MEDICINE

## 2020-06-07 PROCEDURE — 77010033678 HC OXYGEN DAILY

## 2020-06-07 PROCEDURE — 74011250637 HC RX REV CODE- 250/637: Performed by: HOSPITALIST

## 2020-06-07 PROCEDURE — 85379 FIBRIN DEGRADATION QUANT: CPT

## 2020-06-07 PROCEDURE — 74011250636 HC RX REV CODE- 250/636: Performed by: HOSPITALIST

## 2020-06-07 PROCEDURE — 85384 FIBRINOGEN ACTIVITY: CPT

## 2020-06-07 PROCEDURE — 94760 N-INVAS EAR/PLS OXIMETRY 1: CPT

## 2020-06-07 RX ORDER — PREDNISONE 10 MG/1
10 TABLET ORAL
Status: DISCONTINUED | OUTPATIENT
Start: 2020-06-11 | End: 2020-06-09 | Stop reason: HOSPADM

## 2020-06-07 RX ORDER — PREDNISONE 20 MG/1
20 TABLET ORAL
Status: DISCONTINUED | OUTPATIENT
Start: 2020-06-08 | End: 2020-06-09 | Stop reason: HOSPADM

## 2020-06-07 RX ADMIN — PANTOPRAZOLE SODIUM 40 MG: 40 TABLET, DELAYED RELEASE ORAL at 15:51

## 2020-06-07 RX ADMIN — Medication 10 ML: at 07:15

## 2020-06-07 RX ADMIN — Medication 500 MG: at 10:25

## 2020-06-07 RX ADMIN — PANTOPRAZOLE SODIUM 40 MG: 40 TABLET, DELAYED RELEASE ORAL at 10:24

## 2020-06-07 RX ADMIN — LEVOTHYROXINE SODIUM 250 MCG: 125 TABLET ORAL at 07:15

## 2020-06-07 RX ADMIN — ZINC SULFATE 220 MG (50 MG) CAPSULE 1 CAPSULE: CAPSULE at 10:24

## 2020-06-07 RX ADMIN — Medication 10 ML: at 23:01

## 2020-06-07 RX ADMIN — ENOXAPARIN SODIUM 40 MG: 40 INJECTION SUBCUTANEOUS at 03:32

## 2020-06-07 RX ADMIN — ALLOPURINOL 100 MG: 100 TABLET ORAL at 10:24

## 2020-06-07 RX ADMIN — ENOXAPARIN SODIUM 40 MG: 40 INJECTION SUBCUTANEOUS at 15:50

## 2020-06-07 RX ADMIN — Medication 500 MG: at 15:50

## 2020-06-07 RX ADMIN — PREDNISONE 30 MG: 20 TABLET ORAL at 10:24

## 2020-06-07 NOTE — PROGRESS NOTES
Patient up with minimal assistance to [facility]. Some slight dizziness when walking to stretcher during t/x out. TRANSFER - OUT REPORT:    Verbal report given to [facility] RN(name) on Elana Avila  being transferred to (unit) for routine progression of care       Report consisted of patients Situation, Background, Assessment and   Recommendations(SBAR). Information from the following report(s) SBAR, Kardex, Intake/Output, MAR, Accordion, and Recent Results was reviewed with the receiving nurse. Lines:   Peripheral IV 05/25/20 Right Antecubital (Active)   Site Assessment Clean, dry, & intact 6/7/2020  8:00 AM   Phlebitis Assessment 0 6/7/2020  8:00 AM   Infiltration Assessment 0 6/7/2020  8:00 AM   Dressing Status Clean, dry, & intact 6/7/2020  8:00 AM   Dressing Type Tape;Transparent 6/7/2020  8:00 AM   Hub Color/Line Status Pink 6/7/2020  8:00 AM   Action Taken Open ports on tubing capped 6/7/2020  8:00 AM   Alcohol Cap Used Yes 6/7/2020  8:00 AM          Opportunity for questions and clarification was provided. Patient transported with:   O2 @ 2 liters  Tech, patient belongings                                      Problem: Airway Clearance - Ineffective  Goal: Achieve or maintain patent airway  Outcome: Progressing Towards Goal     Problem: Gas Exchange - Impaired  Goal: Absence of hypoxia  Outcome: Progressing Towards Goal  Goal: Promote optimal lung function  Outcome: Progressing Towards Goal     Problem: Breathing Pattern - Ineffective  Goal: Ability to achieve and maintain a regular respiratory rate  Outcome: Progressing Towards Goal     Problem:  Body Temperature -  Risk of, Imbalanced  Goal: Complications related to the disease process, condition or treatment will be avoided or minimized  Outcome: Progressing Towards Goal     Problem: Isolation Precautions - Risk of Spread of Infection  Goal: Prevent transmission of infectious organism to others  Outcome: Progressing Towards Goal     Problem: Nutrition Deficits  Goal: Optimize nutrtional status  Outcome: Progressing Towards Goal     Problem: Loneliness or Risk for Loneliness  Goal: Demonstrate positive use of time alone when socialization is not possible  Outcome: Progressing Towards Goal     Problem: Fatigue  Goal: Verbalize increase energy and improved vitality  Outcome: Progressing Towards Goal     Problem: Patient Education: Go to Patient Education Activity  Goal: Patient/Family Education  Outcome: Progressing Towards Goal

## 2020-06-07 NOTE — PROGRESS NOTES
[facility] Adult  Hospitalist Group                                                                                          Hospitalist Progress Note  Lilli Lomax MD  Answering service: [phone] OR [phone] from in house phone        Date of Service:  2020  NAME:  Beronica Duncan  :  1960  MRN:  [id number]      Admission Summary:     Beronica Duncan is a 64 y.o. female who presents with COVID pos+ from [address] [place] [address]. Pt said she had a test 2 days back and came back positive. She became SOB and went to ED.  Patient states that for the last 3 days she has not felt well.  She has had some body aches and nausea and fatigue.  She is also had cough.  Over the last 24 hours she started to have shortness of breath.  She comes in for evaluation of these complaints and concern for coronavirus given her workplace exposure.  She works at a facility in [employer]. where there is apparently several patients with confirmed coronavirus. Interval history / Subjective:   Feeling better. Breathing is better. Feeling well on 2LPM O2 NC. Willing to wean off completely. Denied any SOB, cough, chest tightness or congestion. Apprehensive for DC.  If stable and weaned off appropriately, would DC home tomorrow     Assessment & Plan:     Acute respiratory failure, seems like slowly improving, hopefully soon off O2  -Acute hypoxic respiratory failure secondary to viral pneumonia  -COVID 19 positive as outpatient  -Pulmonology following  -Weaning successful from high flow O2 from 35 LPM down to 2 LPM  -Continue to wean  -Likely DC in next 24-48 hrs off Oxygen, as seems to have good potential for DC without O2 supplementation    COVID 19 infection  -Droplet plus precaution  -Continue vitamin C and azithromycin  -Anticoagulation per protocol  -Completed Remdesivir  -Actemra one dose   -On steroid, continue weaning -  On slow taper  -ID and pulmonology following  -No JUSTIN therapy per Pulm now with improving recovery    Leukocytosis  -Likely from steroid use    Hypothyroidism  -TSH elevated  -Continue Synthroid  -Follow TSH level as outpatient    Obesity  -Patient with body mass index of 37.66 kg/m²  -Outpatient follow-up for weight management    Dehydration  -Monitor renal function    Abnormal LFT's   -Likely from infectious process  -Monitor liver function  -consider USG as out pt     Code status: We discussed CODE STATUS with the patient. She is okay with intubation but no other resuscitative efforts. DVT prophylaxis: Lovenox    Care Plan discussed with: Patient/Family and Nurse  Anticipated Disposition: Home  Anticipated Discharge: 24-48 hrs     Hospital Problems  Date Reviewed: 11/13/2019          Codes Class Noted POA    Hypoxia ICD-10-CM: R09.02  ICD-9-CM: 799.02  5/25/2020 Unknown        COVID-19 ICD-10-CM: U07.1  ICD-9-CM: 079.89  5/25/2020 Unknown                Review of Systems:   A comprehensive review of systems was negative except for that written in the HPI. Vital Signs:    Last 24hrs VS reviewed since prior progress note. Most recent are:  Visit Vitals  /76 (BP 1 Location: Right arm, BP Patient Position: At rest)   Pulse 94   Temp 98.6 °F (37 °C)   Resp 18   Ht 5' 7\" (1.702 m)   Wt 107.7 kg (237 lb 7 oz)   SpO2 (P) 92%   BMI 37.19 kg/m²         Intake/Output Summary (Last 24 hours) at 6/7/2020 1724  Last data filed at 6/7/2020 0800  Gross per 24 hour   Intake 240 ml   Output 1150 ml   Net -910 ml        Physical Examination:             Constitutional:  No acute distress, cooperative, pleasant    ENT:  Oral mucosa moist, oropharynx benign. Resp:  CTA bilaterally. No wheezing/rhonchi/rales. No accessory muscle use   CV:  Regular rhythm, normal rate, no murmurs, gallops, rubs    GI:  Soft, non distended, non tender. normoactive bowel sounds, no hepatosplenomegaly     Musculoskeletal:  No edema, warm, 2+ pulses throughout    Neurologic:  Moves all extremities.   AAOx3, CN II-XII reviewed Skin:  Good turgor, no rashes or ulcers       Data Review:    Review and/or order of clinical lab test    Xr Chest Port    Result Date: 6/2/2020  IMPRESSION: There is diffuse bilateral pulmonary opacities which appear slightly improved. Xr Chest Port    Result Date: 5/29/2020  IMPRESSION: Interval development of moderate to severe bilateral airspace disease consistent with bilateral pneumonia. Xr Chest Port    Result Date: 5/25/2020  impression: Limited by shallow inspiration, cannot exclude interstitial infiltrates bilaterally. Labs:     Recent Labs     06/07/20  0354   WBC 17.1*   HGB 10.9*   HCT 35.3   *     Recent Labs     06/07/20  0354   *   K 4.3      CO2 26   BUN 23*   CREA 0.90   *   CA 9.4   MG 2.3     Recent Labs     06/07/20  0354   ALT 51   AP 76   TBILI 0.2   TP 7.1   ALB 3.1*   GLOB 4.0     Recent Labs     06/07/20  0354 06/06/20  0133 06/05/20  0243   INR 1.0 1.0 1.0   PTP 10.1 10.2 10.4   APTT 20.8* 20.8* 23.1      No results for input(s): FE, TIBC, PSAT, FERR in the last 72 hours. No results found for: FOL, RBCF   No results for input(s): PH, PCO2, PO2 in the last 72 hours. No results for input(s): CPK, CKNDX, TROIQ in the last 72 hours.     No lab exists for component: CPKMB  Lab Results   Component Value Date/Time    Cholesterol, total 310 (H) 10/25/2018 02:15 PM    HDL Cholesterol 54 10/25/2018 02:15 PM    LDL, calculated 221 (H) 10/25/2018 02:15 PM    Triglyceride 173 (H) 10/25/2018 02:15 PM    CHOL/HDL Ratio 6.8 (H) 03/09/2012 09:10 AM     Lab Results   Component Value Date/Time    Glucose (POC) 130 (H) 05/27/2020 12:03 PM     Lab Results   Component Value Date/Time    Color Yellow 10/25/2018 02:15 PM    Appearance Cloudy (A) 10/25/2018 02:15 PM    Specific gravity 1.020 07/16/2015 07:58 PM    pH (UA) 5.5 10/25/2018 02:15 PM    Protein NEGATIVE  07/16/2015 07:58 PM    Glucose NEGATIVE  07/16/2015 07:58 PM    Ketone Negative 10/25/2018 02:15 PM Bilirubin Negative 10/25/2018 02:15 PM    Urobilinogen 0.2 07/16/2015 07:58 PM    Nitrites Negative 10/25/2018 02:15 PM    Leukocyte Esterase 2+ (A) 10/25/2018 02:15 PM    Epithelial cells FEW 07/16/2015 07:58 PM    Bacteria Few 10/25/2018 02:15 PM    WBC >30 (A) 10/25/2018 02:15 PM    RBC 3-10 (A) 10/25/2018 02:15 PM         Medications Reviewed:     Current Facility-Administered Medications   Medication Dose Route Frequency    predniSONE (DELTASONE) tablet 30 mg  30 mg Oral DAILY WITH BREAKFAST    pantoprazole (PROTONIX) tablet 40 mg  40 mg Oral BID    diphenhydrAMINE (BENADRYL) capsule 25 mg  25 mg Oral Q6H PRN    polyethylene glycol (MIRALAX) packet 17 g  17 g Oral DAILY    sodium chloride (OCEAN) 0.65 % nasal squeeze bottle 2 Spray  2 Spray Both Nostrils Q2H PRN    lidocaine 4 % patch 1 Patch  1 Patch TransDERmal Q24H    lidocaine 4 % patch 1 Patch  1 Patch TransDERmal Q24H    docusate sodium (COLACE) capsule 100 mg  100 mg Oral DAILY    butalbital-acetaminophen-caffeine (FIORICET, ESGIC) -40 mg per tablet 1 Tab  1 Tab Oral Q12H PRN    levothyroxine (SYNTHROID) tablet 250 mcg  250 mcg Oral 6am    allopurinoL (ZYLOPRIM) tablet 100 mg  100 mg Oral DAILY    acetaminophen (TYLENOL) tablet 650 mg  650 mg Oral Q6H PRN    Or    acetaminophen (TYLENOL) suppository 650 mg  650 mg Rectal Q6H PRN    fluticasone propionate (FLONASE) 50 mcg/actuation nasal spray 2 Spray  2 Spray Both Nostrils DAILY    albuterol (PROVENTIL HFA, VENTOLIN HFA, PROAIR HFA) inhaler 2 Puff  2 Puff Inhalation Q6H PRN    zinc sulfate (ZINCATE) 220 (50) mg capsule 1 Cap  1 Cap Oral DAILY    ascorbic acid (vitamin C) (VITAMIN C) tablet 500 mg  500 mg Oral BID    enoxaparin (LOVENOX) injection 40 mg  40 mg SubCUTAneous Q12H    guaiFENesin (ROBITUSSIN) 100 mg/5 mL oral liquid 100 mg  100 mg Oral Q4H PRN    sodium chloride (NS) flush 5-40 mL  5-40 mL IntraVENous Q8H    sodium chloride (NS) flush 5-40 mL  5-40 mL IntraVENous PRN    ondansetron (ZOFRAN) injection 4 mg  4 mg IntraVENous Q6H PRN     ______________________________________________________________________  EXPECTED LENGTH OF STAY: 5d 12h  ACTUAL LENGTH OF STAY:          Gigi Camejo MD

## 2020-06-07 NOTE — ROUTINE PROCESS
Bedside shift change report given to Safia Scott RN (oncoming nurse) by Nydia Porter RN (offgoing nurse). Report included the following information SBAR, Kardex, ED Summary, Intake/Output, MAR, Accordion, Recent Results and Cardiac Rhythm NSR/ST.

## 2020-06-07 NOTE — PROGRESS NOTES
TRANSFER - IN REPORT:    Verbal report received from Sarah(name) on Justice Rodriguez  being received from [facility]) for routine progression of care      Report consisted of patients Situation, Background, Assessment and   Recommendations(SBAR). Information from the following report(s) SBAR, Kardex and Intake/Output was reviewed with the receiving nurse. Opportunity for questions and clarification was provided. Assessment completed upon patients arrival to unit and care assumed.

## 2020-06-08 LAB
APTT PPP: 21.9 SEC (ref 22.1–32)
D DIMER PPP FEU-MCNC: 0.3 MG/L FEU (ref 0–0.65)
FIBRINOGEN PPP-MCNC: 304 MG/DL (ref 200–475)
INR PPP: 1 (ref 0.9–1.1)
PROTHROMBIN TIME: 10.2 SEC (ref 9–11.1)
SARS-COV-2, COV2: DETECTED
SPECIMEN SOURCE, FCOV2M: ABNORMAL
THERAPEUTIC RANGE,PTTT: ABNORMAL SECS (ref 58–77)

## 2020-06-08 PROCEDURE — 74011636637 HC RX REV CODE- 636/637: Performed by: INTERNAL MEDICINE

## 2020-06-08 PROCEDURE — 85610 PROTHROMBIN TIME: CPT

## 2020-06-08 PROCEDURE — 36415 COLL VENOUS BLD VENIPUNCTURE: CPT

## 2020-06-08 PROCEDURE — 74011250636 HC RX REV CODE- 250/636: Performed by: HOSPITALIST

## 2020-06-08 PROCEDURE — 65270000029 HC RM PRIVATE

## 2020-06-08 PROCEDURE — 85384 FIBRINOGEN ACTIVITY: CPT

## 2020-06-08 PROCEDURE — 74011250637 HC RX REV CODE- 250/637: Performed by: HOSPITALIST

## 2020-06-08 PROCEDURE — 85730 THROMBOPLASTIN TIME PARTIAL: CPT

## 2020-06-08 PROCEDURE — 74011000250 HC RX REV CODE- 250: Performed by: FAMILY MEDICINE

## 2020-06-08 PROCEDURE — 85379 FIBRIN DEGRADATION QUANT: CPT

## 2020-06-08 PROCEDURE — 74011250637 HC RX REV CODE- 250/637: Performed by: FAMILY MEDICINE

## 2020-06-08 RX ORDER — ACETAMINOPHEN 325 MG/1
650 TABLET ORAL
Qty: 30 TAB | Refills: 0 | Status: SHIPPED | OUTPATIENT
Start: 2020-06-08 | End: 2020-11-23 | Stop reason: ALTCHOICE

## 2020-06-08 RX ORDER — ZINC SULFATE 50(220)MG
220 CAPSULE ORAL DAILY
Qty: 30 CAP | Refills: 0 | Status: SHIPPED | OUTPATIENT
Start: 2020-06-09 | End: 2020-07-08

## 2020-06-08 RX ORDER — PANTOPRAZOLE SODIUM 40 MG/1
40 TABLET, DELAYED RELEASE ORAL 2 TIMES DAILY
Qty: 30 TAB | Refills: 0 | Status: SHIPPED | OUTPATIENT
Start: 2020-06-08 | End: 2020-11-23 | Stop reason: ALTCHOICE

## 2020-06-08 RX ORDER — DOCUSATE SODIUM 100 MG/1
100 CAPSULE, LIQUID FILLED ORAL DAILY
Qty: 30 CAP | Refills: 0 | Status: SHIPPED | OUTPATIENT
Start: 2020-06-09 | End: 2020-07-09

## 2020-06-08 RX ORDER — PREDNISONE 10 MG/1
TABLET ORAL
Qty: 6 TAB | Refills: 0 | Status: SHIPPED | OUTPATIENT
Start: 2020-06-09 | End: 2020-06-13

## 2020-06-08 RX ORDER — ALBUTEROL SULFATE 90 UG/1
2 AEROSOL, METERED RESPIRATORY (INHALATION)
Qty: 1 INHALER | Refills: 0 | Status: SHIPPED | OUTPATIENT
Start: 2020-06-08

## 2020-06-08 RX ORDER — LEVOTHYROXINE SODIUM 125 UG/1
TABLET ORAL
Qty: 180 TAB | Refills: 3 | Status: SHIPPED | OUTPATIENT
Start: 2020-06-08 | End: 2020-06-08

## 2020-06-08 RX ORDER — ASCORBIC ACID 500 MG
500 TABLET ORAL 2 TIMES DAILY
Qty: 30 TAB | Refills: 0 | Status: SHIPPED | OUTPATIENT
Start: 2020-06-08 | End: 2020-07-08

## 2020-06-08 RX ORDER — FLUTICASONE PROPIONATE 50 MCG
2 SPRAY, SUSPENSION (ML) NASAL DAILY
Qty: 3 BOTTLE | Refills: 3 | Status: SHIPPED | OUTPATIENT
Start: 2020-06-08

## 2020-06-08 RX ORDER — POLYETHYLENE GLYCOL 3350 17 G/17G
17 POWDER, FOR SOLUTION ORAL DAILY
Qty: 10 EACH | Refills: 0 | Status: SHIPPED | OUTPATIENT
Start: 2020-06-09

## 2020-06-08 RX ADMIN — Medication 10 ML: at 22:00

## 2020-06-08 RX ADMIN — Medication 500 MG: at 17:34

## 2020-06-08 RX ADMIN — ZINC SULFATE 220 MG (50 MG) CAPSULE 1 CAPSULE: CAPSULE at 09:11

## 2020-06-08 RX ADMIN — PANTOPRAZOLE SODIUM 40 MG: 40 TABLET, DELAYED RELEASE ORAL at 17:34

## 2020-06-08 RX ADMIN — ALLOPURINOL 100 MG: 100 TABLET ORAL at 09:11

## 2020-06-08 RX ADMIN — FLUTICASONE PROPIONATE 2 SPRAY: 50 SPRAY, METERED NASAL at 09:14

## 2020-06-08 RX ADMIN — ENOXAPARIN SODIUM 40 MG: 40 INJECTION SUBCUTANEOUS at 15:12

## 2020-06-08 RX ADMIN — Medication 500 MG: at 09:11

## 2020-06-08 RX ADMIN — ENOXAPARIN SODIUM 40 MG: 40 INJECTION SUBCUTANEOUS at 01:55

## 2020-06-08 RX ADMIN — PANTOPRAZOLE SODIUM 40 MG: 40 TABLET, DELAYED RELEASE ORAL at 09:11

## 2020-06-08 RX ADMIN — LEVOTHYROXINE SODIUM 250 MCG: 125 TABLET ORAL at 07:21

## 2020-06-08 RX ADMIN — Medication 10 ML: at 07:21

## 2020-06-08 RX ADMIN — Medication 10 ML: at 15:12

## 2020-06-08 RX ADMIN — PREDNISONE 20 MG: 20 TABLET ORAL at 07:22

## 2020-06-08 NOTE — PROGRESS NOTES
Problem: Falls - Risk of  Goal: *Absence of Falls  Description: Document Ashly Starch Fall Risk and appropriate interventions in the flowsheet.   Outcome: Progressing Towards Goal  Note: Fall Risk Interventions:  Mobility Interventions: Strengthening exercises (ROM-active/passive)         Medication Interventions: Patient to call before getting OOB    Elimination Interventions: Call light in reach

## 2020-06-08 NOTE — DISCHARGE INSTRUCTIONS
Discharge Instructions       PATIENT ID: Vlad Blackwell  MRN: [id number]   YOB: 1960    DATE OF ADMISSION: 5/25/2020  9:16 AM    DATE OF DISCHARGE: 6/8/2020    PRIMARY CARE PROVIDER: Vanita Vasquez MD     ATTENDING PHYSICIAN: Patria Bradley MD  DISCHARGING PROVIDER: Alison Jim MD    To contact this individual call [phone] and ask the  to page. If unavailable ask to be transferred the Adult Hospitalist Department. DISCHARGE DIAGNOSES   # Acute hypoxic respiratory failure, improved. # COVID 19 infection, s/p Actermra and Remdesivir, completed. Improved  # Leukocytosis  # Hypothyroidism. TSH 13. Recommend recheck with PCP in 1 week. # Obesity, life style modifications  # Dehydration, resolved  # Abnormal LFT on admission, resolved. Likely related to infection    CONSULTATIONS: IP CONSULT TO PULMONOLOGY  IP CONSULT TO INFECTIOUS DISEASES    PROCEDURES/SURGERIES: * No surgery found *    PENDING TEST RESULTS:   At the time of discharge the following test results are still pending: none    FOLLOW UP APPOINTMENTS:   Follow-up Information     Follow up With Specialties Details Why Contact Info    Vanita Vasquez MD Internal Medicine On 6/12/2020 Hospital follow up PCP virtual appointment Friday, 6/12/20 @ 2:00 p.m.  [address] [facility]  [phone]      Jed Echevarria PA-C Pulmonary Disease In 3 days As needed, If symptoms worsen and for post hospital follow up [address] [place]  [phone]             ADDITIONAL CARE RECOMMENDATIONS:   F/up with PCP within 1 week for CBC, CMP  F/up with Pulmonology in 3-5 days  · It is important that you take the medication exactly as they are prescribed. · Keep your medication in the bottles provided by the pharmacist and keep a list of the medication names, dosages, and times to be taken in your wallet.    · Do not take other medications without consulting your doctor. · No drinking alcohol or driving car or operating machinery if you are on narcotic pain medications. Donot take sedating mediations if you are sleepy or confused. · Fall Precautions  · Keep Well Hydrated  · Report to your medical provider if you feel you have  developed allergies to medications  · Follow up with your PCP or Consultant for medication adjustments and refills  · Monitor for signs of fevers,chills,bleeding,chest pain and seek medical attention if you do so. DIET: Regular Diet    ACTIVITY: Activity as tolerated    WOUND CARE: NA    EQUIPMENT needed: NA      DISCHARGE MEDICATIONS:   See Medication Reconciliation Form    · It is important that you take the medication exactly as they are prescribed. · Keep your medication in the bottles provided by the pharmacist and keep a list of the medication names, dosages, and times to be taken in your wallet. · Do not take other medications without consulting your doctor. NOTIFY YOUR PHYSICIAN FOR ANY OF THE FOLLOWING:   Fever over 101 degrees for 24 hours. Chest pain, shortness of breath, fever, chills, nausea, vomiting, diarrhea, change in mentation, falling, weakness, bleeding. Severe pain or pain not relieved by medications. Or, any other signs or symptoms that you may have questions about. DISPOSITION:   x Home With:   OT  PT  HH  RN       SNF/Inpatient Rehab/LTAC    Independent/assisted living    Hospice    Other:     CDMP Checked:   Yes x     PROBLEM LIST Updated:  Yes x        My Medications      START taking these medications      Instructions Each Dose to Equal Morning Noon Evening Bedtime   acetaminophen 325 mg tablet  Commonly known as:  TYLENOL    Your last dose was: Your next dose is: Take 2 Tabs by mouth every six (6) hours as needed for Pain or Fever.    650 mg                 albuterol 90 mcg/actuation inhaler  Commonly known as:  PROVENTIL HFA, VENTOLIN HFA, PROAIR HFA    Your last dose was: Your next dose is: Take 2 Puffs by inhalation every six (6) hours as needed for Wheezing, Shortness of Breath, Respiratory Distress or Cough. 2 Puff                 ascorbic acid (vitamin C) 500 mg tablet  Commonly known as:  VITAMIN C    Your last dose was: Your next dose is: Take 1 Tab by mouth two (2) times a day. 500 mg                 docusate sodium 100 mg capsule  Commonly known as:  COLACE  Start taking on:  June 9, 2020    Your last dose was: Your next dose is: Take 1 Cap by mouth daily for 30 days. Give with a full glass of water. Hold for diarrhea. 100 mg                 pantoprazole 40 mg tablet  Commonly known as:  PROTONIX    Your last dose was: Your next dose is: Take 1 Tab by mouth two (2) times a day. Do not crush, break or chew. Swallow whole. 40 mg                 polyethylene glycol 17 gram packet  Commonly known as:  MIRALAX  Start taking on:  June 9, 2020    Your last dose was: Your next dose is: Take 1 Packet by mouth daily. Mix in 8 oz of water, juice, soda, coffee, or tea prior to administration   17 g                 predniSONE 10 mg tablet  Commonly known as:  Rayma Blamer  Start taking on:  June 9, 2020    Your last dose was: Your next dose is: Take 20 mg by mouth daily (with breakfast) for 2 days, THEN 10 mg daily (with breakfast) for 2 days. Give with food                  zinc sulfate 220 (50) mg capsule  Commonly known as:  ZINCATE  Start taking on:  June 9, 2020    Your last dose was: Your next dose is: Take 1 Cap by mouth daily. 220 mg                    CHANGE how you take these medications      Instructions Each Dose to Equal Morning Noon Evening Bedtime   levothyroxine 125 mcg tablet  Commonly known as:  SYNTHROID  What changed:  Another medication with the same name was removed.  Continue taking this medication, and follow the directions you see here. Your last dose was: Your next dose is: Take 250 mcg by mouth Daily (before breakfast). 250 mcg                    CONTINUE taking these medications      Instructions Each Dose to Equal Morning Noon Evening Bedtime   allopurinoL 100 mg tablet  Commonly known as:  ZYLOPRIM    Your last dose was: Your next dose is: Take 100 mg by mouth daily. 100 mg                 fluticasone propionate 50 mcg/actuation nasal spray  Commonly known as:  FLONASE    Your last dose was: Your next dose is: 2 Sprays by Both Nostrils route daily. 2 Spray                 gabapentin 400 mg capsule  Commonly known as:  NEURONTIN    Your last dose was: Your next dose is: Take 400 mg by mouth three (3) times daily as needed. 400 mg                 Vitamin D3 25 mcg (1,000 unit) Cap  Generic drug:  cholecalciferol    Your last dose was: Your next dose is: Take  by mouth daily. Where to Get Your Medications      These medications were sent to [facility]/pharmacy #[id number]- [id number], [address] [place] [address]    Hours:  24-hours Phone:  [phone]   · docusate sodium 100 mg capsule  · fluticasone propionate 50 mcg/actuation nasal spray     Information on where to get these meds will be given to you by the nurse or doctor.     Ask your nurse or doctor about these medications  · acetaminophen 325 mg tablet  · albuterol 90 mcg/actuation inhaler  · ascorbic acid (vitamin C) 500 mg tablet  · pantoprazole 40 mg tablet  · polyethylene glycol 17 gram packet  · predniSONE 10 mg tablet  · zinc sulfate 220 (50) mg capsule         Signed:   Edith Echavarria MD  6/8/2020  12:39 PM

## 2020-06-08 NOTE — PROGRESS NOTES
ID follow up    Chart reviewed  Improved , oxygen requirement down as well   Will sign off  Call if questions     Kim Jovel,   10:01 AM

## 2020-06-08 NOTE — PROGRESS NOTES
Pulse oximetry assessment   95% at rest on room air (if 88% or less, skip next steps)  92% while ambulating on room air  96% at rest on  1LPM

## 2020-06-08 NOTE — PROGRESS NOTES
Pulmonary, Critical Care, and Sleep Medicine~Progress Note    Name: Sunil Jenkins MRN: [id number]   : 1960 Hospital: [facility]    Date: 2020 10:42 AM Admission: 2020     Impression Plan   1. Acute hypoxic resp failure   2. COVID19 PNA   3. Obesity   4. Hx of total mastectomy  1. NIV at night and for WOB   2. lovenox   3. IL6 is 91, received actemra x 1 dose  4. Wean steroids   5. azithromax completed    6. Vit c/zinc   7. s/p Remdesivir  8. Doing well from pulmonary aspect      Daily Progression:      Talking on the phone   Down to 1lpm       Down to 6lpm   Sitting on the side of the bed and looks better       Down to 25lpm and 94%  Sitting on the side of the bed   D dimer back down     6/3  ddimer up  O2 down to 35lpm and 94%  Sitting on the side of the bed       D dimer is continuing to rise but at 2.38  Other labs are trending downward   Chest film has shown a slight improvment      D dimer 1.45  CRP 5.99 down  LD down to 740      On bipap, tachypneic  But says feels a little better      On 55lpm and 100% fiO2; sats in low 90s and upper 80s   Looks fatigued   D dimer is 0.4  Inflammatory markers are up     I have reviewed the labs and previous days notes. Pertinent items are noted in HPI. OBJECTIVE:     Vital Signs:       Visit Vitals  BP (!) 135/91 (BP 1 Location: Left arm)   Pulse 89   Temp 97.7 °F (36.5 °C)   Resp 16   Ht 5' 7\" (1.702 m)   Wt 107.7 kg (237 lb 7 oz)   SpO2 94%   BMI 37.19 kg/m²      Temp (24hrs), Av.4 °F (36.9 °C), Min:97.7 °F (36.5 °C), Max:98.8 °F (37.1 °C)     Intake/Output:     Last shift: No intake/output data recorded.     Last 3 shifts:  1901 -  0700  In: 240 [P.O.:240]  Out: 1150 [Urine:1150]        No intake or output data in the 24 hours ending 20 0916    Physical Exam:                                        Exam Findings Other   General: No resp distress noted, appears stated age    [de-identified]:  No ulcers, JVD not elevated, no cervical LAD    Chest: No pectus deformity, normal chest rise b/l    HEART:  Deferred     Lungs:  Deferred     ABD: Soft/NT, non rigid mildly distended    EXT: No cyanosis/clubbing/edema, normal peripheral pulses    Skin: No rashes or ulcers, no mottling    Neuro: A/O x 3        Medications:  Current Facility-Administered Medications   Medication Dose Route Frequency    predniSONE (DELTASONE) tablet 20 mg  20 mg Oral DAILY WITH BREAKFAST    [START ON 6/11/2020] predniSONE (DELTASONE) tablet 10 mg  10 mg Oral DAILY WITH BREAKFAST    pantoprazole (PROTONIX) tablet 40 mg  40 mg Oral BID    diphenhydrAMINE (BENADRYL) capsule 25 mg  25 mg Oral Q6H PRN    polyethylene glycol (MIRALAX) packet 17 g  17 g Oral DAILY    sodium chloride (OCEAN) 0.65 % nasal squeeze bottle 2 Spray  2 Spray Both Nostrils Q2H PRN    lidocaine 4 % patch 1 Patch  1 Patch TransDERmal Q24H    lidocaine 4 % patch 1 Patch  1 Patch TransDERmal Q24H    docusate sodium (COLACE) capsule 100 mg  100 mg Oral DAILY    butalbital-acetaminophen-caffeine (FIORICET, ESGIC) -40 mg per tablet 1 Tab  1 Tab Oral Q12H PRN    levothyroxine (SYNTHROID) tablet 250 mcg  250 mcg Oral 6am    allopurinoL (ZYLOPRIM) tablet 100 mg  100 mg Oral DAILY    acetaminophen (TYLENOL) tablet 650 mg  650 mg Oral Q6H PRN    Or    acetaminophen (TYLENOL) suppository 650 mg  650 mg Rectal Q6H PRN    fluticasone propionate (FLONASE) 50 mcg/actuation nasal spray 2 Spray  2 Spray Both Nostrils DAILY    albuterol (PROVENTIL HFA, VENTOLIN HFA, PROAIR HFA) inhaler 2 Puff  2 Puff Inhalation Q6H PRN    zinc sulfate (ZINCATE) 220 (50) mg capsule 1 Cap  1 Cap Oral DAILY    ascorbic acid (vitamin C) (VITAMIN C) tablet 500 mg  500 mg Oral BID    enoxaparin (LOVENOX) injection 40 mg  40 mg SubCUTAneous Q12H    guaiFENesin (ROBITUSSIN) 100 mg/5 mL oral liquid 100 mg  100 mg Oral Q4H PRN    sodium chloride (NS) flush 5-40 mL  5-40 mL IntraVENous Q8H    sodium chloride (NS) flush 5-40 mL  5-40 mL IntraVENous PRN    ondansetron (ZOFRAN) injection 4 mg  4 mg IntraVENous Q6H PRN       Labs:  ABG No results for input(s): PHI, PCO2I, PO2I, HCO3I, SO2I, FIO2I in the last 72 hours.      CBC Recent Labs     06/07/20  0354   WBC 17.1*   HGB 10.9*   HCT 35.3   *   MCV 92.9   MCH 20.3        Metabolic  Panel Recent Labs     06/08/20  0436 06/07/20  0354 06/06/20  0133   NA  --  134*  --    K  --  4.3  --    CL  --  100  --    CO2  --  26  --    GLU  --  128*  --    BUN  --  23*  --    CREA  --  0.90  --    CA  --  9.4  --    MG  --  2.3  --    ALB  --  3.1*  --    ALT  --  51  --    INR 1.0 1.0 1.0        Pertinent Labs                Toi Nash PA-C  6/8/2020

## 2020-06-08 NOTE — PROGRESS NOTES
Hospital follow-up PCP virtual transitional care appointment has been scheduled with Dr. Ced Hinson for Friday, 6/12/20 at 2:00 p.m. Pending patient discharge.   Kristine Jackson, Care Management Specialist.

## 2020-06-08 NOTE — PROGRESS NOTES
Reviewed chart for transitions of care,and discussed in rounds. Patient will discharge home today, and does not require any home oxygen needs at this time.     Dom Williamson [facility]

## 2020-06-08 NOTE — PROGRESS NOTES
Bedside shift change report given to Giovanny Pizarro RN (oncoming nurse) by Lizzy Marley RN (offgoing nurse). Report included the following information SBAR, Kardex and MAR.

## 2020-06-08 NOTE — PROGRESS NOTES
Bedside shift change report given to Aleksandra Swan (oncoming nurse) by [address] [place] [address] Avenue (offgoing nurse). Report included the following information SBAR and Kardex.

## 2020-06-09 VITALS
SYSTOLIC BLOOD PRESSURE: 121 MMHG | DIASTOLIC BLOOD PRESSURE: 77 MMHG | HEIGHT: 67 IN | BODY MASS INDEX: 37.27 KG/M2 | OXYGEN SATURATION: 92 % | TEMPERATURE: 98.2 F | HEART RATE: 93 BPM | WEIGHT: 237.44 LBS | RESPIRATION RATE: 16 BRPM

## 2020-06-09 LAB
APTT PPP: <20 SEC (ref 22.1–32)
D DIMER PPP FEU-MCNC: 0.24 MG/L FEU (ref 0–0.65)
FIBRINOGEN PPP-MCNC: 308 MG/DL (ref 200–475)
INR PPP: 1 (ref 0.9–1.1)
PROTHROMBIN TIME: 10.4 SEC (ref 9–11.1)
THERAPEUTIC RANGE,PTTT: ABNORMAL SECS (ref 58–77)

## 2020-06-09 PROCEDURE — 85384 FIBRINOGEN ACTIVITY: CPT

## 2020-06-09 PROCEDURE — 85610 PROTHROMBIN TIME: CPT

## 2020-06-09 PROCEDURE — 74011000250 HC RX REV CODE- 250: Performed by: FAMILY MEDICINE

## 2020-06-09 PROCEDURE — 85730 THROMBOPLASTIN TIME PARTIAL: CPT

## 2020-06-09 PROCEDURE — 97164 PT RE-EVAL EST PLAN CARE: CPT

## 2020-06-09 PROCEDURE — 97116 GAIT TRAINING THERAPY: CPT

## 2020-06-09 PROCEDURE — 74011250637 HC RX REV CODE- 250/637: Performed by: HOSPITALIST

## 2020-06-09 PROCEDURE — 74011636637 HC RX REV CODE- 636/637: Performed by: INTERNAL MEDICINE

## 2020-06-09 PROCEDURE — 74011250637 HC RX REV CODE- 250/637: Performed by: FAMILY MEDICINE

## 2020-06-09 PROCEDURE — 36415 COLL VENOUS BLD VENIPUNCTURE: CPT

## 2020-06-09 PROCEDURE — 85379 FIBRIN DEGRADATION QUANT: CPT

## 2020-06-09 PROCEDURE — 74011250636 HC RX REV CODE- 250/636: Performed by: HOSPITALIST

## 2020-06-09 RX ADMIN — ENOXAPARIN SODIUM 40 MG: 40 INJECTION SUBCUTANEOUS at 03:17

## 2020-06-09 RX ADMIN — Medication 10 ML: at 06:51

## 2020-06-09 RX ADMIN — ZINC SULFATE 220 MG (50 MG) CAPSULE 1 CAPSULE: CAPSULE at 08:36

## 2020-06-09 RX ADMIN — ALLOPURINOL 100 MG: 100 TABLET ORAL at 08:36

## 2020-06-09 RX ADMIN — PREDNISONE 20 MG: 20 TABLET ORAL at 06:50

## 2020-06-09 RX ADMIN — Medication 500 MG: at 08:35

## 2020-06-09 RX ADMIN — PANTOPRAZOLE SODIUM 40 MG: 40 TABLET, DELAYED RELEASE ORAL at 08:36

## 2020-06-09 RX ADMIN — LEVOTHYROXINE SODIUM 250 MCG: 125 TABLET ORAL at 06:50

## 2020-06-09 RX ADMIN — FLUTICASONE PROPIONATE 2 SPRAY: 50 SPRAY, METERED NASAL at 08:36

## 2020-06-09 RX ADMIN — POLYETHYLENE GLYCOL 3350 17 G: 17 POWDER, FOR SOLUTION ORAL at 06:54

## 2020-06-09 NOTE — PROGRESS NOTES
Nurse called me and updated me about pt refused discharge later in the day stating she felt weak, does not have Oxygen at home, feels like she needed Oxygen, lives alone and does not have anyone to help and did not feel safe to go home. PT/OT ordered. Pt is aware that she is staying on her own choice which is different than medical advise.

## 2020-06-09 NOTE — PROGRESS NOTES
Orders received, chart reviewed and patient discharging home today. Resources provided during PT consult and on discharge packet. Patient is a CNA. No skilled acute OT needs at this time. PT provided education, patient to pace herself slowly during recovery. Recommend discharge home with grocery and medication delivery. Thank you.

## 2020-06-09 NOTE — PROGRESS NOTES
PHYSICAL THERAPY REEVALUATION WITH DISCHARGE  Patient: Guillermo Rosario (60 y.o. female)  Date: 6/9/2020  Primary Diagnosis: Hypoxia [R09.02]  COVID-19 [U07.1]        Precautions:   (droplet plus)      ASSESSMENT  Based on the objective data described below, the patient presents with decreased activity tolerance with lengthened hospital admission. Pt presents with no significant balance or strength deficits. Pt ambulated within room with modified independence due to increase in time and did not require use of AD. Pt demonstrated ability to clear tub/shower step over to simulate ability to weight shift and clear height of step. Pt's sister is able to assist as needed. Pt does not demonstrate need for acute PT services and would benefit from HHPT upon d/c to ensure home safety and continued improvements in endurance. Pt provided with all patient education (exercises, energy conservation, and helpful resource link). After ambulating in room, pt transferred to chair to eat lunch. Functional Outcome Measure: The patient scored 95/100 on the Barthel Index outcome measure which is indicative of patient is 5% dependent on others. Other factors to consider for discharge: stairs, COVID-19          PLAN :  Patient is discharged from skilled acute physical therapy at this time    Recommendation for discharge: (in order for the patient to meet his/her long term goals)  Physical therapy at least 2 days/week in the home     This discharge recommendation:  Has been made in collaboration with the attending provider and/or case management    Equipment recommendations for successful discharge: none         SUBJECTIVE:   Patient stated I could have this for 2-3 months.  pt referring to the virus    OBJECTIVE DATA SUMMARY:   HISTORY:    Past Medical History:   Diagnosis Date    Arthritis     Cancer ([facility] [address]     breast cancer    H/O total mastectomy 11/30/2015    Hypercholesterolemia     Other ill-defined conditions(799.89) completed radiation     Thyroid disease     hypothyroidism     Past Surgical History:   Procedure Laterality Date    HX BREAST LUMPECTOMY Right 2012    HX GYN      hysterectomy; TBL    HX HEENT      tonsillectomy    HX MASTECTOMY Bilateral 2015    HX ORTHOPAEDIC      right knee ACL    HX OTHER SURGICAL      lumptectomy right breast     Hospital course since last seen and reason for reevaluation: concern for d/c home    Personal factors and/or comorbidities impacting plan of care: NEHEMIAH, COVID-19    Home Situation  Home Environment: Private residence  # Steps to Enter: 6  Rails to Enter: Yes  Hand Rails : Bilateral  One/Two Story Residence: One story  Living Alone: Yes  Support Systems: Family member(s)  Patient Expects to be Discharged to[de-identified] Private residence  Current DME Used/Available at Home: None    EXAMINATION/PRESENTATION/DECISION MAKING:   Critical Behavior:  Neurologic State: Alert  Orientation Level: Oriented X4  Cognition: Follows commands     Hearing: Auditory  Auditory Impairment: None  Skin:  intact  Edema: none noted  Range Of Motion:  AROM: Within functional limits           PROM: Within functional limits           Strength:    Strength: Within functional limits                    Tone & Sensation:   Tone: Normal              Sensation: Intact               Coordination:  Coordination: Within functional limits  Vision:      Functional Mobility:  Bed Mobility:     Supine to Sit: Modified independent     Scooting: Modified independent  Transfers:  Sit to Stand: Modified independent  Stand to Sit: Modified independent       Balance:   Sitting: Intact  Standing: Intact; Without support  Ambulation/Gait Training:  Distance (ft): 30 Feet (ft)(x2)  Assistive Device: Gait belt  Ambulation - Level of Assistance: Modified independent; Additional time       Base of Support: Narrowed     Speed/Irene: Slow         Functional Measure:  Barthel Index:    Bathin  Bladder: 10  Bowels: 10  Groomin  Dressing: 10  Feeding: 10  Mobility: 15  Stairs: 5  Toilet Use: 10  Transfer (Bed to Chair and Back): 15  Total: 95/100       The Barthel ADL Index: Guidelines  1. The index should be used as a record of what a patient does, not as a record of what a patient could do. 2. The main aim is to establish degree of independence from any help, physical or verbal, however minor and for whatever reason. 3. The need for supervision renders the patient not independent. 4. A patient's performance should be established using the best available evidence. Asking the patient, friends/relatives and nurses are the usual sources, but direct observation and common sense are also important. However direct testing is not needed. 5. Usually the patient's performance over the preceding 24-48 hours is important, but occasionally longer periods will be relevant. 6. Middle categories imply that the patient supplies over 50 per cent of the effort. 7. Use of aids to be independent is allowed. Claudia Cummings., BarthelYOAV. (4434). Functional evaluation: the Barthel Index. [address] [facility] (14)2. RADHA Julian, Antonio Vela., Jennie Ochoa., [place], [address] [place] [address] (1999). Measuring the change indisability after inpatient rehabilitation; comparison of the responsiveness of the Barthel Index and Functional [place] Measure. Journal of Neurology, Neurosurgery, and Psychiatry, 66(4), 375-026. OLEGARIO Olivarez.KIMI, ALEX Delaney, & Jared Holbrook, M.A. (2004.) Assessment of post-stroke quality of life in cost-effectiveness studies: The usefulness of the Barthel Index and the EuroQoL-5D. Quality of Life Research, 13, 747-58       Activity Tolerance:   Good  Please refer to the flowsheet for vital signs taken during this treatment.     After treatment patient left in no apparent distress:   Sitting in chair and Call bell within reach    COMMUNICATION/EDUCATION:   The patients plan of care was discussed with: Occupational therapist, Registered nurse, and Physician. Fall prevention education was provided and the patient/caregiver indicated understanding. and Patient/family have participated as able in goal setting and plan of care.     Thank you for this referral.  Debbie Sewell, PT, DPT   Time Calculation: 18 mins

## 2020-06-09 NOTE — PROGRESS NOTES
MADINA;  1. PT/OT consults pending. 2. [place] [facility]      CM awaiting recommendations from PT/OT, they saw the patient on 6/3 and recommended home health. CM to see if patient had a decline in ambulation, and will send referrals when appropriate. 1151: Cm sent referral to [facility], waiting response. 1409: [facility] unable to staff patient area, referral sent to [facility],they can accept.       Rissa Mccann [facility]

## 2020-06-09 NOTE — DISCHARGE SUMMARY
Discharge Summary       PATIENT ID: Allison Burnett  MRN: [id number]   YOB: 1960    DATE OF ADMISSION: 5/25/2020  9:16 AM    DATE OF DISCHARGE: 06/09/20   PRIMARY CARE PROVIDER: Yanely Us MD     ATTENDING PHYSICIAN: Denisse Valenzuela MD  DISCHARGING PROVIDER: Denisse Valenzuela MD    To contact this individual call [phone] and ask the  to page. If unavailable ask to be transferred the Adult Hospitalist Department. CONSULTATIONS: IP CONSULT TO PULMONOLOGY  IP CONSULT TO INFECTIOUS DISEASES    PROCEDURES/SURGERIES: * No surgery found *    ADMITTING DIAGNOSES & HOSPITAL COURSE:   # Acute hypoxic respiratory failure, improved. # COVID 19 infection, s/p Actermra and Remdesivir, completed. Improved  # Leukocytosis  # Hypothyroidism. TSH 13. Recommend recheck with PCP in 1 week. # Obesity, life style modifications  # Dehydration, resolved  # Abnormal LFT on admission, resolved. Likely related to infection  # Generalized weakness. HH PT. No OT needs    Admission Summary:      Sunshine Dorantes a 64 y. o. female who presents with COVID pos+ from [address] [place] [address]. Pt said she had a test 2 days back and came back positive. She became SOB and went to ED.  Patient states that for the last 3 days she has not felt well.  She has had some body aches and nausea and fatigue.  She is also had cough.  Over the last 24 hours she started to have shortness of breath.  She comes in for evaluation of these complaints and concern for coronavirus given her workplace exposure.  She works at a facility in [employer] where there is apparently several patients with confirmed coronavirus.      Interval history / Subjective:   Feeling well. Energy level is improving. No fever, chills. Off O2 NC. Sats well at rest and on exertion. Home oxygen evaluation done by nursing team. No further needs. No overnight events or concerns. DISCHARGE DIAGNOSES / PLAN:      Acute respiratory failure, improved. Off O2 NC now.  Pt is using for her comfort. O2 sat never went below 91.  -Acute hypoxic respiratory failure secondary to viral pneumonia  -COVID 19 positive as outpatient  -Pulmonology following  -Home oxygen eval completed. Not needing further O2 at rest or exertion. -DC home today.     COVID 19 infection  -Droplet plus precaution  -Continue vitamin C and azithromycin  -Anticoagulation per protocol  -Completed Remdesivir  -Actemra one dose 5/29  -On steroid, continue weaning -  On slow taper 5 more doses  -ID and pulmonology following. Appreciate  -No JUSTIN therapy per Pulm now with improving recovery  -Repeat testing 6/7/20 still +. Defer further re-test to PCP for clearance.     Leukocytosis  -Likely from steroid use  -CBC in 1 week with PCP follow up.     Hypothyroidism  -TSH elevated  -Continue Synthroid  -Follow TSH level as outpatient in 1 month for further adjustment     Obesity  -Patient with body mass index of 37.66 kg/m²  -Outpatient follow-up for weight management     Dehydration  -Monitor renal function     Abnormal LFT's, normalized  -Likely from infectious process  -Monitor liver function  -consider USG as out pt further worsening  -CMP monitoring in 1 week with PCP f/up     Code status: We discussed CODE STATUS with the patient. She is okay with intubation but no other resuscitative efforts.     DVT prophylaxis: Lovenox     Care Plan discussed with: Patient/Family and Nurse  Anticipated Disposition: Home  Anticipated Discharge: DC home today     ADDITIONAL CARE RECOMMENDATIONS:   F/up with PCP within 1 week for CBC, CMP  F/up with Pulmonology in 3-5 days  · It is important that you take the medication exactly as they are prescribed. · Keep your medication in the bottles provided by the pharmacist and keep a list of the medication names, dosages, and times to be taken in your wallet. · Do not take other medications without consulting your doctor.    · No drinking alcohol or driving car or operating machinery if you are on narcotic pain medications. Donot take sedating mediations if you are sleepy or confused. · Fall Precautions  · Keep Well Hydrated  · Report to your medical provider if you feel you have  developed allergies to medications  · Follow up with your PCP or Consultant for medication adjustments and refills  · Monitor for signs of fevers,chills,bleeding,chest pain and seek medical attention if you do so. PENDING TEST RESULTS:   At the time of discharge the following test results are still pending: none    FOLLOW UP APPOINTMENTS:    Follow-up Information     Follow up With Specialties Details Why Contact Info    Savannah Prajapati MD Internal Medicine On 6/12/2020 Hospital follow up PCP virtual appointment Friday, 6/12/20 @ 2:00 p.m.  [address] [facility]  [phone]      Elfego Rachel PA-C Pulmonary Disease In 3 days As needed, If symptoms worsen and for post hospital follow up [address]  [phone]               DIET: Regular Diet    ACTIVITY: Activity as tolerated    WOUND CARE: NA    EQUIPMENT needed: NA      DISCHARGE MEDICATIONS:  Current Discharge Medication List      START taking these medications    Details   acetaminophen (TYLENOL) 325 mg tablet Take 2 Tabs by mouth every six (6) hours as needed for Pain or Fever. Qty: 30 Tab, Refills: 0      albuterol (PROVENTIL HFA, VENTOLIN HFA, PROAIR HFA) 90 mcg/actuation inhaler Take 2 Puffs by inhalation every six (6) hours as needed for Wheezing, Shortness of Breath, Respiratory Distress or Cough. Qty: 1 Inhaler, Refills: 0      ascorbic acid, vitamin C, (VITAMIN C) 500 mg tablet Take 1 Tab by mouth two (2) times a day. Qty: 30 Tab, Refills: 0      docusate sodium (COLACE) 100 mg capsule Take 1 Cap by mouth daily for 30 days. Give with a full glass of water. Hold for diarrhea.   Qty: 30 Cap, Refills: 0      pantoprazole (PROTONIX) 40 mg tablet Take 1 Tab by mouth two (2) times a day. Do not crush, break or chew. Swallow whole. Qty: 30 Tab, Refills: 0      polyethylene glycol (MIRALAX) 17 gram packet Take 1 Packet by mouth daily. Mix in 8 oz of water, juice, soda, coffee, or tea prior to administration  Qty: 10 Each, Refills: 0      predniSONE (DELTASONE) 10 mg tablet Take 20 mg by mouth daily (with breakfast) for 2 days, THEN 10 mg daily (with breakfast) for 2 days. Give with food  Qty: 6 Tab, Refills: 0      zinc sulfate (ZINCATE) 220 (50) mg capsule Take 1 Cap by mouth daily. Qty: 30 Cap, Refills: 0         CONTINUE these medications which have CHANGED    Details   fluticasone propionate (FLONASE) 50 mcg/actuation nasal spray 2 Sprays by Both Nostrils route daily. Qty: 3 Bottle, Refills: 3    Associated Diagnoses: Acute rhinitis         CONTINUE these medications which have NOT CHANGED    Details   allopurinoL (ZYLOPRIM) 100 mg tablet Take 100 mg by mouth daily. gabapentin (NEURONTIN) 400 mg capsule Take 400 mg by mouth three (3) times daily as needed. levothyroxine (SYNTHROID) 125 mcg tablet Take 250 mcg by mouth Daily (before breakfast). cholecalciferol (VITAMIN D3) 1,000 unit cap Take  by mouth daily. NOTIFY YOUR PHYSICIAN FOR ANY OF THE FOLLOWING:   Fever over 101 degrees for 24 hours. Chest pain, shortness of breath, fever, chills, nausea, vomiting, diarrhea, change in mentation, falling, weakness, bleeding. Severe pain or pain not relieved by medications. Or, any other signs or symptoms that you may have questions about.     DISPOSITION:   x Home With:   OT x PT x HH  RN       Long term SNF/Inpatient Rehab    Independent/assisted living    Hospice    Other:       PATIENT CONDITION AT DISCHARGE:     Functional status    Poor     Deconditioned    x Independent      Cognition   x  Lucid     Forgetful     Dementia      Catheters/lines (plus indication)    Alegre     PICC     PEG    x None      Code status   x  Full code DNR      PHYSICAL EXAMINATION AT DISCHARGE:  Constitutional:  No acute distress, cooperative, pleasant    ENT:  Oral mucosa moist, oropharynx benign. Resp:  CTA bilaterally. No wheezing/rhonchi/rales. No accessory muscle use   CV:  Regular rhythm, normal rate, no murmurs, gallops, rubs    GI:  Soft, non distended, non tender. normoactive bowel sounds, no hepatosplenomegaly     Musculoskeletal:  No edema, warm, 2+ pulses throughout    Neurologic:  Moves all extremities.   AAOx3, CN II-XII reviewed                         Skin:  Good turgor, no rashes or ulcers      CHRONIC MEDICAL DIAGNOSES:  Problem List as of 6/9/2020 Date Reviewed: 11/13/2019          Codes Class Noted - Resolved    Hypoxia ICD-10-CM: R09.02  ICD-9-CM: 799.02  5/25/2020 - Present        COVID-19 ICD-10-CM: U07.1  ICD-9-CM: 079.89  5/25/2020 - Present        Severe obesity ([facility] [address] ICD-10-CM: E66.01  ICD-9-CM: 278.01  11/11/2019 - Present        Costochondritis ICD-10-CM: M94.0  ICD-9-CM: 733.6  10/9/2018 - Present        BMI 33.0-33.9,adult ICD-10-CM: S67.91  ICD-9-CM: V85.33  9/14/2018 - Present        Chest wall pain following surgery ICD-10-CM: R07.89, G89.18  ICD-9-CM: 786.59, 338.18  2/9/2018 - Present        Chest wall pain, chronic ICD-10-CM: R07.89, G89.29  ICD-9-CM: 786.52, 338.29  2/9/2018 - Present        H/O mastectomy, bilateral ICD-10-CM: Z90.13  ICD-9-CM: V45.71  2/9/2018 - Present        Vitamin D deficiency ICD-10-CM: E55.9  ICD-9-CM: 268.9  2/21/2017 - Present        Lymphedema ICD-10-CM: I89.0  ICD-9-CM: 457.1  11/9/2016 - Present        Hypothyroidism due to acquired atrophy of thyroid ICD-10-CM: E03.4  ICD-9-CM: 244.8, 246.8  5/14/2015 - Present        HLD (hyperlipidemia) ICD-10-CM: E78.5  ICD-9-CM: 272.4  5/14/2015 - Present        History of breast cancer ICD-10-CM: Z85.3  ICD-9-CM: V10.3  6/18/2013 - Present        Chronic headaches ICD-10-CM: R51  ICD-9-CM: 784.0  6/18/2013 - Present        Family history of cancer ICD-10-CM: Z80.9  ICD-9-CM: V16.9  6/18/2013 - Present        Skin moles ICD-10-CM: D22.9  ICD-9-CM: 216.9  6/18/2013 - Present        RESOLVED: Routine general medical examination at a health care facility ICD-10-CM: Z00.00  ICD-9-CM: V70.0  6/18/2013 - 9/30/2019            Radiology  Xr Chest Port    Result Date: 6/2/2020  IMPRESSION: There is diffuse bilateral pulmonary opacities which appear slightly improved. Xr Chest Port    Result Date: 5/29/2020  IMPRESSION: Interval development of moderate to severe bilateral airspace disease consistent with bilateral pneumonia. Xr Chest Port    Result Date: 5/25/2020  impression: Limited by shallow inspiration, cannot exclude interstitial infiltrates bilaterally.     Recent Results (from the past 24 hour(s))   PROTHROMBIN TIME + INR    Collection Time: 06/09/20  3:27 AM   Result Value Ref Range    INR 1.0 0.9 - 1.1      Prothrombin time 10.4 9.0 - 11.1 sec   PTT    Collection Time: 06/09/20  3:27 AM   Result Value Ref Range    aPTT <20.0 (L) 22.1 - 32.0 sec    aPTT, therapeutic range     58.0 - 77.0 SECS   FIBRINOGEN    Collection Time: 06/09/20  3:27 AM   Result Value Ref Range    Fibrinogen 308 200 - 475 mg/dL   D DIMER    Collection Time: 06/09/20  3:27 AM   Result Value Ref Range    D-dimer 0.24 0.00 - 0.65 mg/L FEU       Greater than 45 minutes were spent with the patient on counseling and coordination of care    Signed:   Margarita Godinez MD  6/9/2020  12:50 PM

## 2020-06-09 NOTE — PROGRESS NOTES
Bedside shift change report given to Milli De Oliveira (oncoming nurse) by Rafa Matute  (offgoing nurse). Report included the following information SBAR and Kardex.

## 2020-06-09 NOTE — PROGRESS NOTES
Orders received, chart reviewed and patient re-evaluated by physical therapy. Pending progression with skilled acute physical therapy, recommend:  Physical therapy at least 2 days/week in the home     Recommend with nursing patient to complete as able in order to maintain strength, endurance and independence: OOB to chair 3x/day with SBA and ambulating with SBA. Thank you for your assistance. Full evaluation to follow.

## 2020-06-10 ENCOUNTER — PATIENT OUTREACH (OUTPATIENT)
Dept: CASE MANAGEMENT | Age: 60
End: 2020-06-10

## 2020-06-12 ENCOUNTER — VIRTUAL VISIT (OUTPATIENT)
Dept: INTERNAL MEDICINE CLINIC | Age: 60
End: 2020-06-12

## 2020-06-12 ENCOUNTER — TELEPHONE (OUTPATIENT)
Dept: INTERNAL MEDICINE CLINIC | Age: 60
End: 2020-06-12

## 2020-06-12 ENCOUNTER — PATIENT OUTREACH (OUTPATIENT)
Dept: INTERNAL MEDICINE CLINIC | Age: 60
End: 2020-06-12

## 2020-06-12 DIAGNOSIS — U07.1 COVID-19: Primary | ICD-10-CM

## 2020-06-12 DIAGNOSIS — D72.829 LEUKOCYTOSIS, UNSPECIFIED TYPE: ICD-10-CM

## 2020-06-12 DIAGNOSIS — R74.01 ELEVATED TRANSAMINASE LEVEL: ICD-10-CM

## 2020-06-12 NOTE — PROGRESS NOTES
Jamin Hutchinson is a 64 y.o. female who presents for follow up after hospitalization with COVID 19. Hospitalized 5/25-6/9 with acute respiratory failure, COVID 19, treated with Actemra and Remdesivir. She had persistent leukocytosis, WBC 17k at discharge and elevated transaminases, resolved at discharge. Last COVID test positive on 6/7. Last CXR June 2 with diffuse infiltrate. She is at home on quarantine until June 23. Reports some residual SOB, no cough, no fever. She has home health PT. Is to follow up with pulmonary. This is an established visit conducted via telemedicine with video. The patient has been instructed that this meets HIPAA criteria and acknowledges and agrees to this method of visitation. Pursuant to the emergency declaration under the [facility], 1135 waiver authority and the [employer] Act, this Virtual Visit was conducted, with patient's consent, to reduce the patient's risk of exposure to COVID-19 and provide continuity of care for an established patient. Services were provided through a video synchronous discussion virtually to substitute for in-person clinic visit.         Past Medical History:   Diagnosis Date    Arthritis     Cancer [facility])     breast cancer    H/O total mastectomy 11/30/2015    Hypercholesterolemia     Other ill-defined conditions(799.89)     completed radiation 9/12    Thyroid disease     hypothyroidism       Family History   Problem Relation Age of Onset    Cancer Mother         brain cancer    No Known Problems Father     Breast Cancer Sister 54    No Known Problems Brother     Thyroid Disease Maternal Grandmother     MS Sister     Breast Cancer Maternal Aunt         60-70's       Social History     Socioeconomic History    Marital status: SINGLE     Spouse name: Not on file    Number of children: Not on file    Years of education: Not on file  Highest education level: Not on file   Occupational History    Not on file   Social Needs    Financial resource strain: Not on file    Food insecurity     Worry: Not on file     Inability: Not on file    Transportation needs     Medical: Not on file     Non-medical: Not on file   Tobacco Use    Smoking status: Former Smoker     Packs/day: 0.50     Last attempt to quit: 2015     Years since quittin.3    Smokeless tobacco: Never Used   Substance and Sexual Activity    Alcohol use: Yes     Comment: occasional    Drug use: No    Sexual activity: Not Currently     Partners: Male     Birth control/protection: None   Lifestyle    Physical activity     Days per week: Not on file     Minutes per session: Not on file    Stress: Not on file   Relationships    Social connections     Talks on phone: Not on file     Gets together: Not on file     Attends Druze service: Not on file     Active member of club or organization: Not on file     Attends meetings of clubs or organizations: Not on file     Relationship status: Not on file    Intimate partner violence     Fear of current or ex partner: Not on file     Emotionally abused: Not on file     Physically abused: Not on file     Forced sexual activity: Not on file   Other Topics Concern    Not on file   Social History Narrative    Not on file       Current Outpatient Medications on File Prior to Visit   Medication Sig Dispense Refill    fluticasone propionate (FLONASE) 50 mcg/actuation nasal spray 2 Sprays by Both Nostrils route daily. 3 Bottle 3    acetaminophen (TYLENOL) 325 mg tablet Take 2 Tabs by mouth every six (6) hours as needed for Pain or Fever. 30 Tab 0    albuterol (PROVENTIL HFA, VENTOLIN HFA, PROAIR HFA) 90 mcg/actuation inhaler Take 2 Puffs by inhalation every six (6) hours as needed for Wheezing, Shortness of Breath, Respiratory Distress or Cough.  1 Inhaler 0    ascorbic acid, vitamin C, (VITAMIN C) 500 mg tablet Take 1 Tab by mouth two (2) times a day. 30 Tab 0    docusate sodium (COLACE) 100 mg capsule Take 1 Cap by mouth daily for 30 days. Give with a full glass of water. Hold for diarrhea. 30 Cap 0    pantoprazole (PROTONIX) 40 mg tablet Take 1 Tab by mouth two (2) times a day. Do not crush, break or chew. Swallow whole. 30 Tab 0    polyethylene glycol (MIRALAX) 17 gram packet Take 1 Packet by mouth daily. Mix in 8 oz of water, juice, soda, coffee, or tea prior to administration 10 Each 0    predniSONE (DELTASONE) 10 mg tablet Take 20 mg by mouth daily (with breakfast) for 2 days, THEN 10 mg daily (with breakfast) for 2 days. Give with food 6 Tab 0    zinc sulfate (ZINCATE) 220 (50) mg capsule Take 1 Cap by mouth daily. 30 Cap 0    allopurinoL (ZYLOPRIM) 100 mg tablet Take 100 mg by mouth daily.  gabapentin (NEURONTIN) 400 mg capsule Take 400 mg by mouth three (3) times daily as needed.  levothyroxine (SYNTHROID) 125 mcg tablet Take 250 mcg by mouth Daily (before breakfast).  cholecalciferol (VITAMIN D3) 1,000 unit cap Take  by mouth daily. No current facility-administered medications on file prior to visit. Review of Systems  Pertinent items are noted in HPI. Objective:     Gen: well appearing female  HEENT: normal conjunctiva, no audible congestion, patient does not see oral erythema, has MMM  Neck: patient does not feel enlarged or tender LAD or masses  Resp: normal respiratory effort, no audible wheezing. CV: patient does not feel palpitations or heart irregularity  Abd: patient does not feel abdominal tenderness or mass, patient does not notice distension  Extrem: patient does not see swelling in ankles or joints. Neuro: Alert and oriented, able to answer questions without difficulty, able to move all extremities and walk normally        Assessment/Plan:       ICD-10-CM ICD-9-CM    1. COVID-19 U07.1 079.89    2. Elevated transaminase level O52.0 683.4 METABOLIC PANEL, COMPREHENSIVE   3. Leukocytosis, unspecified type D72.829 288.60 CBC W/O DIFF   follow up with pulmonary. To wait to do labs until after June 23. This was a telemedicine visit with video. Sony Israel MD    Follow-up and Dispositions    · Return if symptoms worsen or fail to improve.

## 2020-06-25 ENCOUNTER — OFFICE VISIT (OUTPATIENT)
Dept: PRIMARY CARE CLINIC | Age: 60
End: 2020-06-25

## 2020-06-25 DIAGNOSIS — U07.1 COVID-19: Primary | ICD-10-CM

## 2020-06-25 NOTE — PROGRESS NOTES
[facility]  Danis Keenan. [facility], [address]  [phone]             Date of visit: 6/25/2020   Subjective:      History obtained from:  the patient. Shira Feldman is a 64 y.o. female who presents today for feeling fine but needs repeat covid testing for her employer (has to have 2 negatives)  Recently recovered covid  Still being careful, understands we don't know how protective previous infection is      Seen in our outdoor flu clinic during COVID-19 pandemic      See scanned paper form    Assessment/Plan:       ICD-10-CM ICD-9-CM    1. COVID-19 U07.1 079.89 NOVEL CORONAVIRUS (COVID-19)        Orders Placed This Encounter    NOVEL CORONAVIRUS (COVID-19)       Will send results and letter in portal    Discussed the diagnosis and plan and she expressed understanding. Follow-up and Dispositions    · Return if symptoms worsen or fail to improve.          Cheyl Carias MD

## 2020-06-25 NOTE — PATIENT INSTRUCTIONS
Learning About Coronavirus [phone])  Coronavirus [phone]): Overview  What is coronavirus (COVID-19)? The coronavirus disease (COVID-19) is caused by a virus. It is an illness that was first found in [place], [place], in December 2019. It has since spread worldwide. The virus can cause fever, cough, and trouble breathing. In severe cases, it can cause pneumonia and make it hard to breathe without help. It can cause death. Coronaviruses are a large group of viruses. They cause the common cold. They also cause more serious illnesses like Middle East respiratory syndrome (MERS) and severe acute respiratory syndrome (SARS). COVID-19 is caused by a novel coronavirus. That means it's a new type that has not been seen in people before. This virus spreads person-to-person through droplets from coughing and sneezing. It can also spread when you are close to someone who is infected. And it can spread when you touch something that has the virus on it, such as a doorknob or a tabletop. What can you do to protect yourself from coronavirus (COVID-19)? The best way to protect yourself from getting sick is to:  · Avoid areas where there is an outbreak. · Avoid contact with people who may be infected. · Wash your hands often with soap or alcohol-based hand sanitizers. · Avoid crowds and try to stay at least 6 feet away from other people. · Wash your hands often, especially after you cough or sneeze. Use soap and water, and scrub for at least 20 seconds. If soap and water aren't available, use an alcohol-based hand . · Avoid touching your mouth, nose, and eyes. What can you do to avoid spreading the virus to others? To help avoid spreading the virus to others:  · Cover your mouth with a tissue when you cough or sneeze. Then throw the tissue in the trash. · Use a disinfectant to clean things that you touch often. · Wear a cloth face cover if you have to go to public areas.   · Stay home if you are sick or have been exposed to the virus. Don't go to school, work, or public areas. And don't use public transportation, ride-shares, or taxis unless you have no choice. · If you are sick:  ? Leave your home only if you need to get medical care. But call the doctor's office first so they know you're coming. And wear a face cover. ? Wear the face cover whenever you're around other people. It can help stop the spread of the virus when you cough or sneeze. ? Clean and disinfect your home every day. Use household  and disinfectant wipes or sprays. Take special care to clean things that you grab with your hands. These include doorknobs, remote controls, phones, and handles on your refrigerator and microwave. And don't forget countertops, tabletops, bathrooms, and computer keyboards. When to call for help  [id number] anytime you think you may need emergency care. For example, call if:  · You have severe trouble breathing. (You can't talk at all.)  · You have constant chest pain or pressure. · You are severely dizzy or lightheaded. · You are confused or can't think clearly. · Your face and lips have a blue color. · You pass out (lose consciousness) or are very hard to wake up. Call your doctor now if you develop symptoms such as:  · Shortness of breath. · Fever. · Cough. If you need to get care, call ahead to the doctor's office for instructions before you go. Make sure you wear a face cover to prevent exposing other people to the virus. Where can you get the latest information? The following health organizations are tracking and studying this virus. Their websites contain the most up-to-date information. Grzegorz Hilton also learn what to do if you think you may have been exposed to the virus. · U.S. Centers for Disease Control and Prevention (CDC): The CDC provides updated news about the disease and travel advice. The website also tells you how to prevent the spread of infection.  www.cdc.gov  · [address] Organization (WHO): WHO offers information about the virus outbreaks. WHO also has travel advice. www.who.int  Current as of: May 8, 2020               Content Version: 12.5  © 2006-2020 Healthwise, Incorporated. Care instructions adapted under license by [employer] (which disclaims liability or warranty for this information). If you have questions about a medical condition or this instruction, always ask your healthcare professional. [place] [address] any warranty or liability for your use of this information.

## 2020-06-29 LAB — SARS-COV-2, NAA: NOT DETECTED

## 2020-07-02 ENCOUNTER — OFFICE VISIT (OUTPATIENT)
Dept: PRIMARY CARE CLINIC | Age: 60
End: 2020-07-02

## 2020-07-02 DIAGNOSIS — U07.1 2019 NOVEL CORONAVIRUS DISEASE (COVID-19): Primary | ICD-10-CM

## 2020-07-02 NOTE — PROGRESS NOTES
Patient was seen at [facility] thru flu clinic. Please seen scanned form for additional visit documentation. Patient consented to treatment. She has had disease  5/25 to 6/9 hospital stay  Now better still weak  Had neg swab last week  Works as CNA      Vitals OK     No exam  Nurses notes scanned      Diagnoses and all orders for this visit:    1. 2019 novel coronavirus disease (COVID-19)  -     NOVEL CORONAVIRUS (COVID-19);  Future

## 2020-07-07 ENCOUNTER — TELEPHONE (OUTPATIENT)
Dept: INTERNAL MEDICINE CLINIC | Age: 60
End: 2020-07-07

## 2020-07-07 LAB — SARS-COV-2, NAA: NOT DETECTED

## 2020-07-08 ENCOUNTER — VIRTUAL VISIT (OUTPATIENT)
Dept: INTERNAL MEDICINE CLINIC | Age: 60
End: 2020-07-08

## 2020-07-08 DIAGNOSIS — G89.18 CHEST WALL PAIN FOLLOWING SURGERY: ICD-10-CM

## 2020-07-08 DIAGNOSIS — R06.09 DYSPNEA ON EXERTION: Primary | ICD-10-CM

## 2020-07-08 DIAGNOSIS — R07.89 CHEST WALL PAIN FOLLOWING SURGERY: ICD-10-CM

## 2020-07-08 NOTE — PROGRESS NOTES
Francisco Javier Crews is a 64 y.o. female who presents for follow up. Admitted May 25- June 1. Has had 2 negative COVID tests. No fever. Has [place] [facility]. PT at home. Still feeling has dyspnea. Used Albuterol as needed with some improvement. To see pulmonary on July 21. Patient states that the main reason she feels she cannot return to work is that her job is physical- lifting patients, walking a lot. At this time, feeling short of breath with activity and easily fatigued. Has lymphedema of chest wall since bilateral mastectomy, ongoing. This is an established visit conducted via telemedicine with video. The patient has been instructed that this meets HIPAA criteria and acknowledges and agrees to this method of visitation. Pursuant to the emergency declaration under the [facility] waiver authority and the [employer] Act, this Virtual Visit was conducted, with patient's consent, to reduce the patient's risk of exposure to COVID-19 and provide continuity of care for an established patient. Services were provided through a video synchronous discussion virtually to substitute for in-person clinic visit.         Past Medical History:   Diagnosis Date    Arthritis     Cancer [facility])     breast cancer    H/O total mastectomy 11/30/2015    Hypercholesterolemia     Other ill-defined conditions(799.89)     completed radiation 9/12    Thyroid disease     hypothyroidism       Family History   Problem Relation Age of Onset    Cancer Mother         brain cancer    No Known Problems Father     Breast Cancer Sister 54    No Known Problems Brother     Thyroid Disease Maternal Grandmother     MS Sister     Breast Cancer Maternal Aunt         60-70's       Social History     Socioeconomic History    Marital status: SINGLE     Spouse name: Not on file    Number of children: Not on file    Years of education: Not on file    Highest education level: Not on file   Occupational History    Not on file   Social Needs    Financial resource strain: Not on file    Food insecurity     Worry: Not on file     Inability: Not on file    Transportation needs     Medical: Not on file     Non-medical: Not on file   Tobacco Use    Smoking status: Former Smoker     Packs/day: 0.50     Last attempt to quit: 2015     Years since quittin.4    Smokeless tobacco: Never Used   Substance and Sexual Activity    Alcohol use: Yes     Comment: occasional    Drug use: No    Sexual activity: Not Currently     Partners: Male     Birth control/protection: None   Lifestyle    Physical activity     Days per week: Not on file     Minutes per session: Not on file    Stress: Not on file   Relationships    Social connections     Talks on phone: Not on file     Gets together: Not on file     Attends Yazidi service: Not on file     Active member of club or organization: Not on file     Attends meetings of clubs or organizations: Not on file     Relationship status: Not on file    Intimate partner violence     Fear of current or ex partner: Not on file     Emotionally abused: Not on file     Physically abused: Not on file     Forced sexual activity: Not on file   Other Topics Concern    Not on file   Social History Narrative    Not on file       Current Outpatient Medications on File Prior to Visit   Medication Sig Dispense Refill    fluticasone propionate (FLONASE) 50 mcg/actuation nasal spray 2 Sprays by Both Nostrils route daily. 3 Bottle 3    albuterol (PROVENTIL HFA, VENTOLIN HFA, PROAIR HFA) 90 mcg/actuation inhaler Take 2 Puffs by inhalation every six (6) hours as needed for Wheezing, Shortness of Breath, Respiratory Distress or Cough. 1 Inhaler 0    docusate sodium (COLACE) 100 mg capsule Take 1 Cap by mouth daily for 30 days. Give with a full glass of water. Hold for diarrhea.  30 Cap 0    pantoprazole (PROTONIX) 40 mg tablet Take 1 Tab by mouth two (2) times a day. Do not crush, break or chew. Swallow whole. 30 Tab 0    polyethylene glycol (MIRALAX) 17 gram packet Take 1 Packet by mouth daily. Mix in 8 oz of water, juice, soda, coffee, or tea prior to administration 10 Each 0    allopurinoL (ZYLOPRIM) 100 mg tablet Take 100 mg by mouth daily.  gabapentin (NEURONTIN) 400 mg capsule Take 400 mg by mouth three (3) times daily as needed.  levothyroxine (SYNTHROID) 125 mcg tablet Take 250 mcg by mouth Daily (before breakfast).  cholecalciferol (VITAMIN D3) 1,000 unit cap Take  by mouth daily.  acetaminophen (TYLENOL) 325 mg tablet Take 2 Tabs by mouth every six (6) hours as needed for Pain or Fever. 30 Tab 0    [DISCONTINUED] ascorbic acid, vitamin C, (VITAMIN C) 500 mg tablet Take 1 Tab by mouth two (2) times a day. 30 Tab 0    [DISCONTINUED] zinc sulfate (ZINCATE) 220 (50) mg capsule Take 1 Cap by mouth daily. 30 Cap 0     No current facility-administered medications on file prior to visit. Review of Systems  Pertinent items are noted in HPI. Objective:     Gen: well appearing female  HEENT: normal conjunctiva, no audible congestion, patient does not see oral erythema, has MMM  Neck: patient does not feel enlarged or tender LAD or masses  Resp: normal respiratory effort, no audible wheezing. CV: patient does not feel palpitations or heart irregularity  Abd: patient does not feel abdominal tenderness or mass, patient does not notice distension  Extrem: patient does not see swelling in ankles or joints. Neuro: Alert and oriented, able to answer questions without difficulty      Assessment/Plan:       ICD-10-CM ICD-9-CM    1. Dyspnea on exertion R06.00 786.09    2. Chest wall pain following surgery R07.89 786.59     G89.18 338.18    will discuss patient's physical limitations with physical therapy. This was a telemedicine visit with video.         Alf Matthews MD    Follow-up and Dispositions    · Return if symptoms worsen or fail to improve.

## 2020-07-08 NOTE — TELEPHONE ENCOUNTER
Has [facility] for [facility] therapy. Need the therapist's last notes on the patient's physical limitations. And the therapist Aj. Number so I can speak with him.

## 2020-07-08 NOTE — TELEPHONE ENCOUNTER
Spoke with Yamileth Drew with [facility]. She is going to fax PT last note on patient and she will also have PT give the office a call to speak with Dr. Ochoa Owens.   Yamileth Drew states that they are not allowed to give out their employees phone numbers

## 2020-07-08 NOTE — TELEPHONE ENCOUNTER
----- Message from Libby Pulliam sent at 7/8/2020 12:28 PM EDT -----  Regarding: Robin Wong MD;  General Message/Vendor Calls    Caller's first and last name: Tong Grace Coordinator of [facility]      Reason for call: Correspondence Follow UP      Callback required yes/no and why: Yes      Best contact number(s): [phone]      Details to clarify the request: Ehsan Virk is following up with practice regarding correspondence previously submitted      Libby Pulliam

## 2020-07-09 NOTE — TELEPHONE ENCOUNTER
Spoke with [employer]. Sherrill Seaman states she needs a return to work date letter for [employer] also a copy  2 negative test results for COVID 19.     Hair Pryor would like information faxed [phone].

## 2020-08-25 ENCOUNTER — TELEPHONE (OUTPATIENT)
Dept: INTERNAL MEDICINE CLINIC | Age: 60
End: 2020-08-25

## 2020-08-25 DIAGNOSIS — G89.18 CHEST WALL PAIN FOLLOWING SURGERY: Primary | ICD-10-CM

## 2020-08-25 DIAGNOSIS — R07.89 CHEST WALL PAIN FOLLOWING SURGERY: Primary | ICD-10-CM

## 2020-08-25 NOTE — TELEPHONE ENCOUNTER
Called and spoke to patient. She was very upset that we could not see her in the office. Due to Patient having covid-19 I informed patient she could not come in the office. I told her she could go to [facility] or [facility]. She states that she wants to see a doctor in office. I explain we can not see her . She got upset and hung up.

## 2020-08-25 NOTE — TELEPHONE ENCOUNTER
#[phone] pt states she has been taking 2 x 300 mg for the pain that is coming from armpits.   (see note pertaining to this)

## 2020-08-25 NOTE — TELEPHONE ENCOUNTER
#[phone]  Pt states every since she had Covid19 she has been suffering swelling & chronic pain under armpits. Is this effecting the lymph nodes she is asking? Pt would like an appt today or tomorrow with Dr. Jai Barfield in the office. I tried to explain that we are doing virtual, but pt does not want that. Can she see another physician? Please call pt as soon as possible.

## 2020-08-25 NOTE — TELEPHONE ENCOUNTER
Please advise if you approve dose change based on patient reporting taking differently:   \"pt states she has been taking 2 x 300 mg for the pain that is coming from armpits:. Future Appointments:  Future Appointments   Date Time Provider Adelia Brenna   11/17/2020  9:00 AM Guillermo Noyola NP [facility] BS AMB   11/17/2020  9:30 AM Judah Bhat,  [address] BS AMB        Last Appointment With Me:  7/8/2020     Requested Prescriptions     Pending Prescriptions Disp Refills    gabapentin (NEURONTIN) 400 mg capsule       Sig: Take  by mouth three (3) times daily as needed.

## 2020-08-27 RX ORDER — GABAPENTIN 400 MG/1
400 CAPSULE ORAL 2 TIMES DAILY
Qty: 60 CAP | Refills: 1 | Status: SHIPPED | OUTPATIENT
Start: 2020-08-27 | End: 2020-11-07 | Stop reason: SDUPTHER

## 2020-08-27 NOTE — TELEPHONE ENCOUNTER
Patient states she needs a call back in reference to Forms that she needs to get completed by Dr. Angel Oliva for Work Limitations & her health issues. Please call to discuss.  Thank you

## 2020-08-27 NOTE — TELEPHONE ENCOUNTER
Spoke with patient. Two pt identifiers confirmed. Patient states that she has some forms that needs to be completed by Dr. Onur Martinez. Patient advised that she can fax her forms, send them through [address] or drop them off Monday-Thursday with the screener in the hallway. Patient states that she will send them through her [employer]. Patient advised that we have 7-10 business days to complete all forms. Pt verbalized understanding of information discussed w/ no further questions at this time.

## 2020-08-28 ENCOUNTER — TELEPHONE (OUTPATIENT)
Dept: ONCOLOGY | Age: 60
End: 2020-08-28

## 2020-09-01 ENCOUNTER — OFFICE VISIT (OUTPATIENT)
Dept: ONCOLOGY | Age: 60
End: 2020-09-01
Payer: MEDICAID

## 2020-09-01 ENCOUNTER — DOCUMENTATION ONLY (OUTPATIENT)
Dept: ONCOLOGY | Age: 60
End: 2020-09-01

## 2020-09-01 VITALS
TEMPERATURE: 97.6 F | HEIGHT: 67 IN | HEART RATE: 93 BPM | BODY MASS INDEX: 34.76 KG/M2 | OXYGEN SATURATION: 97 % | SYSTOLIC BLOOD PRESSURE: 128 MMHG | DIASTOLIC BLOOD PRESSURE: 80 MMHG | WEIGHT: 221.5 LBS

## 2020-09-01 DIAGNOSIS — G89.18 CHEST WALL PAIN FOLLOWING SURGERY: ICD-10-CM

## 2020-09-01 DIAGNOSIS — Z85.3 HISTORY OF BREAST CANCER: Primary | ICD-10-CM

## 2020-09-01 DIAGNOSIS — Z90.13 H/O MASTECTOMY, BILATERAL: ICD-10-CM

## 2020-09-01 DIAGNOSIS — M79.89 AXILLARY SWELLING: ICD-10-CM

## 2020-09-01 DIAGNOSIS — R06.09 DOE (DYSPNEA ON EXERTION): ICD-10-CM

## 2020-09-01 DIAGNOSIS — I89.0 LYMPHEDEMA: ICD-10-CM

## 2020-09-01 DIAGNOSIS — R07.89 CHEST WALL PAIN FOLLOWING SURGERY: ICD-10-CM

## 2020-09-01 PROCEDURE — 99214 OFFICE O/P EST MOD 30 MIN: CPT | Performed by: INTERNAL MEDICINE

## 2020-09-01 NOTE — PROGRESS NOTES
This note will not be viewable in [address] Oncology Navigator  Psychosocial Assessment    Reason for Assessment:    []Depression  []Anxiety  []Caregiver [place]  []Maladaptive Coping with Serious Illness   [x] Social Work Referral [x] Initial Assessment  [] Other     Sources of Information:    [x]Patient  []Family  []Staff  []Medical Record    Advance Care Planning:  Advance Care Planning 5/25/2020   Patient's Healthcare Decision Maker is: Verbal statement (Legal Next of Kin remains as decision maker)   Confirm Advance Directive Yes, not on file   Patient has an AMD on file. Mental Status:    [x]Alert  []Lethargic  []Unresponsive   [] Unable to assess   Oriented to:  [x]Person  [x]Place  [x]Time  [x]Situation      Barriers to Learning:    []Language  []Developmental  []Cognitive  []Altered Mental Status  []Visual/Hearing Impairment  []Unable to Read/Write  []Motivational   [x]No Barriers Identified  []Other:    Relationship Status:  [x]Single  []  []Significant Other/Life Partner  []  []  []      Living Circumstances:  [x]Lives Alone  []Family/Significant Other in Household  []Roommates  []Children in the Home  []Paid Caregivers  []Assisted Living Facility/Group Home  []Skilled [address]  []Homeless  []Incarcerated  []Environmental/Care Concerns  []other:    Employment Status:  []Employed Full-time []Employed Part-time []Homemaker [] Disabled  [] Retired [x]Other: Patient has applied for [employer], as she got COVID-19 on the job. She currently receives unemployment.       Support System:    [x]Strong  []Fair  []Limited    Financial/Legal Concerns:    []Uninsured  []Limited Income/Resources  []Non-Citizen  [x]No Concerns Identified  []Financial POA:    []Other:    Sikh/Spiritual/Existential:  []Strong Sense of Spirituality  []Involved in [employer]  []Request  Visit  []Expressing Spiritual/Existential Angst  [x]No Concerns Identified    Coping with Illness:         Patient: Family/Caregiver:   Understanding and Acceptance of Illness/Prognosis  [x] []   Strong Sense of Resilience [x] []   Self Reflection [x] []   Engaged Support System [x] []   Does not Readily Discuss Illness [] []   Denial of Terminal Status [] []   Anger [] []   Depression [] []   Anxiety/Fear [] []   Bargaining [] []   Recent Diagnosis/Prognosis [] []   Difficulties with Body Image [] []   Loss of Identity [] []   Excessive Substance Use [] []   Mental Health History [] []   Enmeshed Relationships [] []   History of Loss [] []   Anticipatory Grief [] []   Concern for Complicated Grief [] []   Suicidal Ideation or Plan [] []   Unable to assess [] [x]            Narrative:   Met with the patient during her office visit today. Patient is being seen for history of breast cancer. Patient has a history of bilateral mastectomy in . Patient lives alone. She is not  and does not have any children. She has supportive siblings including one brother and two sisters, who live locally. Offered active listening and emotional support as she shared that her mother  for a GMB 3 years ago. Their father  2 years ago. The patient has a PCP - Dr. Kelly Cheng. 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 She spent 12 days in the hospital in May, after eduarda COVID-19 on the job. The patient has since tested negative for COVID twice. She is in the process of applying for [employer]. She currently receives unemployment. The patient has a difficult recovery from COVID-19. She has had home health PT, which recently ended. She hopes to pursue Cancer Rehabilitation now for Bilateral Axillary Lymphedema with Right Seroma, as well as generalized weakness and fatigue. Her provider placed a referral today. This  sent a referral to colleague Saran Gaspar, PT.   Offered active listening and emotional support as the patient shared that it can take her days to recover after running an errand, due to generalized weakness and fatigue. Provided the patient with a list of places to obtain mastectomy bras, prosthesis, and other accessories. Patient hopes to get a new mastectomy bra, as the previous bra is now too large. Provided Knitted Knockers, and a prescription for a mastectomy bra. She plans to visit [facility] again. Provided this 's contact information as well as information regarding the complementary services provided by the [facility], explaining that the center is currently closed due to COVID-19 restrictions. Explained that meditation and music therapy are both being offered virtually. Assessment/Action:   1. Introduced self and role of this  in the [facility] [facility] 2.  Informed the patient of the [facility] and available resources there. 3.  Continue to meet with the patient when she returns to the clinic for ongoing assessment of the patients adjustment to her diagnosis and treatment. 4.  Ongoing psychosocial support as desired by patient.       Plan/Referral:   DME/WISHAILESH referral  Cancer Rehab referral  [employer], LCSW

## 2020-09-01 NOTE — LETTER
9/7/20 Patient: Pippa Hernandez YOB: 1960 Date of Visit: 9/1/2020 Mamadou Vee MD 
[address] [facility] [address] [address] VIA In Basket Dear Mamadou Vee MD, Thank you for referring Ms. Pippa Hernandez to [address] [place] [address] for evaluation. My notes for this consultation are attached. If you have questions, please do not hesitate to call me. I look forward to following your patient along with you. Sincerely, Melony Lynne, DO

## 2020-09-01 NOTE — PROGRESS NOTES
[facility] [place] [address], [place]: [phone]  F: [phone]    Reason for Visit:   Crystal Cee is a 64 y.o. female who is seen for 10 month follow up for history of breast cancer. Treatment History:   · Prior patient of [facility]  · Hx of b/l mastectomy   · Adjuvant hormonal therapy stopped due to intolerance. No chemo. STAGE: T2N0 ER+ HER2 negative/Oncotype low risk x 2    History of Present Illness:   Crystal Cee is a 64 y.o. female seen today in office for 10 mo follow up of breast cancer not on any therapy from here. She is here today because she reports increased bilateral axillary swelling/edema for the past month or two. She went to a free standing ER with [facility] on 20 and had ultrasounds - records are scanned into media. She reports that the area is \"sore and tender. \" She has not been to the Lymphedema Clinic lately (in over a year). She last saw breast surgery in . Patient does report that she tested + for COVID in May but has had 2 negative tests since then. She has been seeing a Pulmonologist for SOB. She has routine labs with her PCP. She is here alone today.      Past Medical History:   Diagnosis Date    Arthritis     Cancer [facility])     breast cancer    H/O total mastectomy 2015    Hypercholesterolemia     Other ill-defined conditions(799.89)     completed radiation     Thyroid disease     hypothyroidism      Past Surgical History:   Procedure Laterality Date    HX BREAST LUMPECTOMY Right     HX GYN      hysterectomy; TBL    HX HEENT      tonsillectomy    HX MASTECTOMY Bilateral     HX ORTHOPAEDIC      right knee ACL    HX OTHER SURGICAL      lumptectomy right breast      Social History     Tobacco Use    Smoking status: Former Smoker     Packs/day: 0.50     Last attempt to quit: 2015     Years since quittin.5    Smokeless tobacco: Never Used   Substance Use Topics    Alcohol use: Yes     Comment: occasional      Family History   Problem Relation Age of Onset    Cancer Mother         brain cancer    No Known Problems Father     Breast Cancer Sister 54    No Known Problems Brother     Thyroid Disease Maternal Grandmother     MS Sister     Breast Cancer Maternal Aunt         60-70's     Current Outpatient Medications   Medication Sig    gabapentin (NEURONTIN) 400 mg capsule Take 1 Cap by mouth two (2) times a day. Max Daily Amount: 800 mg.    albuterol (PROVENTIL HFA, VENTOLIN HFA, PROAIR HFA) 90 mcg/actuation inhaler Take 2 Puffs by inhalation every six (6) hours as needed for Wheezing, Shortness of Breath, Respiratory Distress or Cough.  pantoprazole (PROTONIX) 40 mg tablet Take 1 Tab by mouth two (2) times a day. Do not crush, break or chew. Swallow whole.  levothyroxine (SYNTHROID) 125 mcg tablet Take 250 mcg by mouth Daily (before breakfast).  fluticasone propionate (FLONASE) 50 mcg/actuation nasal spray 2 Sprays by Both Nostrils route daily.  acetaminophen (TYLENOL) 325 mg tablet Take 2 Tabs by mouth every six (6) hours as needed for Pain or Fever.  polyethylene glycol (MIRALAX) 17 gram packet Take 1 Packet by mouth daily. Mix in 8 oz of water, juice, soda, coffee, or tea prior to administration    allopurinoL (ZYLOPRIM) 100 mg tablet Take 100 mg by mouth daily.  cholecalciferol (VITAMIN D3) 1,000 unit cap Take  by mouth daily. No current facility-administered medications for this visit. Allergies   Allergen Reactions    Penicillins Hives     Pt states I am not allergic to penicillin.  Percocet [Oxycodone-Acetaminophen] Itching    Sulfa (Sulfonamide Antibiotics) Other (comments)     Mouth blistering      Review of Systems: A complete review of systems was obtained, negative except as described above.     Physical Exam:     Visit Vitals  /80 (BP 1 Location: Left arm, BP Patient Position: Sitting)   Pulse 93   Temp 97.6 °F (36.4 °C) (Temporal)   Ht 5' 7\" (1.702 m)   Wt 221 lb 8 oz (100.5 kg)   SpO2 97%   BMI 34.69 kg/m²     ECOG PS: 1  General: No distress  Eyes: PERRLA, anicteric sclerae  HENT: Atraumatic, OP clear  Neck: Supple  Lymphatic: No cervical, supraclavicular  Respiratory: Dyspnea with conversation   CV: Normal rate, regular rhythm, no murmurs, no peripheral edema  GI: Soft, nontender  MS: Normal gait and station  Skin: Warm, dry  Psych: Alert, oriented, appropriate affect, normal judgment/insight  Breast: b/l mastectomy without lesions, mild bilateral axillary edema RIGHT fluid collection    Results:   Per PCP  Lab Results   Component Value Date/Time    WBC 8.4 06/25/2020 10:09 AM    HGB 11.0 (L) 06/25/2020 10:09 AM    HCT 34.1 06/25/2020 10:09 AM    PLATELET 719 (H) 91/08/1244 10:09 AM    MCV 91 06/25/2020 10:09 AM    ABS. NEUTROPHILS 13.2 (H) 06/07/2020 03:54 AM     Lab Results   Component Value Date/Time    Sodium 140 06/25/2020 10:09 AM    Potassium 4.6 06/25/2020 10:09 AM    Chloride 101 06/25/2020 10:09 AM    CO2 22 06/25/2020 10:09 AM    Glucose 130 (H) 06/25/2020 10:09 AM    BUN 17 06/25/2020 10:09 AM    Creatinine 0.82 06/25/2020 10:09 AM    GFR est AA 91 06/25/2020 10:09 AM    GFR est non-AA 79 06/25/2020 10:09 AM    Calcium 9.8 06/25/2020 10:09 AM    Glucose (POC) 130 (H) 05/27/2020 12:03 PM     Lab Results   Component Value Date/Time    Bilirubin, total 0.4 06/25/2020 10:09 AM    ALT (SGPT) 31 06/25/2020 10:09 AM    Alk. phosphatase 83 06/25/2020 10:09 AM    Protein, total 7.3 06/25/2020 10:09 AM    Albumin 4.3 06/25/2020 10:09 AM    Globulin 4.0 06/07/2020 03:54 AM     Records reviewed and summarized above. Pathology report(s) reviewed above. Radiology report(s) reviewed above. Assessment/PLAN:       1) History of Breast Cancer ER+ HER2 negative 2012 and 2015 treated at [address] [place] [address] with hx of b/l mastectomy in 2015  Oncotype low risk both times. No chemo done. She could not tolerate adjuvant hormonal therapy. Patient was following with surgery and sent here due to insurance change. Patient is not on any adjuvant therapy due to intolerance. She was going to the [address] but has not been recently - in over a year. She continues to have pulling/discomfort in chest wall since surgery. CT Chest 11/19 negative with postsurgical scarring and very thin seromas noted in the bilateral anterior chest walls. She is here today for follow up for increased edema in bilateral axilla. She has not been to the Lymphedema Clinic in over a year. She was seen at [facility] ER on 8/27/20 and had ultrasounds. Left side was negative/right showed simple fluid collection. She reports that she was hospitalized in May with COVID - has since tested negative. She last saw surgery in 11/19 - due to see them again in 11/20. Encouraged patient to make an appointment with Lymphedema Clinic. Encouraged patient to make an appointment with breast surgery for possible drainage of seroma. She has labs and routine HM with her PCP. 2) Chronic Chest Wall Pain   Since mastectomy. She has an appointment with surgery in 11/20. CT Chest 11/19 negative for cancer. 3) Bilateral Axillary Lymphedema with Right Seroma  She recently went to [facility] ER and had ultrasound - scanned into media. Left side was negative/right showed simple fluid collection. She has not been to the Lymphedema Clinic lately and needs to see them. Advised patient to follow up with breast surgery for possible drainage of seroma. 4) [place]  COVID + in 5/20. She has since had 2 negative tests. She is following with a Pulmonary doctor. 5) Psychosocial  Mood good, coping well overall. She is here alone today. Follow up in 1 year or sooner if needed. Patient advised to call anytime with questions. This patient was seen in conjunction with Epi Shin NP. I appreciate the opportunity to participate in [facility] care.     Signed By: Kasi Alvarenga, NP

## 2020-09-01 NOTE — PROGRESS NOTES
ONCOLOGY NURSE NAVIGATOR    Met with Whittrey Tim in [facility] at her request today following her lymphedema therapy  Provided empathic listening and support. She's having more trouble with left arm edema and has been seen recently by Dr. Dwayne Mccray who placed referral for lymphedema and PT services    Whittrey Tim has appt with Dr. 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 Her father is in active treatment for stage 4 cancer. She is worried for him. Her sister provides primary care for dad. We discussed the followin. She'd like appt with CRC counselor -- scheduled at first open appt on . I'll ask LCSW to call Elgin Tim in the meantime    2. Continue lymphedema therapy -- RTC 10/30    3. Consider referral to Cancer Rehab -- I'll contact Natalie Hope to discuss     4.   I'll see Elgin Tim at appt with Dr. Gregorio Valenzuela on 10/30    Valerie Benavides MS RN AOCNS
Detail Level: Zone
Detail Level: Simple

## 2020-09-21 ENCOUNTER — OFFICE VISIT (OUTPATIENT)
Dept: SURGERY | Age: 60
End: 2020-09-21
Payer: MEDICAID

## 2020-09-21 VITALS
BODY MASS INDEX: 34.69 KG/M2 | HEART RATE: 82 BPM | TEMPERATURE: 98 F | HEIGHT: 67 IN | DIASTOLIC BLOOD PRESSURE: 83 MMHG | WEIGHT: 221 LBS | SYSTOLIC BLOOD PRESSURE: 144 MMHG

## 2020-09-21 DIAGNOSIS — Z90.13 H/O BILATERAL MASTECTOMY: Primary | ICD-10-CM

## 2020-09-21 DIAGNOSIS — I89.0 LYMPHEDEMA: ICD-10-CM

## 2020-09-21 PROCEDURE — 99212 OFFICE O/P EST SF 10 MIN: CPT | Performed by: SURGERY

## 2020-09-21 NOTE — LETTER
9/22/20 Patient: Lovell Dance YOB: 1960 Date of Visit: 9/21/2020 Isabella Sheriff MD 
[facility] [address] [address] VIA In Basket Dear Isabella Sheriff MD, Thank you for referring Ms. Lovell Dance to [address] for evaluation. My notes for this consultation are attached. If you have questions, please do not hesitate to call me. I look forward to following your patient along with you. Sincerely, Vita Calle MD

## 2020-09-21 NOTE — PROGRESS NOTES
HISTORY OF PRESENT ILLNESS  Yamileth Conway is a 64 y.o. female. HPI  ESTABLISHED patient here for BILATERAL swollen axillae. Has had swelling on and off since she had mastectomy. Saw someone last week, a surgeon at [address] [place] [address] that drained 12 cc of seroma from RIGHT axilla. Patient states she is still swollen. This started in May when she had COVID. Ultrasound done at [facility], 8/28/20, showing soft/fatty thickening in LEFT axilla. History of RIGHT breast cancer in 2012, lumpectomy and xrt, took antihormonal pill for 3 years. RIGHT breast cancer recurred in 2015, bilateral mastectomies. No reconstruction. Dr. King Cifuentes was her breast surgeon and Dr. Re Deluna was her med onc. STAGE: T2N0 ER+ HER2 negative/Oncotype low risk x 2  Review of Systems   All other systems reviewed and are negative. Physical Exam  Vitals signs and nursing note reviewed. Chest:          Comments: Swollen tissue left axilla no seroma on us  No swollen tissue right axilla no seroma. Mastectomy scars healed. No masses. Lymphadenopathy:      Upper Body:      Right upper body: No supraclavicular, axillary or pectoral adenopathy. Left upper body: No supraclavicular, axillary or pectoral adenopathy. ASSESSMENT and PLAN    ICD-10-CM ICD-9-CM    1. H/O bilateral mastectomy  Z90.13 V45.71    2. Lymphedema  I89.0 457.1      - continue lymphedema clinic and compression. No surgical intervention needed. F/u 1 year.

## 2020-09-21 NOTE — PATIENT INSTRUCTIONS

## 2020-09-25 ENCOUNTER — HOSPITAL ENCOUNTER (OUTPATIENT)
Dept: PHYSICAL THERAPY | Age: 60
Discharge: HOME | End: 2020-09-25
Payer: MEDICAID

## 2020-09-25 VITALS — WEIGHT: 226.2 LBS | HEIGHT: 67 IN | BODY MASS INDEX: 35.5 KG/M2

## 2020-09-25 PROCEDURE — 97161 PT EVAL LOW COMPLEX 20 MIN: CPT

## 2020-09-25 PROCEDURE — 97140 MANUAL THERAPY 1/> REGIONS: CPT

## 2020-09-25 PROCEDURE — 97110 THERAPEUTIC EXERCISES: CPT

## 2020-09-25 NOTE — PROGRESS NOTES
[facility]  Physical Therapy Lymphedema Clinic  [address] [facility], [address] [facility], [facility] [address].    INITIAL EVALUATION    NAME: Alfredo  AGE: 64 y.o. GENDER: female  DATE: 9/25/2020  REFERRING PHYSICIAN: Dr. 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 Patient was initially seen in our clinic 3/9/2018-6/5/2018 for anterior chest tightness and axillary and intermittent R upper arm swelling. Patient had been doing well since last seen in our clinic two years ago until a recent hospitalization 5/25/20-6/9/20 due to the COVID 19 virus. She has since noticed an increase in tightness across the chest and R  > L axillary swelling with drainage of a seroma in the R axilla 9/12/20.    [facility] Lymphedema Scale: scores a 17/20  Past Medical History:   Past Medical History:   Diagnosis Date    Arthritis     Cancer ([address]     breast cancer    H/O total mastectomy 11/30/2015    Hypercholesterolemia     Other ill-defined conditions(799.89)     completed radiation 9/12    Thyroid disease     hypothyroidism     Past Surgical History:   Procedure Laterality Date    HX BREAST LUMPECTOMY Right 2012    HX GYN      hysterectomy; TBL    HX HEENT      tonsillectomy    HX MASTECTOMY Bilateral 2015    HX ORTHOPAEDIC      right knee ACL    HX OTHER SURGICAL      lumptectomy right breast Current Medications:    Medication     gabapentin (NEURONTIN) 400 mg capsule Take 1 Cap by mouth two (2) times a day. Max Daily Amount: 800 mg.    albuterol (PROVENTIL HFA, VENTOLIN HFA, PROAIR HFA) 90 mcg/actuation inhaler Take 2 Puffs by inhalation every six (6) hours as needed for Wheezing, Shortness of Breath, Respiratory Distress or Cough.  levothyroxine (SYNTHROID) 125 mcg tablet Take 250 mcg by mouth Daily (before breakfast).  fluticasone propionate (FLONASE) 50 mcg/actuation nasal spray 2 Sprays by Both Nostrils route daily.  polyethylene glycol (MIRALAX) 17 gram packet Take 1 Packet by mouth daily. Mix in 8 oz of water, juice, soda, coffee, or tea prior to administration    allopurinoL (ZYLOPRIM) 100 mg tablet Take 100 mg by mouth daily.  cholecalciferol (VITAMIN D3) 1,000 unit cap Take  by mouth daily.           No current facility-administered medications for this encounter. Allergies: Allergies   Allergen Reactions    Penicillins Hives     Pt states I am not allergic to penicillin. 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 Living Situation: Lives alone in a ranch house with 4 steps to enter. Trainable Caregiver?: No   Self-care/ADLs: Modified independent with bathing and dressing. Mobility: Independent for community distances.     Sleeping Arrangement:  In a bed    Adaptive Equipment Owned: None   Other: N/A  Previous Therapy:   [address] and more recently in the [facility] from 3/9/2018 - 6/5/2018 and then again 10/23/18-11/13/18. Compression/Lymphedema Equipment:  Flexitouch Vasopneumatic Pump, Juzo Soft arm sleeve and gauntlet, Peterson Hernán bilateral mastectomy pad, Optiflow T pad, Wear Ease Compression Shirt. SUBJECTIVE: Patient reports an increase in swelling in the axillas bilaterally and tightness across the chest since her hospitalization for COVID 19. She recently started going to Outpatient PT for strengthening and endurance. She is scheduled for an ECHO later today since she continues to have shortness of breath. Patients goals for therapy: \"Reduce swelling in my arm pits and have less pain across my chest\"    OBJECTIVE DATA SUMMARY:   EXAMINATION/PRESENTATION/DECISION MAKING:   Pain:  Pain Scale 1: Numeric (0 - 10)    Pain Intensity 1: 0 - complains of discomfort across the anterior chest with ROM/stretches in the clinic. Temperature:  97.0 degrees  Self-Care and ADLs:  Grooming: Modified independent  Bathing: Modified independent    UB Dressing: Modified independent LB Dressing: Modified independent    Don/Doff Shoes/Socks: Modified independent  Toileting: Modified independent    Other: Independent with management of day garments and the JoviPak B mastectomy pad.        Skin and Tissue Assessment:  Dermal Status:  [x]   Intact []  Dry    []  Tenuous [] Flaky   []  Wound/lesion present [x]  Scars - B mastectomies   []  Dermatitis    Texture/Consistency:  [x]  Boggy - R > L axillary region []  Pitting Edema   []  Brawny []  Combination   [x]  Fibrotic/Woody  -  R > L anterior chest    Pigmentation/Color Change:  [x]  Normal []  Hemosiderin   []  Red []  Erythematous   []  Hyperpigmented []  Hyperlipodermatosclerosis   Anomalies:  []  Lymphorrhea []  Vesicles   []  Petechiae []  Warty Vercusis   []  Bullae []  Papilloma   Circulatory:  []  ALICE []  Varicosities:   []  Pulses []  Vascular studies ruled out DVT in past   []  DVT History    Nails:  [x]   Normal  []   Fungus  Stemmers Sign: negative  Height: Height: 5' 7\" (170.2 cm)  Weight:  Weight: 102.6 kg (226 lb 3.2 oz), gain of 8.5 lbs since 11/13/18   BMI:  BMI (calculated): 35.4  (36 or greater: adversely affecting lymphedema)  Wound/Ulcer:  N/A      Wound Pain:   N/A  Volumetric Measurements:   Right:  2,812.73 mL (loss of 67 mL since 11/13/18) Left: 2,852.04 mL (gain of 24 mL since 11/13/18)   % Difference: 1% Dominance: right handed     Girth measurements of the trunk:  Axilla 108.5 cm, 111.5 cm on 1/16/18  Lower Chest: 101.8 cm, 106.5 cm 1/16/18  Waist: 120.0 cm, 119.8 cm 1/16/18  Hips: 122.0 cm, 118.0 cm 1/16/18  Range of Motion: within functional limits all extremities with B shoulder flexion to approximately 175 degrees actively. Increased time for elevation of the R arm due to discomfort in axilla and anterior chest.    Strength: Within functional limits. Sensation:    [x] Intact to light touch  [] Impaired    Mobility:    Bed/Chair Mobility:  Independent  Transfers:  Independent    Sitting Balance:  Normal Standing Balance:  Good   Gait:  Independent in the community Wheelchair Mobility:  N/A   Endurance:  Fair - shortness of breath with activity since hospitalization for COVID 19 Stairs:  Able to negotiate stairs in and out of her house. Safety:  Patient is alert and oriented:  Yes   Safety awareness:  Appears intact   Fall Risk?:  Low   Patient given written fall prevention handout: Yes   Precautions:  Standard lymphedema precautions to include avoiding blood pressure readings, injections and IVs or other procedures/acts that could lead to broken skin on affected area, and avoiding excessive heat, resistive activity or altitude without compression garment       Based on the above components, the patient evaluation is determined to be of the following complexity level: LOW     Evaluation Time: 8:20 - 8:45 am    TREATMENT PROVIDED:   1.   Treatment description:  Therapeutic Exercise and Procedure: Patient instructed in activity restrictions and exercise guidelines as she is currently attending Outpatient PT 2 to 3 times per week at [facility] for strengthening and endurance following hospitalization for COVID 19. Patient instructed to wear the compression arm sleeve and hand piece with exercise since she is participating in repetitive exercises/activities while in therapy. Provided a written handout on the butterfly stretch, T and Y stretch, and the sidebending stretch to be performed 2 to 3 times each day as tolerated. Patient was able to demonstrate the stretches in the clinic with cues for technique. Patient is not participating in a formal walking program at this time due to dyspnea on exertion but remains active in the house doing chores and walking as tolerated. Treatment time:  9:35-9:50 am  Minutes: 15    2. Treatment description:  Manual Therapy: Patient instructed in skin care principles and anatomy of the lymphatic system. Therapist able to demonstrate manual lymphatic drainage techniques for the drainage of R > L axillary regions and R UE and skin care protocol: Continued education in daily skin care with a low Ph lotion using MLD techniques and demonstrated gentle manual techniques to the scar/adhesions on the anterior chest. Patient reports using the Flexitouch vaso-pneumatic pump a few times each month without any issues. She was instructed to defer use of the pump until cleared by the Pulmonologist as she is scheduled for an ECHO later today for continual shortness of breath following diagnosis of COVID 19. She is able to verbalize understanding of these instructions prior to leaving the clinic. Patient wears a ready made arm sleeve and hand piece several days per week when performing repetitive activities, such as folding laundry, at work. The day garments are now two years old and in need of replacement.  Measured patient for one: Juzo Soft 2001 arm sleeve size 4/max long length and Juzo basic seamless glove size large, 20-30 mmHg. Patient wears the Peterson Hernán Bilateral mastectomy pad several nights per week. She did not bring the garment in for inspection but reports that it is worn and a bit too large. She is interested in exploring other compression options for truncal swelling and samples were provided during the visit today. Patient voiced interest in obtaining one [address] with outer jacket and measurements were taken today. The order for the compression garments will be submitted to the vendor, [employer], for processing. Patient will use the Optiflow T pad with compression shirt for management of axillary swelling but the Optiflow T pad is worn and in need of replacement per patient. Will provide patient with an Optiflow T pad next visit as our current supply in the clinic is depleted. Treatment time:  8:45-9:35 am  Minutes: 48    ASSESSMENT:   Yani Basilio is a 64 y.o. female who presents with R > L axillary swelling and anterior chest tightness following diagnosis and treatment for recurrent R breast cancer. Patient has not been seen since 2018 but had done well until her hospitalization for COVID 19. She now complains of more swelling in the axillas and anterior chest tightness. She is here today for continued education in exercises/stretches and to obtain new compression garments. She is motivated to improve her condition. Patient will benefit from complete decongestive therapy (CDT) including manual lymphatic drainage (MLD) technique, short-stretch textile bandages/compression system to decongest limb, and kinesiotaping as appropriate. Patient will receive instruction in proper skin care to recognize signs/symptoms of and prevent infection, therapeutic exercise, and self-MLD for independent home program and restorative lymphatic performance. This care is medically necessary due to the infection risk with lymphedema and to improve functional activities.   CDT is necessary to resolve swelling to allow patient to return to wearing normal clothes/footwear and prevent worsening of symptoms, such as venous stasis ulcerations, infections, or hospitalizations. Patient will be independent with home program strategies to allow improved ADL ability and mobility and to allow patient to return to greatest functional independence. Rehabilitation potential is considered to be Good. Factors which may influence rehabilitation potential include scarring/adhesions, impaired endurance, and performing repetitive activities at work  Patient will benefit from 4 to 8 physical therapy visits over 12 weeks to optimize improvement in these areas. PLAN OF CARE:   Recommendations and Planned Interventions:  Manual lymph drainage/complete decongestive therapy  Multi-layer compression bandaging (short-stretch)  Compression garment fitting/provision  Lymphedema therapeutic exercise  AROM/PROM/Strength/Coordination  Self-care training  Functional mobility training  Education in skin care and lymphedema precautions  Self-MLD education per home program  Self-bandaging education per home program  Caregiver education as needed  Wound care as needed     GOALS  Short term goals  Time frame: to be met by 10/30/2020  1. Patient will demonstrate knowledge of signs/symptoms of infections/cellulitis and be independent in skin care to prevent cellulitis. 2.  Patient will demonstrate independence in lymphedema home program of therapeutic exercises/stretches to improve circulation and comfortable range of motion to improve ADLs. 3.  Patient will participate in the selection process to allow ordering of home compression system  (daytime, nighttime garments and pump as needed) and/or be compliant with use of the home compression system. Long term goals  Time frame: to be met by 12/21/2020  1.   Pt will show improvement in [facility] [place] Lymphedema Scale from 17/20 to 13/20 and thus allow improvement in patient's quality of life.  2.  Patient will be independent with don/doff of compression system and use in order to prevent reaccumulation of fluid at discharge. 3.  Pt will be independent in self-MLD and show stable limb volumes showing decongestion and pt. ready for transition to independent restorative phase of lymphedema therapy. Patient has participated in goal setting and agrees to work toward plan of care. Patient was instructed to call if questions or concerns arise. Thank you for this referral.  Carolina Celis, PT, CLT   Time Calculation: 90 mins    TREATMENT PLAN EFFECTIVE DATES:   9/25/2020 TO 12/21/2020  I have read the above plan of care for Mamadou Dos Santos. I certify the above prescribed services are required by this patient and are medically necessary.   The above plan of care has been developed in conjunction with the lymphedema/physical therapist.       Physician Signature: ____________________________________Date:______________      Giovani Saleh MD

## 2020-10-02 ENCOUNTER — HOSPITAL ENCOUNTER (OUTPATIENT)
Dept: NON INVASIVE DIAGNOSTICS | Age: 60
Discharge: HOME | End: 2020-10-02
Attending: INTERNAL MEDICINE
Payer: OTHER MISCELLANEOUS

## 2020-10-02 VITALS — WEIGHT: 226 LBS | BODY MASS INDEX: 35.4 KG/M2

## 2020-10-02 DIAGNOSIS — R06.02 SOB (SHORTNESS OF BREATH): ICD-10-CM

## 2020-10-02 DIAGNOSIS — J18.9 PNEUMONIA: ICD-10-CM

## 2020-10-02 LAB
ECHO AO ROOT DIAM: 2.39 CM
ECHO AV AREA PLAN: 1.9 CM2
ECHO EST RA PRESSURE: 5 MMHG
ECHO LA AREA 4C: 13.86 CM2
ECHO LA MAJOR AXIS: 2.82 CM
ECHO LA VOL 4C: 31.9 ML (ref 22–52)
ECHO LV INTERNAL DIMENSION DIASTOLIC: 4.84 CM (ref 3.9–5.3)
ECHO LV INTERNAL DIMENSION SYSTOLIC: 2.58 CM
ECHO LV IVSD: 1.34 CM (ref 0.6–0.9)
ECHO LV MASS 2D: 227.3 G (ref 67–162)
ECHO LV POSTERIOR WALL DIASTOLIC: 1.1 CM (ref 0.6–0.9)
ECHO LVOT DIAM: 1.73 CM
ECHO LVOT PEAK GRADIENT: 2.35 MMHG
ECHO LVOT PEAK VELOCITY: 76.72 CM/S
ECHO MV A VELOCITY: 46.33 CM/S
ECHO MV AREA PHT: 4.9 CM2
ECHO MV AREA PLAN: 4.54 CM2
ECHO MV E DECELERATION TIME (DT): 0.08 S
ECHO MV E VELOCITY: 26.07 CM/S
ECHO MV E/A RATIO: 0.56
ECHO MV MAX VELOCITY: 79.54 CM/S
ECHO MV MEAN GRADIENT: 1.02 MMHG
ECHO MV PEAK GRADIENT: 2.53 MMHG
ECHO MV PRESSURE HALF TIME (PHT): 0.04 S
ECHO MV VTI: 19.31 CM
ECHO PV PEAK INSTANTANEOUS GRADIENT SYSTOLIC: 1.37 MMHG
ECHO PV REGURGITANT MAX VELOCITY: 58.39 CM/S
ECHO RA AREA 4C: 13.56 CM2
ECHO RIGHT VENTRICULAR SYSTOLIC PRESSURE (RVSP): 19 MMHG
ECHO RIGHT VENTRICULAR SYSTOLIC PRESSURE (RVSP): 22.99 MMHG
ECHO RIGHT VENTRICULAR SYSTOLIC PRESSURE (RVSP): 23.88 MMHG
ECHO RIGHT VENTRICULAR SYSTOLIC PRESSURE (RVSP): 25.25 MMHG
ECHO RIGHT VENTRICULAR SYSTOLIC PRESSURE (RVSP): 25.25 MMHG
ECHO TV REGURGITANT MAX VELOCITY: 187.06 CM/S
ECHO TV REGURGITANT MAX VELOCITY: 212.08 CM/S
ECHO TV REGURGITANT MAX VELOCITY: 217.26 CM/S
ECHO TV REGURGITANT MAX VELOCITY: 225.02 CM/S
ECHO TV REGURGITANT MAX VELOCITY: 225.02 CM/S
ECHO TV REGURGITANT PEAK GRADIENT: 14 MMHG
ECHO TV REGURGITANT PEAK GRADIENT: 17.99 MMHG
ECHO TV REGURGITANT PEAK GRADIENT: 18.88 MMHG
ECHO TV REGURGITANT PEAK GRADIENT: 20.25 MMHG
ECHO TV REGURGITANT PEAK GRADIENT: 20.25 MMHG

## 2020-10-02 PROCEDURE — 74011250636 HC RX REV CODE- 250/636: Performed by: INTERNAL MEDICINE

## 2020-10-02 PROCEDURE — C8929 TTE W OR WO FOL WCON,DOPPLER: HCPCS

## 2020-10-02 RX ORDER — SODIUM CHLORIDE 0.9 % (FLUSH) 0.9 %
10 SYRINGE (ML) INJECTION AS NEEDED
Status: DISCONTINUED | OUTPATIENT
Start: 2020-10-02 | End: 2020-10-06 | Stop reason: HOSPADM

## 2020-10-02 RX ADMIN — Medication 10 ML: at 10:40

## 2020-10-02 RX ADMIN — PERFLUTREN 2 ML: 6.52 INJECTION, SUSPENSION INTRAVENOUS at 09:52

## 2020-10-02 RX ADMIN — Medication 10 ML: at 09:53

## 2020-10-13 ENCOUNTER — TELEPHONE (OUTPATIENT)
Dept: INTERNAL MEDICINE CLINIC | Age: 60
End: 2020-10-13

## 2020-10-13 DIAGNOSIS — I27.20 PULMONARY HYPERTENSION (HCC): Primary | ICD-10-CM

## 2020-10-13 NOTE — TELEPHONE ENCOUNTER
#[phone] pt states she wants a second opinion and will be going to [facility] in December. Pt is asking for you to call them #[phone] to get her in sooner. Pt states she was sent back to work too soon. She still has sob when doing house work or going to [employer]. This is why pt wants a second opinion. She is asking for a referral to see them. Please call pt to advise what you can do.

## 2020-10-13 NOTE — TELEPHONE ENCOUNTER
Spoke with patient. Two pt identifiers confirmed. Patient states that she saw [facility]. Patient states that her Echo showed Normal EF 55-60% and mild concentric hypertrophy. Patient states that traces of mitral valve regurgitation. Patient states what concerns her is the echo mentions a possibility of pulmonary hypertension. Patient states that she felt like she was not being listened too. Patient states that she does have WOODARD. Patient states that all of her symptoms started after she had COVID. Patient states that she is concern because the pulmonary did not seem concerned about the pulmonary hypertension and has returned her to work without restrictions. Patient states that this is why she is wanting a second opinion. Advised patient that I will send this information to  and will give her a call back as soon as she responds. Pt verbalized understanding of information discussed w/ no further questions at this time.

## 2020-10-14 ENCOUNTER — HOSPITAL ENCOUNTER (OUTPATIENT)
Dept: PHYSICAL THERAPY | Age: 60
Discharge: HOME | End: 2020-10-14
Payer: MEDICAID

## 2020-10-14 PROCEDURE — 97110 THERAPEUTIC EXERCISES: CPT

## 2020-10-14 PROCEDURE — 97140 MANUAL THERAPY 1/> REGIONS: CPT

## 2020-10-14 NOTE — TELEPHONE ENCOUNTER
MD Sarah Noonan, LPN    Caller: Unspecified Saudmackenzie Stack,  8:50 AM)               Advise the patient that since the first pulmonologist cleared her and released her to work, Eliza Sober is not an emergency and I cannot ask [facility] to change her appointment.       Left message for patient to return call

## 2020-10-14 NOTE — PROGRESS NOTES
[facility]  Lymphedema Clinic  [address], [address], [place]  22.    LYMPHEDEMA THERAPY  VISIT: 2    [x]                  Daily note               []                 30 day/10th visit progress note    NAME: Cathy Crespo  DATE: 10/14/2020    GOALS  Short term goals  Time frame: to be met by 10/30/2020  1. Patient will demonstrate knowledge of signs/symptoms of infections/cellulitis and be independent in skin care to prevent cellulitis. Continued education in signs and symptoms of infection. Patient instructed to avoid scratching incision on the R anterior chest. Progressing toward goal.   2.  Patient will demonstrate independence in lymphedema home program of therapeutic exercises/stretches to improve circulation and comfortable range of motion to improve ADLs. Patient was educated in the Stick Routine and several stretches this visit. Progressing toward goal.   3.  Patient will participate in the selection process to allow ordering of home compression system  (daytime, nighttime garments and pump as needed) and/or be compliant with use of the home compression system. Measured patient and ordered compression garments for the R UE and trunk last visit. Insurance authorization is still pending. Progressing toward goal.      Long term goals  Time frame: to be met by 12/21/2020  1. Pt will show improvement in [facility] Lymphedema Scale from 17/20 to 13/20 and thus allow improvement in patient's quality of life. 2.  Patient will be independent with don/doff of compression system and use in order to prevent reaccumulation of fluid at discharge. 3.  Pt will be independent in self-MLD and show stable limb volumes showing decongestion and pt. ready for transition to independent restorative phase of lymphedema therapy. Continued education in self MLD with focus on deep abdominal breathing and R arm this visit.  Progressing toward goal. SUBJECTIVE REPORT: Patient reports having an ECHO since last seen in the clinic. She continues to have WOODARD despite unremarkable results. She is seeking a second opinion from a Cardiologist and Pulmonologist. She is now working as a  a few days per week from 10 am to 4 pm. She is no longer attending Outpatient PT but was given a HEP for generalized strengthening. She continues to feel shoulder and chest tightness with functional activities. Pain:  Pain Scale 1: Numeric (0 - 10)  Pain Intensity 1: 0    Gait:  Independent gait without any assistive device for community distances. Endurance is impaired since having COVID 19 in May 2020. Transfers: Independent   Fall risk: Yes    ADLs: Independent with bathing and dressing. Treatment Response:  Patient reviewed packet received at evaluation. Patient completed home program as prescribed. Patient waiting on compression garment arrival.    [facility] Lymphedema Assessment Scale:  deferred  TREATMENT AND OBJECTIVE DATA SUMMARY:     Therapeutic Exercise/Procedure 40 minutes   Treatment time: 7:45 - 8:30 am  Walking program Patient initiated a walking program on a treadmill while attending Outpatient PT and was able to walk approximately 6 minutes consecutively at time of discharge. Patient encouraged to continue the walking program as tolerated while monitoring signs of fatigue and shortness of breath. Ceci ball exercise program: Deferred   Stick exercise program: Patient was educated on the stick exercise routine. Patient able to perform 50% of the stick routine with rest breaks and the additional exercises were demonstrated by therapist. Handout provided. Patient instructed to perform exercises within tolerance and encouraged to wear compression garments for the R arm and hand while participating in exercises.     Free exercises/ROM: Discussed energy conservation techniques when participating in a walking or exercise program and to monitor for any increase in R UE/trunk swelling with exercise. Continued education in the butterfly stretch, T and Y stretch, and side bending stretch. Patient was educated in the forward and sideway pinky stretches, corner stretch, chest stretch, shoulder abduction with cane stretch during the visit today. Home program: Patient to perform daily to BID:  Skin care, Deep abdominal breathing, Exercise routine, Walking program, Rest in supine, Compression garments and Self manual lymph drainage (MLD) with focus on deep abdominal breathing and the R UE. Rationale: Exercise will increase the lymph angiomotoricity and tissue pressure of the skin and thus decrease swelling. Modalities 0 minutes   Treatment time: N/A  Vasopneumatic pump: Patient has avoided using the vaso-pneumatic device due to SOB/WOODARD ever since having COVID 19. Patient instructed to discuss use of the pump during her upcoming visits with the Pulmonologist and Cardiologist and to avoid pump use until cleared by MD.      Manual Lymphatic Drainage (MLD) 28 minutes   Treatment time: 8:30 - 8:58 am  Patient/family education provided in self MLD. Reviewed with patient, as well as demonstration and instruction during MLD portion of the session. Continued education in self MLD technique with bathing and skin care. Area to decongest: R arm and trunk   Sequence used and effectiveness: Secondary sequence for upper extremity with trunk involvement with focus on the R arm and deep abdominal breathing techniques. Modifications were made to manual lymph drainage sequence to exclude cervical and trunk techniques secondary to patient's ongoing shortness of breath. Performed soft tissue mobilization to adhesions on R > L anterior chest and educated patient in techniques to perform at home. Skin/wound care/debridement: Reviewed skin care principles:   Continued education in daily skin care using MLD techniques.      Scabbing present along the scar on R anterior chest from patient scratching it. Patient instructed to avoid scratching the scar and discussed the risk and signs of infection. Upper/Lower extremity compression: Patient was measured for one: custom JoviPak Vest with outer jacket and one:Juzo Soft 2001 arm sleeve size 4/max long length and Juzo basic seamless glove size large, 20-30 mmHg last visit. The order was submitted to the vendor. Called the vendor today and insurance authorization is still pending. Patient will bring the garments to the clinic for fitting once they are received. Patient wears prosthetic bras obtained from [employer] and continues to wear the Optiflow T pad and Peterson Hernán Bilateral mastectomy pad several times per month. Her current Optiflow T pad is old and worn and was replaced during the visit today. Kinesiotaping: Deferred due to scabbing along anterior chest incision. Girth/Volume measurement: Not assessed this visit. ASSESSMENT:   Patient is performing exercises and stretches as instructed and with modifications due to impaired endurance. 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 Patient will defer using the vaso-pneumatic device until cleared by the Cardiologist/Pulmonolgist. Patient is waiting on delivery of compression garments for the R UE to wear with high risk activities and the trunk for intermittent axillary and lateral chest swelling. Patient remains motivated and continues to do well in above home programs. Patient is eager to learn and improve on condition. Patient is making gains towards goals and will be ready for discharge to the restorative phase of care once all garments are received and the fit is deemed good. PLAN OF CARE:   Continue plan of care as established on evaluation. Frequency:  Will return in 3 weeks (or sooner) once garments are received.     Next session will address: Assess volumes, garment fitting, exercise, education   Other:  N/A      TOTAL TREATMENT 74 mins   Jeane Garvin, PT, CLT

## 2020-10-15 NOTE — TELEPHONE ENCOUNTER
----- Message from [place] Alejandro sent at 10/15/2020  8:56 AM EDT -----  Regarding: Dr. Marilee Price first and last name and relationship (if not the patient): n/a  Best contact number(s): [phone]  Whose call is being returned: Teresa Ji  Details to clarify the request: n/a

## 2020-10-16 NOTE — TELEPHONE ENCOUNTER
Jacqui, [facility]               Patient return call     Caller's first and last name and relationship (if not the patient):       Best contact number(s):[phone]       Whose call is being returned:nurse       Details to clarify the request:Pt returned nurse call from yesterday.        [address] [facility]

## 2020-10-16 NOTE — TELEPHONE ENCOUNTER
Spoke with patient. Two pt identifiers confirmed. Patient advised that because this is a second opinion and non-emergent Dr. Luis Alberto Maradiaga can not ask for them to move up her appointment. Patient advised that I will mail referral to her. Pt verbalized understanding of information discussed w/ no further questions at this time.

## 2020-10-19 ENCOUNTER — TELEPHONE (OUTPATIENT)
Dept: INTERNAL MEDICINE CLINIC | Age: 60
End: 2020-10-19

## 2020-10-19 NOTE — TELEPHONE ENCOUNTER
----- Message from Irlanda King sent at 10/19/2020  4:18 PM EDT -----  Regarding: Dr. Xi Rust telephone  Appointment not available    Caller's first and last name and relationship to patient (if not the patient): pt       Best contact number: [phone]      Preferred date and time: this week (early in the morning 8:00 am preferably)      Scheduled appointment date and time: n.a      Reason for appointment: Pre-Op      Details to clarify the request: pt is scheduled for a cataract surgery 10/27 for her left eye and 11/10 for her right eye. Her appt needs to be scheduled within 30 days of her surgery.        Message from Brian

## 2020-10-20 NOTE — TELEPHONE ENCOUNTER
Latonia Luis, [address] [address] Office Pool               Appointment not available     [facility] first and last name and relationship to patient (if not the patient): n/a       Best contact number: [phone]       Preferred date and time: first available       Scheduled appointment date and time: n/a       Reason for appointment: Pre-op for surgery that is scheduled on 10/27/20.        Details to clarify the request: Tam Sheppard [facility]

## 2020-10-20 NOTE — TELEPHONE ENCOUNTER
Spoke with patient. Two pt identifiers confirmed. Patient offered an appointment with Dr. Leodan Camargo on 10/26/2020. Appointment accepted. Patient advised if anything changes or if unable to keep this appointment to call the office  Pt verbalized understanding of information discussed w/ no further questions at this time.

## 2020-10-26 ENCOUNTER — OFFICE VISIT (OUTPATIENT)
Dept: INTERNAL MEDICINE CLINIC | Age: 60
End: 2020-10-26
Payer: MEDICAID

## 2020-10-26 VITALS
BODY MASS INDEX: 37.04 KG/M2 | TEMPERATURE: 97.1 F | HEIGHT: 67 IN | DIASTOLIC BLOOD PRESSURE: 109 MMHG | RESPIRATION RATE: 16 BRPM | OXYGEN SATURATION: 99 % | SYSTOLIC BLOOD PRESSURE: 172 MMHG | HEART RATE: 79 BPM | WEIGHT: 236 LBS

## 2020-10-26 DIAGNOSIS — H26.9 CATARACT OF BOTH EYES, UNSPECIFIED CATARACT TYPE: Primary | ICD-10-CM

## 2020-10-26 DIAGNOSIS — R03.0 ELEVATED BP WITHOUT DIAGNOSIS OF HYPERTENSION: ICD-10-CM

## 2020-10-26 PROCEDURE — 99213 OFFICE O/P EST LOW 20 MIN: CPT | Performed by: INTERNAL MEDICINE

## 2020-10-26 NOTE — PROGRESS NOTES
SUBJECTIVE  Ms. Jose Maria Romero presents today acutely for     Chief Complaint   Patient presents with    Pre-op Exam     pre op for cataract surg      \"I had COVID, so I still have some shortness of breath. \"  She contracted this in May 2020. \"I was already immunocompromised because I'm a cancer survivor\" (Breast cancer). Has UE lymphadema. No cardiovascular symptoms otherwise. Today's BP is an outlier: On recheck, it was 159/92  BP Readings from Last 3 Encounters:   10/26/20 (!) 172/109   09/21/20 (!) 144/83   09/01/20 128/80       See H and P form. OBJECTIVE  Visit Vitals  BP (!) 172/109 (BP 1 Location: Left arm, BP Patient Position: Sitting)   Pulse 79   Temp 97.1 °F (36.2 °C) (Temporal)   Resp 16   Ht 5' 7\" (1.702 m)   Wt 236 lb (107 kg)   SpO2 99%   BMI 36.96 kg/m²     Gen: Pleasant 64 y.o.  female in NAD.   HEENT: PERRLA. EOMI. OP moist and pink.  Neck: Supple.  No LAD.  HEART: RRR, No M/G/R.   LUNGS: CTAB No W/R.   ABDOMEN: S, NT, ND, BS+.   EXTREMITIES: Warm. No C/C/E.       ASSESSMENT   Cataract  Elevated BP: Was normal as recently as 9/1--?anxiety    PLAN  See H and P form. I have reviewed with the patient details of the assessment and plan and all questions were answered. Relevant patient education was performed.

## 2020-10-27 ENCOUNTER — TELEPHONE (OUTPATIENT)
Dept: INTERNAL MEDICINE CLINIC | Age: 60
End: 2020-10-27

## 2020-10-27 NOTE — TELEPHONE ENCOUNTER
#[phone] [employer] states she needs a call to confirm that the order for the second opinion to the pulmonologist is part of pt's workman's comp?

## 2020-10-27 NOTE — TELEPHONE ENCOUNTER
MD Andrea Joy LPN    Caller: Unspecified (Today,  2:07 PM)               Correct.  The patient requested a second opinion as she still feels something is wrong.  The first pulmonologist did not find a reason for her ongoing shortness of breath.  No, I did not order the second opinion.  This is something the patient scheduled on her own.  No, I do not see a medical reason to move up her appointment. Spoke with Julio C Ivory with [employer]. Julio C Ivory advised of the above information from Dr. Surya Irene. Julio C Ivory verbalized understanding of information discussed w/ no further questions at this time.

## 2020-10-28 ENCOUNTER — APPOINTMENT (OUTPATIENT)
Dept: PHYSICAL THERAPY | Age: 60
End: 2020-10-28
Payer: MEDICAID

## 2020-10-30 ENCOUNTER — TELEPHONE (OUTPATIENT)
Dept: INTERNAL MEDICINE CLINIC | Age: 60
End: 2020-10-30

## 2020-10-30 NOTE — TELEPHONE ENCOUNTER
Attempted to contact patient  Someone answered and sounded like they were ordering food in a drive thru, would never say hello. Will wait for patient to call back.

## 2020-10-30 NOTE — TELEPHONE ENCOUNTER
Patient states she needs a call back today in reference to her [facility] would like to requests If At All Possible could the forms be completed for her today as patient states she has not been seen by Specialist & Still waiting for her appt. Please call to discuss & advise.  Thank you

## 2020-11-06 ENCOUNTER — TELEPHONE (OUTPATIENT)
Dept: INTERNAL MEDICINE CLINIC | Age: 60
End: 2020-11-06

## 2020-11-06 NOTE — TELEPHONE ENCOUNTER
Patient states she needs a call back to discuss her upcoming appt on 11/11/20 with Dr. Tri Salazar in the office. Patient states she has an appt that same day on 11/11/20 with a New Lung Doctor for her 2nd Opinion since she was tested with COVID-19 & still concerned with residual effects. Patient states she is also going for a 2nd opinion to a heart doctor but has not scheduled that appt yet. Patient states she wants to re-schedule her In office appt on 11/11/20 with Dr. Tri Salazar for a little further out so Dr. Tri Salazar will have results but no availability until last December for In Office appt. Please call to discuss. Thank you      Appt for 11/11/20 was left on schedule.

## 2020-11-07 DIAGNOSIS — G89.18 CHEST WALL PAIN FOLLOWING SURGERY: ICD-10-CM

## 2020-11-07 DIAGNOSIS — R07.89 CHEST WALL PAIN FOLLOWING SURGERY: ICD-10-CM

## 2020-11-10 RX ORDER — GABAPENTIN 400 MG/1
400 CAPSULE ORAL 2 TIMES DAILY
Qty: 60 CAP | Refills: 1 | Status: SHIPPED | OUTPATIENT
Start: 2020-11-10 | End: 2021-01-27 | Stop reason: SDUPTHER

## 2020-11-23 ENCOUNTER — OFFICE VISIT (OUTPATIENT)
Dept: INTERNAL MEDICINE CLINIC | Age: 60
End: 2020-11-23
Payer: MEDICAID

## 2020-11-23 ENCOUNTER — TELEPHONE (OUTPATIENT)
Dept: INTERNAL MEDICINE CLINIC | Age: 60
End: 2020-11-23

## 2020-11-23 VITALS
HEIGHT: 67 IN | SYSTOLIC BLOOD PRESSURE: 116 MMHG | BODY MASS INDEX: 36.85 KG/M2 | HEART RATE: 91 BPM | DIASTOLIC BLOOD PRESSURE: 72 MMHG | OXYGEN SATURATION: 98 % | RESPIRATION RATE: 16 BRPM | WEIGHT: 234.8 LBS | TEMPERATURE: 97.5 F

## 2020-11-23 DIAGNOSIS — Z13.220 SCREENING FOR HYPERLIPIDEMIA: ICD-10-CM

## 2020-11-23 DIAGNOSIS — R07.89 CHEST WALL PAIN FOLLOWING SURGERY: ICD-10-CM

## 2020-11-23 DIAGNOSIS — I89.0 LYMPHEDEMA: ICD-10-CM

## 2020-11-23 DIAGNOSIS — E55.9 VITAMIN D DEFICIENCY: ICD-10-CM

## 2020-11-23 DIAGNOSIS — G89.18 CHEST WALL PAIN FOLLOWING SURGERY: ICD-10-CM

## 2020-11-23 DIAGNOSIS — R06.00 DYSPNEA, UNSPECIFIED TYPE: Primary | ICD-10-CM

## 2020-11-23 DIAGNOSIS — E03.9 ACQUIRED HYPOTHYROIDISM: ICD-10-CM

## 2020-11-23 DIAGNOSIS — D64.9 ANEMIA, UNSPECIFIED TYPE: ICD-10-CM

## 2020-11-23 PROCEDURE — 99214 OFFICE O/P EST MOD 30 MIN: CPT | Performed by: FAMILY MEDICINE

## 2020-11-23 RX ORDER — CYCLOBENZAPRINE HCL 5 MG
5 TABLET ORAL
Qty: 30 TAB | Refills: 2 | Status: SHIPPED | OUTPATIENT
Start: 2020-11-23 | End: 2021-08-09 | Stop reason: DRUGHIGH

## 2020-11-23 NOTE — PROGRESS NOTES
Jose Maria Romero is a 64 y.o. female who presents for follow up. Reports remains SOB since having COVID in May 2020. Saw pulmonary specialist, Dr Keegan Devine, and was told lung function is fine. She went for a second opinion. She has seen Dr. Segundo Alvarez, pulmonary at [address] [place] [address], and is scheduled for cardiopulmonary test.   She reports constant SOB. Using albuterol prn. Tried breo, off now. She was off work under [employer], released to return to work October 5. She resigned. 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 No lifting. Light work. She reports she is SOB after 4 hours of working. Is applying for long disability. Patient has chronic lymphedema of chest wall since bilateral mastectomy. Has gone to lymphedema therapy. Takes diuretic prn. On gabapentin 400mg increased to TID. On levothyroxine 250mcg daily.   Due for labs          Past Medical History:   Diagnosis Date    Arthritis     Cancer [facility])     breast cancer    H/O total mastectomy 11/30/2015    Hypercholesterolemia     Other ill-defined conditions(799.89)     completed radiation 9/12    Thyroid disease     hypothyroidism       Family History   Problem Relation Age of Onset    Cancer Mother         brain cancer    No Known Problems Father     Breast Cancer Sister 54    No Known Problems Brother     Thyroid Disease Maternal Grandmother     MS Sister     Breast Cancer Maternal Aunt         60-70's       Social History     Socioeconomic History    Marital status: SINGLE     Spouse name: Not on file    Number of children: Not on file    Years of education: Not on file    Highest education level: Not on file   Occupational History    Not on file   Social Needs    Financial resource strain: Not on file    Food insecurity     Worry: Not on file     Inability: Not on file    Transportation needs     Medical: Not on file     Non-medical: Not on file   Tobacco Use    Smoking status: Former Smoker Packs/day: 0.50     Years: 20.00     Pack years: 10.00     Last attempt to quit: 2015     Years since quittin.8    Smokeless tobacco: Never Used   Substance and Sexual Activity    Alcohol use: Yes     Comment: occasional    Drug use: No    Sexual activity: Not Currently     Partners: Male     Birth control/protection: None   Lifestyle    Physical activity     Days per week: Not on file     Minutes per session: Not on file    Stress: Not on file   Relationships    Social connections     Talks on phone: Not on file     Gets together: Not on file     Attends Spiritism service: Not on file     Active member of club or organization: Not on file     Attends meetings of clubs or organizations: Not on file     Relationship status: Not on file    Intimate partner violence     Fear of current or ex partner: Not on file     Emotionally abused: Not on file     Physically abused: Not on file     Forced sexual activity: Not on file   Other Topics Concern    Not on file   Social History Narrative    Not on file       Current Outpatient Medications on File Prior to Visit   Medication Sig Dispense Refill    gabapentin (NEURONTIN) 400 mg capsule Take 1 Cap by mouth two (2) times a day. Max Daily Amount: 800 mg. (Patient taking differently: Take 400 mg by mouth three (3) times daily.) 60 Cap 1    ZINC PO Take  by mouth.  ascorbic acid (VITAMIN C PO) Take  by mouth.  fluticasone propionate (FLONASE) 50 mcg/actuation nasal spray 2 Sprays by Both Nostrils route daily. 3 Bottle 3    albuterol (PROVENTIL HFA, VENTOLIN HFA, PROAIR HFA) 90 mcg/actuation inhaler Take 2 Puffs by inhalation every six (6) hours as needed for Wheezing, Shortness of Breath, Respiratory Distress or Cough. 1 Inhaler 0    polyethylene glycol (MIRALAX) 17 gram packet Take 1 Packet by mouth daily.  Mix in 8 oz of water, juice, soda, coffee, or tea prior to administration 10 Each 0    allopurinoL (ZYLOPRIM) 100 mg tablet Take 100 mg by mouth daily.  levothyroxine (SYNTHROID) 125 mcg tablet Take 250 mcg by mouth Daily (before breakfast).  cholecalciferol (VITAMIN D3) 1,000 unit cap Take  by mouth daily.  MASTECTOMY PROSTHESIS misc B/l mastectomy / breast cancer 4 Each 0    [DISCONTINUED] acetaminophen (TYLENOL) 325 mg tablet Take 2 Tabs by mouth every six (6) hours as needed for Pain or Fever. 30 Tab 0    [DISCONTINUED] pantoprazole (PROTONIX) 40 mg tablet Take 1 Tab by mouth two (2) times a day. Do not crush, break or chew. Swallow whole. 30 Tab 0     No current facility-administered medications on file prior to visit. Review of Systems  Pertinent items are noted in HPI. Objective:     Visit Vitals  /72 (BP 1 Location: Left arm, BP Patient Position: Sitting)   Pulse 91   Temp 97.5 °F (36.4 °C) (Temporal)   Resp 16   Ht 5' 7\" (1.702 m)   Wt 234 lb 12.8 oz (106.5 kg)   SpO2 98%   BMI 36.77 kg/m²     Gen: well appearing female intermittent SOB during interview. Resp:  No wheezing, no rhonchi, no rales. CV:  RRR, normal S1S2  Extrem:   no edema, warm distally  Neuro: alert, nonfocal, clear speech      Assessment/Plan:       ICD-10-CM ICD-9-CM    1. Dyspnea, unspecified type  S27.31 420.13 METABOLIC PANEL, COMPREHENSIVE      CBC W/O DIFF   2. Lymphedema  I89.0 457.1    3. Acquired hypothyroidism  F19.8 657.3 METABOLIC PANEL, COMPREHENSIVE      CBC W/O DIFF      TSH 3RD GENERATION   4. Chest wall pain following surgery  R07.89 786.59 cyclobenzaprine (FLEXERIL) 5 mg tablet    G89.18 338.18    5. Vitamin D deficiency  E55.9 268.9 VITAMIN D, 25 HYDROXY   6. Screening for hyperlipidemia  Z13.220 V77.91 LIPID PANEL   7. Anemia, unspecified type  D64.9 285.9 CBC W/O DIFF      FERRITIN   follow up with pulmonary for further testing to determine cause of SOB. Follow-up and Dispositions    · Return for follow up pending labs.  Olga Helton MD

## 2020-11-23 NOTE — TELEPHONE ENCOUNTER
----- Message from Elma Rocha MD sent at 11/23/2020 12:54 PM EST -----  Please contact pulmonary associates, Dr Paul Guevara, pulmonary, notes from both.

## 2020-11-24 LAB
25(OH)D3+25(OH)D2 SERPL-MCNC: 12.4 NG/ML (ref 30–100)
ALBUMIN SERPL-MCNC: 5 G/DL (ref 3.8–4.9)
ALBUMIN/GLOB SERPL: 1.7 {RATIO} (ref 1.2–2.2)
ALP SERPL-CCNC: 90 IU/L (ref 39–117)
ALT SERPL-CCNC: 37 IU/L (ref 0–32)
AST SERPL-CCNC: 38 IU/L (ref 0–40)
BILIRUB SERPL-MCNC: 0.4 MG/DL (ref 0–1.2)
BUN SERPL-MCNC: 18 MG/DL (ref 6–24)
BUN/CREAT SERPL: 18 (ref 9–23)
CALCIUM SERPL-MCNC: 10.7 MG/DL (ref 8.7–10.2)
CHLORIDE SERPL-SCNC: 100 MMOL/L (ref 96–106)
CHOLEST SERPL-MCNC: 439 MG/DL (ref 100–199)
CO2 SERPL-SCNC: 25 MMOL/L (ref 20–29)
CREAT SERPL-MCNC: 1.01 MG/DL (ref 0.57–1)
ERYTHROCYTE [DISTWIDTH] IN BLOOD BY AUTOMATED COUNT: 15.6 % (ref 11.7–15.4)
FERRITIN SERPL-MCNC: 203 NG/ML (ref 15–150)
GLOBULIN SER CALC-MCNC: 3 G/DL (ref 1.5–4.5)
GLUCOSE SERPL-MCNC: 120 MG/DL (ref 65–99)
HCT VFR BLD AUTO: 41.2 % (ref 34–46.6)
HDLC SERPL-MCNC: 61 MG/DL
HGB BLD-MCNC: 13.2 G/DL (ref 11.1–15.9)
LDLC SERPL CALC-MCNC: 340 MG/DL (ref 0–99)
MCH RBC QN AUTO: 27.4 PG (ref 26.6–33)
MCHC RBC AUTO-ENTMCNC: 32 G/DL (ref 31.5–35.7)
MCV RBC AUTO: 86 FL (ref 79–97)
PLATELET # BLD AUTO: 304 X10E3/UL (ref 150–450)
POTASSIUM SERPL-SCNC: 4.6 MMOL/L (ref 3.5–5.2)
PROT SERPL-MCNC: 8 G/DL (ref 6–8.5)
RBC # BLD AUTO: 4.81 X10E6/UL (ref 3.77–5.28)
SODIUM SERPL-SCNC: 144 MMOL/L (ref 134–144)
TRIGL SERPL-MCNC: 179 MG/DL (ref 0–149)
TSH SERPL DL<=0.005 MIU/L-ACNC: 13.2 UIU/ML (ref 0.45–4.5)
VLDLC SERPL CALC-MCNC: 38 MG/DL (ref 5–40)
WBC # BLD AUTO: 8.9 X10E3/UL (ref 3.4–10.8)

## 2020-12-01 ENCOUNTER — TELEPHONE (OUTPATIENT)
Dept: INTERNAL MEDICINE CLINIC | Age: 60
End: 2020-12-01

## 2020-12-01 DIAGNOSIS — E78.00 PURE HYPERCHOLESTEROLEMIA: ICD-10-CM

## 2020-12-01 DIAGNOSIS — E03.9 ACQUIRED HYPOTHYROIDISM: Primary | ICD-10-CM

## 2020-12-01 RX ORDER — ERGOCALCIFEROL 1.25 MG/1
50000 CAPSULE ORAL
Qty: 4 CAP | Refills: 5 | Status: SHIPPED | OUTPATIENT
Start: 2020-12-01 | End: 2021-08-09 | Stop reason: SDUPTHER

## 2020-12-01 NOTE — TELEPHONE ENCOUNTER
Patient does not appear to use her MyChart. Labs show her thyroid is very low. Did she stop taking her levothyroxine 125 mcg 2 tablets daily? Has she been out of this medication? Her vitamin D is low. I have sent in vitamin D 50,000 units once weekly. Her calcium level is high. Stop taking any calcium supplements. Her cholesterol is very very high. Total cholesterol 439, goal less than 200. And LDL cholesterol 340, goal less than 100. .  I do not see any allergy to cholesterol medication. I would recommend patient start cholesterol medicine right away. Let me know if she agrees. Her blood sugar is elevated at 120. If she is fasting this is high.   If she was not fasting this is normal.

## 2020-12-02 RX ORDER — LEVOTHYROXINE SODIUM 125 UG/1
125 TABLET ORAL
Qty: 60 TAB | Refills: 5 | Status: SHIPPED | OUTPATIENT
Start: 2020-12-02 | End: 2021-08-09

## 2020-12-02 RX ORDER — ATORVASTATIN CALCIUM 40 MG/1
40 TABLET, FILM COATED ORAL DAILY
Qty: 30 TAB | Refills: 2 | Status: SHIPPED | OUTPATIENT
Start: 2020-12-02 | End: 2021-01-11

## 2020-12-02 NOTE — TELEPHONE ENCOUNTER
Spoke with patient. Two pt identifiers confirmed. Patient advised of her recent lab results per Dr. Guido Haynes. Patient states that she has been taking her levothyroxine daily as prescribed. Patient also states that she is willing to start the cholesterol medication. Patient also states that she was fasting when her labs were drawn. Patient advised that I will need to send all of this information to Dr. Guido Haynes and will give her a call back with a plan once she responds. Pt verbalized understanding of information discussed w/ no further questions at this time.

## 2020-12-02 NOTE — TELEPHONE ENCOUNTER
Patient does not appear to use my chart. Advise  patient to continue levothyroxine 125 mcg 2 tablets once daily. A refill has been sent to the pharmacy. Also, I have sent in cholesterol medication, atorvastatin 40 mg once daily. Advise patient we should recheck the thyroid, liver, and cholesterol in 8 weeks. Lab orders in the system.   Remind her to fast.

## 2020-12-04 DIAGNOSIS — R06.02 SOB (SHORTNESS OF BREATH): ICD-10-CM

## 2020-12-04 DIAGNOSIS — J18.9 PNEUMONIA: ICD-10-CM

## 2020-12-08 ENCOUNTER — TELEPHONE (OUTPATIENT)
Dept: ONCOLOGY | Age: 60
End: 2020-12-08

## 2020-12-08 ENCOUNTER — HOSPITAL ENCOUNTER (OUTPATIENT)
Dept: PHYSICAL THERAPY | Age: 60
Discharge: HOME | End: 2020-12-08
Payer: MEDICAID

## 2020-12-08 PROCEDURE — 97110 THERAPEUTIC EXERCISES: CPT

## 2020-12-08 PROCEDURE — 97140 MANUAL THERAPY 1/> REGIONS: CPT

## 2020-12-08 NOTE — PROGRESS NOTES
[facility]  Lymphedema Clinic  [address], [address], [facility] 22.    LYMPHEDEMA THERAPY  VISIT: 3    []                  Daily note               [x]                 30 day/10th visit progress note    NAME: Natali Klein  DATE: 12/8/2020    Patient is returning to the clinic today for the first time since 10/14/20 secondary to waiting for garments and cancelled/missed visits. GOALS  Short term goals  Time frame: to be met by 10/30/2020, short term goals extended to be met by today as patient has not been seen in the clinic since 10/14/20. 1.  Patient will demonstrate knowledge of signs/symptoms of infections/cellulitis and be independent in skin care to prevent cellulitis. Patient has been educated in daily skin care and signs and symptoms of infection. Skin is intact. Goal met 12/8/20.   2.  Patient will demonstrate independence in lymphedema home program of therapeutic exercises/stretches to improve circulation and comfortable range of motion to improve ADLs. Patient has been educated in the remedial lymphedema exercises and axillary web stretches. She is performing the exercises/stretches daily and verbalized increased comfort with ADL's. Goal met 12/8/20. 3.  Patient will participate in the selection process to allow ordering of home compression system  (daytime, nighttime garments and pump as needed) and/or be compliant with use of the home compression system. Patient has received garments for the R UE and trunk and the initial fit and comfort of the garments is good. Patient has deferred use of the pump due to chronic shortness of breath/fatigue ever since having COVID 19 virus in May. Patient will need to clear use of the pump with Cardiology/Pulmonology prior to use. Goal met 12/8/20.       Long term goals  Time frame: to be met by 12/21/2020  1.   Pt will show improvement in [facility] Lymphedema Scale from 17/20 to 13/20 and thus allow improvement in patient's quality of life. 2.  Patient will be independent with don/doff of compression system and use in order to prevent reaccumulation of fluid at discharge. Patient is able to don and doff the [employer] custom vest with outer jacket with verbal cues in the clinic today. Patient has demonstrated independence with don/doff of garments for the R arm and hand during previous visits. Progressing toward goal.   3.  Pt will be independent in self-MLD and show stable limb volumes showing decongestion and pt. ready for transition to independent restorative phase of lymphedema therapy. Patient has been educated in the self MLD packet and is performing self MLD with bathing and skin care. R UE volumes were taken today. See measurements below. Progressing toward goal.        SUBJECTIVE REPORT: Patient complains of chronic fatigue and shortness of breath with activity. She reports having Pulmonary Function Testing done last week at [address] [place] [address] but is unsure of the results. She reports fullness in the abdominal area and voiced concern that the custom compression vest that she received may be too tight. Pain:  No complaints of pain. .   Temperature:  98.6 degrees    Gait:  Independent gait without any assistive device for community distances. Patient reports fatigue and shortness of breath after ambulating 350 feet to our clinic. O2 sats 96% and HR 76 bpm.   Transfers: Independent   Fall risk: Yes    ADLs: Independent with bathing and dressing. Treatment Response:  Patient presents with a 12.5 lb weight gain and increase in girth measurements of the trunk since evaluation 9/25/20. Patient received the [employer] custom vest with outer jacket and the initial fit is good and comfortable to the patient. However, patient will need to work with the garment for the next couple of days and call the clinic with any discomfort or garment issues so that a remake can be ordered.   Patient is past the remake period but the vendor was aware of patient's cancelled visits and will allow her to order a remake this week if needed. [facility] Lymphedema Assessment Scale:  deferred  TREATMENT AND OBJECTIVE DATA SUMMARY:     Therapeutic Exercise/Procedure 10 minutes   Treatment time: 12:05-12:15 pm  Walking program Patient initiated a walking program on a treadmill while attending Outpatient PT and was able to walk approximately 6 minutes consecutively at time of discharge. Patient encouraged to continue the walking program as tolerated while monitoring signs of fatigue and shortness of breath. Ceci ball exercise program: Deferred   Stick exercise program: Patient has been educated in the Stick Routine and has the written handout to follow. Patient instructed to perform an exercise routine with modifications as needed. Free exercises/ROM: Continued education in the butterfly stretch, T and Y stretch, and side bending stretch. Patient educated in the shoulder internal rotation stretch with towel this visit. Patient was previously educated in the forward and sideway pinky stretches, corner stretch, chest stretch, shoulder abduction and is performing stretches daily. Home program: Patient to perform daily to BID:  Skin care, Deep abdominal breathing, Exercise routine, Walking program, Rest in supine, Compression garments and Self manual lymph drainage (MLD) with focus on deep abdominal breathing and the R UE. Rationale: Exercise will increase the lymph angiomotoricity and tissue pressure of the skin and thus decrease swelling. Modalities 0 minutes   Treatment time: N/A  Vasopneumatic pump: Patient has avoided using the vaso-pneumatic device due to SOB/WOODARD ever since having COVID 19.  Patient instructed to discuss use of the pump during her upcoming visits with the Pulmonologist and Cardiologist and to avoid pump use until cleared by MD.      Manual Lymphatic Drainage (MLD) 50 minutes   Treatment time: 11:15 am -12:05 pm  Patient/family education provided in self MLD. Patient has been educated in the self MLD packet with modifications and has the written instructions to follow. Continued education in self MLD technique with bathing and skin care. Area to decongest: R arm and trunk   Sequence used and effectiveness: Secondary sequence for upper extremity with trunk involvement with focus on the R arm and deep abdominal breathing techniques. Modifications: exclude cervical and trunk techniques secondary to patient's ongoing shortness of breath. Continued education in skin traction and massage techniques to the adhesions on the anterolateral chest.       Skin/wound care/debridement: Skin is intact. Upper/Lower extremity compression: Patient received the [employer] custom vest with outer jacket and the initial fit and comfort of the garment is adequate to good despite an increase in girth measurements of the trunk and 12.5 lb weight gain. Patient instructed to wear the garment for a few hours each evening and then nightly as tolerated. Patient will call the clinic in 1 to 2 days with any issues or discomfort as a remake of the garment will need to be ordered this week if needed. Patient has received two sets of:  Juzo Soft 2001 arm sleeve size 4/max long length and Juzo basic seamless glove size large, 20-30 mmHg. Patient did not bring the garments to the clinic for fitting but reports that the garments fit well and are comfortable. Patient instructed in the care of, precautions, and wear schedule of the garments. Patient wears prosthetic bras obtained from [employer] and continues to wear the Optiflow T pad and Peterson Hernán Bilateral mastectomy pad several times per month as needed. Kinesiotaping: Deferred   Girth/Volume measurement: R UE volumes taken today reveal a gain of 153.62 mL since evaluation 9/25/20.      Girth measurements of the trunk taken today:  Axilla 111.0 cm, 108.5 cm on 9/25/20  Xiphoid: 107.3 cm, 101.8 cm on 9/25/20  Waist: 128.0 cm, 120 cm on 9/25/20  Hips: 125 cm, 122.0 cm on 9/25/20     ASSESSMENT:   Patient returns to the clinic for the first time since 10/14/2020 secondary to waiting for garments and cancelled visits. Patient continues to have chronic shortness of breath and fatigue following hospitalization for COVID 19 virus in May. 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 She wears an arm sleeve with hand piece with high risk activities, at work, or when noticing \"heaviness\" in the arm. She was fitted with the [employer] custom vest during the visit today and the initial fit is adequate to good. Patient will need to work with the garments for the next few days to further assess comfort as she has gained 12 pounds and girth measurements of the trunk have increased since being measured for the garments. Recommended that patient discuss the weight gain, abdominal fullness, as well as continued shortness of breath/WOODARD with her Oncologist or PCP. She reports having an upcoming appointment with Dr. Prince Lennon. Patient remains motivated and continues to do well in above home programs. Patient is eager to learn and improve on condition. Patient has met all short term goals and is progressing toward long term goals. She should be ready for discharge to the restorative phase of care if there are no issues with the garments. PLAN OF CARE:   Continue plan of care as established on evaluation. Frequency:  Will call the clinic with any garment issues.     Next session will address: Assess volumes and fit of garments, MLD, exercise, education   Other:  N/A      TOTAL TREATMENT 60 mins   Kg Sandoval, PT, CLT

## 2020-12-08 NOTE — TELEPHONE ENCOUNTER
Patient came into waiting room and stated she needed an appointment sooner. Patient indicated that she has gained weight and now has a bunch of fluid that is causing severe pain and hindering day to day activities. Patient seemed out of breathe and in pain when in the waiting room. I told patient that someone would give her a call.      [phone]

## 2020-12-28 ENCOUNTER — OFFICE VISIT (OUTPATIENT)
Dept: ONCOLOGY | Age: 60
End: 2020-12-28
Payer: MEDICAID

## 2020-12-28 VITALS
DIASTOLIC BLOOD PRESSURE: 88 MMHG | BODY MASS INDEX: 38.07 KG/M2 | OXYGEN SATURATION: 96 % | TEMPERATURE: 96.2 F | HEART RATE: 60 BPM | SYSTOLIC BLOOD PRESSURE: 146 MMHG | WEIGHT: 243.1 LBS

## 2020-12-28 DIAGNOSIS — Z90.13 H/O MASTECTOMY, BILATERAL: ICD-10-CM

## 2020-12-28 DIAGNOSIS — E66.01 SEVERE OBESITY (HCC): ICD-10-CM

## 2020-12-28 DIAGNOSIS — R14.0 ABDOMINAL DISTENTION: ICD-10-CM

## 2020-12-28 DIAGNOSIS — I89.0 LYMPHEDEMA: ICD-10-CM

## 2020-12-28 DIAGNOSIS — E03.4 HYPOTHYROIDISM DUE TO ACQUIRED ATROPHY OF THYROID: ICD-10-CM

## 2020-12-28 DIAGNOSIS — G89.18 CHEST WALL PAIN FOLLOWING SURGERY: ICD-10-CM

## 2020-12-28 DIAGNOSIS — R06.09 DOE (DYSPNEA ON EXERTION): ICD-10-CM

## 2020-12-28 DIAGNOSIS — R07.89 CHEST WALL PAIN FOLLOWING SURGERY: ICD-10-CM

## 2020-12-28 DIAGNOSIS — M79.89 AXILLARY SWELLING: ICD-10-CM

## 2020-12-28 DIAGNOSIS — R53.83 FATIGUE, UNSPECIFIED TYPE: ICD-10-CM

## 2020-12-28 DIAGNOSIS — Z85.3 HISTORY OF BREAST CANCER: Primary | ICD-10-CM

## 2020-12-28 DIAGNOSIS — Z86.16 HISTORY OF 2019 NOVEL CORONAVIRUS DISEASE (COVID-19): ICD-10-CM

## 2020-12-28 DIAGNOSIS — E55.9 VITAMIN D DEFICIENCY: ICD-10-CM

## 2020-12-28 PROCEDURE — 99214 OFFICE O/P EST MOD 30 MIN: CPT | Performed by: INTERNAL MEDICINE

## 2020-12-28 NOTE — PROGRESS NOTES
[facility] [place] at [facility]  [address], [place]: [phone]  F: [phone]    Reason for Visit:   Tiffanie Espinoza is a 64 y.o. female who is seen for 3 month follow up for history of breast cancer. Treatment History:   · Prior patient of [facility]  · Hx of b/l mastectomy 2015  · Adjuvant hormonal therapy stopped due to intolerance. No chemo. STAGE: T2N0 ER+ HER2 negative/Oncotype low risk x 2    History of Present Illness:   Tiffanie Espinoza is a 64 y.o. female seen today in office for 3 mo follow up of hx of breast cancer not on any therapy from here. Seen today for \"fluid\". Was seen by breast surgery recently also. States sent here by LE clinic. States belly is bigger. Has chronic pain in chest wall. Seeing pain doctor now. Surgery was at [facility]. Has not seen PCP. Hx of COVID in may with negative test recently. Still seeing pulmonary for this. Chronic SOB for this. Working doing light duty as CNA. States cannot work now due to SOB. Wants disability due to many symptoms. Has not had any recurrent cancer. Here today to r/o cancer recurrence as cause of symptoms. + fatigue/ SOB/ belly hardness/ abdominal distention. Last visit:    She is here today because she reports increased bilateral axillary swelling/edema for the past month or two. She went to a free standing ER with [facility] on 8/27/20 and had ultrasounds - records are scanned into media. She reports that the area is \"sore and tender. \" She has not been to the Lymphedema Clinic lately (in over a year). She last saw breast surgery in 11/19. Patient does report that she tested + for COVID in May but has had 2 negative tests since then. She has been seeing a Pulmonologist for SOB. She has routine labs with her PCP. She is here alone today.      Past Medical History:   Diagnosis Date    Arthritis     Cancer [facility])     breast cancer    H/O total mastectomy 11/30/2015    Hypercholesterolemia     Other ill-defined conditions(799.89)     completed radiation     Thyroid disease     hypothyroidism      Past Surgical History:   Procedure Laterality Date    HX BREAST LUMPECTOMY Right 2012    HX GYN      hysterectomy; TBL    HX HEENT      tonsillectomy    HX MASTECTOMY Bilateral 2015    HX ORTHOPAEDIC      right knee ACL    HX OTHER SURGICAL      lumptectomy right breast      Social History     Tobacco Use    Smoking status: Former Smoker     Packs/day: 0.50     Years: 20.00     Pack years: 10.00     Quit date: 2015     Years since quittin.9    Smokeless tobacco: Never Used   Substance Use Topics    Alcohol use: Yes     Comment: occasional      Family History   Problem Relation Age of Onset    Cancer Mother         brain cancer    No Known Problems Father     Breast Cancer Sister 54    No Known Problems Brother     Thyroid Disease Maternal Grandmother     MS Sister     Breast Cancer Maternal Aunt         60-70's     Current Outpatient Medications   Medication Sig    atorvastatin (LIPITOR) 40 mg tablet Take 1 Tab by mouth daily.  levothyroxine (SYNTHROID) 125 mcg tablet Take 1 Tab by mouth Daily (before breakfast).  ergocalciferol (ERGOCALCIFEROL) 1,250 mcg (50,000 unit) capsule Take 1 Cap by mouth every seven (7) days.  cyclobenzaprine (FLEXERIL) 5 mg tablet Take 1 Tab by mouth nightly. As needed for muscle spasm    gabapentin (NEURONTIN) 400 mg capsule Take 1 Cap by mouth two (2) times a day. Max Daily Amount: 800 mg. (Patient taking differently: Take 400 mg by mouth three (3) times daily.)    ZINC PO Take  by mouth.  ascorbic acid (VITAMIN C PO) Take  by mouth.  MASTECTOMY PROSTHESIS misc B/l mastectomy / breast cancer    fluticasone propionate (FLONASE) 50 mcg/actuation nasal spray 2 Sprays by Both Nostrils route daily.     albuterol (PROVENTIL HFA, VENTOLIN HFA, PROAIR HFA) 90 mcg/actuation inhaler Take 2 Puffs by inhalation every six (6) hours as needed for Wheezing, Shortness of Breath, Respiratory Distress or Cough.  polyethylene glycol (MIRALAX) 17 gram packet Take 1 Packet by mouth daily. Mix in 8 oz of water, juice, soda, coffee, or tea prior to administration    allopurinoL (ZYLOPRIM) 100 mg tablet Take 100 mg by mouth daily.  levothyroxine (SYNTHROID) 125 mcg tablet Take 250 mcg by mouth Daily (before breakfast).  cholecalciferol (VITAMIN D3) 1,000 unit cap Take  by mouth daily. No current facility-administered medications for this visit. Allergies   Allergen Reactions    Penicillins Hives     Pt states I am not allergic to penicillin.  Percocet [Oxycodone-Acetaminophen] Itching    Sulfa (Sulfonamide Antibiotics) Other (comments)     Mouth blistering      A complete review of systems was obtained, negative except as described above and as reported on ROS sheet scanned into system. Physical Exam:     Visit Vitals  BP (!) 146/88 (BP 1 Location: Left arm, BP Patient Position: Sitting)   Pulse 60   Temp (!) 96.2 °F (35.7 °C) (Temporal)   Wt 243 lb 1.6 oz (110.3 kg)   SpO2 96%   BMI 38.07 kg/m²     ECOG PS: 1  General: No distress  Eyes: PERRLA, anicteric sclerae  HENT: Atraumatic, wearing a mask  Neck: Supple  Respiratory: Dyspnea with conversation   CV: Normal rate, regular rhythm, no murmurs, no peripheral edema  GI: protuberant, Soft, nontender  MS: Normal gait and station  Skin: Warm, dry  Psych: Alert, oriented, appropriate affect, normal judgment/insight  Breast: b/l mastectomy without lesions   Minimal arm lymphedema    Results:   Per PCP  Lab Results   Component Value Date/Time    WBC 8.9 11/23/2020 02:13 PM    HGB 13.2 11/23/2020 02:13 PM    HCT 41.2 11/23/2020 02:13 PM    PLATELET 717 07/10/0715 02:13 PM    MCV 86 11/23/2020 02:13 PM    ABS.  NEUTROPHILS 13.2 (H) 06/07/2020 03:54 AM     Lab Results   Component Value Date/Time    Sodium 144 11/23/2020 02:13 PM    Potassium 4.6 11/23/2020 02:13 PM Chloride 100 11/23/2020 02:13 PM    CO2 25 11/23/2020 02:13 PM    Glucose 120 (H) 11/23/2020 02:13 PM    BUN 18 11/23/2020 02:13 PM    Creatinine 1.01 (H) 11/23/2020 02:13 PM    GFR est AA 71 11/23/2020 02:13 PM    GFR est non-AA 62 11/23/2020 02:13 PM    Calcium 10.7 (H) 11/23/2020 02:13 PM    Glucose (POC) 130 (H) 05/27/2020 12:03 PM     Lab Results   Component Value Date/Time    Bilirubin, total 0.4 11/23/2020 02:13 PM    ALT (SGPT) 37 (H) 11/23/2020 02:13 PM    Alk. phosphatase 90 11/23/2020 02:13 PM    Protein, total 8.0 11/23/2020 02:13 PM    Albumin 5.0 (H) 11/23/2020 02:13 PM    Globulin 4.0 06/07/2020 03:54 AM     Records reviewed and summarized above. Pathology report(s) reviewed above. Radiology report(s) reviewed above. Assessment/PLAN:       1) History of Breast Cancer ER+ HER2 negative 2012 and 2015 treated at [address] [place] [address] with hx of b/l mastectomy in 2015  Oncotype low risk both times. No chemo done. She could not tolerate adjuvant hormonal therapy. Patient was following with surgery and sent here due to insurance change. Patient is not on any adjuvant therapy due to intolerance. CT Chest 11/19 negative with postsurgical scarring and very thin seromas noted in the bilateral anterior chest walls. Seen today per pt request due to \"fluid\". Many symptoms today chronically. Has chronic SOB related to prior COVID and sees pulmonary. C/o abdominal fullness/ chronic fatigue/ SOB/ chest wall pain. Wants CTs done to r/o cancer recurrence. Ordered. Labs done with PCP recently and ok. Advised to fu with PCP/ pulmonary/ pain doctor. Fu here in a year if CTs negative. 2) Chronic Chest Wall Pain. To see pain clinic. Since mastectomy. Per surgery. CT Chest 11/19 negative for cancer. Will recheck CT. 3) Bilateral Axillary Lymphedema with Right Seroma  Per surgery and LE clinic. 4) COVID + in 5/20. With chronic SOB. She has since had 2 negative tests.   She is following with a Pulmonary doctor. 5) Psychosocial  Many issues today. Does not feel she can work and wants disability. Refer pt back to PCP for this. She is here alone today. Follow up in 1 year or sooner if needed. Patient advised to call anytime with questions. I appreciate the opportunity to participate in [facility] care.     Signed By: Bari Moraes DO

## 2020-12-28 NOTE — PROGRESS NOTES
Samantha Orellana is a 64 y.o. female  Chief Complaint   Patient presents with    Follow-up    Breast Cancer     1. Have you been to the ER, urgent care clinic since your last visit? Hospitalized since your last visit? No.    2. Have you seen or consulted any other health care providers outside of the [address] [place] [address] since your last visit? Include any pap smears or colon screening.  No.

## 2021-01-04 ENCOUNTER — TELEPHONE (OUTPATIENT)
Dept: INTERNAL MEDICINE CLINIC | Age: 61
End: 2021-01-04

## 2021-01-04 NOTE — TELEPHONE ENCOUNTER
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 Please call pt when this is in system. LV vm if needed to let pt know this has been done.

## 2021-01-05 NOTE — TELEPHONE ENCOUNTER
Omar Lainez MD  You 19 hours ago (3:15 PM)     That is unusual.  If the facility is requiring the testing, then an order would not come from an outside physician. Bernice Holleyiden the patient to speak to her supervisor regarding this issue.       Attempted to contact patient x 2  Number rings busy  Will wait for patient to call back

## 2021-01-09 ENCOUNTER — HOSPITAL ENCOUNTER (OUTPATIENT)
Dept: CT IMAGING | Age: 61
Discharge: HOME | End: 2021-01-09
Attending: INTERNAL MEDICINE
Payer: MEDICAID

## 2021-01-09 DIAGNOSIS — E66.01 SEVERE OBESITY (HCC): ICD-10-CM

## 2021-01-09 DIAGNOSIS — Z90.13 H/O MASTECTOMY, BILATERAL: ICD-10-CM

## 2021-01-09 DIAGNOSIS — E55.9 VITAMIN D DEFICIENCY: ICD-10-CM

## 2021-01-09 DIAGNOSIS — E03.4 HYPOTHYROIDISM DUE TO ACQUIRED ATROPHY OF THYROID: ICD-10-CM

## 2021-01-09 DIAGNOSIS — Z85.3 HISTORY OF BREAST CANCER: ICD-10-CM

## 2021-01-09 DIAGNOSIS — M79.89 AXILLARY SWELLING: ICD-10-CM

## 2021-01-09 DIAGNOSIS — G89.18 CHEST WALL PAIN FOLLOWING SURGERY: ICD-10-CM

## 2021-01-09 DIAGNOSIS — R53.83 FATIGUE, UNSPECIFIED TYPE: ICD-10-CM

## 2021-01-09 DIAGNOSIS — I89.0 LYMPHEDEMA: ICD-10-CM

## 2021-01-09 DIAGNOSIS — Z86.16 HISTORY OF 2019 NOVEL CORONAVIRUS DISEASE (COVID-19): ICD-10-CM

## 2021-01-09 DIAGNOSIS — R07.89 CHEST WALL PAIN FOLLOWING SURGERY: ICD-10-CM

## 2021-01-09 DIAGNOSIS — R06.09 DOE (DYSPNEA ON EXERTION): ICD-10-CM

## 2021-01-09 DIAGNOSIS — R14.0 ABDOMINAL DISTENTION: ICD-10-CM

## 2021-01-09 PROCEDURE — 74011000636 HC RX REV CODE- 636: Performed by: INTERNAL MEDICINE

## 2021-01-09 PROCEDURE — 71260 CT THORAX DX C+: CPT

## 2021-01-09 PROCEDURE — 74177 CT ABD & PELVIS W/CONTRAST: CPT

## 2021-01-09 RX ADMIN — IOHEXOL 50 ML: 240 INJECTION, SOLUTION INTRATHECAL; INTRAVASCULAR; INTRAVENOUS; ORAL at 09:27

## 2021-01-09 RX ADMIN — IOPAMIDOL 100 ML: 755 INJECTION, SOLUTION INTRAVENOUS at 09:27

## 2021-01-11 RX ORDER — ATORVASTATIN CALCIUM 40 MG/1
TABLET, FILM COATED ORAL
Qty: 90 TAB | Refills: 0 | Status: SHIPPED | OUTPATIENT
Start: 2021-01-11 | End: 2022-05-26 | Stop reason: SDUPTHER

## 2021-01-11 NOTE — PROGRESS NOTES
QUINCY Verified. Called pt on mobile phone number listed. Patient was made aware of CT scans, patient was appreciative over call.   Josue Manzano

## 2021-01-11 NOTE — PROGRESS NOTES
Attempted to reach patient, no answer. Left a voicemail to give the office a call back.   Lupe Blackwell

## 2021-01-27 DIAGNOSIS — R07.89 CHEST WALL PAIN FOLLOWING SURGERY: ICD-10-CM

## 2021-01-27 DIAGNOSIS — G89.18 CHEST WALL PAIN FOLLOWING SURGERY: ICD-10-CM

## 2021-01-27 RX ORDER — GABAPENTIN 400 MG/1
400 CAPSULE ORAL 2 TIMES DAILY
Qty: 60 CAP | Refills: 1 | Status: SHIPPED | OUTPATIENT
Start: 2021-01-27 | End: 2021-07-02

## 2021-02-08 ENCOUNTER — TELEPHONE (OUTPATIENT)
Dept: INTERNAL MEDICINE CLINIC | Age: 61
End: 2021-02-08

## 2021-02-08 NOTE — TELEPHONE ENCOUNTER
Patient states she needs to get a Prior Auth done for her Gout Medication that was sent over to the Pharmacy per her insurance. Please call if any questions.  Thank you

## 2021-03-08 ENCOUNTER — OFFICE VISIT (OUTPATIENT)
Dept: INTERNAL MEDICINE CLINIC | Age: 61
End: 2021-03-08
Payer: MEDICAID

## 2021-03-08 VITALS
RESPIRATION RATE: 20 BRPM | BODY MASS INDEX: 36.98 KG/M2 | OXYGEN SATURATION: 98 % | SYSTOLIC BLOOD PRESSURE: 117 MMHG | DIASTOLIC BLOOD PRESSURE: 72 MMHG | HEART RATE: 95 BPM | WEIGHT: 235.6 LBS | TEMPERATURE: 96.8 F | HEIGHT: 67 IN

## 2021-03-08 DIAGNOSIS — E03.4 HYPOTHYROIDISM DUE TO ACQUIRED ATROPHY OF THYROID: ICD-10-CM

## 2021-03-08 DIAGNOSIS — K21.9 GASTROESOPHAGEAL REFLUX DISEASE WITHOUT ESOPHAGITIS: Primary | ICD-10-CM

## 2021-03-08 DIAGNOSIS — M10.9 GOUT, UNSPECIFIED CAUSE, UNSPECIFIED CHRONICITY, UNSPECIFIED SITE: ICD-10-CM

## 2021-03-08 PROCEDURE — 99214 OFFICE O/P EST MOD 30 MIN: CPT | Performed by: FAMILY MEDICINE

## 2021-03-08 RX ORDER — ALLOPURINOL 100 MG/1
200 TABLET ORAL DAILY
Qty: 180 TAB | Refills: 3 | Status: SHIPPED | OUTPATIENT
Start: 2021-03-08

## 2021-03-08 RX ORDER — PANTOPRAZOLE SODIUM 40 MG/1
40 TABLET, DELAYED RELEASE ORAL DAILY
Qty: 30 TAB | Refills: 2 | Status: SHIPPED | OUTPATIENT
Start: 2021-03-08 | End: 2021-06-13

## 2021-03-08 NOTE — PATIENT INSTRUCTIONS
Gastroesophageal Reflux Disease (GERD): Care Instructions  Overview     Gastroesophageal reflux disease (GERD) is the backward flow of stomach acid into the esophagus. The esophagus is the tube that leads from your throat to your stomach. A one-way valve prevents the stomach acid from backing up into this tube. But when you have GERD, this valve does not close tightly enough. This can also cause pain and swelling in your esophagus. (This is called esophagitis.)  If you have mild GERD symptoms including heartburn, you may be able to control the problem with antacids or over-the-counter medicine. You can also make lifestyle changes to help reduce your symptoms. These include changing your diet and eating habits, such as not eating late at night and losing weight. Follow-up care is a key part of your treatment and safety. Be sure to make and go to all appointments, and call your doctor if you are having problems. It's also a good idea to know your test results and keep a list of the medicines you take. How can you care for yourself at home? · Take your medicines exactly as prescribed. Call your doctor if you think you are having a problem with your medicine. · Your doctor may recommend over-the-counter medicine. For mild or occasional indigestion, antacids, such as Tums, Gaviscon, Mylanta, or Maalox, may help. Your doctor also may recommend over-the-counter acid reducers, such as famotidine (Pepcid AC), cimetidine (Tagamet HB), or omeprazole (Prilosec). Read and follow all instructions on the label. If you use these medicines often, talk with your doctor. · Change your eating habits. ? It's best to eat several small meals instead of two or three large meals. ? After you eat, wait 2 to 3 hours before you lie down. ? Chocolate, mint, and alcohol can make GERD worse. ? Spicy foods, foods that have a lot of acid (like tomatoes and oranges), and coffee can make GERD symptoms worse in some people.  If your symptoms are worse after you eat a certain food, you may want to stop eating that food to see if your symptoms get better. · Do not smoke or chew tobacco. Smoking can make GERD worse. If you need help quitting, talk to your doctor about stop-smoking programs and medicines. These can increase your chances of quitting for good. · If you have GERD symptoms at night, raise the head of your bed 6 to 8 inches by putting the frame on blocks or placing a foam wedge under the head of your mattress. (Adding extra pillows does not work.)  · Do not wear tight clothing around your middle. · Lose weight if you need to. Losing just 5 to 10 pounds can help. When should you call for help? Call your doctor now or seek immediate medical care if:    · You have new or different belly pain.     · Your stools are black and tarlike or have streaks of blood. Watch closely for changes in your health, and be sure to contact your doctor if:    · Your symptoms have not improved after 2 days.     · Food seems to catch in your throat or chest.   Where can you learn more? Go to http://www.gray.com/  Enter U988 in the search box to learn more about \"Gastroesophageal Reflux Disease (GERD): Care Instructions. \"  Current as of: April 15, 2020               Content Version: 12.6  © 9091-9565 [employer]. Care instructions adapted under license by [employer] (which disclaims liability or warranty for this information). If you have questions about a medical condition or this instruction, always ask your healthcare professional. [place] [address] any warranty or liability for your use of this information.

## 2021-03-08 NOTE — PROGRESS NOTES
Chad Philippe is a 64 y.o. female who presents with complaints of acid reflux. Reports seen at ED on 301 with same symptoms, given Nexium. To see GI at [facility], . Reports upper abdominal and chest pain. Feels nauseated. \"sore stomach\". Drinking lemon juice to help her acid reflux. On allopurinol 100mg daily. Reports she has breakthrough episodes at times. Sees pulmonary, given inhaler for persistent SOB since COVID. Reports dizziness when supine.      Past Medical History:   Diagnosis Date    Arthritis     Cancer [facility])     breast cancer    H/O total mastectomy 2015    Hypercholesterolemia     Other ill-defined conditions(799.89)     completed radiation     Thyroid disease     hypothyroidism       Family History   Problem Relation Age of Onset    Cancer Mother         brain cancer    No Known Problems Father     Breast Cancer Sister 54    No Known Problems Brother     Thyroid Disease Maternal Grandmother     MS Sister     Breast Cancer Maternal Aunt         60-70's       Social History     Socioeconomic History    Marital status: SINGLE     Spouse name: Not on file    Number of children: Not on file    Years of education: Not on file    Highest education level: Not on file   Occupational History    Not on file   Social Needs    Financial resource strain: Not on file    Food insecurity     Worry: Not on file     Inability: Not on file    Transportation needs     Medical: Not on file     Non-medical: Not on file   Tobacco Use    Smoking status: Former Smoker     Packs/day: 0.50     Years: 20.00     Pack years: 10.00     Quit date: 2015     Years since quittin.1    Smokeless tobacco: Never Used   Substance and Sexual Activity    Alcohol use: Yes     Comment: occasional    Drug use: No    Sexual activity: Not Currently     Partners: Male     Birth control/protection: None   Lifestyle    Physical activity     Days per week: Not on file Minutes per session: Not on file    Stress: Not on file   Relationships    Social connections     Talks on phone: Not on file     Gets together: Not on file     Attends Caodaism service: Not on file     Active member of club or organization: Not on file     Attends meetings of clubs or organizations: Not on file     Relationship status: Not on file    Intimate partner violence     Fear of current or ex partner: Not on file     Emotionally abused: Not on file     Physically abused: Not on file     Forced sexual activity: Not on file   Other Topics Concern    Not on file   Social History Narrative    Not on file       Current Outpatient Medications on File Prior to Visit   Medication Sig Dispense Refill    gabapentin (NEURONTIN) 400 mg capsule Take 1 Cap by mouth two (2) times a day. Max Daily Amount: 800 mg. 60 Cap 1    atorvastatin (LIPITOR) 40 mg tablet TAKE 1 TABLET BY MOUTH EVERY DAY 90 Tab 0    levothyroxine (SYNTHROID) 125 mcg tablet Take 1 Tab by mouth Daily (before breakfast). 60 Tab 5    ergocalciferol (ERGOCALCIFEROL) 1,250 mcg (50,000 unit) capsule Take 1 Cap by mouth every seven (7) days. 4 Cap 5    cyclobenzaprine (FLEXERIL) 5 mg tablet Take 1 Tab by mouth nightly. As needed for muscle spasm 30 Tab 2    ascorbic acid (VITAMIN C PO) Take  by mouth.  MASTECTOMY PROSTHESIS misc B/l mastectomy / breast cancer 4 Each 0    fluticasone propionate (FLONASE) 50 mcg/actuation nasal spray 2 Sprays by Both Nostrils route daily. 3 Bottle 3    albuterol (PROVENTIL HFA, VENTOLIN HFA, PROAIR HFA) 90 mcg/actuation inhaler Take 2 Puffs by inhalation every six (6) hours as needed for Wheezing, Shortness of Breath, Respiratory Distress or Cough. 1 Inhaler 0    polyethylene glycol (MIRALAX) 17 gram packet Take 1 Packet by mouth daily.  Mix in 8 oz of water, juice, soda, coffee, or tea prior to administration 10 Each 0    levothyroxine (SYNTHROID) 125 mcg tablet Take 250 mcg by mouth Daily (before breakfast).  ZINC PO Take  by mouth.  [DISCONTINUED] allopurinoL (ZYLOPRIM) 100 mg tablet Take 100 mg by mouth daily.  cholecalciferol (VITAMIN D3) 1,000 unit cap Take  by mouth daily. No current facility-administered medications on file prior to visit. Review of Systems  Pertinent items are noted in HPI. Objective:     Visit Vitals  /72 (BP 1 Location: Left upper arm, BP Patient Position: Sitting, BP Cuff Size: Adult)   Pulse 95   Temp 96.8 °F (36 °C) (Temporal)   Resp 20   Ht 5' 7\" (1.702 m)   Wt 235 lb 9.6 oz (106.9 kg)   SpO2 98%   BMI 36.90 kg/m²     Gen: well appearing female  HEENT:   PERRL,normal conjunctiva. External ear and canals normal, TMs no opacification or erythema,  OP no erythema, no exudates, MMM  Neck:  Supple. Thyroid normal size, nontender, without nodules. No masses or LAD  Resp:  No wheezing, no rhonchi, no rales. CV:  RRR, normal S1S2, no murmur. GI: soft, nontender, without masses. No hepatosplenomegaly. Extrem:  +2 pulses, no edema, warm distally      Assessment/Plan:       ICD-10-CM ICD-9-CM    1. Gastroesophageal reflux disease without esophagitis  K21.9 530.81 pantoprazole (PROTONIX) 40 mg tablet   2. Gout, unspecified cause, unspecified chronicity, unspecified site  M10.9 274.9 allopurinoL (ZYLOPRIM) 100 mg tablet   3. Hypothyroidism due to acquired atrophy of thyroid  E03.4 244.8      246.8        Follow-up and Dispositions    · Return if symptoms worsen or fail to improve. increase allopurinol to 200mg daily. [place] diet, start protonix.       Sonam Silverio MD

## 2021-03-10 ENCOUNTER — HOSPITAL ENCOUNTER (EMERGENCY)
Age: 61
Discharge: HOME | End: 2021-03-10
Attending: EMERGENCY MEDICINE
Payer: MEDICAID

## 2021-03-10 ENCOUNTER — APPOINTMENT (OUTPATIENT)
Dept: GENERAL RADIOLOGY | Age: 61
End: 2021-03-10
Attending: EMERGENCY MEDICINE
Payer: MEDICAID

## 2021-03-10 VITALS
TEMPERATURE: 97.9 F | HEIGHT: 67 IN | SYSTOLIC BLOOD PRESSURE: 128 MMHG | WEIGHT: 231 LBS | OXYGEN SATURATION: 97 % | RESPIRATION RATE: 16 BRPM | HEART RATE: 98 BPM | DIASTOLIC BLOOD PRESSURE: 82 MMHG | BODY MASS INDEX: 36.26 KG/M2

## 2021-03-10 DIAGNOSIS — M79.672 LEFT FOOT PAIN: Primary | ICD-10-CM

## 2021-03-10 DIAGNOSIS — M25.562 ACUTE PAIN OF LEFT KNEE: ICD-10-CM

## 2021-03-10 PROCEDURE — 99282 EMERGENCY DEPT VISIT SF MDM: CPT

## 2021-03-10 PROCEDURE — 73630 X-RAY EXAM OF FOOT: CPT

## 2021-03-10 PROCEDURE — 74011250636 HC RX REV CODE- 250/636: Performed by: EMERGENCY MEDICINE

## 2021-03-10 PROCEDURE — 96372 THER/PROPH/DIAG INJ SC/IM: CPT

## 2021-03-10 RX ORDER — INDOMETHACIN 25 MG/1
50 CAPSULE ORAL 3 TIMES DAILY
Qty: 42 CAP | Refills: 0 | Status: SHIPPED | OUTPATIENT
Start: 2021-03-10 | End: 2021-03-17

## 2021-03-10 RX ORDER — KETOROLAC TROMETHAMINE 30 MG/ML
30 INJECTION, SOLUTION INTRAMUSCULAR; INTRAVENOUS
Status: COMPLETED | OUTPATIENT
Start: 2021-03-10 | End: 2021-03-10

## 2021-03-10 RX ADMIN — KETOROLAC TROMETHAMINE 30 MG: 30 INJECTION, SOLUTION INTRAMUSCULAR at 09:12

## 2021-03-10 NOTE — ED PROVIDER NOTES
EMERGENCY DEPARTMENT HISTORY AND PHYSICAL EXAM      Date: 3/10/2021  Patient Name: Ezequiel Cardoza    History of Presenting Illness     Chief Complaint   Patient presents with    Foot Pain     Left foot pain for few days, reports hx of gout    Knee Pain     Left knee pain for few days, reports hx of gout. No injury       History Provided By: Patient    HPI: Ezequiel Cardoza, 64 y.o. female presents to the ED with cc of knee and ankle pain. Patient is a 64 YOF with a history of gout. Reports history of flares in past, felt it coming on, but last night pain became so severe that she could not bear weight. No injuries. Patient reports pain in L foot to side radiating to ankle, worse with weight bearing. Patient reports some pain in knee as well. No fever. Patient does have chronic shortness of breath due to Covid as she recovered from in May. There are no other complaints, changes, or physical findings at this time. PCP: Artem Thapa MD    No current facility-administered medications on file prior to encounter. Current Outpatient Medications on File Prior to Encounter   Medication Sig Dispense Refill    allopurinoL (ZYLOPRIM) 100 mg tablet Take 2 Tabs by mouth daily. 180 Tab 3    pantoprazole (PROTONIX) 40 mg tablet Take 1 Tab by mouth daily. 30 Tab 2    gabapentin (NEURONTIN) 400 mg capsule Take 1 Cap by mouth two (2) times a day. Max Daily Amount: 800 mg. 60 Cap 1    atorvastatin (LIPITOR) 40 mg tablet TAKE 1 TABLET BY MOUTH EVERY DAY 90 Tab 0    levothyroxine (SYNTHROID) 125 mcg tablet Take 1 Tab by mouth Daily (before breakfast). 60 Tab 5    ergocalciferol (ERGOCALCIFEROL) 1,250 mcg (50,000 unit) capsule Take 1 Cap by mouth every seven (7) days. 4 Cap 5    cyclobenzaprine (FLEXERIL) 5 mg tablet Take 1 Tab by mouth nightly. As needed for muscle spasm 30 Tab 2    ZINC PO Take  by mouth.  ascorbic acid (VITAMIN C PO) Take  by mouth.       MASTECTOMY PROSTHESIS misc B/l mastectomy / breast cancer 4 Each 0    fluticasone propionate (FLONASE) 50 mcg/actuation nasal spray 2 Sprays by Both Nostrils route daily. 3 Bottle 3    albuterol (PROVENTIL HFA, VENTOLIN HFA, PROAIR HFA) 90 mcg/actuation inhaler Take 2 Puffs by inhalation every six (6) hours as needed for Wheezing, Shortness of Breath, Respiratory Distress or Cough. 1 Inhaler 0    polyethylene glycol (MIRALAX) 17 gram packet Take 1 Packet by mouth daily. Mix in 8 oz of water, juice, soda, coffee, or tea prior to administration 10 Each 0    levothyroxine (SYNTHROID) 125 mcg tablet Take 250 mcg by mouth Daily (before breakfast).  cholecalciferol (VITAMIN D3) 1,000 unit cap Take  by mouth daily. Past History     Past Medical History:  Past Medical History:   Diagnosis Date    Arthritis     Cancer ([facility] [address]     breast cancer    H/O total mastectomy 2015    Hypercholesterolemia     Other ill-defined conditions(799.89)     completed radiation     Thyroid disease     hypothyroidism       Past Surgical History:  Past Surgical History:   Procedure Laterality Date    HX BREAST LUMPECTOMY Right     HX GYN      hysterectomy; TBL    HX HEENT      tonsillectomy    HX MASTECTOMY Bilateral     HX ORTHOPAEDIC      right knee ACL    HX OTHER SURGICAL      lumptectomy right breast       Family History:  Family History   Problem Relation Age of Onset    Cancer Mother         brain cancer    No Known Problems Father     Breast Cancer Sister 54    No Known Problems Brother     Thyroid Disease Maternal Grandmother     MS Sister     Breast Cancer Maternal Aunt         60-70's       Social History:  Social History     Tobacco Use    Smoking status: Former Smoker     Packs/day: 0.50     Years: 20.00     Pack years: 10.00     Quit date: 2015     Years since quittin.1    Smokeless tobacco: Never Used   Substance Use Topics    Alcohol use: Yes     Comment: occasional    Drug use:  No Allergies: Allergies   Allergen Reactions    Penicillins Hives     Pt states I am not allergic to penicillin.  Percocet [Oxycodone-Acetaminophen] Itching    Sulfa (Sulfonamide Antibiotics) Other (comments)     Mouth blistering         Review of Systems   Review of Systems   Constitutional: Negative for activity change, chills and fever. HENT: Negative for facial swelling and voice change. Eyes: Negative for redness. Respiratory: Negative for cough, shortness of breath and wheezing. Cardiovascular: Negative for chest pain and leg swelling. Gastrointestinal: Negative for abdominal pain, diarrhea, nausea and vomiting. Genitourinary: Negative for decreased urine volume. Musculoskeletal: Positive for arthralgias, gait problem and joint swelling. Skin: Negative for pallor and rash. Neurological: Negative for tremors and facial asymmetry. Psychiatric/Behavioral: Negative for agitation. All other systems reviewed and are negative. Physical Exam   Physical Exam  Vitals signs and nursing note reviewed. Constitutional:       Comments: 61-year-old female, resting in bed, no acute distress   HENT:      Head: Normocephalic and atraumatic. Neck:      Musculoskeletal: Normal range of motion. Cardiovascular:      Rate and Rhythm: Normal rate and regular rhythm. Heart sounds: No murmur. No friction rub. No gallop. Pulmonary:      Effort: Pulmonary effort is normal.      Breath sounds: Normal breath sounds. Abdominal:      Palpations: Abdomen is soft. Tenderness: There is no abdominal tenderness. Musculoskeletal: Normal range of motion. General: Swelling and tenderness present. Comments: Over the left foot between the third and fourth metatarsals there is mild erythema with tenderness to palpation. There is no ecchymosis. Left lower extremity is neurovascular intact. There is tenderness to palpation over the left anterior patella with no effusion. Skin:     General: Skin is warm. Capillary Refill: Capillary refill takes less than 2 seconds. Neurological:      General: No focal deficit present. Mental Status: She is alert. Psychiatric:         Mood and Affect: Mood normal.         Diagnostic Study Results     Labs -   No results found for this or any previous visit (from the past 12 hour(s)). Radiologic Studies -   XR FOOT LT MIN 3 V   Final Result   Degenerative changes first MTP joint. No acute osseous abnormality. CT Results  (Last 48 hours)    None        CXR Results  (Last 48 hours)    None          Medical Decision Making   I am the first provider for this patient. I reviewed the vital signs, available nursing notes, past medical history, past surgical history, family history and social history. Vital Signs-Reviewed the patient's vital signs. Patient Vitals for the past 12 hrs:   Temp Pulse Resp BP SpO2   03/10/21 0822 97.9 °F (36.6 °C) 98 16 128/82 97 %     Records Reviewed: Nursing Notes and Old Medical Records    Provider Notes (Medical Decision Making):     32-year-old female presents emergency department with a chief complaint of left foot pain. Patient does endorse a mild left anterior patella pain but exam is unremarkable. Left foot is more painful. She ambulates with crutches and an antalgic gait. Differential is broad, doubt septic arthritis. Consider stress fracture, will check plain film. Will treat empirically for gout with high-dose NSAIDs, patient counseled to avoid Aleve. She continues to use gabapentin. She has no history of CKD, most recent Cr normal.    We will discharge with orthopedic follow-up. ED Course:   Initial assessment performed. The patients presenting problems have been discussed, and they are in agreement with the care plan formulated and outlined with them. I have encouraged them to ask questions as they arise throughout their visit.     ED Course as of Mar 10 1639   Wed Mar 10, 2021   0944 Plain film shows no fracture. [MB]      ED Course User Index  [MB] MD Moni Gill MD      Disposition:    Discharged    DISCHARGE PLAN:  1. Discharge Medication List as of 3/10/2021 10:01 AM        2. Follow-up Information     Follow up With Specialties Details Why Contact Demetrius Barthel, MD Internal Medicine In 3 days  [facility]  [phone]      Julio Cesar Place, DO Orthopedic Surgery In 1 week As needed, orthopedic doctor [address] [place] [address] [address]  [phone]          3. Return to ED if worse     Diagnosis     Clinical Impression:   1. Left foot pain    2. Acute pain of left knee        Attestations:    Moni Duarte MD    Please note that this dictation was completed with [employer], the computer voice recognition software. Quite often unanticipated grammatical, syntax, homophones, and other interpretive errors are inadvertently transcribed by the computer software. Please disregard these errors. Please excuse any errors that have escaped final proofreading. Thank you.

## 2021-03-10 NOTE — DISCHARGE INSTRUCTIONS
Please use the medicine indomethacin as instructed. Please return for new or worsening symptoms at any time.

## 2021-03-10 NOTE — LETTER
[facility] EMERGENCY DEPT 
[address] [facility] [address] 
[phone] Work/School Note Date: 3/10/2021 To Whom It May concern: 
 
Quinton Cuellar was seen and treated today in the emergency room by the following provider(s): 
Attending Provider: David Santillan MD. Quinton Cuellar may return to work on 3/11/21. Sincerely, Magda Toledo Registered Nurse

## 2021-03-13 ENCOUNTER — IMMUNIZATION (OUTPATIENT)
Dept: FAMILY MEDICINE CLINIC | Age: 61
End: 2021-03-13
Payer: MEDICAID

## 2021-03-13 DIAGNOSIS — Z23 ENCOUNTER FOR IMMUNIZATION: Primary | ICD-10-CM

## 2021-03-13 PROCEDURE — 91300 COVID-19, MRNA, LNP-S, PF, 30MCG/0.3ML DOSE(PFIZER): CPT | Performed by: FAMILY MEDICINE

## 2021-03-13 PROCEDURE — 0001A COVID-19, MRNA, LNP-S, PF, 30MCG/0.3ML DOSE(PFIZER): CPT | Performed by: FAMILY MEDICINE

## 2021-04-03 ENCOUNTER — IMMUNIZATION (OUTPATIENT)
Dept: FAMILY MEDICINE CLINIC | Age: 61
End: 2021-04-03
Payer: MEDICAID

## 2021-04-03 DIAGNOSIS — Z23 ENCOUNTER FOR IMMUNIZATION: Primary | ICD-10-CM

## 2021-04-03 PROCEDURE — 0002A COVID-19, MRNA, LNP-S, PF, 30MCG/0.3ML DOSE(PFIZER): CPT | Performed by: FAMILY MEDICINE

## 2021-04-03 PROCEDURE — 91300 COVID-19, MRNA, LNP-S, PF, 30MCG/0.3ML DOSE(PFIZER): CPT | Performed by: FAMILY MEDICINE

## 2021-04-09 ENCOUNTER — HOSPITAL ENCOUNTER (OUTPATIENT)
Dept: CARDIAC REHAB | Age: 61
Discharge: HOME | End: 2021-04-09

## 2021-04-09 VITALS — WEIGHT: 229.4 LBS | BODY MASS INDEX: 36.87 KG/M2 | HEIGHT: 66 IN

## 2021-04-09 NOTE — CARDIO/PULMONARY
Cardiopulmonary Rehab Orientation:    MS Karen Byrne is a 64year old patient of Dr. Tayla Driscoll, who presents to rehab for strengthening and conditioning. Patient has a history of DYSPNEA and Post COV-19. Past medical history significant for Breast ca, HLD and obesity. Smoking history assessed, former smoker, Lungs are clear, but decreased. No apparent edema, noted. Patient does not exercise at home. Patient is still working, pt immunizations reviewed and up to date. Patient limitations include, WOODARD, obesity and inactivity. Pt's VS were as follows: /93 on R arm , 138/97   L arm,     oxygen saturation 98% on  room air, 85 HR  BMI 37     Heart rhythm on monitor showed -pt not monitored today. Phyllis Brown Psychosocial: pt lives alone, pt has a good support system and a positive attitude.     Pt to return for the six minute walk, pending insurance approval.

## 2021-05-11 NOTE — CARDIO/PULMONARY
Pt was called to follow up on her orientation, insurance did not approve of the program. Pt stated that an appeal is in progress. Will wait to hear back from the insurance company.

## 2021-06-12 DIAGNOSIS — K21.9 GASTROESOPHAGEAL REFLUX DISEASE WITHOUT ESOPHAGITIS: ICD-10-CM

## 2021-06-13 RX ORDER — PANTOPRAZOLE SODIUM 40 MG/1
TABLET, DELAYED RELEASE ORAL
Qty: 90 TABLET | Refills: 3 | Status: SHIPPED | OUTPATIENT
Start: 2021-06-13 | End: 2021-06-21

## 2021-06-24 ENCOUNTER — TELEPHONE (OUTPATIENT)
Dept: INTERNAL MEDICINE CLINIC | Age: 61
End: 2021-06-24

## 2021-07-02 DIAGNOSIS — G89.18 CHEST WALL PAIN FOLLOWING SURGERY: ICD-10-CM

## 2021-07-02 DIAGNOSIS — R07.89 CHEST WALL PAIN FOLLOWING SURGERY: ICD-10-CM

## 2021-07-02 RX ORDER — GABAPENTIN 400 MG/1
CAPSULE ORAL
Qty: 60 CAPSULE | Refills: 1 | Status: SHIPPED | OUTPATIENT
Start: 2021-07-02 | End: 2021-08-09 | Stop reason: SDUPTHER

## 2021-08-08 NOTE — PROGRESS NOTES
Heaven Banks is a 63 y.o. female who presents for annual exam    Patient has chronic lymphedema of chest wall since bilateral mastectomy. Prior lymphedema therapy. Takes diuretic prn. On gabapentin 400mg TID, flexeril 10mg at bedtime.      On levothyroxine 250 mcg daily    Asthma,  Former smoker. More SOB since COVID diagnosis. On advair, pulmonary added singulair. Using albuterol prn. Pulmonary added spirnolactone for edema. Reports is going to [address] [facility] at [address] 12.4. Out of 50k. Reports right hand tingling, right handed. Wants to work on weight loss. Improving diet. Not active due to WOODARD.         Past Medical History:   Diagnosis Date    Arthritis     Cancer [facility])     breast cancer    H/O total mastectomy 2015    Hypercholesterolemia     Other ill-defined conditions(799.89)     completed radiation     Thyroid disease     hypothyroidism       Family History   Problem Relation Age of Onset    Cancer Mother         brain cancer    No Known Problems Father     Breast Cancer Sister 54    No Known Problems Brother     Thyroid Disease Maternal Grandmother     MS Sister     Breast Cancer Maternal Aunt         60-70's       Social History     Socioeconomic History    Marital status: SINGLE     Spouse name: Not on file    Number of children: Not on file    Years of education: Not on file    Highest education level: Not on file   Occupational History    Not on file   Tobacco Use    Smoking status: Former Smoker     Packs/day: 0.50     Years: 20.00     Pack years: 10.00     Quit date: 2015     Years since quittin.5    Smokeless tobacco: Never Used   Vaping Use    Vaping Use: Never used   Substance and Sexual Activity    Alcohol use: Yes     Comment: occasional    Drug use: No    Sexual activity: Not Currently     Partners: Male     Birth control/protection: None   Other Topics Concern    Not on file   Social History Narrative    Not on file     Social Determinants of Health     Financial Resource Strain:     Difficulty of Paying Living Expenses:    Food Insecurity:     Worried About Running Out of Food in the Last Year:     [address] in the Last Year:    Transportation Needs:     Lack of Transportation (Medical):  Lack of Transportation (Non-Medical):    Physical Activity:     Days of Exercise per Week:     Minutes of Exercise per Session:    Stress:     Feeling of Stress :    Social Connections:     Frequency of Communication with Friends and Family:     Frequency of Social Gatherings with Friends and Family:     Attends Confucianism Services:     Active Member of Clubs or Organizations:     Attends Club or Organization Meetings:     Marital Status:    Intimate Partner Violence:     Fear of Current or Ex-Partner:     Emotionally Abused:     Physically Abused:     Sexually Abused:        Current Outpatient Medications on File Prior to Visit   Medication Sig Dispense Refill    fluticasone propion-salmeteroL (Advair Diskus) 250-50 mcg/dose diskus inhaler Take 1 Puff by inhalation two (2) times a day.  omeprazole (PRILOSEC) 20 mg capsule Take 1 Capsule by mouth daily. 90 Capsule 3    allopurinoL (ZYLOPRIM) 100 mg tablet Take 2 Tabs by mouth daily. 180 Tab 3    atorvastatin (LIPITOR) 40 mg tablet TAKE 1 TABLET BY MOUTH EVERY DAY 90 Tab 0    ascorbic acid (VITAMIN C PO) Take  by mouth.  MASTECTOMY PROSTHESIS misc B/l mastectomy / breast cancer 4 Each 0    fluticasone propionate (FLONASE) 50 mcg/actuation nasal spray 2 Sprays by Both Nostrils route daily. 3 Bottle 3    albuterol (PROVENTIL HFA, VENTOLIN HFA, PROAIR HFA) 90 mcg/actuation inhaler Take 2 Puffs by inhalation every six (6) hours as needed for Wheezing, Shortness of Breath, Respiratory Distress or Cough. 1 Inhaler 0    polyethylene glycol (MIRALAX) 17 gram packet Take 1 Packet by mouth daily.  Mix in 8 oz of water, juice, soda, coffee, or tea prior to administration 10 Each 0    levothyroxine (SYNTHROID) 125 mcg tablet Take 250 mcg by mouth Daily (before breakfast).  montelukast (SINGULAIR) 10 mg tablet TAKE 1 TABLET BY MOUTH AT BEDTIME      spironolactone (ALDACTONE) 25 mg tablet TAKE 1 TABLET BY MOUTH EVERY DAY      [DISCONTINUED] gabapentin (NEURONTIN) 400 mg capsule TAKE 1 CAP BY MOUTH TWO (2) TIMES A DAY 60 Capsule 1    [DISCONTINUED] levothyroxine (SYNTHROID) 125 mcg tablet Take 1 Tab by mouth Daily (before breakfast). 60 Tab 5    [DISCONTINUED] ergocalciferol (ERGOCALCIFEROL) 1,250 mcg (50,000 unit) capsule Take 1 Cap by mouth every seven (7) days. (Patient not taking: Reported on 8/9/2021) 4 Cap 5    [DISCONTINUED] cyclobenzaprine (FLEXERIL) 5 mg tablet Take 1 Tab by mouth nightly. As needed for muscle spasm 30 Tab 2    ZINC PO Take  by mouth. (Patient not taking: Reported on 8/9/2021)      cholecalciferol (VITAMIN D3) 1,000 unit cap Take  by mouth daily. (Patient not taking: Reported on 8/9/2021)       No current facility-administered medications on file prior to visit. Review of Systems  Pertinent items are noted in HPI. Objective:     Visit Vitals  /79 (BP 1 Location: Left arm, BP Patient Position: Sitting, BP Cuff Size: Large adult)   Pulse 78   Temp 97.8 °F (36.6 °C) (Temporal)   Resp 22   Ht 5' 6\" (1.676 m)   Wt 236 lb 3.2 oz (107.1 kg)   SpO2 97%   BMI 38.12 kg/m²     Gen: well appearing female  HEENT:   PERRL,normal conjunctiva. External ear and canals normal, TMs no opacification or erythema,  OP no erythema, no exudates, MMM  Neck:  Supple. Thyroid normal size, nontender, without nodules. No masses or LAD  Resp:  No wheezing, no rhonchi, no rales. CV:  RRR, normal S1S2, no murmur. Chest: Bilateral mastectomy  GI: soft, nontender, exam limited by habitus  Extrem:  +2 pulses, no edema, warm distally      Assessment/Plan:       ICD-10-CM ICD-9-CM    1.  Routine medical exam  X06.09 X48.8 METABOLIC PANEL, COMPREHENSIVE      CBC W/O DIFF      LIPID PANEL      VITAMIN D, 25 HYDROXY      HEMOGLOBIN A1C WITH EAG      TSH 3RD GENERATION   2. Severe obesity (HCC)  E66.01 278.01 phentermine (ADIPEX-P) 37.5 mg tablet   3. Hypothyroidism due to acquired atrophy of thyroid  E03.4 244.8      246.8    4. Mixed hyperlipidemia  E78.2 272.2    5. H/O mastectomy, bilateral  Z90.13 V45.71    6. History of breast cancer  Z85.3 V10.3    7. Vitamin D deficiency  E55.9 268.9 VITAMIN D, 25 HYDROXY      ergocalciferol (ERGOCALCIFEROL) 1,250 mcg (50,000 unit) capsule   8. Lymphedema  I89.0 457.1    9. Chest wall pain following surgery  R07.89 786.59 cyclobenzaprine (FLEXERIL) 10 mg tablet    G89.18 338.18 gabapentin (NEURONTIN) 400 mg capsule   10. Acute gout, unspecified cause, unspecified site  M10.9 274.01 URIC ACID   We discussed the importance of healthy weight. Recommend portion control with calorie counting. Follow a lower carbohydrate and higher protein diet. Recommend regular cardiovascular exercise. Wrist splint for suspected CTS    Follow-up and Dispositions    · Return in about 3 months (around 11/9/2021) for WEIGHT LOSS FOLLOW UP/CAN BE VIRTUAL.          Wilma Kennedy MD

## 2021-08-09 ENCOUNTER — OFFICE VISIT (OUTPATIENT)
Dept: INTERNAL MEDICINE CLINIC | Age: 61
End: 2021-08-09
Payer: MEDICAID

## 2021-08-09 VITALS
SYSTOLIC BLOOD PRESSURE: 121 MMHG | RESPIRATION RATE: 22 BRPM | WEIGHT: 236.2 LBS | DIASTOLIC BLOOD PRESSURE: 79 MMHG | TEMPERATURE: 97.8 F | BODY MASS INDEX: 37.96 KG/M2 | HEART RATE: 78 BPM | HEIGHT: 66 IN | OXYGEN SATURATION: 97 %

## 2021-08-09 DIAGNOSIS — E55.9 VITAMIN D DEFICIENCY: ICD-10-CM

## 2021-08-09 DIAGNOSIS — Z90.13 H/O MASTECTOMY, BILATERAL: ICD-10-CM

## 2021-08-09 DIAGNOSIS — G89.18 CHEST WALL PAIN FOLLOWING SURGERY: ICD-10-CM

## 2021-08-09 DIAGNOSIS — E78.2 MIXED HYPERLIPIDEMIA: ICD-10-CM

## 2021-08-09 DIAGNOSIS — Z00.00 ROUTINE MEDICAL EXAM: Primary | ICD-10-CM

## 2021-08-09 DIAGNOSIS — M10.9 ACUTE GOUT, UNSPECIFIED CAUSE, UNSPECIFIED SITE: ICD-10-CM

## 2021-08-09 DIAGNOSIS — I89.0 LYMPHEDEMA: ICD-10-CM

## 2021-08-09 DIAGNOSIS — E66.01 SEVERE OBESITY (HCC): ICD-10-CM

## 2021-08-09 DIAGNOSIS — E03.4 HYPOTHYROIDISM DUE TO ACQUIRED ATROPHY OF THYROID: ICD-10-CM

## 2021-08-09 DIAGNOSIS — Z85.3 HISTORY OF BREAST CANCER: ICD-10-CM

## 2021-08-09 DIAGNOSIS — R07.89 CHEST WALL PAIN FOLLOWING SURGERY: ICD-10-CM

## 2021-08-09 PROCEDURE — 99396 PREV VISIT EST AGE 40-64: CPT | Performed by: FAMILY MEDICINE

## 2021-08-09 RX ORDER — MONTELUKAST SODIUM 10 MG/1
TABLET ORAL
COMMUNITY
Start: 2021-06-22

## 2021-08-09 RX ORDER — FLUTICASONE PROPIONATE AND SALMETEROL 250; 50 UG/1; UG/1
1 POWDER RESPIRATORY (INHALATION) 2 TIMES DAILY
COMMUNITY

## 2021-08-09 RX ORDER — ERGOCALCIFEROL 1.25 MG/1
50000 CAPSULE ORAL
Qty: 4 CAPSULE | Refills: 5 | Status: SHIPPED | OUTPATIENT
Start: 2021-08-09 | End: 2021-09-13 | Stop reason: SDUPTHER

## 2021-08-09 RX ORDER — GABAPENTIN 400 MG/1
CAPSULE ORAL
Qty: 90 CAPSULE | Refills: 1 | Status: SHIPPED | OUTPATIENT
Start: 2021-08-09

## 2021-08-09 RX ORDER — PHENTERMINE HYDROCHLORIDE 37.5 MG/1
37.5 TABLET ORAL
Qty: 30 TABLET | Refills: 2 | Status: SHIPPED | OUTPATIENT
Start: 2021-08-09 | End: 2022-02-24 | Stop reason: SDUPTHER

## 2021-08-09 RX ORDER — INDOMETHACIN 50 MG/1
50 CAPSULE ORAL 3 TIMES DAILY
Qty: 30 CAPSULE | Refills: 1 | Status: SHIPPED | OUTPATIENT
Start: 2021-08-09 | End: 2021-10-15

## 2021-08-09 RX ORDER — SPIRONOLACTONE 25 MG/1
TABLET ORAL
COMMUNITY
Start: 2021-06-22 | End: 2022-02-24

## 2021-08-09 RX ORDER — CYCLOBENZAPRINE HCL 10 MG
10 TABLET ORAL
Qty: 30 TABLET | Refills: 2 | Status: SHIPPED | OUTPATIENT
Start: 2021-08-09

## 2021-08-09 NOTE — PATIENT INSTRUCTIONS
Carpal Tunnel Syndrome: Care Instructions  Overview     Carpal tunnel syndrome is numbness, tingling, weakness, and pain in your hand, wrist, and sometimes forearm. It is caused by pressure on the median nerve. This nerve and several tough tissues called tendons run through a space in the wrist. This space is called the carpal tunnel. The repeated hand motions used in work and some hobbies and sports can put pressure on the median nerve. Pregnancy can cause carpal tunnel syndrome. Several conditions, such as diabetes, arthritis, and an underactive thyroid, can also cause it. You may be able to limit an activity or change the way you do it to reduce your symptoms. You also can take other steps to feel better. If your symptoms are mild, 1 to 2 weeks of home treatment are likely to ease your pain. Surgery is needed only if other treatments do not work. Follow-up care is a key part of your treatment and safety. Be sure to make and go to all appointments, and call your doctor if you are having problems. It's also a good idea to know your test results and keep a list of the medicines you take. How can you care for yourself at home? · If possible, stop or reduce the activity that causes your symptoms. If you cannot stop the activity, take frequent breaks to rest and stretch or change hand positions to do a task. Try switching hands, such as when using a computer mouse. · Try to avoid bending or twisting your wrists. · Ask your doctor if you can take an over-the-counter pain medicine, such as acetaminophen (Tylenol), ibuprofen (Advil, Motrin), or naproxen (Aleve). Be safe with medicines. Read and follow all instructions on the label. · If your doctor prescribes corticosteroid medicine to help reduce pain and swelling, take it exactly as prescribed. Call your doctor if you think you are having a problem with your medicine. · Put ice or a cold pack on your wrist for 10 to 20 minutes at a time to ease pain.  Put a thin cloth between the ice and your skin. · If your doctor or your physical or occupational therapist tells you to wear a wrist splint, wear it as directed to keep your wrist in a neutral position. This also eases pressure on your median nerve. · Ask your doctor whether you should have physical or occupational therapy to learn how to do tasks differently. · Try a yoga class to stretch your muscles and build strength in your hands and wrists. Yoga has been shown to ease carpal tunnel symptoms. To prevent carpal tunnel  · When working at a computer, keep your hands and wrists in line with your forearms. Hold your elbows close to your sides. Take a break every 10 to 15 minutes. · Try these exercises:  ? Warm up: Rotate your wrist up, down, and from side to side. Repeat this 4 times. Stretch your fingers far apart, relax them, then stretch them again. Repeat 4 times. Stretch your thumb by pulling it back gently, holding it, and then releasing it. Repeat 4 times. ? Prayer stretch: Start with your palms together in front of your chest just below your chin. Slowly lower your hands toward your waistline while keeping your hands close to your stomach and your palms together until you feel a mild to moderate stretch under your forearms. Hold for 10 to 20 seconds. Repeat 4 times. ? Wrist flexor stretch: Hold your arm in front of you with your palm up. Bend your wrist, pointing your hand toward the floor. With your other hand, gently bend your wrist further until you feel a mild to moderate stretch in your forearm. Hold for 10 to 20 seconds. Repeat 4 times. ? Wrist extensor stretch: Repeat the steps for the wrist flexor stretch, but begin with your extended hand palm down. · Squeeze a rubber exercise ball several times a day to keep your hands and fingers strong. · Avoid holding objects (such as a book) in one position for a long time. When possible, use your whole hand to grasp an object.  Using just the thumb and index finger can put stress on the wrist.  · Do not smoke. It can make this condition worse by reducing blood flow to the median nerve. If you need help quitting, talk to your doctor about stop-smoking programs and medicines. These can increase your chances of quitting for good. When should you call for help? Watch closely for changes in your health, and be sure to contact your doctor if:    · Your pain or other problems do not get better with home care.     · You want more information about physical or occupational therapy.     · You have side effects of your corticosteroid medicine, such as:  ? Weight gain. ? Mood changes. ? Trouble sleeping. ? Bruising easily.     · You have any other problems with your medicine. Where can you learn more? Go to http://www.gray.com/  Enter R432 in the search box to learn more about \"Carpal Tunnel Syndrome: Care Instructions. \"  Current as of: November 16, 2020               Content Version: 12.8  © 6116-5476 [employer]. Care instructions adapted under license by [employer] (which disclaims liability or warranty for this information). If you have questions about a medical condition or this instruction, always ask your healthcare professional. [place] [address] any warranty or liability for your use of this information. Carpal Tunnel Syndrome: Exercises  Introduction  Here are some examples of exercises for you to try. The exercises may be suggested for a condition or for rehabilitation. Start each exercise slowly. Ease off the exercises if you start to have pain. You will be told when to start these exercises and which ones will work best for you. Warm-up stretches  When you no longer have pain or numbness, you can do exercises to help prevent carpal tunnel syndrome from coming back. Do not do any stretch or movement that is uncomfortable or painful.   1. Rotate your wrist up, down, and from side to side. Repeat 4 times. 2. Stretch your fingers far apart. Relax them, and then stretch them again. Repeat 4 times. 3. Stretch your thumb by pulling it back gently, holding it, and then releasing it. Repeat 4 times. How to do the exercises  Prayer stretch   1. Start with your palms together in front of your chest just below your chin. 2. Slowly lower your hands toward your waistline, keeping your hands close to your stomach and your palms together until you feel a mild to moderate stretch under your forearms. 3. Hold for at least 15 to 30 seconds. Repeat 2 to 4 times. Wrist flexor stretch   1. Extend your arm in front of you with your palm up. 2. Bend your wrist, pointing your hand toward the floor. 3. With your other hand, gently bend your wrist farther until you feel a mild to moderate stretch in your forearm. 4. Hold for at least 15 to 30 seconds. Repeat 2 to 4 times. Wrist extensor stretch   1. Repeat steps 1 through 4 of the stretch above, but begin with your extended hand palm down. Follow-up care is a key part of your treatment and safety. Be sure to make and go to all appointments, and call your doctor if you are having problems. It's also a good idea to know your test results and keep a list of the medicines you take. Where can you learn more? Go to http://www.gray.com/  Enter J876 in the search box to learn more about \"Carpal Tunnel Syndrome: Exercises. \"  Current as of: November 16, 2020               Content Version: 12.8  © 2006-2021 [employer]. Care instructions adapted under license by [employer] (which disclaims liability or warranty for this information). If you have questions about a medical condition or this instruction, always ask your healthcare professional. [place] [address] any warranty or liability for your use of this information. START PHENTERMINE 37.5MG 1/2 TABLET IN THE MORNING.      RECOMMEND 0914-7430 CALORIE DIET. TRACK CALORIE INTAKE WITH MY FITNESS PAL BRAD. FIT BIT. SMART WATCH. PROTEIN AT EVERY MEAL. REDUCE INTAKE OF STARCHY CARBS, LIKE BREAD, RICE, PASTA, AND POTATOES. MAKE CARBS 1/4 OF YOUR PLATE. DRINK CALORIE FREE BEVERAGES ONLY. TRY GRAZING DIET, 3 SMALL MEALS AND 2 SNACKS. INCREASE CARDIOVASCULAR EXERCISE, MINIMUM 3 DAYS A WEEK, 30 MINUTES OF CARDIO.

## 2021-08-10 LAB
25(OH)D3 SERPL-MCNC: 17.4 NG/ML (ref 30–100)
ALBUMIN SERPL-MCNC: 4.1 G/DL (ref 3.5–5)
ALBUMIN/GLOB SERPL: 1.1 {RATIO} (ref 1.1–2.2)
ALP SERPL-CCNC: 71 U/L (ref 45–117)
ALT SERPL-CCNC: 46 U/L (ref 12–78)
ANION GAP SERPL CALC-SCNC: 7 MMOL/L (ref 5–15)
AST SERPL-CCNC: 30 U/L (ref 15–37)
BILIRUB SERPL-MCNC: 0.4 MG/DL (ref 0.2–1)
BUN SERPL-MCNC: 14 MG/DL (ref 6–20)
BUN/CREAT SERPL: 19 (ref 12–20)
CALCIUM SERPL-MCNC: 9.9 MG/DL (ref 8.5–10.1)
CHLORIDE SERPL-SCNC: 106 MMOL/L (ref 97–108)
CHOLEST SERPL-MCNC: 372 MG/DL
CO2 SERPL-SCNC: 23 MMOL/L (ref 21–32)
CREAT SERPL-MCNC: 0.75 MG/DL (ref 0.55–1.02)
ERYTHROCYTE [DISTWIDTH] IN BLOOD BY AUTOMATED COUNT: 14.6 % (ref 11.5–14.5)
EST. AVERAGE GLUCOSE BLD GHB EST-MCNC: 220 MG/DL
GLOBULIN SER CALC-MCNC: 3.6 G/DL (ref 2–4)
GLUCOSE SERPL-MCNC: 169 MG/DL (ref 65–100)
HBA1C MFR BLD: 9.3 % (ref 4–5.6)
HCT VFR BLD AUTO: 39.9 % (ref 35–47)
HDLC SERPL-MCNC: 55 MG/DL
HDLC SERPL: 6.8 {RATIO} (ref 0–5)
HGB BLD-MCNC: 12.3 G/DL (ref 11.5–16)
LDLC SERPL CALC-MCNC: 251.6 MG/DL (ref 0–100)
MCH RBC QN AUTO: 29.1 PG (ref 26–34)
MCHC RBC AUTO-ENTMCNC: 30.8 G/DL (ref 30–36.5)
MCV RBC AUTO: 94.5 FL (ref 80–99)
NRBC # BLD: 0 K/UL (ref 0–0.01)
NRBC BLD-RTO: 0 PER 100 WBC
PLATELET # BLD AUTO: 343 K/UL (ref 150–400)
PMV BLD AUTO: 11.4 FL (ref 8.9–12.9)
POTASSIUM SERPL-SCNC: 4.5 MMOL/L (ref 3.5–5.1)
PROT SERPL-MCNC: 7.7 G/DL (ref 6.4–8.2)
RBC # BLD AUTO: 4.22 M/UL (ref 3.8–5.2)
SODIUM SERPL-SCNC: 136 MMOL/L (ref 136–145)
TRIGL SERPL-MCNC: 327 MG/DL (ref ?–150)
TSH SERPL DL<=0.05 MIU/L-ACNC: 3.24 UIU/ML (ref 0.36–3.74)
URATE SERPL-MCNC: 10.6 MG/DL (ref 2.6–6)
VLDLC SERPL CALC-MCNC: 65.4 MG/DL
WBC # BLD AUTO: 7.8 K/UL (ref 3.6–11)

## 2021-09-02 ENCOUNTER — TELEPHONE (OUTPATIENT)
Dept: ONCOLOGY | Age: 61
End: 2021-09-02

## 2021-09-12 NOTE — PROGRESS NOTES
Edward Walsh is a 63 y.o. female who presents for follow-up. Was last seen August 9 for annual exam.    Reports left knee pain and top of right foot. taking ibuprofen prn. Patient has chronic lymphedema of chest wall, s/p bilateral mastectomy. Prior lymphedema therapy. Takes diuretic prn. On gabapentin 400mg TID, flexeril 10mg at bedtime. Taking liptor 40mg but admits not every day.  in Nov 2020. Now, . Has allopurinol 200mg, forgetting dose. Uric acid 10.        Treated for hypothyroidism. On levothyroxine 250 mcg daily. TSH at goal.     Vitamin D 17. 4. has vitamin D 50k once a week. Diabetic, HbA1C 9.3%. Off diet. Was getting up at night eating. Trying to improve diet now. Not on medication for diabetes. Weight unchanged.       Asthma,  Former smoker. More SOB since COVID diagnosis. On advair and singulair, using albuterol. pulmonary.  Pulmonary added spirnolactone for edema.     [facility] [address] [place] [address]      Past Medical History:   Diagnosis Date    Arthritis     Cancer [facility])     breast cancer    H/O total mastectomy 11/30/2015    Hypercholesterolemia     Other ill-defined conditions(799.89)     completed radiation 9/12    Thyroid disease     hypothyroidism       Family History   Problem Relation Age of Onset    Cancer Mother         brain cancer    No Known Problems Father     Breast Cancer Sister 54    No Known Problems Brother     Thyroid Disease Maternal Grandmother     MS Sister     Breast Cancer Maternal Aunt         60-70's       Social History     Socioeconomic History    Marital status: SINGLE     Spouse name: Not on file    Number of children: Not on file    Years of education: Not on file    Highest education level: Not on file   Occupational History    Not on file   Tobacco Use    Smoking status: Former Smoker     Packs/day: 0.50     Years: 20.00     Pack years: 10.00     Quit date: 1/28/2015     Years since quittin.6    Smokeless tobacco: Never Used   Vaping Use    Vaping Use: Never used   Substance and Sexual Activity    Alcohol use: Yes     Comment: occasional    Drug use: No    Sexual activity: Not Currently     Partners: Male     Birth control/protection: None   Other Topics Concern    Not on file   Social History Narrative    Not on file     Social Determinants of Health     Financial Resource Strain:     Difficulty of Paying Living Expenses:    Food Insecurity:     Worried About Running Out of Food in the Last Year:     Ran Out of Food in the Last Year:    Transportation Needs:     Lack of Transportation (Medical):  Lack of Transportation (Non-Medical):    Physical Activity:     Days of Exercise per Week:     Minutes of Exercise per Session:    Stress:     Feeling of Stress :    Social Connections:     Frequency of Communication with Friends and Family:     Frequency of Social Gatherings with Friends and Family:     Attends Religion Services:     Active Member of Clubs or Organizations:     Attends Club or Organization Meetings:     Marital Status:    Intimate Partner Violence:     Fear of Current or Ex-Partner:     Emotionally Abused:     Physically Abused:     Sexually Abused:        Current Outpatient Medications on File Prior to Visit   Medication Sig Dispense Refill    fluticasone propion-salmeteroL (Advair Diskus) 250-50 mcg/dose diskus inhaler Take 1 Puff by inhalation two (2) times a day.  montelukast (SINGULAIR) 10 mg tablet TAKE 1 TABLET BY MOUTH AT BEDTIME      spironolactone (ALDACTONE) 25 mg tablet TAKE 1 TABLET BY MOUTH EVERY DAY      cyclobenzaprine (FLEXERIL) 10 mg tablet Take 1 Tablet by mouth nightly. 30 Tablet 2    gabapentin (NEURONTIN) 400 mg capsule Take one capsule in am and two capsules in pm 90 Capsule 1    phentermine (ADIPEX-P) 37.5 mg tablet Take 1 Tablet by mouth every morning.  Max Daily Amount: 37.5 mg. 30 Tablet 2    indomethacin (INDOCIN) 50 mg capsule Take 1 Capsule by mouth three (3) times daily. As needed for gout flares 30 Capsule 1    allopurinoL (ZYLOPRIM) 100 mg tablet Take 2 Tabs by mouth daily. 180 Tab 3    atorvastatin (LIPITOR) 40 mg tablet TAKE 1 TABLET BY MOUTH EVERY DAY 90 Tab 0    ascorbic acid (VITAMIN C PO) Take  by mouth.  MASTECTOMY PROSTHESIS misc B/l mastectomy / breast cancer 4 Each 0    fluticasone propionate (FLONASE) 50 mcg/actuation nasal spray 2 Sprays by Both Nostrils route daily. 3 Bottle 3    albuterol (PROVENTIL HFA, VENTOLIN HFA, PROAIR HFA) 90 mcg/actuation inhaler Take 2 Puffs by inhalation every six (6) hours as needed for Wheezing, Shortness of Breath, Respiratory Distress or Cough. 1 Inhaler 0    polyethylene glycol (MIRALAX) 17 gram packet Take 1 Packet by mouth daily. Mix in 8 oz of water, juice, soda, coffee, or tea prior to administration 10 Each 0    levothyroxine (SYNTHROID) 125 mcg tablet Take 250 mcg by mouth Daily (before breakfast).  [DISCONTINUED] ergocalciferol (ERGOCALCIFEROL) 1,250 mcg (50,000 unit) capsule Take 1 Capsule by mouth every seven (7) days. 4 Capsule 5    [DISCONTINUED] omeprazole (PRILOSEC) 20 mg capsule Take 1 Capsule by mouth daily. (Patient not taking: Reported on 9/13/2021) 90 Capsule 3    [DISCONTINUED] ZINC PO Take  by mouth. (Patient not taking: Reported on 8/9/2021)      cholecalciferol (VITAMIN D3) 1,000 unit cap Take  by mouth daily. (Patient not taking: Reported on 8/9/2021)       No current facility-administered medications on file prior to visit. Review of Systems  Pertinent items are noted in HPI. Objective:     Visit Vitals  /75 (BP 1 Location: Left arm, BP Patient Position: Sitting, BP Cuff Size: Adult)   Pulse 94   Temp 97.2 °F (36.2 °C) (Temporal)   Resp 16   Ht 5' 6\" (1.676 m)   Wt 230 lb (104.3 kg)   SpO2 98%   BMI 37.12 kg/m²     Gen: well appearing female  HEENT:   PERRL,normal conjunctiva.  External ear and canals normal, TMs no opacification or erythema,  OP no erythema, no exudates, MMM  Neck:  Supple. Thyroid normal size, nontender, without nodules. No masses or LAD  Resp:  No wheezing, no rhonchi, no rales. Chest: bilateral mastectomy  CV:  RRR, normal S1S2, no murmur. GI: soft, nontender, without masses. No hepatosplenomegaly. Extrem:  +2 pulses, no edema, warm distally      Assessment/Plan:       ICD-10-CM ICD-9-CM    1. Uncontrolled type 2 diabetes mellitus with hyperglycemia (HCC)  E11.65 250.02 metFORMIN ER (GLUCOPHAGE XR) 500 mg tablet   2. Severe obesity ([address]  E66.01 278.01    3. Mixed hyperlipidemia  E78.2 272.2    4. Hypothyroidism due to acquired atrophy of thyroid  E03.4 244.8      246.8    5. Chest wall pain following surgery  R07.89 786.59     G89.18 338.18    6. H/O mastectomy, bilateral  Z90.13 V45.71    7. Acute pain of left knee  M25.562 719.46 XR KNEE LT MAX 2 VWS   8. Vitamin D deficiency  E55.9 268.9 ergocalciferol (ERGOCALCIFEROL) 1,250 mcg (50,000 unit) capsule   follow diabetic diet. Work on weight reduction,  Add metformin XR 500mg at dinner. Increase vitamin D to twice a weeek. Be consistent with medication. Follow-up and Dispositions    · Return in about 3 months (around 12/13/2021) for follow up on diabetes.  Sonja Callejas MD

## 2021-09-13 ENCOUNTER — OFFICE VISIT (OUTPATIENT)
Dept: INTERNAL MEDICINE CLINIC | Age: 61
End: 2021-09-13

## 2021-09-13 ENCOUNTER — PATIENT MESSAGE (OUTPATIENT)
Dept: INTERNAL MEDICINE CLINIC | Age: 61
End: 2021-09-13

## 2021-09-13 VITALS
HEIGHT: 66 IN | DIASTOLIC BLOOD PRESSURE: 75 MMHG | HEART RATE: 94 BPM | BODY MASS INDEX: 36.96 KG/M2 | SYSTOLIC BLOOD PRESSURE: 110 MMHG | WEIGHT: 230 LBS | TEMPERATURE: 97.2 F | OXYGEN SATURATION: 98 % | RESPIRATION RATE: 16 BRPM

## 2021-09-13 DIAGNOSIS — M25.562 ACUTE PAIN OF LEFT KNEE: ICD-10-CM

## 2021-09-13 DIAGNOSIS — E03.4 HYPOTHYROIDISM DUE TO ACQUIRED ATROPHY OF THYROID: ICD-10-CM

## 2021-09-13 DIAGNOSIS — Z90.13 H/O MASTECTOMY, BILATERAL: ICD-10-CM

## 2021-09-13 DIAGNOSIS — E78.2 MIXED HYPERLIPIDEMIA: ICD-10-CM

## 2021-09-13 DIAGNOSIS — G89.18 CHEST WALL PAIN FOLLOWING SURGERY: ICD-10-CM

## 2021-09-13 DIAGNOSIS — E55.9 VITAMIN D DEFICIENCY: ICD-10-CM

## 2021-09-13 DIAGNOSIS — E11.65 UNCONTROLLED TYPE 2 DIABETES MELLITUS WITH HYPERGLYCEMIA (HCC): Primary | ICD-10-CM

## 2021-09-13 DIAGNOSIS — R07.89 CHEST WALL PAIN FOLLOWING SURGERY: ICD-10-CM

## 2021-09-13 DIAGNOSIS — E66.01 SEVERE OBESITY (HCC): ICD-10-CM

## 2021-09-13 PROCEDURE — 99214 OFFICE O/P EST MOD 30 MIN: CPT | Performed by: FAMILY MEDICINE

## 2021-09-13 RX ORDER — ERGOCALCIFEROL 1.25 MG/1
50000 CAPSULE ORAL 2 TIMES WEEKLY
Qty: 8 CAPSULE | Refills: 2 | Status: SHIPPED | OUTPATIENT
Start: 2021-09-13

## 2021-09-13 RX ORDER — METFORMIN HYDROCHLORIDE 500 MG/1
500 TABLET, EXTENDED RELEASE ORAL
Qty: 90 TABLET | Refills: 2 | Status: SHIPPED | OUTPATIENT
Start: 2021-09-13 | End: 2022-02-24 | Stop reason: SDUPTHER

## 2021-09-13 NOTE — PATIENT INSTRUCTIONS
BE CONSISTENT WITH MEDICATIONS:      ALLOPURINOL 100MG TWO AT BEDTIME  FOR GOUT    ATORVASTATIN 40MG AT BEDTIME. FOR CHOLESTEROL    ADD METFORMIN 500MG ONE WITH DINNER FOR DIABETES.       INCREASE VITAMIN D TO TWICE A WEEK

## 2021-09-23 ENCOUNTER — OFFICE VISIT (OUTPATIENT)
Dept: SURGERY | Age: 61
End: 2021-09-23
Payer: MEDICAID

## 2021-09-23 ENCOUNTER — TELEPHONE (OUTPATIENT)
Dept: SURGERY | Age: 61
End: 2021-09-23

## 2021-09-23 VITALS
WEIGHT: 230 LBS | BODY MASS INDEX: 36.96 KG/M2 | SYSTOLIC BLOOD PRESSURE: 115 MMHG | DIASTOLIC BLOOD PRESSURE: 61 MMHG | HEIGHT: 66 IN | HEART RATE: 94 BPM

## 2021-09-23 DIAGNOSIS — I89.0 LYMPHEDEMA: ICD-10-CM

## 2021-09-23 DIAGNOSIS — Z90.13 H/O BILATERAL MASTECTOMY: ICD-10-CM

## 2021-09-23 DIAGNOSIS — Z85.3 HISTORY OF BREAST CANCER IN FEMALE: Primary | ICD-10-CM

## 2021-09-23 PROCEDURE — 99213 OFFICE O/P EST LOW 20 MIN: CPT | Performed by: NURSE PRACTITIONER

## 2021-09-23 NOTE — PROGRESS NOTES
HISTORY OF PRESENT ILLNESS  Lynsey Luis is a 63 y.o. female. HPI Established patient presents for follow-up to RIGHT breast cancer. Reports BILATERAL axillary swelling and tightness. Breast history -   Referring - Dr. Sim Quiroga  History of RIGHT breast cancer in 2012 - lumpectomy and xrt, took antihormonal pill for 3 years. RIGHT breast cancer recurred in 2015 - bilateral mastectomies. No reconstruction. Dr. Becky Graves was her breast surgeon and Dr. Shelby Betancur was her med onc. Switched to Dr. Sim Quiroga as she needed care through [facility] 79: T2N0 ER+ HER2 negative/Oncotype low risk x 2  Seeing lymphedema clinic      Family history -   - Sister diagnosed with breast cancer at age 54. - Maternal aunt diagnosed with breast cancer in her 57-65's. OB History    No obstetric history on file. Obstetric Comments   Menarche:  17. LMP: 30's. # of Children:  0. Age at Delivery of First Child:  n/a. Hysterectomy/oophorectomy:  YES/YES. Breast Bx:  yes. Hx of Breast Feeding:  no. BCP:  no. Hormone therapy:  no.                    Past Surgical History:   Procedure Laterality Date    HX BREAST LUMPECTOMY Right 2012    HX GYN      hysterectomy; TBL    HX HEENT      tonsillectomy    HX MASTECTOMY Bilateral 2015    HX ORTHOPAEDIC      right knee ACL    HX OTHER SURGICAL      lumptectomy right breast       ROS    Physical Exam  Constitutional:       Appearance: Normal appearance. Chest:      Breasts:         Right: Absent. Left: Absent. Comments: Chest wall s/p bilateral mastectomy with no reconstruction. Well healed incisions. Axillary fullness and tightness bilaterally. Lymphadenopathy:      Upper Body:      Right upper body: No supraclavicular or axillary adenopathy. Left upper body: No supraclavicular or axillary adenopathy. Neurological:      Mental Status: She is alert.    Psychiatric:         Attention and Perception: Attention normal.         Mood and Affect: Mood normal.         Speech: Speech normal.         Behavior: Behavior normal.         Visit Vitals  /61 (BP 1 Location: Left upper arm, BP Patient Position: Sitting, BP Cuff Size: Adult)   Pulse 94   Ht 5' 6\" (1.676 m)   Wt 230 lb (104.3 kg)   BMI 37.12 kg/m²         ASSESSMENT and PLAN  Encounter Diagnoses   Name Primary?  History of breast cancer in female Yes    H/O bilateral mastectomy     Lymphedema         Continued BILATERAL axillary swelling and tightness. She felt that it had improved and was manageable, but things have gotten worse since she had Covid in 5/2020 and the swelling worsened. She saw Dr. Murphy Yadav last year and no recurrence and continued lymphedema was noted. She is tearful and reports that she has not been motivated to do any treatment since she had Covid and is dealing with long-term symptoms of that. She is currently in the long Covid clinic at [facility]. Recommended that she explain her feelings which sound like situational depression when she see them on 9/30/21. No routine breast imaging - s/p bilateral mastectomy with no s/sx of recurrence. Refer to lymphedema clinic. Follow-up here PRN. She is comfortable with this plan. All questions answered and she stated understanding. Total time spent for this patient - 20 minutes.

## 2021-09-23 NOTE — PATIENT INSTRUCTIONS
Breast Self-Exam: Care Instructions  Your Care Instructions     A breast self-exam is when you check your breasts for lumps or changes. This regular exam helps you learn how your breasts normally look and feel. Most breast problems or changes are not because of cancer. Breast self-exam is not a substitute for a mammogram. Having regular breast exams by your doctor and regular mammograms improve your chances of finding any problems with your breasts. Some women set a time each month to do a step-by-step breast self-exam. Other women like a less formal system. They might look at their breasts as they brush their teeth, or feel their breasts once in a while in the shower. If you notice a change in your breast, tell your doctor. Follow-up care is a key part of your treatment and safety. Be sure to make and go to all appointments, and call your doctor if you are having problems. It's also a good idea to know your test results and keep a list of the medicines you take. How do you do a breast self-exam?  · The best time to examine your breasts is usually one week after your menstrual period begins. Your breasts should not be tender then. If you do not have periods, you might do your exam on a day of the month that is easy to remember. · To examine your breasts:  ? Remove all your clothes above the waist and lie down. When you are lying down, your breast tissue spreads evenly over your chest wall, which makes it easier to feel all your breast tissue. ? Use the pads--not the fingertips--of the 3 middle fingers of your left hand to check your right breast. Move your fingers slowly in small coin-sized circles that overlap. ? Use three levels of pressure to feel of all your breast tissue. Use light pressure to feel the tissue close to the skin surface. Use medium pressure to feel a little deeper. Use firm pressure to feel your tissue close to your breastbone and ribs.  Use each pressure level to feel your breast tissue before moving on to the next spot. ? Check your entire breast, moving up and down as if following a strip from the collarbone to the bra line, and from the armpit to the ribs. Repeat until you have covered the entire breast.  ? Repeat this procedure for your left breast, using the pads of the 3 middle fingers of your right hand. · To examine your breasts while in the shower:  ? Place one arm over your head and lightly soap your breast on that side. ? Using the pads of your fingers, gently move your hand over your breast (in the strip pattern described above), feeling carefully for any lumps or changes. ? Repeat for the other breast.  · Have your doctor inspect anything you notice to see if you need further testing. Where can you learn more? Go to http://www.gray.com/  Enter P148 in the search box to learn more about \"Breast Self-Exam: Care Instructions. \"  Current as of: December 17, 2020               Content Version: 13.0  © 2006-2021 [employer]. Care instructions adapted under license by [employer] (which disclaims liability or warranty for this information). If you have questions about a medical condition or this instruction, always ask your healthcare professional. [place] [address] any warranty or liability for your use of this information. Eating Healthy Foods: Care Instructions  Your Care Instructions     Eating healthy foods can help lower your risk for disease. Healthy food gives you energy and keeps your heart strong, your brain active, your muscles working, and your bones strong. A healthy diet includes a variety of foods from the basic food groups: grains, vegetables, fruits, milk and milk products, and meat and beans. Some people may eat more of their favorite foods from only one food group and, as a result, miss getting the nutrients they need.  So, it is important to pay attention not only to what you eat but also to what you are missing from your diet. You can eat a healthy, balanced diet by making a few small changes. Follow-up care is a key part of your treatment and safety. Be sure to make and go to all appointments, and call your doctor if you are having problems. It's also a good idea to know your test results and keep a list of the medicines you take. How can you care for yourself at home? Look at what you eat  · Keep a food diary for a week or two and record everything you eat or drink. Track the number of servings you eat from each food group. · For a balanced diet every day, eat a variety of:  ? 6 or more ounce-equivalents of grains, such as cereals, breads, crackers, rice, or pasta, every day. An ounce-equivalent is 1 slice of bread, 1 cup of ready-to-eat cereal, or ½ cup of cooked rice, cooked pasta, or cooked cereal.  ? 2½ cups of vegetables, especially:  § Dark-green vegetables such as broccoli and spinach. § Orange vegetables such as carrots and sweet potatoes. § Dry beans (such as tolbert and kidney beans) and peas (such as lentils). ? 2 cups of fresh, frozen, or canned fruit. A small apple or 1 banana or orange equals 1 cup. ? 3 cups of nonfat or low-fat milk, yogurt, or other milk products. ? 5½ ounces of meat and beans, such as chicken, fish, lean meat, beans, nuts, and seeds. One egg, 1 tablespoon of peanut butter, ½ ounce nuts or seeds, or ¼ cup of cooked beans equals 1 ounce of meat. · Learn how to read food labels for serving sizes and ingredients. Fast-food and convenience-food meals often contain few or no fruits or vegetables. Make sure you eat some fruits and vegetables to make the meal more nutritious. · Look at your food diary. For each food group, add up what you have eaten and then divide the total by the number of days. This will give you an idea of how much you are eating from each food group. See if you can find some ways to change your diet to make it more healthy.   Start small  · Do not try to make dramatic changes to your diet all at once. You might feel that you are missing out on your favorite foods and then be more likely to fail. · Start slowly, and gradually change your habits. Try some of the following:  ? Use whole wheat bread instead of white bread. ? Use nonfat or low-fat milk instead of whole milk. ? Eat brown rice instead of white rice, and eat whole wheat pasta instead of white-flour pasta. ? Try low-fat cheeses and low-fat yogurt. ? Add more fruits and vegetables to meals and have them for snacks. ? Add lettuce, tomato, cucumber, and onion to sandwiches. ? Add fruit to yogurt and cereal.  Enjoy food  · You can still eat your favorite foods. You just may need to eat less of them. If your favorite foods are high in fat, salt, and sugar, limit how often you eat them, but do not cut them out entirely. · Eat a wide variety of foods. Make healthy choices when eating out  · The type of restaurant you choose can help you make healthy choices. Even fast-food chains are now offering more low-fat or healthier choices on the menu. · Choose smaller portions, or take half of your meal home. · When eating out, try:  ? A veggie pizza with a whole wheat crust or grilled chicken (instead of sausage or pepperoni). ? Pasta with roasted vegetables, grilled chicken, or marinara sauce instead of cream sauce. ? A vegetable wrap or grilled chicken wrap. ? Broiled or poached food instead of fried or breaded items. Make healthy choices easy  · Buy packaged, prewashed, ready-to-eat fresh vegetables and fruits, such as baby carrots, salad mixes, and chopped or shredded broccoli and cauliflower. · Buy packaged, presliced fruits, such as melon or pineapple. · Choose 100% fruit or vegetable juice instead of soda. Limit juice intake to 4 to 6 oz (½ to ¾ cup) a day. · Blend low-fat yogurt, fruit juice, and canned or frozen fruit to make a smoothie for breakfast or a snack.   Where can you learn more? Go to http://www.PacketSled.com/  Enter T756 in the search box to learn more about \"Eating Healthy Foods: Care Instructions. \"  Current as of: December 17, 2020               Content Version: 13.0  © 9647-1633 [employer]. Care instructions adapted under license by [employer] (which disclaims liability or warranty for this information). If you have questions about a medical condition or this instruction, always ask your healthcare professional. [place] [address] any warranty or liability for your use of this information.

## 2021-09-28 ENCOUNTER — TELEPHONE (OUTPATIENT)
Dept: ONCOLOGY | Age: 61
End: 2021-09-28

## 2021-10-12 ENCOUNTER — DOCUMENTATION ONLY (OUTPATIENT)
Dept: INTERNAL MEDICINE CLINIC | Age: 61
End: 2021-10-12

## 2021-10-13 ENCOUNTER — HOSPITAL ENCOUNTER (OUTPATIENT)
Dept: PHYSICAL THERAPY | Age: 61
Discharge: HOME | End: 2021-10-13
Payer: MEDICAID

## 2021-10-13 ENCOUNTER — DOCUMENTATION ONLY (OUTPATIENT)
Dept: SURGERY | Age: 61
End: 2021-10-13

## 2021-10-13 VITALS — BODY MASS INDEX: 34.91 KG/M2 | HEIGHT: 67 IN | WEIGHT: 222.4 LBS

## 2021-10-13 PROCEDURE — 97162 PT EVAL MOD COMPLEX 30 MIN: CPT

## 2021-10-13 NOTE — PROGRESS NOTES
[facility]  Lymphedema Clinic  [address] [place] [address], [address], [facility] [address].    INITIAL EVALUATION    NAME: Omid Nguyen AGE: 63 y.o. GENDER: female  DATE: 10/13/2021  REFERRING PHYSICIAN: Marin La NP  HISTORY AND BACKGROUND:   Primary Diagnosis:  ? 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 Swelling not relieved by elevation (R60.9)  ? Lack of coordination (R27.9)  ? Personal History of Malignant Neoplasm of Breast (Z85.3)  ? Pain in Unspecified Upper Arm (M79.629)  ? Other Chest Pain (R07.89)  Date of Onset: 2/19/2018 - patient is returning today for evaluation. Present Symptoms and Functional Limitations: Patient reports having swelling and tightness in the trunk and axillary region bilaterally ever since having surgery for R breast cancer. Patient had a R breast lumpectomy with radiation treatments in 2012 and then bilateral mastectomies without reconstruction in 2015 for recurrent R breast cancer. Patient was first seen in our clinic in 2018 for education in a home program and to obtain compression products and a vaso-pneumatic device for management of swelling. The swelling and tightness initially improved but then increased after having the COVID 19 virus May 2020. She has been referred to Pulmonology and has an appointment with Cardiology today for continual fatigue and shortness of breath.    Past Medical History:   Past Medical History:   Diagnosis Date    Arthritis     Cancer ([facility] [address]     breast cancer    H/O total mastectomy 11/30/2015    Hypercholesterolemia     Other ill-defined conditions(799.89)     completed radiation 9/12    Thyroid disease     hypothyroidism     Past Surgical History:   Procedure Laterality Date    HX BREAST LUMPECTOMY Right 2012    HX GYN      hysterectomy; TBL    HX HEENT      tonsillectomy    HX MASTECTOMY Bilateral 2015    HX ORTHOPAEDIC      right knee ACL    HX OTHER SURGICAL      lumptectomy right breast     Current Medications:    Current Outpatient Medications   Medication Sig    metFORMIN ER (GLUCOPHAGE XR) 500 mg tablet Take 1 Tablet by mouth daily (with dinner).  ergocalciferol (ERGOCALCIFEROL) 1,250 mcg (50,000 unit) capsule Take 1 Capsule by mouth two (2) times a week.  fluticasone propion-salmeteroL (Advair Diskus) 250-50 mcg/dose diskus inhaler Take 1 Puff by inhalation two (2) times a day.  montelukast (SINGULAIR) 10 mg tablet TAKE 1 TABLET BY MOUTH AT BEDTIME    spironolactone (ALDACTONE) 25 mg tablet TAKE 1 TABLET BY MOUTH EVERY DAY    cyclobenzaprine (FLEXERIL) 10 mg tablet Take 1 Tablet by mouth nightly.  gabapentin (NEURONTIN) 400 mg capsule Take one capsule in am and two capsules in pm    phentermine (ADIPEX-P) 37.5 mg tablet Take 1 Tablet by mouth every morning. Max Daily Amount: 37.5 mg.    indomethacin (INDOCIN) 50 mg capsule Take 1 Capsule by mouth three (3) times daily. As needed for gout flares    allopurinoL (ZYLOPRIM) 100 mg tablet Take 2 Tabs by mouth daily.  atorvastatin (LIPITOR) 40 mg tablet TAKE 1 TABLET BY MOUTH EVERY DAY    ascorbic acid (VITAMIN C PO) Take  by mouth.  MASTECTOMY PROSTHESIS misc B/l mastectomy / breast cancer    fluticasone propionate (FLONASE) 50 mcg/actuation nasal spray 2 Sprays by Both Nostrils route daily.  albuterol (PROVENTIL HFA, VENTOLIN HFA, PROAIR HFA) 90 mcg/actuation inhaler Take 2 Puffs by inhalation every six (6) hours as needed for Wheezing, Shortness of Breath, Respiratory Distress or Cough.  polyethylene glycol (MIRALAX) 17 gram packet Take 1 Packet by mouth daily. Mix in 8 oz of water, juice, soda, coffee, or tea prior to administration    levothyroxine (SYNTHROID) 125 mcg tablet Take 250 mcg by mouth Daily (before breakfast).  cholecalciferol (VITAMIN D3) 1,000 unit cap Take  by mouth daily. No current facility-administered medications for this encounter. Allergies: Allergies   Allergen Reactions    Penicillins Hives     Pt states I am not allergic to penicillin. 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 She reports that she has an appointment with Cardiology later today. Living Situation: Lives alone in a ranch house with 4 steps to enter. Trainable Caregiver?: No  Mobility: Independent gait without any assistive device for community distances, rest breaks as needed due to impaired endurance. Sleeping Arrangement:  in bed at night  Adaptive Equipment Owned: none  Other: Patient is independent with management of compression products. Previous Therapy:  [facility] in the past and then in our clinic in 2018 and 2020. Compression/Lymphedema Equipment: Flexitouch Vasopneumatic Pump, Juzo Soft arm sleeve and gauntlet, Peterson Hernán bilateral mastectomy pad, Optiflow T pad, Wear Ease Compression Shirt, Custom Peterson Hernán vest with outer jacket. SUBJECTIVE:   Patient reports continual swelling and tightness in the chest and axillas. She wears compression products as tolerated and will massage the area but it does not seem to improve. She has started a pill to help her lose weight and it seems to be helping. She reports that she has an appointment with Cardiology this afternoon for tightness in the chest and shortness of breath. Patients goals for therapy: decrease swelling and tightness in the anterior chest and axillary region. OBJECTIVE DATA SUMMARY:   EXAMINATION/PRESENTATION/DECISION MAKING:   Pain:   Patient reports generalized pain in the knees and axillary regions bilaterally.      Self-Care and ADLs: Modified independent with increased time as needed. Skin and Tissue Assessment:  Dermal Status: Intact and scars s/p bilateral mastectomies. Texture/Consistency: Boggy R > L axilla, fibrotic/woody R > L anterior chest.    Pigmentation/Color Change: Normal    Anomalies: N/A    Circulatory: No history of DVT     Nails: Normal    Stemmers Sign: Negative    Height:  Height: 5' 7\" (170.2 cm)  Weight:  Weight: 100.9 kg (222 lb 6.4 oz)   BMI:  BMI (calculated): 34.8  (36 or greater: adversely affecting lymphedema)  Wound/Ulcer:  None        Volumetric Measurements:   Right:  2,824.42 mL (loss of 141.93 ml since 12/8/20) Left:  2,782.05 mL (loss of 69.99 ml since 12/8/20)   % Difference: 1.52% R UE > L U E Dominance: right hand dominant   Girth measurements of the trunk:  Axilla: 113.0 cm today compared to 111.0 cm on 12/8/20  Xiphoid: 106.0 cm today compared to 107.3 cm on 12/8/20  Waist: 118.0 cm today compared to 128.0 cm on 12/8/20  Hips: 126.0 cm today compared to 125.0 cm on 12/8/20    Range of Motion: within functional limits all extremities   Strength: Not formally assessed 2* patient is able to complete all functional activities in the clinic today. Sensation:  Impaired - patient reports decreased sensation anterior chest and axillary region bilaterally. Mobility:    Bed/Chair Mobility: Modified independent  Transfers: Modified independent  Gait: Independent for community distances. Sitting rest breaks as needed. Endurance: Impaired - dyspnea with exertion. Patient is visibly short of breath upon arrival to the clinic with 5 minute recovery period.  O2 sats 97% room air, heart rate 89 bpm.     Safety:  Patient is alert and oriented: Yes  Safety awareness: Appears intact   Fall risk?: Yes  Patient given written fall prevention handout: Yes    Based on the above components, the patient evaluation is determined to be of the following complexity level: MEDIUM (Patient is seen for evaluation only)    Evaluation Time: 7:55-8:50 am: Patient's home program was discussed in detail. She is performing daily skin care and is performing \"massage\" to the anterior chest. She is performing stretches and exercises several days per week that she learned during previous therapy in our clinic. She attempts to remain active but endurance and WOODARD tends to limit participation in an exercise program. She reports wearing truncal and UE compression products sporadically for management of swelling. The arm sleeves are greater than one year old and will slip down the arms on occasion. She reports the custom Peterson Hernán vest is comfortable but too hot and does not target the swelling in the axillary regions. She will position the Optiflow T pad in the axillary region but it is too large and bulky. She has deferred using the Flexitouch Vaso-pneumatic device ever since having COVID 19 virus and will need to discuss pump use with Cardiology prior to resuming use of the pump. Therapeutic exercise time: 8:50-8:55 am  Treatment Time: 5 minutes  Continued education in the sidelying pectoralis stretch and the butterfly stretch with patient able to demonstrate with good technique during the visit today. ASSESSMENT:   Beronica Duncan is a 63 y.o. female who presents with secondary truncal lymphedema and tightness/adhesions following diagnosis and treatment for recurrent R breast cancer. The symptoms have worsened ever since having the COVID 19 virus. She reports also having long lasting effects from the virus which are affecting her ability to perform ADL's and work. She is being followed by Pulmonology and is scheduled to see Cardiology today. Patient is motivated to improve her condition and will benefit from complete decongestive therapy (CDT) including: Manual lymphatic drainage (MLD) technique, Short-stretch textile bandages/compression system to decongest limb and Kinesiotaping as appropriate.       This care is medically necessary due to the infection risk with lymphedema and to improve functional activities. CDT is necessary to resolve swelling to allow patient to return to wearing normal clothes/footwear and prevent worsening of symptoms, such as infections and/or hospitalizations. Patient will be independent with home program strategies to allow improved ADL ability and mobility and to allow patient to return to greatest functional independence. Rehabilitation potential is considered to be Fair. Factors which may influence rehabilitation potential include prolonged swelling, chronic pain, impaired endurance, and scarring/adhesions. Patient will benefit from 4 to 8 physical therapy visits over 12 weeks to optimize improvement in these areas. PLAN OF CARE:   Recommendations and Planned Interventions: Manual lymph drainage/decongestive therapy, Compression garment fitting/provision, Lymphedema therapeutic exercise, AROM/PROM/Strength/Coordination, Self-care training, Functional mobility training, Education in skin care and lymphedema precautions, Self-MLD education per home program, Caregiver education as needed and Would care as needed    GOALS  Short term goals  Time frame: to be met by 11/17/2021  1. Patient will demonstrate knowledge of signs/symptoms of infections/cellulitis and be independent in skin care to prevent cellulitis. 2.  Patient will demonstrate independence in lymphedema home program of therapeutic exercises to improve circulation and decongest limb to improve ADLs. 3.  Patient will participate in the selection process to allow ordering of home compression system (daytime, nighttime garments and pump as needed). Long term goals  Time frame: to be met by 1/8/22  1. Patient will be independent with don/doff of compression system and use in order to prevent reaccumulation of fluid at discharge.   2.  Pt will be independent in self-MLD and show stable limb volumes and improvement in girth measurements of the trunk showing decongestion and pt. ready for transition to independent restorative phase of lymphedema therapy. Patient has participated in goal setting and agrees to work toward plan of care. Patient was instructed to call if questions or concerns arise. Thank you for this referral.  Makayla Eastman, PT, CLT     Total timed codes: 5  1:1 Treatment time: 5    TREATMENT PLAN EFFECTIVE DATES:   10/13/2021 TO 1/8/2022  I have read the above plan of care for Madeline Adorno. I certify the above prescribed services are required by this patient and are medically necessary.   The above plan of care has been developed in conjunction with the lymphedema/physical therapist.       Physician Signature: ____________________________________Date:______________       Patricia Lombardo NP

## 2021-10-13 NOTE — PROGRESS NOTES
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 []   I87.2 Venous Insufficiency   []   I89.0 Lymphedema, other secondary  []   I83.019 Venous Stasis Ulcer LE, Right   []   I89.9 Unspecified Lymphatic Disorder []   I83.029 Venous Stasis Ulcer LE, Left   [x]   R60.9 Swelling not relieved by elevation []   Q82.0 Hereditary/ Congenital Lymphedema   [x]   Z85.3 Personal history of malignant neoplasm of the breast     []   C50.212 Malignant neoplasm of breast, Left []   L03.115 LE Cellulitis, Right   []    []   L03.116 LE Cellulitis, Left                         Recommended Product for Diagnosis as noted above:  Units Upper Extremity Rt Lt Units Lower Extremity Rt Lt    Compression Multi-Layered Bandages    Compression Multi-Layered Bandages      Circ-Aid, Ready Wrap, Sigvaris Arm    Inelastic binders (Circ-Aid, Farrow)  []Foot   []Below Knee   []Knee   []Thigh      Circ-Aid Ready Wrap, Sigvaris hand    Peterson Hernán, Leg, night use      Tribute Arm, night use    Tribute Leg, night use      Peterson Hernán Arm, night use    Jayant Sleeve Leg/ Foot, night use      Vasopneumatic Compression Pump  []Arm []Arm w/Shoulder  []Arm w/Vest    Vasopneumatic Compression Pump  []Leg         []Trunk       []Pant     2 Gradiant Compression Sleeves & Gloves  Custom/ RM Arm:  [x]CCL1 [x]CCL2 []CCL3  Custom/ RM Glove: [x]CCL1 [x]CCL2 []CCL3      Gradient Compression Stockings  Custom/ RM Lower Extremity:   []CCL1       []CCL2         []CCL3   []Knee      []Thigh        []Full Length      Jayant sleeve arm w/ hand, night use    Tribute Wrap, night use     2 Compression Bra/shirt    Compression Vest     1 Solaris Swell spot/foam padding    Compression Multi-Layered Bandages         The above patient was referred for treatment of Lymphedema due to the diagnosis indicated above. Lymphedema is a progressive and chronic condition. It may cause acute infections, muscle atrophy, discomfort, and connective tissue fibrosis with irreversible tissue damage, decreased ROM in the affected limb and diminished functional mobility possibly interfering with independence and ability to work. Recurrent infections and wound complications that commonly occur with Lymphedema often require hospitalization and extensive wound care, thus increasing medical costs. The patient has received complete decongestive therapy which includes manual lymphatic drainage, lymphedema specific exercises, compression bandaging, and hygiene/skin care. Goals of therapy are to reduce the edema and prevent re-accumulation of fluid with its complications. It is medically necessary for this patient to have daytime garments to control this condition. They will need 2 sets (one set to wear and one set to wash for adequate skin care and wearing time). Garments must be replaced every 4-6 months for effectiveness. There are no substitutes available that offer acceptable compression treatment for this Lymphedema patient. If further information is requested, please contact our certified lymphedema therapists at [phone].     [] Akosua Eng, PT, MSPT, CLT-SCOOTER [] Leona Opitz, MS, OTR/L, CLT [x]  Dalia Nicholas, PT, CLT [] Shanita Maloney, PTA, CLT, CSIS    Printed MD Name  MD Signature  Date

## 2021-10-13 NOTE — PROGRESS NOTES
Faxed signed Therapy letter of medical necessity and Therapy plan of care to [facility] Lymphedema clinic. Confirmatio received.

## 2021-10-15 RX ORDER — INDOMETHACIN 50 MG/1
50 CAPSULE ORAL 3 TIMES DAILY
Qty: 30 CAPSULE | Refills: 1 | Status: SHIPPED | OUTPATIENT
Start: 2021-10-15 | End: 2021-11-12

## 2021-10-27 ENCOUNTER — HOSPITAL ENCOUNTER (OUTPATIENT)
Dept: PHYSICAL THERAPY | Age: 61
Discharge: HOME | End: 2021-10-27
Payer: MEDICAID

## 2021-10-27 PROCEDURE — 97110 THERAPEUTIC EXERCISES: CPT

## 2021-10-27 PROCEDURE — 97140 MANUAL THERAPY 1/> REGIONS: CPT

## 2021-11-05 ENCOUNTER — DOCUMENTATION ONLY (OUTPATIENT)
Dept: SURGERY | Age: 61
End: 2021-11-05

## 2021-11-05 NOTE — PROGRESS NOTES
Faxed office notes and signed certificate of medical necessity to [facility] [phone]. Confirmation received.

## 2021-11-12 RX ORDER — INDOMETHACIN 50 MG/1
CAPSULE ORAL
Qty: 30 CAPSULE | Refills: 1 | Status: SHIPPED | OUTPATIENT
Start: 2021-11-12 | End: 2022-05-26

## 2021-11-30 ENCOUNTER — HOSPITAL ENCOUNTER (OUTPATIENT)
Dept: PHYSICAL THERAPY | Age: 61
Discharge: HOME | End: 2021-11-30
Payer: MEDICAID

## 2021-11-30 PROCEDURE — 97140 MANUAL THERAPY 1/> REGIONS: CPT

## 2021-11-30 NOTE — PROGRESS NOTES
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 Any medication changes, allergies to medications, adverse drug reactions, diagnosis change, or new procedure performed?: [] No    [x] Yes (see summary sheet for update) - Patient reports having an appointment/testing with Cardiology for persistent fatigue and shortness of breath. Patient reports that all test results were unremarkable. Subjective functional status/changes:   [] No changes reported    Patient is upset during the visit today. She had a good friend pass away yesterday but did not want to cancel the visit. She is hoping the visit can focus of fitting of compression products. OBJECTIVE    0 min Therapeutic Exercise:  [] Arshida Georgiana Exercises [] Remedial Lymphedema Exercise Program [x] Axillary Web Exercise Program - sidelying pectoralis stretch, shoulder flexion, neural tension stretch, butterfly stretch     [x] Shoulder ROM Exercises - active assisted exercise/stretching and over the door pulley exercises. Rationale: Activate muscle pump to improve lymphatic fluid movement and decrease swelling to improve the patients ability to perform ADL and IADL skills and prevent worsening of swelling over time.     30 min Manual Therapy    Kinesiotaping: Deferred today       Manual Lymphatic Drainage (MLD):  Area to decongest: B UE and trunk      Sequence used and effectiveness: Deferred today due to patient request.    Skin/wound care/debridement: Reviewed skin care principles:   Performed skin care with low pH lotion following manual lymph principles. Skin care products  Prevention of cellulitis   Upper/Lower Extremity Compression: Patient arrived to the clinic wearing the following compression product. The bra with knitted knockers fits well and is comfortable to the patient. She is interested in ordering a second compression product for wash and wear and the order was submitted to the vendor. Brand: [employer] Bra size XL in the color black    Vendor: [employer]    Education: Educated patient in compression garment donning and doffing. Daily wear schedule. Daily laundering. Garment lifespan. Return and reordering process (by bringing prior garments into the clinic). Educated patient to monitor for redness or pressure points on the skin. If new pain occurs they should contact their therapist.    Provided patient with a chip bag during a previous visit and she is independent in use of the product. Patient wears a Juzo soft arm sleeve and hand piece with high risk activities. She is not interested in obtaining additional products at this time. Discussed the life expectancy of compression products and how they lose their effectiveness over time. Rationale: increase ROM, increase tissue extensibility and decrease edema  to improve the patients ability to manage truncal swelling and tightness during the restorative phase of care. 0   Vasopneumatic Device:   Deferred at this time due to persistent shortness of breath and fatigue following COVID 19 virus. Patient will need to be cleared by Cardiology prior to resuming use of the pump and will discuss with the MD at her next appointment. Rationale: To improve lymphatic fluid movement and decrease swelling to improve the patients ability to perform ADL and IADL skills and prevent worsening of swelling over time.           With   [] TE   [] TA   [x] MT   [] SC   [] other: Patient Education: [x] Review HEP/stretches - encouraged daily exercise  [x] MLD Patient Education   [] Progressed/Changed HEP based on:   [x] positioning   [] Kinesiotape   [x] Skin care   [] wound care   [x] other: fitting of compression products, chip bag/optiflow T pad for axillary swelling. Patient / caregiver re-demonstrated bandaging. [] Yes  [] No  Compression bandaging/garment precautions reviewed: [x] Yes  [] No     Other Objective/Functional Measures: Full UE volumetric measurements taken today reveal a gain of 104 ml in the involved extremity and a gain of 195 ml in the uninvolved extremity since last visit 10/13/21. Percentage difference is 1.63% L UE > R UE. Weight: 227.6 lb this visit, 222.4 lb last visit 10/13/21    Pain Level (0-10 scale) post treatment: No complaints of pain. ASSESSMENT/Changes in Function:  Patient has not been seen in the clinic since 10/13/21 due to delay in receiving compression products and a cancelled visit. The visit today focused on compression needs and fitting of compression products. The [employer] compression bra fits well and a second bra will be ordered today. Patient has older Vide[employer] products which she wears with high risk activities for management of UE swelling. She is not interested in replacing those products at this time. She remains rather sedentary due to chronic fatigue and shortness of breath ever since having the COVID 19 virus. She will continue to defer using the vaso-pneumatic device for management of UE and truncal swelling until cleared by Cardiology. She has met all goals set on evaluation and will continue with her home program to the best of her ability. She will be discharged to the restorative phase of care will follow up in 6 to 12 months or sooner if needed. Progress towards goals / Updated goals:  Short term goals  Time frame: to be met by 11/17/2021, goals extended to be met by 1/8/21 due to cancelled visits and delay in receiving compression products.    1.  Patient will demonstrate knowledge of signs/symptoms of infections/cellulitis and be independent in skin care to prevent cellulitis. Goal met 10/27/21  2. Patient will demonstrate independence in lymphedema home program of therapeutic exercises to improve circulation and decongest limb to improve ADLs. Patient has been instructed in performing the remedial lymphedema exercises and the [facility] Axillary Web Exercise Program daily as tolerated. Goal met 11/30/21  3. Patient will participate in the selection process to allow ordering of home compression system (daytime, nighttime garments and pump as needed). Patient will defer using the vaso-pneumatic device until cleared by Cardiology. Patient received the [facility] I[employer] compression bra and is ready to order a second one for wash and wear. Patient is not interested in obtaining any additional compression products at this time. Goal met 11/30/21.     Long term goals  Time frame: to be met by 1/8/21  1. Patient will be independent with don/doff of compression system and use in order to prevent reaccumulation of fluid at discharge. Goal met 11/30/21  2. Pt will be independent in self-MLD and show stable limb volumes and improvement in girth measurements of the trunk showing decongestion and pt. ready for transition to independent restorative phase of lymphedema therapy. Patient has been educated in self MLD packet. Full UE volumes taken today reveal a gain in volume in the arms bilaterally since last visit 10/13/21 which is most likely due to weight gain of 5 lbs. Percentage difference remains low at 1.63% L UE > R  UE. Goal met 11/30/21.     PLAN  [x]  Upgrade activities as tolerated     []  Continue plan of care  []  Update interventions per flow sheet       [x]  Discharge due to:   []  Other:_      Leodan Saeed, PT, CLT 11/30/2021

## 2021-12-06 NOTE — DISCHARGE SUMMARY
Discharge Summary       PATIENT ID: Elana Avila  MRN: [id number]   YOB: 1960    DATE OF ADMISSION: 5/25/2020  9:16 AM    DATE OF DISCHARGE: 06/08/20   PRIMARY CARE PROVIDER: Florencia Fothergill, MD     ATTENDING PHYSICIAN: Christal Thomas MD  DISCHARGING PROVIDER: Christal Thomas MD    To contact this individual call [phone] and ask the  to page. If unavailable ask to be transferred the Adult Hospitalist Department. CONSULTATIONS: IP CONSULT TO PULMONOLOGY  IP CONSULT TO INFECTIOUS DISEASES    PROCEDURES/SURGERIES: * No surgery found *    ADMITTING DIAGNOSES & HOSPITAL COURSE:   # Acute hypoxic respiratory failure, improved. # COVID 19 infection, s/p Actermra and Remdesivir, completed. Improved  # Leukocytosis  # Hypothyroidism. TSH 13. Recommend recheck with PCP in 1 week. # Obesity, life style modifications  # Dehydration, resolved  # Abnormal LFT on admission, resolved. Likely related to infection    Admission Summary:      Chelo Stark a 64 y. o. female who presents with COVID pos+ from [address] [place] [address]. Pt said she had a test 2 days back and came back positive. She became SOB and went to ED.  Patient states that for the last 3 days she has not felt well.  She has had some body aches and nausea and fatigue.  She is also had cough.  Over the last 24 hours she started to have shortness of breath.  She comes in for evaluation of these complaints and concern for coronavirus given her workplace exposure.  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      Interval history / Subjective:   Feeling well. Energy level is improving. No fever, chills. Off O2 NC. Sats well at rest and on exertion. Home oxygen evaluation done by nursing team. No further needs. No overnight events or concerns. DISCHARGE DIAGNOSES / PLAN:      Acute respiratory failure, improved.  Off O2 NC now.   -Acute hypoxic respiratory failure secondary to Rx Refill Note  Requested Prescriptions     Pending Prescriptions Disp Refills   • sertraline (ZOLOFT) 100 MG tablet [Pharmacy Med Name: SERTRALINE 100MG] 30 tablet 0     Sig: TAKE 1 TABLET BY MOUTH DAILY      Last office visit with prescribing clinician: 5/10/21  Next office visit with prescribing clinician: Visit date not found            Kamini Zhou MA  12/06/21, 07:30 CST      Past due CPE   viral pneumonia  -COVID 19 positive as outpatient  -Pulmonology following  -Home oxygen eval completed. Not needing further O2 at rest or exertion. -DC home today.     COVID 19 infection  -Droplet plus precaution  -Continue vitamin C and azithromycin  -Anticoagulation per protocol  -Completed Remdesivir  -Actemra one dose 5/29  -On steroid, continue weaning -  On slow taper 5 more doses  -ID and pulmonology following. Appreciate  -No JUSTIN therapy per Pulm now with improving recovery     Leukocytosis  -Likely from steroid use  -CBC in 1 week with PCP follow up.     Hypothyroidism  -TSH elevated  -Continue Synthroid  -Follow TSH level as outpatient in 1 month for further adjustment     Obesity  -Patient with body mass index of 37.66 kg/m²  -Outpatient follow-up for weight management     Dehydration  -Monitor renal function     Abnormal LFT's, normalized  -Likely from infectious process  -Monitor liver function  -consider USG as out pt further worsening  -CMP monitoring in 1 week with PCP f/up     Code status: We discussed CODE STATUS with the patient. She is okay with intubation but no other resuscitative efforts.     DVT prophylaxis: Lovenox     Care Plan discussed with: Patient/Family and Nurse  Anticipated Disposition: Home  Anticipated Discharge: DC home today     ADDITIONAL CARE RECOMMENDATIONS:   F/up with PCP within 1 week for CBC, CMP  F/up with Pulmonology in 3-5 days  · It is important that you take the medication exactly as they are prescribed. · Keep your medication in the bottles provided by the pharmacist and keep a list of the medication names, dosages, and times to be taken in your wallet. · Do not take other medications without consulting your doctor. · No drinking alcohol or driving car or operating machinery if you are on narcotic pain medications. Donot take sedating mediations if you are sleepy or confused.    · Fall Precautions  · Keep Well Hydrated  · Report to your medical provider if you feel you have  developed allergies to medications  · Follow up with your PCP or Consultant for medication adjustments and refills  · Monitor for signs of fevers,chills,bleeding,chest pain and seek medical attention if you do so. PENDING TEST RESULTS:   At the time of discharge the following test results are still pending: none    FOLLOW UP APPOINTMENTS:    Follow-up Information     Follow up With Specialties Details Why Contact Info    Florencia Fothergill, MD Internal Medicine On 6/12/2020 Hospital follow up PCP virtual appointment Friday, 6/12/20 @ 2:00 p.m.  [address] [facility] [address]  [phone]      Zeinab Whitehead PA-C Pulmonary Disease In 3 days As needed, If symptoms worsen and for post hospital follow up [address]  [phone]               DIET: Regular Diet    ACTIVITY: Activity as tolerated    WOUND CARE: NA    EQUIPMENT needed: NA      DISCHARGE MEDICATIONS:  Current Discharge Medication List      START taking these medications    Details   acetaminophen (TYLENOL) 325 mg tablet Take 2 Tabs by mouth every six (6) hours as needed for Pain or Fever. Qty: 30 Tab, Refills: 0      albuterol (PROVENTIL HFA, VENTOLIN HFA, PROAIR HFA) 90 mcg/actuation inhaler Take 2 Puffs by inhalation every six (6) hours as needed for Wheezing, Shortness of Breath, Respiratory Distress or Cough. Qty: 1 Inhaler, Refills: 0      ascorbic acid, vitamin C, (VITAMIN C) 500 mg tablet Take 1 Tab by mouth two (2) times a day. Qty: 30 Tab, Refills: 0      docusate sodium (COLACE) 100 mg capsule Take 1 Cap by mouth daily for 30 days. Give with a full glass of water. Hold for diarrhea. Qty: 30 Cap, Refills: 0      pantoprazole (PROTONIX) 40 mg tablet Take 1 Tab by mouth two (2) times a day. Do not crush, break or chew. Swallow whole.   Qty: 30 Tab, Refills: 0      polyethylene glycol (MIRALAX) 17 gram packet Take 1 Packet by mouth daily. Mix in 8 oz of water, juice, soda, coffee, or tea prior to administration  Qty: 10 Each, Refills: 0      predniSONE (DELTASONE) 10 mg tablet Take 20 mg by mouth daily (with breakfast) for 2 days, THEN 10 mg daily (with breakfast) for 2 days. Give with food  Qty: 6 Tab, Refills: 0      zinc sulfate (ZINCATE) 220 (50) mg capsule Take 1 Cap by mouth daily. Qty: 30 Cap, Refills: 0         CONTINUE these medications which have CHANGED    Details   fluticasone propionate (FLONASE) 50 mcg/actuation nasal spray 2 Sprays by Both Nostrils route daily. Qty: 3 Bottle, Refills: 3    Associated Diagnoses: Acute rhinitis         CONTINUE these medications which have NOT CHANGED    Details   allopurinoL (ZYLOPRIM) 100 mg tablet Take 100 mg by mouth daily. gabapentin (NEURONTIN) 400 mg capsule Take 400 mg by mouth three (3) times daily as needed. levothyroxine (SYNTHROID) 125 mcg tablet Take 250 mcg by mouth Daily (before breakfast). cholecalciferol (VITAMIN D3) 1,000 unit cap Take  by mouth daily. NOTIFY YOUR PHYSICIAN FOR ANY OF THE FOLLOWING:   Fever over 101 degrees for 24 hours. Chest pain, shortness of breath, fever, chills, nausea, vomiting, diarrhea, change in mentation, falling, weakness, bleeding. Severe pain or pain not relieved by medications. Or, any other signs or symptoms that you may have questions about. DISPOSITION:   x Home With:   OT  PT  HH  RN       Long term SNF/Inpatient Rehab    Independent/assisted living    Hospice    Other:       PATIENT CONDITION AT DISCHARGE:     Functional status    Poor     Deconditioned    x Independent      Cognition   x  Lucid     Forgetful     Dementia      Catheters/lines (plus indication)    Alegre     PICC     PEG    x None      Code status   x  Full code     DNR      PHYSICAL EXAMINATION AT DISCHARGE:  Constitutional:  No acute distress, cooperative, pleasant    ENT:  Oral mucosa moist, oropharynx benign.     Resp:  CTA bilaterally. No wheezing/rhonchi/rales. No accessory muscle use   CV:  Regular rhythm, normal rate, no murmurs, gallops, rubs    GI:  Soft, non distended, non tender. normoactive bowel sounds, no hepatosplenomegaly     Musculoskeletal:  No edema, warm, 2+ pulses throughout    Neurologic:  Moves all extremities.   AAOx3, CN II-XII reviewed                         Skin:  Good turgor, no rashes or ulcers      CHRONIC MEDICAL DIAGNOSES:  Problem List as of 6/8/2020 Date Reviewed: 11/13/2019          Codes Class Noted - Resolved    Hypoxia ICD-10-CM: R09.02  ICD-9-CM: 799.02  5/25/2020 - Present        COVID-19 ICD-10-CM: U07.1  ICD-9-CM: 079.89  5/25/2020 - Present        Severe obesity ([facility] [address] ICD-10-CM: E66.01  ICD-9-CM: 278.01  11/11/2019 - Present        Costochondritis ICD-10-CM: M94.0  ICD-9-CM: 733.6  10/9/2018 - Present        BMI 33.0-33.9,adult ICD-10-CM: S97.09  ICD-9-CM: V85.33  9/14/2018 - Present        Chest wall pain following surgery ICD-10-CM: R07.89, G89.18  ICD-9-CM: 786.59, 338.18  2/9/2018 - Present        Chest wall pain, chronic ICD-10-CM: R07.89, G89.29  ICD-9-CM: 786.52, 338.29  2/9/2018 - Present        H/O mastectomy, bilateral ICD-10-CM: Z90.13  ICD-9-CM: V45.71  2/9/2018 - Present        Vitamin D deficiency ICD-10-CM: E55.9  ICD-9-CM: 268.9  2/21/2017 - Present        Lymphedema ICD-10-CM: I89.0  ICD-9-CM: 457.1  11/9/2016 - Present        Hypothyroidism due to acquired atrophy of thyroid ICD-10-CM: E03.4  ICD-9-CM: 244.8, 246.8  5/14/2015 - Present        HLD (hyperlipidemia) ICD-10-CM: E78.5  ICD-9-CM: 272.4  5/14/2015 - Present        History of breast cancer ICD-10-CM: Z85.3  ICD-9-CM: V10.3  6/18/2013 - Present        Chronic headaches ICD-10-CM: R51  ICD-9-CM: 784.0  6/18/2013 - Present        Family history of cancer ICD-10-CM: Z80.9  ICD-9-CM: V16.9  6/18/2013 - Present        Skin moles ICD-10-CM: D22.9  ICD-9-CM: 216.9  6/18/2013 - Present        RESOLVED: Routine general medical examination at a health care facility ICD-10-CM: Z00.00  ICD-9-CM: V70.0  6/18/2013 - 9/30/2019            Radiology  Xr Chest Port    Result Date: 6/2/2020  IMPRESSION: There is diffuse bilateral pulmonary opacities which appear slightly improved. Xr Chest Port    Result Date: 5/29/2020  IMPRESSION: Interval development of moderate to severe bilateral airspace disease consistent with bilateral pneumonia. Xr Chest Port    Result Date: 5/25/2020  impression: Limited by shallow inspiration, cannot exclude interstitial infiltrates bilaterally.     Recent Results (from the past 24 hour(s))   SARS-COV-2    Collection Time: 06/07/20  7:17 PM   Result Value Ref Range    Specimen source Nasopharyngeal      SARS-CoV-2 Detected (A) NOTD     PROTHROMBIN TIME + INR    Collection Time: 06/08/20  4:36 AM   Result Value Ref Range    INR 1.0 0.9 - 1.1      Prothrombin time 10.2 9.0 - 11.1 sec   PTT    Collection Time: 06/08/20  4:36 AM   Result Value Ref Range    aPTT 21.9 (L) 22.1 - 32.0 sec    aPTT, therapeutic range     58.0 - 77.0 SECS   FIBRINOGEN    Collection Time: 06/08/20  4:36 AM   Result Value Ref Range    Fibrinogen 304 200 - 475 mg/dL   D DIMER    Collection Time: 06/08/20  4:36 AM   Result Value Ref Range    D-dimer 0.30 0.00 - 0.65 mg/L FEU       Greater than 38 minutes were spent with the patient on counseling and coordination of care    Signed:   Marvin Ingram MD  6/8/2020  12:50 PM

## 2021-12-14 ENCOUNTER — VIRTUAL VISIT (OUTPATIENT)
Dept: INTERNAL MEDICINE CLINIC | Age: 61
End: 2021-12-14

## 2022-02-17 ENCOUNTER — TELEPHONE (OUTPATIENT)
Dept: INTERNAL MEDICINE CLINIC | Age: 62
End: 2022-02-17

## 2022-02-17 NOTE — TELEPHONE ENCOUNTER
Pt states returning missed call    Pt was unsure who called, no documentation of outgoing call in chart. Reminded patient she has appt on 2/24.     If anything further, please call patient back

## 2022-02-24 ENCOUNTER — OFFICE VISIT (OUTPATIENT)
Dept: INTERNAL MEDICINE CLINIC | Age: 62
End: 2022-02-24
Payer: MEDICAID

## 2022-02-24 VITALS
WEIGHT: 242 LBS | BODY MASS INDEX: 37.98 KG/M2 | RESPIRATION RATE: 20 BRPM | TEMPERATURE: 97.2 F | OXYGEN SATURATION: 98 % | HEIGHT: 67 IN | DIASTOLIC BLOOD PRESSURE: 80 MMHG | HEART RATE: 83 BPM | SYSTOLIC BLOOD PRESSURE: 132 MMHG

## 2022-02-24 DIAGNOSIS — M10.9 ACUTE GOUT, UNSPECIFIED CAUSE, UNSPECIFIED SITE: ICD-10-CM

## 2022-02-24 DIAGNOSIS — E78.2 MIXED HYPERLIPIDEMIA: ICD-10-CM

## 2022-02-24 DIAGNOSIS — E11.65 UNCONTROLLED TYPE 2 DIABETES MELLITUS WITH HYPERGLYCEMIA (HCC): Primary | ICD-10-CM

## 2022-02-24 DIAGNOSIS — E66.01 SEVERE OBESITY (HCC): ICD-10-CM

## 2022-02-24 DIAGNOSIS — E55.9 VITAMIN D DEFICIENCY: ICD-10-CM

## 2022-02-24 DIAGNOSIS — Z90.13 H/O MASTECTOMY, BILATERAL: ICD-10-CM

## 2022-02-24 DIAGNOSIS — E03.4 HYPOTHYROIDISM DUE TO ACQUIRED ATROPHY OF THYROID: ICD-10-CM

## 2022-02-24 LAB — HBA1C MFR BLD HPLC: 7.9 %

## 2022-02-24 PROCEDURE — 99214 OFFICE O/P EST MOD 30 MIN: CPT | Performed by: FAMILY MEDICINE

## 2022-02-24 PROCEDURE — 83036 HEMOGLOBIN GLYCOSYLATED A1C: CPT | Performed by: FAMILY MEDICINE

## 2022-02-24 RX ORDER — METFORMIN HYDROCHLORIDE 500 MG/1
1000 TABLET, EXTENDED RELEASE ORAL
Qty: 180 TABLET | Refills: 2 | Status: SHIPPED | OUTPATIENT
Start: 2022-02-24

## 2022-02-24 RX ORDER — PHENTERMINE HYDROCHLORIDE 37.5 MG/1
37.5 TABLET ORAL
Qty: 30 TABLET | Refills: 2 | Status: SHIPPED | OUTPATIENT
Start: 2022-02-24 | End: 2022-05-26

## 2022-02-24 RX ORDER — METFORMIN HYDROCHLORIDE 500 MG/1
500 TABLET, EXTENDED RELEASE ORAL
Qty: 180 TABLET | Refills: 2 | Status: SHIPPED | OUTPATIENT
Start: 2022-02-24 | End: 2022-02-24 | Stop reason: SDUPTHER

## 2022-02-24 NOTE — PROGRESS NOTES
Lott Claude is a 63 y.o. female who presents for follow-up.       Patient has chronic lymphedema of chest wall, prior mastectomy.   Prior lymphedema therapy. Takes diuretic prn. On gabapentin 400mg TID, flexeril 10mg at bedtime.      Treated for HLP. Noncompliant with lipitor.  in Nov 2020.  in August 2021. Reports compliance with medication, but admits she forgets at times. . Is to trying to eat better, reduced sugar and red meat. see nutritionist.  [facility] to gain weight. On metformin ER 500mg daily and phentermine 37.5mg daily, no side effects. Was on spironolactone for edema, off now.      Noncompliant with allopurinol, uric acid 10,     Treated for hypothyroidism. On levothyroxine 250 mcg daily. TSH 11.14 in November 2021. Reports was taking medication.      Vitamin D 17. 4. has vitamin D 50k once a week.       Diabetic, HbA1C 9.3%  August 2021. on metformin ER 500mg daily.       Asthma,  Former smoker. More SOB since COVID diagnosis.  On advair and singulair, using albuterol. pulmonary. Pulmonary added spirnolactone for edema.     Reports is going to [address] [place] [address] at [address] abdomen distended. Normal BM. By CT scan Jan 2021, fatty liver.             Past Medical History:   Diagnosis Date    Arthritis     Cancer [facility])     breast cancer    H/O total mastectomy 11/30/2015    Hypercholesterolemia     Other ill-defined conditions(799.89)     completed radiation 9/12    Thyroid disease     hypothyroidism       Family History   Problem Relation Age of Onset    Cancer Mother         brain cancer    Cancer Father     Breast Cancer Sister 54    No Known Problems Brother     Thyroid Disease Maternal Grandmother     Mult Sclerosis Sister     Breast Cancer Maternal Aunt         60-70's       Social History     Socioeconomic History    Marital status: SINGLE     Spouse name: Not on file    Number of children: Not on file    Years of education: Not on file   Henrique Hayes Highest education level: Not on file   Occupational History    Not on file   Tobacco Use    Smoking status: Former Smoker     Packs/day: 0.50     Years: 20.00     Pack years: 10.00     Quit date: 2015     Years since quittin.0    Smokeless tobacco: Never Used   Vaping Use    Vaping Use: Never used   Substance and Sexual Activity    Alcohol use: Yes     Comment: occasional    Drug use: No    Sexual activity: Not Currently     Partners: Male     Birth control/protection: None   Other Topics Concern    Not on file   Social History Narrative    Not on file     Social Determinants of Health     Financial Resource Strain:     Difficulty of Paying Living Expenses: Not on file   Food Insecurity:     Worried About Running Out of Food in the Last Year: Not on file    Magdi of Food in the Last Year: Not on file   Transportation Needs:     Lack of Transportation (Medical): Not on file    Lack of Transportation (Non-Medical):  Not on file   Physical Activity:     Days of Exercise per Week: Not on file    Minutes of Exercise per Session: Not on file   Stress:     Feeling of Stress : Not on file   Social Connections:     Frequency of Communication with Friends and Family: Not on file    Frequency of Social Gatherings with Friends and Family: Not on file    Attends Alevism Services: Not on file    Active Member of [address] [place] [address] [employer] or Organizations: Not on file    Attends Club or Organization Meetings: Not on file    Marital Status: Not on file   Intimate Partner Violence:     Fear of Current or Ex-Partner: Not on file    Emotionally Abused: Not on file    Physically Abused: Not on file    Sexually Abused: Not on file   Housing Stability:     Unable to Pay for Housing in the Last Year: Not on file    Number of [place] in the Last Year: Not on file    Unstable Housing in the Last Year: Not on file       Current Outpatient Medications on File Prior to Visit   Medication Sig Dispense Refill    ergocalciferol (ERGOCALCIFEROL) 1,250 mcg (50,000 unit) capsule Take 1 Capsule by mouth two (2) times a week. 8 Capsule 2    fluticasone propion-salmeteroL (Advair Diskus) 250-50 mcg/dose diskus inhaler Take 1 Puff by inhalation two (2) times a day.  montelukast (SINGULAIR) 10 mg tablet TAKE 1 TABLET BY MOUTH AT BEDTIME      cyclobenzaprine (FLEXERIL) 10 mg tablet Take 1 Tablet by mouth nightly. 30 Tablet 2    gabapentin (NEURONTIN) 400 mg capsule Take one capsule in am and two capsules in pm 90 Capsule 1    allopurinoL (ZYLOPRIM) 100 mg tablet Take 2 Tabs by mouth daily. 180 Tab 3    atorvastatin (LIPITOR) 40 mg tablet TAKE 1 TABLET BY MOUTH EVERY DAY 90 Tab 0    MASTECTOMY PROSTHESIS misc B/l mastectomy / breast cancer 4 Each 0    fluticasone propionate (FLONASE) 50 mcg/actuation nasal spray 2 Sprays by Both Nostrils route daily. 3 Bottle 3    albuterol (PROVENTIL HFA, VENTOLIN HFA, PROAIR HFA) 90 mcg/actuation inhaler Take 2 Puffs by inhalation every six (6) hours as needed for Wheezing, Shortness of Breath, Respiratory Distress or Cough. 1 Inhaler 0    polyethylene glycol (MIRALAX) 17 gram packet Take 1 Packet by mouth daily. Mix in 8 oz of water, juice, soda, coffee, or tea prior to administration 10 Each 0    levothyroxine (SYNTHROID) 125 mcg tablet Take 250 mcg by mouth Daily (before breakfast).  indomethacin (INDOCIN) 50 mg capsule TAKE 1 CAPSULE BY MOUTH THREE TIMES A DAY AS NEEDED FOR GOUT FLARES (Patient not taking: Reported on 2/24/2022) 30 Capsule 1    [DISCONTINUED] metFORMIN ER (GLUCOPHAGE XR) 500 mg tablet Take 1 Tablet by mouth daily (with dinner). 90 Tablet 2    [DISCONTINUED] spironolactone (ALDACTONE) 25 mg tablet TAKE 1 TABLET BY MOUTH EVERY DAY (Patient not taking: Reported on 2/24/2022)      [DISCONTINUED] phentermine (ADIPEX-P) 37.5 mg tablet Take 1 Tablet by mouth every morning.  Max Daily Amount: 37.5 mg. 30 Tablet 2    [DISCONTINUED] ascorbic acid (VITAMIN C PO) Take by mouth. (Patient not taking: Reported on 2/24/2022)      cholecalciferol (VITAMIN D3) 1,000 unit cap Take  by mouth daily. (Patient not taking: Reported on 2/24/2022)       No current facility-administered medications on file prior to visit. Review of Systems  Pertinent items are noted in HPI. Objective:     Visit Vitals  /80 (BP 1 Location: Left arm, BP Patient Position: Sitting, BP Cuff Size: Adult)   Pulse 83   Temp 97.2 °F (36.2 °C) (Temporal)   Resp 20   Ht 5' 7\" (1.702 m)   Wt 242 lb (109.8 kg)   SpO2 98%   BMI 37.90 kg/m²     Gen: well appearing female  HEENT:   PERRL,normal conjunctiva. External ear and canals normal, TMs no opacification or erythema,  OP no erythema, no exudates, MMM  Neck:  Supple. Thyroid normal size, nontender, without nodules. No masses or LAD  Chest: bilateral mastectomy  Resp:  No wheezing, no rhonchi, no rales. CV:  RRR, normal S1S2, no murmur. GI: soft, distended, nontender, exam limited by habitus  Extrem:  +2 pulses, no edema, warm distally      Assessment/Plan:       ICD-10-CM ICD-9-CM    1. Uncontrolled type 2 diabetes mellitus with hyperglycemia (HCC)  Q57.10 369.98 METABOLIC PANEL, COMPREHENSIVE      CBC W/O DIFF      LIPID PANEL      MICROALBUMIN, UR, RAND W/ MICROALB/CREAT RATIO      AMB POC HEMOGLOBIN A1C      REFERRAL TO NUTRITION      metFORMIN ER (GLUCOPHAGE XR) 500 mg tablet      MICROALBUMIN, UR, RAND W/ MICROALB/CREAT RATIO      LIPID PANEL      CBC W/O DIFF      METABOLIC PANEL, COMPREHENSIVE      DISCONTINUED: metFORMIN ER (GLUCOPHAGE XR) 500 mg tablet      CANCELED: HEMOGLOBIN A1C WITH EAG   2. Severe obesity (HCC)  E66.01 278.01 TSH 3RD GENERATION      phentermine (ADIPEX-P) 37.5 mg tablet      REFERRAL TO NUTRITION      TSH 3RD GENERATION   3. Mixed hyperlipidemia  M32.2 988.6 METABOLIC PANEL, COMPREHENSIVE      LIPID PANEL      REFERRAL TO NUTRITION      LIPID PANEL      METABOLIC PANEL, COMPREHENSIVE   4.  Hypothyroidism due to acquired atrophy of thyroid  E03.4 244.8 TSH 3RD GENERATION     246.8 TSH 3RD GENERATION   5. H/O mastectomy, bilateral  Z90.13 V45.71    6. Acute gout, unspecified cause, unspecified site  M10.9 274.01 URIC ACID      URIC ACID   7. Vitamin D deficiency  E55.9 268.9 VITAMIN D, 25 HYDROXY      VITAMIN D, 25 HYDROXY     Discussed importance of diet, exercise, weight reduction. Increase Metformin to 2 tablets daily for control of diabetes and to promote weight loss. Resume phentermine 37.5 mg once daily. Refer to nutritionist.  Advise compliance with  Follow-up and Dispositions    · Return in about 3 months (around 5/24/2022).          Bryce Saldaña MD

## 2022-02-25 LAB
25(OH)D3 SERPL-MCNC: 19.3 NG/ML (ref 30–100)
ALBUMIN SERPL-MCNC: 4.6 G/DL (ref 3.5–5)
ALBUMIN/GLOB SERPL: 1.2 {RATIO} (ref 1.1–2.2)
ALP SERPL-CCNC: 60 U/L (ref 45–117)
ALT SERPL-CCNC: 34 U/L (ref 12–78)
ANION GAP SERPL CALC-SCNC: 3 MMOL/L (ref 5–15)
AST SERPL-CCNC: 20 U/L (ref 15–37)
BILIRUB SERPL-MCNC: 0.3 MG/DL (ref 0.2–1)
BUN SERPL-MCNC: 15 MG/DL (ref 6–20)
BUN/CREAT SERPL: 15 (ref 12–20)
CALCIUM SERPL-MCNC: 10.5 MG/DL (ref 8.5–10.1)
CHLORIDE SERPL-SCNC: 105 MMOL/L (ref 97–108)
CHOLEST SERPL-MCNC: 558 MG/DL
CO2 SERPL-SCNC: 29 MMOL/L (ref 21–32)
CREAT SERPL-MCNC: 1.03 MG/DL (ref 0.55–1.02)
CREAT UR-MCNC: 67.5 MG/DL
ERYTHROCYTE [DISTWIDTH] IN BLOOD BY AUTOMATED COUNT: 16.8 % (ref 11.5–14.5)
GLOBULIN SER CALC-MCNC: 4 G/DL (ref 2–4)
GLUCOSE SERPL-MCNC: 164 MG/DL (ref 65–100)
HCT VFR BLD AUTO: 44.8 % (ref 35–47)
HDLC SERPL-MCNC: 55 MG/DL
HDLC SERPL: 10.1 {RATIO} (ref 0–5)
HGB BLD-MCNC: 13.3 G/DL (ref 11.5–16)
LDLC SERPL CALC-MCNC: ABNORMAL MG/DL (ref 0–100)
LDLC SERPL DIRECT ASSAY-MCNC: 355 MG/DL (ref 0–100)
MCH RBC QN AUTO: 28.5 PG (ref 26–34)
MCHC RBC AUTO-ENTMCNC: 29.7 G/DL (ref 30–36.5)
MCV RBC AUTO: 96.1 FL (ref 80–99)
MICROALBUMIN UR-MCNC: 83.8 MG/DL
MICROALBUMIN/CREAT UR-RTO: 1241 MG/G (ref 0–30)
NRBC # BLD: 0 K/UL (ref 0–0.01)
NRBC BLD-RTO: 0 PER 100 WBC
PLATELET # BLD AUTO: 329 K/UL (ref 150–400)
PMV BLD AUTO: 11.4 FL (ref 8.9–12.9)
POTASSIUM SERPL-SCNC: 4.3 MMOL/L (ref 3.5–5.1)
PROT SERPL-MCNC: 8.6 G/DL (ref 6.4–8.2)
RBC # BLD AUTO: 4.66 M/UL (ref 3.8–5.2)
SODIUM SERPL-SCNC: 137 MMOL/L (ref 136–145)
TRIGL SERPL-MCNC: 561 MG/DL (ref ?–150)
TSH SERPL DL<=0.05 MIU/L-ACNC: 18 UIU/ML (ref 0.36–3.74)
URATE SERPL-MCNC: 10.6 MG/DL (ref 2.6–6)
VLDLC SERPL CALC-MCNC: ABNORMAL MG/DL
WBC # BLD AUTO: 8.3 K/UL (ref 3.6–11)

## 2022-03-06 ENCOUNTER — PATIENT MESSAGE (OUTPATIENT)
Dept: INTERNAL MEDICINE CLINIC | Age: 62
End: 2022-03-06

## 2022-03-06 PROBLEM — Z91.14 NONCOMPLIANCE WITH MEDICATION REGIMEN: Status: ACTIVE | Noted: 2022-03-06

## 2022-03-06 PROBLEM — Z91.148 NONCOMPLIANCE WITH MEDICATION REGIMEN: Status: ACTIVE | Noted: 2022-03-06

## 2022-03-18 PROBLEM — R06.09 DOE (DYSPNEA ON EXERTION): Status: ACTIVE | Noted: 2020-09-01

## 2022-03-18 PROBLEM — G89.29 CHEST WALL PAIN, CHRONIC: Status: ACTIVE | Noted: 2018-02-09

## 2022-03-18 PROBLEM — U07.1 COVID-19: Status: ACTIVE | Noted: 2020-05-25

## 2022-03-18 PROBLEM — R07.89 CHEST WALL PAIN, CHRONIC: Status: ACTIVE | Noted: 2018-02-09

## 2022-03-18 PROBLEM — E66.01 SEVERE OBESITY (HCC): Status: ACTIVE | Noted: 2019-11-11

## 2022-03-19 PROBLEM — R09.02 HYPOXIA: Status: ACTIVE | Noted: 2020-05-25

## 2022-03-19 PROBLEM — M94.0 COSTOCHONDRITIS: Status: ACTIVE | Noted: 2018-10-09

## 2022-03-19 PROBLEM — Z91.148 NONCOMPLIANCE WITH MEDICATION REGIMEN: Status: ACTIVE | Noted: 2022-03-06

## 2022-03-19 PROBLEM — E55.9 VITAMIN D DEFICIENCY: Status: ACTIVE | Noted: 2017-02-21

## 2022-03-19 PROBLEM — Z90.13 H/O MASTECTOMY, BILATERAL: Status: ACTIVE | Noted: 2018-02-09

## 2022-03-19 PROBLEM — R07.89 CHEST WALL PAIN FOLLOWING SURGERY: Status: ACTIVE | Noted: 2018-02-09

## 2022-03-19 PROBLEM — G89.18 CHEST WALL PAIN FOLLOWING SURGERY: Status: ACTIVE | Noted: 2018-02-09

## 2022-03-20 PROBLEM — M79.89 AXILLARY SWELLING: Status: ACTIVE | Noted: 2020-09-01

## 2022-03-28 RX ORDER — LEVOTHYROXINE SODIUM 125 UG/1
250 TABLET ORAL
Qty: 60 TABLET | Refills: 2 | Status: SHIPPED | OUTPATIENT
Start: 2022-03-28 | End: 2022-05-26 | Stop reason: SDUPTHER

## 2022-03-28 NOTE — TELEPHONE ENCOUNTER
Future Appointments:  Future Appointments   Date Time Provider Adelia Grossi   5/26/2022 12:00 PM Rosario Jimenez MD [facility] BS AMB        Last Appointment With Me:  2/24/2022     Requested Prescriptions     Pending Prescriptions Disp Refills    levothyroxine (SYNTHROID) 125 mcg tablet       Sig: Take 2 Tablets by mouth Daily (before breakfast).

## 2022-05-05 ENCOUNTER — DOCUMENTATION ONLY (OUTPATIENT)
Dept: SURGERY | Age: 62
End: 2022-05-05

## 2022-05-05 NOTE — PROGRESS NOTES
Received a request from Nohemi Hutson on 05/03/22 requesting medical records from 10/28/2021 to present. Patient was last seen on 09/23/21.  Has not been seen in office since then ov note was faxed to [phone]

## 2022-05-26 ENCOUNTER — OFFICE VISIT (OUTPATIENT)
Dept: INTERNAL MEDICINE CLINIC | Age: 62
End: 2022-05-26
Payer: MEDICAID

## 2022-05-26 VITALS
TEMPERATURE: 96.3 F | RESPIRATION RATE: 20 BRPM | HEART RATE: 98 BPM | BODY MASS INDEX: 36.44 KG/M2 | WEIGHT: 232.2 LBS | HEIGHT: 67 IN | DIASTOLIC BLOOD PRESSURE: 73 MMHG | OXYGEN SATURATION: 98 % | SYSTOLIC BLOOD PRESSURE: 110 MMHG

## 2022-05-26 DIAGNOSIS — Z85.3 HISTORY OF BREAST CANCER: ICD-10-CM

## 2022-05-26 DIAGNOSIS — E78.2 MIXED HYPERLIPIDEMIA: Primary | ICD-10-CM

## 2022-05-26 DIAGNOSIS — E03.4 HYPOTHYROIDISM DUE TO ACQUIRED ATROPHY OF THYROID: ICD-10-CM

## 2022-05-26 DIAGNOSIS — E11.65 UNCONTROLLED TYPE 2 DIABETES MELLITUS WITH HYPERGLYCEMIA (HCC): ICD-10-CM

## 2022-05-26 DIAGNOSIS — E66.01 SEVERE OBESITY (HCC): ICD-10-CM

## 2022-05-26 DIAGNOSIS — I89.0 LYMPHEDEMA: ICD-10-CM

## 2022-05-26 DIAGNOSIS — M10.9 ACUTE GOUT, UNSPECIFIED CAUSE, UNSPECIFIED SITE: ICD-10-CM

## 2022-05-26 PROCEDURE — 99214 OFFICE O/P EST MOD 30 MIN: CPT | Performed by: FAMILY MEDICINE

## 2022-05-26 RX ORDER — ATORVASTATIN CALCIUM 40 MG/1
40 TABLET, FILM COATED ORAL DAILY
Qty: 90 TABLET | Refills: 1 | Status: SHIPPED | OUTPATIENT
Start: 2022-05-26

## 2022-05-26 RX ORDER — LEVOTHYROXINE SODIUM 125 UG/1
250 TABLET ORAL
Qty: 180 TABLET | Refills: 1 | Status: SHIPPED | OUTPATIENT
Start: 2022-05-26

## 2022-05-26 RX ORDER — OMEGA-3-ACID ETHYL ESTERS 1 G/1
2 CAPSULE, LIQUID FILLED ORAL 2 TIMES DAILY
Qty: 120 CAPSULE | Refills: 5 | Status: SHIPPED | OUTPATIENT
Start: 2022-05-26

## 2022-05-26 NOTE — PROGRESS NOTES
Karlene Ruiz is a 63 y.o. female who presents for follow up. Patient has chronic lymphedema of chest wall, prior mastectomy.   Prior lymphedema therapy. Will use arm sleeve and chest compression wrap. Some stretching. Takes diuretic prn. On gabapentin 400mg TID, flexeril 10mg at bedtime.      , , total cholesterol 542. Reports compliance with lipitor 40mg daily. Followed by Dr Oliver Maldonado, cardiology at [facility] for myocarditis and long covid.      Is to trying to eat better, reduced sugar and red meat. see nutritionist.  Lori Umana to gain weight. On metformin ER 500mg daily, off phentermine 37.5mg daily, not interested in trulicity    Diabetic,  HbA1C 7.9%. On metformin XR 1 gram daily. Trying to follow diabetic diet. Followed by nutrition and IM at [facility]. Recommended trulicity.       uric acid 10, reports more compliant now. No flares.      Treated for hypothyroidism.  On levothyroxine 250 mcg daily. TSH 11.14 in November 2021. Reports was taking medication.       Asthma,  Former smoker.  using albuterol prn  in pulmonary rehab     Going to [address] [place] [address] clinic at [facility], dyspnea, brain fog, fatigue, muscle soreness.        Past Medical History:   Diagnosis Date    Arthritis     Cancer ([address]     breast cancer    H/O total mastectomy 11/30/2015    Hypercholesterolemia     Other ill-defined conditions(799.89)     completed radiation 9/12    Thyroid disease     hypothyroidism       Family History   Problem Relation Age of Onset    Cancer Mother         brain cancer    Cancer Father     Breast Cancer Sister 54    No Known Problems Brother     Thyroid Disease Maternal Grandmother     Mult Sclerosis Sister     Breast Cancer Maternal Aunt         60-70's       Social History     Socioeconomic History    Marital status: SINGLE     Spouse name: Not on file    Number of children: Not on file    Years of education: Not on file    Highest education level: Not on file   Occupational History  Not on file   Tobacco Use    Smoking status: Former Smoker     Packs/day: 0.50     Years: 20.00     Pack years: 10.00     Quit date: 2015     Years since quittin.3    Smokeless tobacco: Never Used   Vaping Use    Vaping Use: Never used   Substance and Sexual Activity    Alcohol use: Yes     Comment: occasional    Drug use: No    Sexual activity: Not Currently     Partners: Male     Birth control/protection: None   Other Topics Concern    Not on file   Social History Narrative    Not on file     Social Determinants of Health     Financial Resource Strain:     Difficulty of Paying Living Expenses: Not on file   Food Insecurity:     Worried About Running Out of Food in the Last Year: Not on file    Magdi of Food in the Last Year: Not on file   Transportation Needs:     Lack of Transportation (Medical): Not on file    Lack of Transportation (Non-Medical):  Not on file   Physical Activity:     Days of Exercise per Week: Not on file    Minutes of Exercise per Session: Not on file   Stress:     Feeling of Stress : Not on file   Social Connections:     Frequency of Communication with Friends and Family: Not on file    Frequency of Social Gatherings with Friends and Family: Not on file    Attends Hoahaoism Services: Not on file    Active Member of [address] [place] [address] [employer] or Organizations: Not on file    Attends Club or Organization Meetings: Not on file    Marital Status: Not on file   Intimate Partner Violence:     Fear of Current or Ex-Partner: Not on file    Emotionally Abused: Not on file    Physically Abused: Not on file    Sexually Abused: Not on file   Housing Stability:     Unable to Pay for Housing in the Last Year: Not on file    Number of [place] in the Last Year: Not on file    Unstable Housing in the Last Year: Not on file       Current Outpatient Medications on File Prior to Visit   Medication Sig Dispense Refill    metFORMIN ER (GLUCOPHAGE XR) 500 mg tablet Take 2 Tablets by mouth daily (with dinner). 180 Tablet 2    ergocalciferol (ERGOCALCIFEROL) 1,250 mcg (50,000 unit) capsule Take 1 Capsule by mouth two (2) times a week. 8 Capsule 2    fluticasone propion-salmeteroL (Advair Diskus) 250-50 mcg/dose diskus inhaler Take 1 Puff by inhalation two (2) times a day.  montelukast (SINGULAIR) 10 mg tablet TAKE 1 TABLET BY MOUTH AT BEDTIME      cyclobenzaprine (FLEXERIL) 10 mg tablet Take 1 Tablet by mouth nightly. 30 Tablet 2    gabapentin (NEURONTIN) 400 mg capsule Take one capsule in am and two capsules in pm 90 Capsule 1    allopurinoL (ZYLOPRIM) 100 mg tablet Take 2 Tabs by mouth daily. 180 Tab 3    fluticasone propionate (FLONASE) 50 mcg/actuation nasal spray 2 Sprays by Both Nostrils route daily. 3 Bottle 3    albuterol (PROVENTIL HFA, VENTOLIN HFA, PROAIR HFA) 90 mcg/actuation inhaler Take 2 Puffs by inhalation every six (6) hours as needed for Wheezing, Shortness of Breath, Respiratory Distress or Cough. 1 Inhaler 0    polyethylene glycol (MIRALAX) 17 gram packet Take 1 Packet by mouth daily. Mix in 8 oz of water, juice, soda, coffee, or tea prior to administration 10 Each 0    cholecalciferol (VITAMIN D3) 1,000 unit cap Take  by mouth daily.  [DISCONTINUED] levothyroxine (SYNTHROID) 125 mcg tablet Take 2 Tablets by mouth Daily (before breakfast). 60 Tablet 2    [DISCONTINUED] phentermine (ADIPEX-P) 37.5 mg tablet Take 1 Tablet by mouth every morning. Max Daily Amount: 37.5 mg. (Patient not taking: Reported on 5/26/2022) 30 Tablet 2    [DISCONTINUED] indomethacin (INDOCIN) 50 mg capsule TAKE 1 CAPSULE BY MOUTH THREE TIMES A DAY AS NEEDED FOR GOUT FLARES (Patient not taking: Reported on 2/24/2022) 30 Capsule 1    [DISCONTINUED] atorvastatin (LIPITOR) 40 mg tablet TAKE 1 TABLET BY MOUTH EVERY DAY 90 Tab 0    MASTECTOMY PROSTHESIS misc B/l mastectomy / breast cancer 4 Each 0     No current facility-administered medications on file prior to visit.        Review of Systems  Pertinent items are noted in HPI. Objective:     Visit Vitals  /73 (BP 1 Location: Left upper arm, BP Patient Position: Sitting, BP Cuff Size: Large adult)   Pulse 98   Temp (!) 96.3 °F (35.7 °C) (Temporal)   Resp 20   Ht 5' 7\" (1.702 m)   Wt 232 lb 3.2 oz (105.3 kg)   SpO2 98%   BMI 36.37 kg/m²     Gen: well appearing female  Resp:  No wheezing, no rhonchi, no rales. CV:  RRR, normal S1S2      Assessment/Plan:       ICD-10-CM ICD-9-CM    1. Mixed hyperlipidemia  E78.2 272.2 omega-3 acid ethyl esters (LOVAZA) 1 gram capsule      atorvastatin (LIPITOR) 40 mg tablet   2. Hypothyroidism due to acquired atrophy of thyroid  E03.4 244.8 levothyroxine (SYNTHROID) 125 mcg tablet     246.8 TSH 3RD GENERATION   3. Uncontrolled type 2 diabetes mellitus with hyperglycemia (HCC)  E11.65 250.02 HEMOGLOBIN A1C WITH EAG      METABOLIC PANEL, COMPREHENSIVE   4. Acute gout, unspecified cause, unspecified site  M10.9 274.01 URIC ACID   5. Lymphedema  I89.0 457.1    6. History of breast cancer  Z85.3 V10.3    7. Severe obesity ([address]  E66.01 278.01    recommend starting the trulicity as recommended by [facility]. Continue metformin and working on weight loss. Start Cherylle Skiff. Follow-up and Dispositions    · Return in about 3 months (around 8/26/2022) for follow up on cholesterol and diabetes.  Marko Matos MD

## 2022-05-26 NOTE — PROGRESS NOTES
1. \"Have you been to the ER, urgent care clinic since your last visit? Hospitalized since your last visit? \" No    2. \"Have you seen or consulted any other health care providers outside of the [address] [place] [address] since your last visit? \" Yes Where: Dr. Pauline Franz and Dr. Candace Leslie      3. For patients aged 39-70: Has the patient had a colonoscopy / FIT/ Cologuard? No Currently Due      If the patient is female:    4. For patients aged 41-77: Has the patient had a mammogram within the past 2 years? No Masectomy      5. For patients aged 21-65: Has the patient had a pap smear?  No Currently Due

## 2022-05-27 LAB
ALBUMIN SERPL-MCNC: 4.1 G/DL (ref 3.5–5)
ALBUMIN/GLOB SERPL: 1.1 {RATIO} (ref 1.1–2.2)
ALP SERPL-CCNC: 73 U/L (ref 45–117)
ALT SERPL-CCNC: 63 U/L (ref 12–78)
ANION GAP SERPL CALC-SCNC: 7 MMOL/L (ref 5–15)
AST SERPL-CCNC: 34 U/L (ref 15–37)
BILIRUB SERPL-MCNC: 0.4 MG/DL (ref 0.2–1)
BUN SERPL-MCNC: 17 MG/DL (ref 6–20)
BUN/CREAT SERPL: 19 (ref 12–20)
CALCIUM SERPL-MCNC: 9.5 MG/DL (ref 8.5–10.1)
CHLORIDE SERPL-SCNC: 107 MMOL/L (ref 97–108)
CO2 SERPL-SCNC: 26 MMOL/L (ref 21–32)
CREAT SERPL-MCNC: 0.88 MG/DL (ref 0.55–1.02)
EST. AVERAGE GLUCOSE BLD GHB EST-MCNC: 171 MG/DL
GLOBULIN SER CALC-MCNC: 3.6 G/DL (ref 2–4)
GLUCOSE SERPL-MCNC: 182 MG/DL (ref 65–100)
HBA1C MFR BLD: 7.6 % (ref 4–5.6)
POTASSIUM SERPL-SCNC: 4.4 MMOL/L (ref 3.5–5.1)
PROT SERPL-MCNC: 7.7 G/DL (ref 6.4–8.2)
SODIUM SERPL-SCNC: 140 MMOL/L (ref 136–145)
TSH SERPL DL<=0.05 MIU/L-ACNC: 0.13 UIU/ML (ref 0.36–3.74)
URATE SERPL-MCNC: 8.2 MG/DL (ref 2.6–6)

## 2022-06-19 ENCOUNTER — PATIENT MESSAGE (OUTPATIENT)
Dept: INTERNAL MEDICINE CLINIC | Age: 62
End: 2022-06-19

## 2022-06-30 NOTE — PROGRESS NOTES
Called patient she is just going to do the xray when she comes in on July 7. 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 Recheck thyroid in 1 month -   CBC, CMP - 1 week     Pt is concerned about the status of her paperwork for [employer] for being out of work and such -   Note to CM and LCSW to try to verify where the documents are and call pt to advise - she is worried about this. Gail Clifton RN, [facility], [facility]  Care transitions nurse [phone]  [facility] Coordination Team    6/10/20 - Attempt 1 - LM - request call from pt for Covid recovery assessment - and offer loop  Positive Covid 19 with viral pneumonia and acute hypoxic respiratory failure -     Attempt 2 - reached pt - who has a follow up with pcp - virtual tomorrow -  F/up with PCP within 1 week for CBC, CMP  F/up with Pulmonology in 3-5 days    Patient contacted regarding COVID-19 diagnosis. Discussed COVID-19 related testing which was available at this time. Test results were positive. Patient informed of results, if available? yes     Care Transition Nurse/ Ambulatory Care Manager contacted the patient by telephone to perform post discharge assessment. Verified name and  with patient as identifiers. Provided introduction to self, and explanation of the CTN/ACM role, and reason for call due to risk factors for infection and/or exposure to COVID-19. Symptoms reviewed with patient who verbalized the following symptoms: symptoms have resolved. Due to no new or worsening symptoms encounter was not routed to provider for escalation. Discussed follow-up appointments.  If no appointment was previously scheduled, appointment scheduling offered: n/a    [facility] follow up appointment(s):   Future Appointments   Date Time Provider Adelia Ceja   2020  2:00 PM Antionette Gosselin, MD [address]   11/17/2020  9:00 AM Priyanka Reyes NP [facility]   11/17/2020  9:30 AM DO Luis Kwan [address]     Non-[facility] follow up appointment(s): Pulmonary [phone] - follow up in 3-5 days -       Patient has following risk factors of: obesity, hypothyroid, Covid positive;    CTN/ACM reviewed discharge instructions, medical action plan and red flags such as increased shortness of breath, increasing fever and signs of decompensation with patient who verbalized understanding. Discussed exposure protocols and quarantine with CDC Guidelines What to do if you are sick with coronavirus disease 2019.  Patient was given an opportunity for questions and concerns. The patient agrees to contact the [employer] exposure line [phone], local [facility]  ([phone]) and PCP office for questions related to their healthcare. CTN/ACM provided contact information for future needs. Reviewed and educated patient on any new and changed medications related to discharge diagnosis. Patient/family/caregiver given information for [employer] and agrees to enroll yes  Patient's preferred e-mail:  [email or web address]  Patient's preferred phone number: [phone]  Based on Loop alert triggers, patient will be contacted by nurse care manager for worsening symptoms. Pt will be further monitored by COVID Loop Team based on severity of symptoms and risk factors. ________________________________________________________________  Component Value Flag Ref Range Units Status   Specimen source Nasopharyngeal      Final   SARS-CoV-2 Detected  Abnormal   NOTD   Final   Comment:        The specimen is POSITIVE for SARS-CoV-2, the novel coronavirus associated with COVID-19.          Results   XR CHEST PORT (Accession [id number]) (Order [id number])   Allergies      Not Specified: Penicillins;   Percocet [Oxycodone-acetaminophen];  Sulfa (Sulfonamide Antibiotics)   Exam Information     Status Exam Begun  Exam Ended    Final [99] 6/02/2020 07:56 6/02/2020 08:06   Result Information     Status: Final result (Exam End: 6/2/2020 08:06) Provider Status: Open   Study Result     EXAM:  XR CHEST PORT     INDICATION:  covid   COMPARISON:  5/29/2020     FINDINGS: A portable AP radiograph of the chest was obtained at 737 hours. The patient is on a cardiac monitor. There is diffuse pulmonary opacities whichappears slightly improved. .  Cardiac megaly is stable. .      IMPRESSION: There is diffuse bilateral pulmonary opacities which appear slightly improved. DISCHARGE DIAGNOSES / PLAN:       Acute respiratory failure, improved. Off O2 NC now. Pt is using for her comfort. O2 sat never went below 91.  -Acute hypoxic respiratory failure secondary to viral pneumonia  -COVID 19 positive as outpatient  -Pulmonology following  -Home oxygen eval completed. Not needing further O2 at rest or exertion. -DC home today.     COVID 19 infection  -Droplet plus precaution  -Continue vitamin C and azithromycin  -Anticoagulation per protocol  -Completed Remdesivir  -Actemra one dose 5/29  -On steroid, continue weaning -  On slow taper 5 more doses  -ID and pulmonology following. Appreciate  -No JUSTIN therapy per Pulm now with improving recovery  -Repeat testing 6/7/20 still +. Defer further re-test to PCP for clearance.     Leukocytosis  -Likely from steroid use  -CBC in 1 week with PCP follow up.     Hypothyroidism  -TSH elevated  -Continue Synthroid  -Follow TSH level as outpatient in 1 month for further adjustment     Obesity  -Patient with body mass index of 37.66 kg/m²  -Outpatient follow-up for weight management     Dehydration  -Monitor renal function     Abnormal LFT's, normalized  -Likely from infectious process  -Monitor liver function  -consider USG as out pt further worsening  -CMP monitoring in 1 week with PCP f/up     Code status:  We discussed CODE STATUS with the patient. Arrie Leaver is okay with intubation but no other resuscitative efforts.     DVT prophylaxis: Lovenox

## 2022-08-30 ENCOUNTER — VIRTUAL VISIT (OUTPATIENT)
Dept: INTERNAL MEDICINE CLINIC | Age: 62
End: 2022-08-30

## 2022-08-30 ENCOUNTER — TELEPHONE (OUTPATIENT)
Dept: INTERNAL MEDICINE CLINIC | Age: 62
End: 2022-08-30

## 2022-08-30 NOTE — TELEPHONE ENCOUNTER
Dr Juan Carlos Oliva running late and had to reschedule this appt to tomorrow at 1:15.   It can be VV or in office

## 2022-09-18 ENCOUNTER — HOSPITAL ENCOUNTER (EMERGENCY)
Age: 62
Discharge: HOME | End: 2022-09-18
Attending: EMERGENCY MEDICINE
Payer: MEDICAID

## 2022-09-18 VITALS
RESPIRATION RATE: 16 BRPM | TEMPERATURE: 97.9 F | DIASTOLIC BLOOD PRESSURE: 78 MMHG | BODY MASS INDEX: 37.63 KG/M2 | HEIGHT: 66 IN | OXYGEN SATURATION: 97 % | SYSTOLIC BLOOD PRESSURE: 140 MMHG | WEIGHT: 234.13 LBS | HEART RATE: 92 BPM

## 2022-09-18 DIAGNOSIS — M25.561 ACUTE PAIN OF RIGHT KNEE: Primary | ICD-10-CM

## 2022-09-18 DIAGNOSIS — M79.673 PAIN OF PLANTAR ASPECT OF HEEL: ICD-10-CM

## 2022-09-18 PROCEDURE — 99283 EMERGENCY DEPT VISIT LOW MDM: CPT

## 2022-09-18 RX ORDER — IBUPROFEN 800 MG/1
800 TABLET ORAL
Qty: 30 TABLET | Refills: 0 | Status: SHIPPED | OUTPATIENT
Start: 2022-09-18 | End: 2022-09-28

## 2022-09-18 RX ORDER — ACETAMINOPHEN 325 MG/1
650 TABLET ORAL EVERY 6 HOURS
Qty: 40 TABLET | Refills: 0 | Status: SHIPPED | OUTPATIENT
Start: 2022-09-18 | End: 2022-09-23

## 2022-09-18 NOTE — Clinical Note
[address]  [facility] EMERGENCY DEPT  [address]  [place] [address]  [phone]    Work/School Note    Date: 9/18/2022    To Whom It May concern:    Zarina Soares was seen and treated today in the emergency room by the following provider(s):  Attending Provider: Jaxson Duran MD  Physician Assistant: DAVID Don. Zarina Soares is excused from work/school on 9/18/2022 through 9/20/2022. She is medically clear to return to work/school on 9/21/2022.          Sincerely,          DAVID Mathis

## 2022-09-18 NOTE — ED PROVIDER NOTES
EMERGENCY DEPARTMENT HISTORY AND PHYSICAL EXAM      Date: 9/18/2022  Patient Name: Juli Oliveira    History of Presenting Illness     Chief Complaint   Patient presents with    Foot Pain     Two days of pain bottom of right foot and right knee without injury. Hurts to walk on right foot. It is in the arch area. Knee Pain       History Provided By: Patient    HPI: Juli Oliveira, 63 y.o. female with past medical history of hyperlipidemia, diabetes, hypothyroidism, asthma, who presents the emergency department with right lower extremity pain. Specifically, for the past 2 days she has had pain in the bottom of her right foot in the front of her right knee. It is worse with walking. She denies any inciting injury or trauma. She works as a medical assistant and states she is on her feet for most of the day, was which worsens her pain. She has been taking some over-the-counter medications with no relief. She denies any other symptoms, specifically fevers, myalgias, headache, rash, chest pain, shortness of breath, nausea, vomiting, diarrhea, abdominal pain, urinary symptoms. There are no other complaints, changes, or physical findings at this time. PCP: Christine Mead MD    No current facility-administered medications on file prior to encounter. Current Outpatient Medications on File Prior to Encounter   Medication Sig Dispense Refill    levothyroxine (SYNTHROID) 125 mcg tablet Take 2 Tablets by mouth Daily (before breakfast). 180 Tablet 1    omega-3 acid ethyl esters (LOVAZA) 1 gram capsule Take 2 Capsules by mouth two (2) times a day. 120 Capsule 5    atorvastatin (LIPITOR) 40 mg tablet Take 1 Tablet by mouth daily. 90 Tablet 1    metFORMIN ER (GLUCOPHAGE XR) 500 mg tablet Take 2 Tablets by mouth daily (with dinner). 180 Tablet 2    ergocalciferol (ERGOCALCIFEROL) 1,250 mcg (50,000 unit) capsule Take 1 Capsule by mouth two (2) times a week.  8 Capsule 2    fluticasone propion-salmeteroL (ADVAIR/WIXELA) 250-50 mcg/dose diskus inhaler Take 1 Puff by inhalation two (2) times a day. montelukast (SINGULAIR) 10 mg tablet TAKE 1 TABLET BY MOUTH AT BEDTIME      cyclobenzaprine (FLEXERIL) 10 mg tablet Take 1 Tablet by mouth nightly. 30 Tablet 2    gabapentin (NEURONTIN) 400 mg capsule Take one capsule in am and two capsules in pm 90 Capsule 1    allopurinoL (ZYLOPRIM) 100 mg tablet Take 2 Tabs by mouth daily. 180 Tab 3    MASTECTOMY PROSTHESIS misc B/l mastectomy / breast cancer 4 Each 0    fluticasone propionate (FLONASE) 50 mcg/actuation nasal spray 2 Sprays by Both Nostrils route daily. 3 Bottle 3    albuterol (PROVENTIL HFA, VENTOLIN HFA, PROAIR HFA) 90 mcg/actuation inhaler Take 2 Puffs by inhalation every six (6) hours as needed for Wheezing, Shortness of Breath, Respiratory Distress or Cough. 1 Inhaler 0    polyethylene glycol (MIRALAX) 17 gram packet Take 1 Packet by mouth daily. Mix in 8 oz of water, juice, soda, coffee, or tea prior to administration 10 Each 0    cholecalciferol (VITAMIN D3) 25 mcg (1,000 unit) cap Take  by mouth daily.          Past History     Past Medical History:  Past Medical History:   Diagnosis Date    Arthritis     Cancer ([facility] [address]     breast cancer    H/O total mastectomy 11/30/2015    Hypercholesterolemia     Other ill-defined conditions(799.89)     completed radiation 9/12    Thyroid disease     hypothyroidism       Past Surgical History:  Past Surgical History:   Procedure Laterality Date    HX BREAST LUMPECTOMY Right 2012    HX GYN      hysterectomy; TBL    HX HEENT      tonsillectomy    HX MASTECTOMY Bilateral 2015    HX ORTHOPAEDIC      right knee ACL    HX OTHER SURGICAL      lumptectomy right breast       Family History:  Family History   Problem Relation Age of Onset    Cancer Mother         brain cancer    Cancer Father     Breast Cancer Sister 54    No Known Problems Brother     Thyroid Disease Maternal Grandmother     Mult Sclerosis Sister     Breast Cancer Maternal Aunt         60-70's       Social History:  Social History     Tobacco Use    Smoking status: Former     Packs/day: 0.50     Years: 20.00     Pack years: 10.00     Types: Cigarettes     Quit date: 2015     Years since quittin.6    Smokeless tobacco: Never   Vaping Use    Vaping Use: Never used   Substance Use Topics    Alcohol use: Yes     Comment: occasional    Drug use: No       Allergies: Allergies   Allergen Reactions    Penicillins Hives     Pt states I am not allergic to penicillin. Percocet [Oxycodone-Acetaminophen] Itching    Sulfa (Sulfonamide Antibiotics) Other (comments)     Mouth blistering         Review of Systems   Review of Systems   Constitutional:  Negative for chills and fever. HENT:  Negative for congestion and sore throat. Respiratory:  Negative for cough and shortness of breath. Cardiovascular:  Negative for chest pain. Gastrointestinal:  Negative for abdominal pain, diarrhea, nausea and vomiting. Genitourinary:  Negative for dysuria and hematuria. Musculoskeletal:  Negative for myalgias. Skin:  Negative for rash. Neurological:  Negative for headaches. All other systems reviewed and are negative. Physical Exam   Physical Exam  Vitals and nursing note reviewed. Constitutional:       General: She is not in acute distress. Appearance: She is not toxic-appearing. HENT:      Head: Atraumatic. Right Ear: External ear normal.      Left Ear: External ear normal.      Nose: Nose normal.      Mouth/Throat:      Mouth: Mucous membranes are moist.   Eyes:      Extraocular Movements: Extraocular movements intact. Conjunctiva/sclera: Conjunctivae normal.   Cardiovascular:      Rate and Rhythm: Normal rate and regular rhythm. Pulses: Normal pulses. Heart sounds: Normal heart sounds. No murmur heard. No friction rub. No gallop. Pulmonary:      Effort: Pulmonary effort is normal. No respiratory distress.       Breath sounds: Normal breath sounds. No stridor. No wheezing, rhonchi or rales. Abdominal:      General: Abdomen is flat. There is no distension. Palpations: Abdomen is soft. Tenderness: There is no abdominal tenderness. There is no guarding or rebound. Musculoskeletal:         General: Tenderness present. No swelling. Cervical back: Neck supple. Comments: Right knee with well-healed anterior surgical scar across patella. Remainder of extremity normal to inspection with no lesions asymmetry erythema or swelling. Pain provoked with passive/active flexion extension of the knee. Pain provoked with active/passive dorsal/plantar flexion of the ankle and movement of the toes. Tenderness over quadriceps tendon. Tenderness over plantar aspect of foot, most prominent near plantar insertion of heel. Patient neurovascular intact distally. Skin:     General: Skin is warm. Neurological:      Mental Status: She is alert and oriented to person, place, and time. Psychiatric:         Mood and Affect: Mood normal.         Behavior: Behavior normal.         Thought Content: Thought content normal.         Judgment: Judgment normal.       Diagnostic Study Results     Labs -   No results found for this or any previous visit (from the past 12 hour(s)). Radiologic Studies -   No orders to display     CT Results  (Last 48 hours)      None          CXR Results  (Last 48 hours)      None              Medical Decision Making   I am the first provider for this patient. I reviewed the vital signs, available nursing notes, past medical history, past surgical history, family history and social history. Vital Signs-Reviewed the patient's vital signs.   Patient Vitals for the past 12 hrs:   Temp Pulse Resp BP SpO2   09/18/22 1047 97.9 °F (36.6 °C) 92 16 (!) 140/78 97 %       Records Reviewed: Nursing Notes and Old Medical Records    Provider Notes (Medical Decision Making):   Patient's symptoms most likely secondary to a soft tissue injury, to include a pallet tar fasciitis/overuse strain. She had no features concerning for infection or vascular process. She was advised on symptomatic care and following up with PCP/podiatry. She was advised return precautions as needed in the ED. Patient expressed understanding of the discharge instructions and treatment plan    ED Course:   Initial assessment performed. The patients presenting problems have been discussed, and they are in agreement with the care plan formulated and outlined with them. I have encouraged them to ask questions as they arise throughout their visit. Critical Care Time: None    Disposition:  discharged    PLAN:  1. Discharge Medication List as of 9/18/2022 12:08 PM        START taking these medications    Details   ibuprofen (MOTRIN) 800 mg tablet Take 1 Tablet by mouth three (3) times daily (with meals) for 10 days. , Normal, Disp-30 Tablet, R-0      acetaminophen (TYLENOL) 325 mg tablet Take 2 Tablets by mouth every six (6) hours for 5 days. , Normal, Disp-40 Tablet, R-0           CONTINUE these medications which have NOT CHANGED    Details   levothyroxine (SYNTHROID) 125 mcg tablet Take 2 Tablets by mouth Daily (before breakfast). , Normal, Disp-180 Tablet, R-1      omega-3 acid ethyl esters (LOVAZA) 1 gram capsule Take 2 Capsules by mouth two (2) times a day., Normal, Disp-120 Capsule, R-5      atorvastatin (LIPITOR) 40 mg tablet Take 1 Tablet by mouth daily. , Normal, Disp-90 Tablet, R-1Hold until needed      metFORMIN ER (GLUCOPHAGE XR) 500 mg tablet Take 2 Tablets by mouth daily (with dinner). , Normal, Disp-180 Tablet, R-2Dose change      ergocalciferol (ERGOCALCIFEROL) 1,250 mcg (50,000 unit) capsule Take 1 Capsule by mouth two (2) times a week., Normal, Disp-8 Capsule, R-2Change in frequency      fluticasone propion-salmeteroL (ADVAIR/WIXELA) 250-50 mcg/dose diskus inhaler Take 1 Puff by inhalation two (2) times a day., Historical Med      montelukast (SINGULAIR) 10 mg tablet TAKE 1 TABLET BY MOUTH AT BEDTIME, Historical Med      cyclobenzaprine (FLEXERIL) 10 mg tablet Take 1 Tablet by mouth nightly., Normal, Disp-30 Tablet, R-2HOLD UNTIL NEEDED      gabapentin (NEURONTIN) 400 mg capsule Take one capsule in am and two capsules in pm, Normal, Disp-90 Capsule, R-1This request is for a new prescription for a controlled substance as required by Federal/State law. allopurinoL (ZYLOPRIM) 100 mg tablet Take 2 Tabs by mouth daily. , Normal, Disp-180 Tab, R-3      MASTECTOMY PROSTHESIS misc B/l mastectomy / breast cancer, Print, Disp-4 Each,R-0      fluticasone propionate (FLONASE) 50 mcg/actuation nasal spray 2 Sprays by Both Nostrils route daily. , Normal, Disp-3 Bottle, R-3      albuterol (PROVENTIL HFA, VENTOLIN HFA, PROAIR HFA) 90 mcg/actuation inhaler Take 2 Puffs by inhalation every six (6) hours as needed for Wheezing, Shortness of Breath, Respiratory Distress or Cough. , Print, Disp-1 Inhaler, R-0      polyethylene glycol (MIRALAX) 17 gram packet Take 1 Packet by mouth daily. Mix in 8 oz of water, juice, soda, coffee, or tea prior to administration, Print, Disp-10 Each, R-0      cholecalciferol (VITAMIN D3) 25 mcg (1,000 unit) cap Take  by mouth daily. , Historical Med           2. Follow-up Information       Follow up With Specialties Details Why Contact Info    Mariane Lefort, DPM Podiatry Schedule an appointment as soon as possible for a visit   [address] [facility]  [address] [place], [place] Orthopedic Surgery Schedule an appointment as soon as possible for a visit   [address] [facility]   [address]  [address]  [phone]      [facility] EMERGENCY DEPT Emergency Medicine  If symptoms worsen [address]  [address] [facility]  [phone]          Return to ED if worse     Diagnosis     Clinical Impression:   1. Acute pain of right knee    2.  Pain of plantar aspect of heel          Please note that this dictation was completed with [employer], the [employer] voice recognition software. Quite often unanticipated grammatical, syntax, homophones, and other interpretive errors are inadvertently transcribed by the computer software. Please disregards these errors. Please excuse any errors that have escaped final proofreading.

## 2023-01-12 LAB — HBA1C MFR BLD HPLC: 7.6 %

## 2023-01-17 ENCOUNTER — HOSPITAL ENCOUNTER (EMERGENCY)
Age: 63
Discharge: HOME | End: 2023-01-17
Attending: STUDENT IN AN ORGANIZED HEALTH CARE EDUCATION/TRAINING PROGRAM
Payer: MEDICAID

## 2023-01-17 VITALS
HEART RATE: 82 BPM | WEIGHT: 239 LBS | SYSTOLIC BLOOD PRESSURE: 124 MMHG | TEMPERATURE: 97.7 F | HEIGHT: 66 IN | DIASTOLIC BLOOD PRESSURE: 68 MMHG | BODY MASS INDEX: 38.41 KG/M2 | RESPIRATION RATE: 18 BRPM | OXYGEN SATURATION: 96 %

## 2023-01-17 DIAGNOSIS — M79.674 GREAT TOE PAIN, RIGHT: Primary | ICD-10-CM

## 2023-01-17 PROCEDURE — 99283 EMERGENCY DEPT VISIT LOW MDM: CPT

## 2023-01-17 PROCEDURE — 74011250637 HC RX REV CODE- 250/637: Performed by: STUDENT IN AN ORGANIZED HEALTH CARE EDUCATION/TRAINING PROGRAM

## 2023-01-17 RX ORDER — ACETAMINOPHEN 325 MG/1
650 TABLET ORAL
Qty: 20 TABLET | Refills: 0 | Status: SHIPPED | OUTPATIENT
Start: 2023-01-17

## 2023-01-17 RX ORDER — COLCHICINE 0.6 MG/1
0.6 TABLET ORAL
Qty: 30 TABLET | Refills: 0 | Status: SHIPPED | OUTPATIENT
Start: 2023-01-17 | End: 2023-02-16

## 2023-01-17 RX ORDER — ACETAMINOPHEN 325 MG/1
650 TABLET ORAL ONCE
Status: COMPLETED | OUTPATIENT
Start: 2023-01-17 | End: 2023-01-17

## 2023-01-17 RX ORDER — COLCHICINE 0.6 MG/1
0.6 TABLET ORAL
Status: COMPLETED | OUTPATIENT
Start: 2023-01-17 | End: 2023-01-17

## 2023-01-17 RX ADMIN — COLCHICINE 0.6 MG: 0.6 TABLET, FILM COATED ORAL at 20:12

## 2023-01-17 RX ADMIN — ACETAMINOPHEN 650 MG: 325 TABLET ORAL at 20:12

## 2023-01-18 NOTE — ED NOTES
Pt arrives to ED with c/o right foot swelling and a headache that started this morning. Pt also has c/o \"a lump\" on her anterior left foot. Pt has some nausea associated with the headache. Denies photophobia, vomiting, or blurred vision. Pt in NAD and on cardiac monitor x2.

## 2023-01-18 NOTE — ED PROVIDER NOTES
[facility] EMERGENCY DEPT  EMERGENCY DEPARTMENT ENCOUNTER       Pt Name: Isa Saravia  MRN: [id number]  [place] 1960  Date of evaluation: 1/17/2023  Provider: Nazario Redd MD   PCP: Vivek Dorsey MD  Note Started: 7:59 PM 1/17/23     CHIEF COMPLAINT       Chief Complaint   Patient presents with    Foot Swelling     Right foot and ankle swelling x1 week    Headache        HISTORY OF PRESENT ILLNESS: 1 or more elements    History From: Patient  HPI Limitations : None     Isa Saravia is a 63 y.o. female with Pmhx listed below     Patient is a 53-year-old female with history of osteoarthritis, thyroid disease, gout presenting with concern of right great toe pain and headache. Patient states that she noticed it about 1 day ago, states that it is located mostly on her right foot, states that she has noticed some mild swelling of the bilateral feet. Patient states that he has history of gout and this feels similar to prior episodes, patient states that it does not happen for a long time, states that she has not taken any medication for this at home. Patient states that she was recently seen and evaluated with orthopedics for ongoing pain in her bilateral feet and ankles, has had imaging and lab work done 1-12 which was within normal limits. Patient denies any fevers, chills, injuries to the feet, denies any nausea, vomiting or other systemic symptoms. Nursing Notes were all reviewed and agreed with or any disagreements were addressed in the HPI. REVIEW OF SYSTEMS      Positives and Pertinent negatives as per HPI.     PAST HISTORY     Past Medical History:  Past Medical History:   Diagnosis Date    Arthritis     Cancer ([facility] [address]     breast cancer    H/O total mastectomy 11/30/2015    Hypercholesterolemia     Other ill-defined conditions(799.89)     completed radiation 9/12    Thyroid disease     hypothyroidism     Previous Medications    ALBUTEROL (PROVENTIL HFA, VENTOLIN HFA, PROAIR HFA) 90 MCG/ACTUATION INHALER    Take 2 Puffs by inhalation every six (6) hours as needed for Wheezing, Shortness of Breath, Respiratory Distress or Cough. ALLOPURINOL (ZYLOPRIM) 100 MG TABLET    Take 2 Tabs by mouth daily. ATORVASTATIN (LIPITOR) 40 MG TABLET    Take 1 Tablet by mouth daily. CHOLECALCIFEROL (VITAMIN D3) 25 MCG (1,000 UNIT) CAP    Take  by mouth daily. CYCLOBENZAPRINE (FLEXERIL) 10 MG TABLET    Take 1 Tablet by mouth nightly. ERGOCALCIFEROL (ERGOCALCIFEROL) 1,250 MCG (50,000 UNIT) CAPSULE    Take 1 Capsule by mouth two (2) times a week. FLUTICASONE PROPION-SALMETEROL (ADVAIR/WIXELA) 250-50 MCG/DOSE DISKUS INHALER    Take 1 Puff by inhalation two (2) times a day. FLUTICASONE PROPIONATE (FLONASE) 50 MCG/ACTUATION NASAL SPRAY    2 Sprays by Both Nostrils route daily. GABAPENTIN (NEURONTIN) 400 MG CAPSULE    Take one capsule in am and two capsules in pm    LEVOTHYROXINE (SYNTHROID) 125 MCG TABLET    Take 2 Tablets by mouth Daily (before breakfast). MASTECTOMY PROSTHESIS MISC    B/l mastectomy / breast cancer    METFORMIN ER (GLUCOPHAGE XR) 500 MG TABLET    Take 2 Tablets by mouth daily (with dinner). MONTELUKAST (SINGULAIR) 10 MG TABLET    TAKE 1 TABLET BY MOUTH AT BEDTIME    OMEGA-3 ACID ETHYL ESTERS (LOVAZA) 1 GRAM CAPSULE    Take 2 Capsules by mouth two (2) times a day. POLYETHYLENE GLYCOL (MIRALAX) 17 GRAM PACKET    Take 1 Packet by mouth daily.  Mix in 8 oz of water, juice, soda, coffee, or tea prior to administration       Past Surgical History:  Past Surgical History:   Procedure Laterality Date    HX BREAST LUMPECTOMY Right 2012    HX GYN      hysterectomy; TBL    HX HEENT      tonsillectomy    HX MASTECTOMY Bilateral 2015    HX ORTHOPAEDIC      right knee ACL    HX OTHER SURGICAL      lumptectomy right breast       Family History:  Family History   Problem Relation Age of Onset    Cancer Mother         brain cancer    Cancer Father     Breast Cancer Sister 54    No Known Problems Brother     Thyroid Disease Maternal Grandmother     Mult Sclerosis Sister     Breast Cancer Maternal Aunt         57-65's       Social History:  Social History     Tobacco Use    Smoking status: Former     Packs/day: 0.50     Years: 20.00     Pack years: 10.00     Types: Cigarettes     Quit date: 2015     Years since quittin.9    Smokeless tobacco: Never   Vaping Use    Vaping Use: Never used   Substance Use Topics    Alcohol use: Yes     Comment: occasional    Drug use: No       Allergies: Allergies   Allergen Reactions    Penicillins Hives     Pt states I am not allergic to penicillin. Percocet [Oxycodone-Acetaminophen] Itching    Sulfa (Sulfonamide Antibiotics) Other (comments)     Mouth blistering       PHYSICAL EXAM      ED Triage Vitals   ED Encounter Vitals Group      BP 23 1658 (!) 135/110      Pulse (Heart Rate) 23 1658 89      Resp Rate 23 1658 20      Temp 23 1658 97.7 °F (36.5 °C)      Temp src --       O2 Sat (%) 23 1658 98 %      Weight 23 1659 239 lb      Height 23 1659 5' 6\"        Physical Exam  Vitals reviewed. Constitutional:       General: She is not in acute distress. Appearance: Normal appearance. She is not ill-appearing, toxic-appearing or diaphoretic. HENT:      Head: Normocephalic. Mouth/Throat:      Mouth: Mucous membranes are dry. Eyes:      Conjunctiva/sclera: Conjunctivae normal.   Cardiovascular:      Rate and Rhythm: Normal rate. Pulmonary:      Effort: Pulmonary effort is normal. No respiratory distress. Abdominal:      General: Abdomen is flat. Musculoskeletal:         General: No deformity. Cervical back: Neck supple. Comments: Patient with exquisite tenderness over right great toe, able to fully range bilateral ankles, no significant edema. No overlying erythema or skin lesions. Skin:     General: Skin is warm and dry. Neurological:      General: No focal deficit present. Mental Status: She is alert. Psychiatric:         Mood and Affect: Mood normal.        EMERGENCY DEPARTMENT COURSE and DIFFERENTIAL DIAGNOSIS/MDM   CC/HPI Summary, DDx, ED Course, and Reassessment:     MDM  Number of Diagnoses or Management Options  Diagnosis management comments: Is a 53-year-old female presenting with concern of right great toe pain. Patient describes pain in the lower extremities bilaterally but states that acutely the right 1 has worsened. Patient is followed by orthopedics for osteoarthritis, states that she had recent imaging and lab work done which was within normal limits, reviewed this from [facility] which appear to be normal.  Patient states that pain is mostly located in the great right toe, does have tenderness to palpation on exam, no overlying erythema, no constitutional symptoms. Patient vitals are stable on arrival, high suspicion for possible gout versus osteoarthritis given history, denies any trauma to the area, do not feel that further imaging or lab work is indicated today, will trial Tylenol and colchicine for pain and likely discharge with close follow-up in primary care for reevaluation. Patient expressed understanding and okay with plan. Vitals:    01/17/23 1658 01/17/23 1659 01/17/23 1931   BP: (!) 135/110  124/68   Pulse: 89  82   Resp: 20  18   Temp: 97.7 °F (36.5 °C)     SpO2: 98%  96%   Weight:  108.4 kg (239 lb)    Height:  5' 6\" (1.676 m)         Patient was given the following medications:  Medications   acetaminophen (TYLENOL) tablet 650 mg (has no administration in time range)   colchicine tablet 0.6 mg (has no administration in time range)       CONSULTS:  None    Social Determinants affecting Dx or Tx: None    Records Reviewed (source and summary of external notes): Nursing Notes, [facility] medial results   DIAGNOSTIC RESULTS   LABS:     No results found for this or any previous visit (from the past 12 hour(s)).      EKG: none     RADIOLOGY:  Non-plain film images such as CT, Ultrasound and MRI are read by the radiologist.   Plain radiographic images are often visualized and preliminarily interpreted by the ED, if an interpretation was completed the findings will be listed below:   none     Interpretation per the Radiologist below, if available at the time of this note:     No results found. PROCEDURES   Unless otherwise noted below, none  Procedures     CRITICAL CARE TIME   none    FINAL IMPRESSION     1. Great toe pain, right          DISPOSITION/PLAN   Discharged    Discharge Note: The patient is stable for discharge home. The signs, symptoms, diagnosis, and discharge instructions have been discussed, understanding conveyed, and agreed upon. The patient is to follow up as recommended or return to ER should their symptoms worsen. PATIENT REFERRED TO:  Follow-up Information       Follow up With Specialties Details Why Contact Martin Argueta MD Internal Medicine Physician  If symptoms worsen [address] [facility] [address]      \[facility] [place] [facility]\"" EMERGENCY DEPT Emergency Medicine   [address] [place] [address]  [phone]              DISCHARGE MEDICATIONS:  Current Discharge Medication List        START taking these medications    Details   acetaminophen (TYLENOL) 325 mg tablet Take 2 Tablets by mouth every four (4) hours as needed for Pain. Qty: 20 Tablet, Refills: 0  Start date: 1/17/2023      colchicine 0.6 mg tablet Take 1 Tablet by mouth daily as needed for Gout or Pain for up to 30 days. Qty: 30 Tablet, Refills: 0  Start date: 1/17/2023, End date: 2/16/2023               DISCONTINUED MEDICATIONS:  Current Discharge Medication List          I am the Primary Clinician of Record. Signed By: Luna Arredondo MD     January 17, 2023      (Please note that parts of this dictation were completed with voice recognition software.  Quite often unanticipated grammatical, syntax, homophones, and other interpretive errors are inadvertently transcribed by the computer software. Please disregards these errors.  Please excuse any errors that have escaped final proofreading.)

## 2023-01-18 NOTE — ED NOTES
Boot applied to pt right foot. Patient discharged by Dr. Kristi Pino. Patient provided with discharge instructions Rx and instructions on follow up care. Patient out of ED via wheelchair accompanied by tech.

## 2023-01-26 NOTE — TELEPHONE ENCOUNTER
Called, spoke to pt. Two pt identifiers confirmed. Patient states she needs appointment for follow up on medication and new test results. Patient offered and accepted appointment 11/23/2020 at 12:15. Pt verbalized understanding of information discussed w/ no further questions at this time. PA denied.

## 2023-02-06 ENCOUNTER — TELEPHONE (OUTPATIENT)
Dept: INTERNAL MEDICINE CLINIC | Age: 63
End: 2023-02-06

## 2023-02-06 ENCOUNTER — OFFICE VISIT (OUTPATIENT)
Dept: INTERNAL MEDICINE CLINIC | Age: 63
End: 2023-02-06
Payer: MEDICAID

## 2023-02-06 VITALS
BODY MASS INDEX: 37.28 KG/M2 | HEART RATE: 82 BPM | OXYGEN SATURATION: 97 % | WEIGHT: 232 LBS | TEMPERATURE: 97.9 F | RESPIRATION RATE: 16 BRPM | SYSTOLIC BLOOD PRESSURE: 132 MMHG | HEIGHT: 66 IN | DIASTOLIC BLOOD PRESSURE: 82 MMHG

## 2023-02-06 DIAGNOSIS — M77.11 RIGHT LATERAL EPICONDYLITIS: ICD-10-CM

## 2023-02-06 DIAGNOSIS — M19.041 PRIMARY OSTEOARTHRITIS OF BOTH HANDS: ICD-10-CM

## 2023-02-06 DIAGNOSIS — M19.042 PRIMARY OSTEOARTHRITIS OF BOTH HANDS: ICD-10-CM

## 2023-02-06 DIAGNOSIS — R60.9 EDEMA, UNSPECIFIED TYPE: Primary | ICD-10-CM

## 2023-02-06 DIAGNOSIS — M79.671 RIGHT FOOT PAIN: ICD-10-CM

## 2023-02-06 DIAGNOSIS — E03.4 HYPOTHYROIDISM DUE TO ACQUIRED ATROPHY OF THYROID: ICD-10-CM

## 2023-02-06 PROCEDURE — 99213 OFFICE O/P EST LOW 20 MIN: CPT | Performed by: FAMILY MEDICINE

## 2023-02-06 RX ORDER — FUROSEMIDE 40 MG/1
40 TABLET ORAL DAILY
COMMUNITY
Start: 2023-02-06

## 2023-02-06 RX ORDER — METHYLPREDNISOLONE 4 MG/1
4 TABLET ORAL
Qty: 1 DOSE PACK | Refills: 0 | Status: SHIPPED | OUTPATIENT
Start: 2023-02-06

## 2023-02-06 RX ORDER — TRAMADOL HYDROCHLORIDE 50 MG/1
50 TABLET ORAL
Qty: 30 TABLET | Refills: 0 | Status: SHIPPED | OUTPATIENT
Start: 2023-02-06 | End: 2023-03-08

## 2023-02-06 NOTE — PROGRESS NOTES
Roseanna South is a 63 y.o. female who presents with concern of pain in right side. Pain in right arm at elbow to hand. Swelling in 2nd digit right hand. Has UE lymphedema, bilateral mastectomy. Using lymphedema sleeve on right arm as needed. Stiffness from knee to foot, feels swollen. Right foot pain. Denies numbness or tingling. On gabapentin 400mg in am and 800mg in pm.     Seen at urgent care, given toradol 10mg QID prn. Tried ibuprofen 800mg, no relief. Saw Dr Víctor Rodriguez, ortho, at [facility]. Xray left foot mid foot and 1st MTP with OA, right foot mid foot OA. Dx with peripheral edema. Given lasix 40mg daily. No change in right foot pain on diuretic. Reports taking thyroid medication regularly.        Past Medical History:   Diagnosis Date    Arthritis     Cancer ([facility] [address]     breast cancer    H/O total mastectomy 2015    Hypercholesterolemia     Other ill-defined conditions(799.89)     completed radiation     Thyroid disease     hypothyroidism       Family History   Problem Relation Age of Onset    Cancer Mother         brain cancer    Cancer Father     Breast Cancer Sister 54    No Known Problems Brother     Thyroid Disease Maternal Grandmother     Mult Sclerosis Sister     Breast Cancer Maternal Aunt         60-70's       Social History     Socioeconomic History    Marital status: SINGLE     Spouse name: Not on file    Number of children: Not on file    Years of education: Not on file    Highest education level: Not on file   Occupational History    Not on file   Tobacco Use    Smoking status: Former     Packs/day: 0.50     Years: 20.00     Pack years: 10.00     Types: Cigarettes     Quit date: 2015     Years since quittin.0    Smokeless tobacco: Never   Vaping Use    Vaping Use: Never used   Substance and Sexual Activity    Alcohol use: Yes     Comment: occasional    Drug use: No    Sexual activity: Not Currently     Partners: Male     Birth control/protection: None   Other Topics Concern    Not on file   Social History Narrative    Not on file     Social Determinants of Health     Financial Resource Strain: Low Risk     Difficulty of Paying Living Expenses: Not very hard   Food Insecurity: No Food Insecurity    Worried About Running Out of Food in the Last Year: Never true    Ran Out of Food in the Last Year: Never true   Transportation Needs: Not on file   Physical Activity: Not on file   Stress: Not on file   Social Connections: Not on file   Intimate Partner Violence: Not on file   Housing Stability: Not on file       Current Outpatient Medications on File Prior to Visit   Medication Sig Dispense Refill    acetaminophen (TYLENOL) 325 mg tablet Take 2 Tablets by mouth every four (4) hours as needed for Pain. 20 Tablet 0    colchicine 0.6 mg tablet Take 1 Tablet by mouth daily as needed for Gout or Pain for up to 30 days. 30 Tablet 0    levothyroxine (SYNTHROID) 125 mcg tablet Take 2 Tablets by mouth Daily (before breakfast). 180 Tablet 1    omega-3 acid ethyl esters (LOVAZA) 1 gram capsule Take 2 Capsules by mouth two (2) times a day. 120 Capsule 5    atorvastatin (LIPITOR) 40 mg tablet Take 1 Tablet by mouth daily. 90 Tablet 1    metFORMIN ER (GLUCOPHAGE XR) 500 mg tablet Take 2 Tablets by mouth daily (with dinner). 180 Tablet 2    ergocalciferol (ERGOCALCIFEROL) 1,250 mcg (50,000 unit) capsule Take 1 Capsule by mouth two (2) times a week. 8 Capsule 2    fluticasone propion-salmeteroL (ADVAIR/WIXELA) 250-50 mcg/dose diskus inhaler Take 1 Puff by inhalation two (2) times a day. montelukast (SINGULAIR) 10 mg tablet TAKE 1 TABLET BY MOUTH AT BEDTIME      cyclobenzaprine (FLEXERIL) 10 mg tablet Take 1 Tablet by mouth nightly. 30 Tablet 2    gabapentin (NEURONTIN) 400 mg capsule Take one capsule in am and two capsules in pm 90 Capsule 1    allopurinoL (ZYLOPRIM) 100 mg tablet Take 2 Tabs by mouth daily.  180 Tab 3    MASTECTOMY PROSTHESIS misc B/l mastectomy / breast cancer 4 Each 0 fluticasone propionate (FLONASE) 50 mcg/actuation nasal spray 2 Sprays by Both Nostrils route daily. 3 Bottle 3    albuterol (PROVENTIL HFA, VENTOLIN HFA, PROAIR HFA) 90 mcg/actuation inhaler Take 2 Puffs by inhalation every six (6) hours as needed for Wheezing, Shortness of Breath, Respiratory Distress or Cough. 1 Inhaler 0    polyethylene glycol (MIRALAX) 17 gram packet Take 1 Packet by mouth daily. Mix in 8 oz of water, juice, soda, coffee, or tea prior to administration 10 Each 0    cholecalciferol (VITAMIN D3) 25 mcg (1,000 unit) cap Take  by mouth daily. No current facility-administered medications on file prior to visit. Review of Systems  Pertinent items are noted in HPI. Objective:     Visit Vitals  /82   Pulse 82   Temp 97.9 °F (36.6 °C) (Temporal)   Resp 16   Ht 5' 6\" (1.676 m)   Wt 232 lb (105.2 kg)   SpO2 97%   BMI 37.45 kg/m²     Gen: well appearing female  Resp:  No wheezing, no rhonchi, no rales. CV:  RRR, normal S1S2, no murmur. Extrem:  trace LE edema, warm distally, advanced OA changes in DIP, PIP, pain on palpation right       Assessment/Plan:       ICD-10-CM ICD-9-CM    1. Edema, unspecified type  M65.8 856.5 METABOLIC PANEL, BASIC      METABOLIC PANEL, BASIC      2. Hypothyroidism due to acquired atrophy of thyroid  E03.4 244.8 TSH 3RD GENERATION     246.8 TSH 3RD GENERATION      3. Right foot pain  M79.671 729.5 REFERRAL TO PODIATRY      traMADoL (ULTRAM) 50 mg tablet      methylPREDNISolone (MEDROL DOSEPACK) 4 mg tablet      4. Primary osteoarthritis of both hands  M19.041 715.14 traMADoL (ULTRAM) 50 mg tablet    M19.042  methylPREDNISolone (MEDROL DOSEPACK) 4 mg tablet      5. Right lateral epicondylitis  M77.11 726.32 REFERRAL TO ORTHOPEDICS      traMADoL (ULTRAM) 50 mg tablet      methylPREDNISolone (MEDROL DOSEPACK) 4 mg tablet       Check renal function on diuretic. Given exercises for epicondylitis, use ice.         Yuli Reina MD

## 2023-02-06 NOTE — PROGRESS NOTES
1. \"Have you been to the ER, urgent care clinic since your last visit? Hospitalized since your last visit? \" Yes [facility], muscle pain     2. \"Have you seen or consulted any other health care providers outside of the [address] [place] [address] since your last visit? \" No     3. For patients aged 39-70: Has the patient had a colonoscopy / FIT/ Cologuard? No      If the patient is female:    4. For patients aged 41-77: Has the patient had a mammogram within the past 2 years? Yes - Care Gap present. Most recent result on file      5. For patients aged 21-65: Has the patient had a pap smear?  No

## 2023-02-06 NOTE — LETTER
2/6/2023 12:13 PM    Ms. Lincoln [address] 53  [address] [id number]      To Whom It May Concern:    Lincoln Todd is currently under the care of [facility]. She was seen in the office today. Please excuse work absence starting Monday, February 6. Return to work on Monday, February 13. If there are questions or concerns please have the patient contact our office.         Sincerely,      Wilene Skiff, MD

## 2023-02-07 ENCOUNTER — TELEPHONE (OUTPATIENT)
Dept: INTERNAL MEDICINE CLINIC | Age: 63
End: 2023-02-07

## 2023-02-07 LAB
ANION GAP SERPL CALC-SCNC: 5 MMOL/L (ref 5–15)
BUN SERPL-MCNC: 18 MG/DL (ref 6–20)
BUN/CREAT SERPL: 23 (ref 12–20)
CALCIUM SERPL-MCNC: 9.8 MG/DL (ref 8.5–10.1)
CHLORIDE SERPL-SCNC: 108 MMOL/L (ref 97–108)
CO2 SERPL-SCNC: 28 MMOL/L (ref 21–32)
CREAT SERPL-MCNC: 0.8 MG/DL (ref 0.55–1.02)
GLUCOSE SERPL-MCNC: 127 MG/DL (ref 65–100)
POTASSIUM SERPL-SCNC: 4.6 MMOL/L (ref 3.5–5.1)
SODIUM SERPL-SCNC: 141 MMOL/L (ref 136–145)
TSH SERPL DL<=0.05 MIU/L-ACNC: 0.29 UIU/ML (ref 0.36–3.74)

## 2023-02-07 NOTE — TELEPHONE ENCOUNTER
Patient states she needs a call back in reference to Podiatrist/Jonathan Best is Not In Plan with her Insurance. Patient states insurance advised of some on [address] that she needs to know if Dr. Remy Solis would refer to. Please call to discuss. Thank you.

## 2023-02-08 ENCOUNTER — TELEPHONE (OUTPATIENT)
Dept: INTERNAL MEDICINE CLINIC | Age: 63
End: 2023-02-08

## 2023-02-08 DIAGNOSIS — M79.671 RIGHT FOOT PAIN: Primary | ICD-10-CM

## 2023-02-08 NOTE — TELEPHONE ENCOUNTER
Fibroscan results sent via [facility] pt portal    Spoke to patient   Gave new referral   Faxed to new provider.

## 2023-02-16 ENCOUNTER — TELEPHONE (OUTPATIENT)
Dept: INTERNAL MEDICINE CLINIC | Age: 63
End: 2023-02-16

## 2023-02-16 NOTE — TELEPHONE ENCOUNTER
Pt states that she went to [facility] today and found out that her uric acid is high. The medication she is taking is not working and they would like her to take another medication. The ankle injection she did have has worn off. Please call pt to let her know what you can do.

## 2023-02-16 NOTE — TELEPHONE ENCOUNTER
Patient states that she went to [address] [place] [address] and had labs drawn. They show that her uric acid is high. The medication she is taking is not working and they would like her to take another medication. The ankle injection she did have has worn off. Patient would like to know what she can do now .

## 2023-02-26 ENCOUNTER — PATIENT MESSAGE (OUTPATIENT)
Dept: INTERNAL MEDICINE CLINIC | Age: 63
End: 2023-02-26

## 2023-03-17 DIAGNOSIS — Z85.3 HISTORY OF BREAST CANCER IN FEMALE: ICD-10-CM

## 2023-03-17 DIAGNOSIS — Z90.13 H/O BILATERAL MASTECTOMY: Primary | ICD-10-CM

## 2023-06-05 DIAGNOSIS — E03.4 ATROPHY OF THYROID (ACQUIRED): ICD-10-CM

## 2023-06-06 RX ORDER — LEVOTHYROXINE SODIUM 0.12 MG/1
TABLET ORAL
Qty: 60 TABLET | Refills: 5 | Status: SHIPPED | OUTPATIENT
Start: 2023-06-06

## 2023-08-29 ENCOUNTER — TELEPHONE (OUTPATIENT)
Age: 63
End: 2023-08-29

## 2023-08-29 NOTE — TELEPHONE ENCOUNTER
Patient states she's returning your call & needs to get a Surg. Clearance for upcoming surg on 9/22/23. Patient states if No Answer to leave a voice mail on date & time when she needs to be here. Please call.  Thank you

## 2023-08-29 NOTE — TELEPHONE ENCOUNTER
----- Message from [facility] sent at 8/29/2023  1:17 PM EDT -----  Subject: Appointment Request    Reason for Call: Established Patient Appointment needed: Routine Pre-Op    QUESTIONS    Reason for appointment request? Available appointments did not meet   patient need     Additional Information for Provider? Patient called in to schedule her   pre-op appointment. Available appointments does not meet patient needs. Patient scheduled to have carpal tunnel surgery on 9/22.  Please contact   patient to further assist.   ---------------------------------------------------------------------------  --------------  Pop NICHOLSON  [phone]; OK to leave message on voicemail  ---------------------------------------------------------------------------  --------------  SCRIPT ANSWERS

## 2023-08-30 ENCOUNTER — TELEPHONE (OUTPATIENT)
Age: 63
End: 2023-08-30

## 2023-08-30 NOTE — TELEPHONE ENCOUNTER
----- Message from Yaakov Gong sent at 8/30/2023  8:42 AM EDT -----  Subject: Appointment Request    Reason for Call: Established Patient Appointment needed: Routine Pre-Op    QUESTIONS    Reason for appointment request? Available appointments did not meet   patient need     Additional Information for Provider? Jesús Bruce is having carpal tunnel   surgery on 09/22/23 and will need a pre-op physical prior to surgery. Dr. Kailey Carver with [facility] will be doing the surgery .  She can be   reached at [phone] ok to leave a message  ---------------------------------------------------------------------------  --------------  [address]  [phone]; OK to leave message on voicemail  ---------------------------------------------------------------------------  --------------  SCRIPT ANSWERS

## 2023-09-11 ENCOUNTER — OFFICE VISIT (OUTPATIENT)
Age: 63
End: 2023-09-11
Payer: COMMERCIAL

## 2023-09-11 VITALS
HEIGHT: 66 IN | RESPIRATION RATE: 16 BRPM | BODY MASS INDEX: 39.21 KG/M2 | WEIGHT: 244 LBS | DIASTOLIC BLOOD PRESSURE: 77 MMHG | TEMPERATURE: 98.2 F | SYSTOLIC BLOOD PRESSURE: 118 MMHG | OXYGEN SATURATION: 99 % | HEART RATE: 63 BPM

## 2023-09-11 DIAGNOSIS — R07.89 CHEST WALL PAIN, CHRONIC: Primary | ICD-10-CM

## 2023-09-11 DIAGNOSIS — G89.29 CHEST WALL PAIN, CHRONIC: Primary | ICD-10-CM

## 2023-09-11 DIAGNOSIS — Z01.818 PREOP EXAM FOR INTERNAL MEDICINE: ICD-10-CM

## 2023-09-11 DIAGNOSIS — R73.09 ELEVATED GLUCOSE: ICD-10-CM

## 2023-09-11 DIAGNOSIS — G56.01 CARPAL TUNNEL SYNDROME OF RIGHT WRIST: Primary | ICD-10-CM

## 2023-09-11 DIAGNOSIS — R60.9 EDEMA, UNSPECIFIED TYPE: ICD-10-CM

## 2023-09-11 DIAGNOSIS — Z90.13 H/O MASTECTOMY, BILATERAL: ICD-10-CM

## 2023-09-11 DIAGNOSIS — Z85.3 HISTORY OF BREAST CANCER: ICD-10-CM

## 2023-09-11 DIAGNOSIS — I89.0 LYMPHEDEMA: ICD-10-CM

## 2023-09-11 DIAGNOSIS — E03.4 HYPOTHYROIDISM DUE TO ACQUIRED ATROPHY OF THYROID: ICD-10-CM

## 2023-09-11 DIAGNOSIS — E78.2 MIXED HYPERLIPIDEMIA: ICD-10-CM

## 2023-09-11 PROCEDURE — 99214 OFFICE O/P EST MOD 30 MIN: CPT | Performed by: FAMILY MEDICINE

## 2023-09-11 RX ORDER — POTASSIUM CHLORIDE 20 MEQ/1
20 TABLET, EXTENDED RELEASE ORAL DAILY
Qty: 30 TABLET | Refills: 2 | Status: SHIPPED | OUTPATIENT
Start: 2023-09-11

## 2023-09-11 SDOH — ECONOMIC STABILITY: FOOD INSECURITY: WITHIN THE PAST 12 MONTHS, YOU WORRIED THAT YOUR FOOD WOULD RUN OUT BEFORE YOU GOT MONEY TO BUY MORE.: NEVER TRUE

## 2023-09-11 SDOH — ECONOMIC STABILITY: FOOD INSECURITY: WITHIN THE PAST 12 MONTHS, THE FOOD YOU BOUGHT JUST DIDN'T LAST AND YOU DIDN'T HAVE MONEY TO GET MORE.: NEVER TRUE

## 2023-09-11 SDOH — ECONOMIC STABILITY: HOUSING INSECURITY
IN THE LAST 12 MONTHS, WAS THERE A TIME WHEN YOU DID NOT HAVE A STEADY PLACE TO SLEEP OR SLEPT IN A SHELTER (INCLUDING NOW)?: NO

## 2023-09-11 SDOH — ECONOMIC STABILITY: INCOME INSECURITY: HOW HARD IS IT FOR YOU TO PAY FOR THE VERY BASICS LIKE FOOD, HOUSING, MEDICAL CARE, AND HEATING?: NOT HARD AT ALL

## 2023-09-11 ASSESSMENT — PATIENT HEALTH QUESTIONNAIRE - PHQ9
SUM OF ALL RESPONSES TO PHQ9 QUESTIONS 1 & 2: 0
SUM OF ALL RESPONSES TO PHQ QUESTIONS 1-9: 0
1. LITTLE INTEREST OR PLEASURE IN DOING THINGS: 0
SUM OF ALL RESPONSES TO PHQ QUESTIONS 1-9: 0
SUM OF ALL RESPONSES TO PHQ QUESTIONS 1-9: 0
2. FEELING DOWN, DEPRESSED OR HOPELESS: 0
SUM OF ALL RESPONSES TO PHQ QUESTIONS 1-9: 0

## 2023-09-12 RX ORDER — GABAPENTIN 400 MG/1
CAPSULE ORAL
Qty: 90 CAPSULE | Refills: 0 | Status: SHIPPED | OUTPATIENT
Start: 2023-09-12 | End: 2023-10-12

## 2023-09-13 ENCOUNTER — TELEPHONE (OUTPATIENT)
Age: 63
End: 2023-09-13

## 2023-09-13 NOTE — TELEPHONE ENCOUNTER
Rachel Roberson from dr Donta Sims office. Paperwork is not ready. Patient needs to do labs which she is set to do on 9/18.

## 2023-09-14 ENCOUNTER — TELEPHONE (OUTPATIENT)
Age: 63
End: 2023-09-14

## 2023-09-14 NOTE — TELEPHONE ENCOUNTER
Amada//Dr. Tavares Spearing Off//[facility] states she needs a call back in reference to patient's Labs needing to be re-done prior to Surgery. Bob Hernandez states she was advised that is scheduled for 9/18/23 but Dr. Mejia Needs states labs received from [facility] are sufficient for him & no need for additional Labs or patient to be stuck again. Please call to discuss & advise as Bob Hernandez states she can fax those results if needed. Bob Hernandez states a detailed message can be left on voice mail if no answer.  Thank you

## 2023-09-15 ENCOUNTER — NURSE ONLY (OUTPATIENT)
Age: 63
End: 2023-09-15

## 2023-09-15 DIAGNOSIS — R73.09 ELEVATED GLUCOSE: ICD-10-CM

## 2023-09-15 DIAGNOSIS — R60.9 EDEMA, UNSPECIFIED TYPE: ICD-10-CM

## 2023-09-15 LAB
ANION GAP SERPL CALC-SCNC: 4 MMOL/L (ref 5–15)
BUN SERPL-MCNC: 28 MG/DL (ref 6–20)
BUN/CREAT SERPL: 25 (ref 12–20)
CALCIUM SERPL-MCNC: 10.2 MG/DL (ref 8.5–10.1)
CHLORIDE SERPL-SCNC: 100 MMOL/L (ref 97–108)
CO2 SERPL-SCNC: 32 MMOL/L (ref 21–32)
CREAT SERPL-MCNC: 1.11 MG/DL (ref 0.55–1.02)
EST. AVERAGE GLUCOSE BLD GHB EST-MCNC: 177 MG/DL
GLUCOSE SERPL-MCNC: 230 MG/DL (ref 65–100)
HBA1C MFR BLD: 7.8 % (ref 4–5.6)
POTASSIUM SERPL-SCNC: 4.4 MMOL/L (ref 3.5–5.1)
SODIUM SERPL-SCNC: 136 MMOL/L (ref 136–145)

## 2023-09-19 ENCOUNTER — TELEPHONE (OUTPATIENT)
Age: 63
End: 2023-09-19

## 2023-09-19 NOTE — TELEPHONE ENCOUNTER
Fax #[phone]  Attn: [id number] [facility] [address] [place] [facility] needs pre op and labs faxed to them today please.

## 2023-09-20 ENCOUNTER — TELEPHONE (OUTPATIENT)
Age: 63
End: 2023-09-20

## 2023-09-20 NOTE — TELEPHONE ENCOUNTER
Please advise patient directly that her on recheck labs she is diabetic. Hemoglobin A1c is 7.8%. She is to follow a lower sugar lower carbohydrate diet. Plan follow-up in the office in 3 months. If on diet  alone, her blood sugar has not improved we will look at medication. On repeat labs her potassium is now normal.  Kidney function mildly impaired. Avoid anti-inflammatories like ibuprofen or Aleve and drink plenty of water, 6-8 glasses of water daily. Please have patient schedule a 3-month follow-up. Please notify patient preop information has been sent for her upcoming.

## 2023-09-21 NOTE — TELEPHONE ENCOUNTER
Called patient. Two patient identifiers verified. Spoke with patient         Please advise patient directly that her on recheck labs she is diabetic. Hemoglobin A1c is 7.8%. She is to follow a lower sugar lower carbohydrate diet. Plan follow-up in the office in 3 months. If on diet  alone, her blood sugar has not improved we will look at medication. On repeat labs her potassium is now normal.  Kidney function mildly impaired. Avoid anti-inflammatories like ibuprofen or Aleve and drink plenty of water, 6-8 glasses of water daily. Please have patient schedule a 3-month follow-up. Please notify patient preop information has been sent for her upcoming.

## 2023-09-22 ENCOUNTER — TELEPHONE (OUTPATIENT)
Age: 63
End: 2023-09-22

## 2023-09-22 NOTE — TELEPHONE ENCOUNTER
Patient had surgery and was not given anything for pain. Please advise what the patient can take for the pain. States because of her kidneys she states they did not know what to give her for pain. Patient is asking for something for pain.

## 2023-09-22 NOTE — TELEPHONE ENCOUNTER
Regarding yesterdays encounter    States carpal tunnel surgery done today    Doesn't know what to to take for pain    States needs to know what pain or anti-inflammatory dr Brook Vidal recommends for her.     Please call to advise  [phone]

## 2023-09-23 RX ORDER — MELOXICAM 15 MG/1
15 TABLET ORAL DAILY
Qty: 30 TABLET | Refills: 0 | Status: SHIPPED | OUTPATIENT
Start: 2023-09-23 | End: 2023-11-08

## 2023-09-28 ENCOUNTER — TELEPHONE (OUTPATIENT)
Age: 63
End: 2023-09-28

## 2023-09-28 RX ORDER — METHYLPREDNISOLONE 4 MG/1
4 TABLET ORAL SEE ADMIN INSTRUCTIONS
Qty: 1 KIT | Refills: 0 | Status: SHIPPED | OUTPATIENT
Start: 2023-09-28

## 2023-09-28 NOTE — TELEPHONE ENCOUNTER
Pt had surgery on Friday and now she has gout in her hands. She had hand surgery. Pt is asking for something to be called in for the gout. This could be in elbow too pt states. Not allopurinol. [phone]      Please call pt to let her know what you can do.

## 2023-10-02 ENCOUNTER — TELEPHONE (OUTPATIENT)
Age: 63
End: 2023-10-02

## 2023-10-02 NOTE — TELEPHONE ENCOUNTER
Tried calling patient to schedule 3-month follow-up for diabetes and home # is out of service and cell MB is full unable to leave a message.

## 2023-10-24 ENCOUNTER — TELEPHONE (OUTPATIENT)
Age: 63
End: 2023-10-24

## 2023-10-24 NOTE — TELEPHONE ENCOUNTER
Returned call to patient on mobile number. Left message for return call. She may ask to speak with me directly if available. Yfn Kohler(Attending)

## 2023-11-08 RX ORDER — MELOXICAM 15 MG/1
15 TABLET ORAL DAILY
Qty: 30 TABLET | Refills: 0 | Status: SHIPPED | OUTPATIENT
Start: 2023-11-08

## 2023-12-15 NOTE — PROGRESS NOTES
Faxed signed CMN to Angy at [facility], per request, for lymphedema supplies. Fax # [phone]. Fax confirmation received. [facility]  Progress Note  Name: Ely Hernadez I  MRN: [id number]  Unit/Bed#: MS Ortiz I Date of Admission: 12/14/2023   Date of Service: 12/15/2023 I Hospital Day: 1    Assessment/Plan   * Acute blood loss anemia  Assessment & Plan  Patient presented to the ED with a hemoglobin of 5.5 on outpatient labs; referred to the ER by cardiology  Hemoglobin on 10/31 noted to be 12.7  Patient denies any bright red blood per rectum, but does endorse dark stools approx 1 wk ago  She is symptomatic with shortness of breath  Patient currently receiving 2 units PRBC  Hemoglobin post transfusion 7.4  GI consult, appreciate input  INR 2.57 this morning, GI requesting 1 unit FFP prior to scope  Continue protonix drip  Keep NPO for endoscopy today.    H/O mechanical aortic valve replacement  Assessment & Plan  History of mechanical valve replacement in 2007  Maintained on Coumadin typically  Coumadin on hold, initiated on Heparin drip after discussion with GI  Heparin drip is now on hold in setting of EGD planned for later this afternoon.  Restart coumadin once cleared    Supratherapeutic INR  Assessment & Plan  INR noted to be 2.57  Will hold Warfarin in setting of GI bleed    Paroxysmal atrial fibrillation (HCC)  Assessment & Plan  Chronic  Takes Warfarin for AC and Lopressor for rate control  Coumadin is on hold in setting of need for EGD today.  Rate is controlled    Stage 3a chronic kidney disease  Assessment & Plan  Lab Results   Component Value Date    EGFR 35 12/14/2023    EGFR 36 12/13/2023    EGFR 43 10/31/2023    CREATININE 1.37 (H) 12/14/2023    CREATININE 1.33 (H) 12/13/2023    CREATININE 1.16 10/31/2023     Chronic, baseline creatinine noted to be 1.1-1.3  Currently within baseline  Will check BMP today.  Avoid nephrotoxins, hypotension  Monitor BMP    Cardiomyopathy (HCC)  Assessment & Plan  Most recent echocardiogram with an EF of 45% with grade 1 diastolic dysfunction  Patient takes 40 mg  Lasix daily at home  Hold for now, plan to restart later today    AVM (arteriovenous malformation) of small bowel, acquired with hemorrhage  Assessment & Plan  History from 2018  Plan is for a EGD with push enteroscopy this afternoon    Diabetes mellitus with neurological manifestation (HCC)  Assessment & Plan  Chronic, uncontrolled, as evidenced by a hemoglobin A1C of 8.7  Hold home oral anti-diabetics  Continue with insulin sliding scale  Monitor blood glucose AC/HS  Hypoglycemia protocol      Coronary artery disease of native artery of native heart with stable angina pectoris (HCC)  Assessment & Plan  History of CAD  Takes Aspirin and Lopressor at home  Cleared by GI to continue with Aspirin  No chest pain at this time             VTE Pharmacologic Prophylaxis: VTE Score: 4 Moderate Risk (Score 3-4) - Pharmacological DVT Prophylaxis Ordered: heparin drip. However Hep gtt on hold for EGD    Mobility:   Basic Mobility Inpatient Raw Score: 20  JH-HLM Goal: 6: Walk 10 steps or more  JH-HLM Achieved: 6: Walk 10 steps or more  HLM Goal achieved. Continue to encourage appropriate mobility.    Patient Centered Rounds: I performed bedside rounds with nursing staff today.   Discussions with Specialists or Other Care Team Provider: Case Management, GI    Education and Discussions with Family / Patient: Patient declined call to .     Total Time Spent on Date of Encounter in care of patient: 37 mins. This time was spent on one or more of the following: performing physical exam; counseling and coordination of care; obtaining or reviewing history; documenting in the medical record; reviewing/ordering tests, medications or procedures; communicating with other healthcare professionals and discussing with patient's family/caregivers.    Current Length of Stay: 1 day(s)  Current Patient Status: Inpatient   Certification Statement: The patient will continue to require additional inpatient hospital stay due to ongoing  treatment in setting of acute blood loss anemia, need for endoscopy today.  Discharge Plan: Anticipate discharge in 24-48 hrs to home.    Code Status: Level 1 - Full Code    Subjective:   Patient resting in bed at this time.  Denies any complaints of chest pain, shortness of breath, fever or chills.  Has no complaints of any abdominal pain, nausea or vomiting.  Denies any bowel movements overnight.  No active bleeding noted.    Objective:     Vitals:   Temp (24hrs), Av.2 °F (36.8 °C), Min:97.4 °F (36.3 °C), Max:99.4 °F (37.4 °C)    Temp:  [97.4 °F (36.3 °C)-99.4 °F (37.4 °C)] 98.4 °F (36.9 °C)  HR:  [61-87] 70  Resp:  [16-30] 17  BP: (107-151)/(47-76) 151/63  SpO2:  [94 %-99 %] 96 %  Body mass index is 26.45 kg/m².     Input and Output Summary (last 24 hours):     Intake/Output Summary (Last 24 hours) at 12/15/2023 0915  Last data filed at 2023 2148  Gross per 24 hour   Intake 820 ml   Output 500 ml   Net 320 ml       Physical Exam:   Physical Exam  Vitals and nursing note reviewed.   Constitutional:       General: She is not in acute distress.     Appearance: She is normal weight. She is not ill-appearing.   Cardiovascular:      Rate and Rhythm: Normal rate.      Pulses: Normal pulses.      Heart sounds: Normal heart sounds.      Comments: + click  Pulmonary:      Effort: Pulmonary effort is normal.      Breath sounds: Normal breath sounds.      Comments: RA   Abdominal:      General: Bowel sounds are normal.      Palpations: Abdomen is soft.   Musculoskeletal:         General: Normal range of motion.      Right lower leg: No edema.      Left lower leg: No edema.   Skin:     General: Skin is warm and dry.      Capillary Refill: Capillary refill takes less than 2 seconds.      Coloration: Skin is pale.   Neurological:      Mental Status: She is alert and oriented to person, place, and time.   Psychiatric:         Mood and Affect: Mood normal.          Additional Data:     Labs:  Results from last 7 days    Lab Units 12/15/23  0510 12/14/23  2242 12/14/23  1336   WBC Thousand/uL 3.95*  --  5.18   HEMOGLOBIN g/dL 7.5*   < > 5.5*   HEMATOCRIT % 24.8*   < > 18.9*   PLATELETS Thousands/uL 297  --  381   NEUTROS PCT %  --   --  62   LYMPHS PCT %  --   --  26   MONOS PCT %  --   --  8   EOS PCT %  --   --  4    < > = values in this interval not displayed.     Results from last 7 days   Lab Units 12/14/23  1336   SODIUM mmol/L 139   POTASSIUM mmol/L 3.4*   CHLORIDE mmol/L 104   CO2 mmol/L 26   BUN mg/dL 28*   CREATININE mg/dL 1.37*   ANION GAP mmol/L 9   CALCIUM mg/dL 8.6   ALBUMIN g/dL 4.0   TOTAL BILIRUBIN mg/dL 0.46   ALK PHOS U/L 61   ALT U/L 12   AST U/L 15   GLUCOSE RANDOM mg/dL 152*     Results from last 7 days   Lab Units 12/15/23  0510   INR  2.57*     Results from last 7 days   Lab Units 12/15/23  0733 12/14/23 2007   POC GLUCOSE mg/dl 67 166*               Lines/Drains:  Invasive Devices       Peripheral Intravenous Line  Duration             Peripheral IV 12/14/23 Right Antecubital <1 day    Peripheral IV 12/14/23 Right;Dorsal (posterior) Forearm <1 day    Peripheral IV 12/14/23 Right;Ventral (anterior) Forearm <1 day                    Imaging: No pertinent imaging reviewed.    Recent Cultures (last 7 days):         Last 24 Hours Medication List:   Current Facility-Administered Medications   Medication Dose Route Frequency Provider Last Rate    aspirin  81 mg Oral Daily CAMACHO Phan      atorvastatin  40 mg Oral QPM CAMACHO Phan      cholecalciferol  2,000 Units Oral Daily CAMACHO Phan      dextrose 5 % and sodium chloride 0.9 %  75 mL/hr Intravenous Continuous Vega SAVAGE MD 75 mL/hr (12/15/23 0757)    gabapentin  300 mg Oral BID CAMACHO Phan      heparin (porcine)  3-20 Units/kg/hr (Order-Specific) Intravenous Titrated CAMACHO Phan Stopped (12/15/23 0718)    insulin detemir  26 Units Subcutaneous HS CAMACHO Phan      insulin  lispro  1-5 Units Subcutaneous TID AC CAMACHO Phan      insulin lispro  1-6 Units Subcutaneous HS CAMACHO Phan      levothyroxine  50 mcg Oral Daily CAMACHO Phan      metoprolol tartrate  25 mg Oral Q12H LIBAN CAMACHO Phan      pantoprazole (PROTONIX) 80 mg in sodium chloride 0.9 % 100 mL infusion  8 mg/hr Intravenous Continuous Sumi Solis PA-C 8 mg/hr (12/15/23 0058)        Today, Patient Was Seen By: CAMACHO Phan    **Please Note: This note may have been constructed using a voice recognition system.**

## 2024-01-08 ENCOUNTER — OFFICE VISIT (OUTPATIENT)
Age: 64
End: 2024-01-08
Payer: COMMERCIAL

## 2024-01-08 VITALS
OXYGEN SATURATION: 97 % | SYSTOLIC BLOOD PRESSURE: 118 MMHG | BODY MASS INDEX: 39.54 KG/M2 | WEIGHT: 245 LBS | TEMPERATURE: 98.5 F | DIASTOLIC BLOOD PRESSURE: 76 MMHG | RESPIRATION RATE: 18 BRPM | HEART RATE: 98 BPM

## 2024-01-08 DIAGNOSIS — Z90.13 HISTORY OF BILATERAL MASTECTOMY: ICD-10-CM

## 2024-01-08 DIAGNOSIS — R07.89 CHRONIC CHEST WALL PAIN: ICD-10-CM

## 2024-01-08 DIAGNOSIS — I89.0 LYMPHEDEMA: ICD-10-CM

## 2024-01-08 DIAGNOSIS — Z85.3 PERSONAL HISTORY OF BREAST CANCER: Primary | ICD-10-CM

## 2024-01-08 DIAGNOSIS — R06.09 DOE (DYSPNEA ON EXERTION): ICD-10-CM

## 2024-01-08 DIAGNOSIS — M25.619 LIMITED RANGE OF MOTION (ROM) OF SHOULDER: ICD-10-CM

## 2024-01-08 DIAGNOSIS — G89.29 CHRONIC CHEST WALL PAIN: ICD-10-CM

## 2024-01-08 DIAGNOSIS — R91.1 PULMONARY NODULE: ICD-10-CM

## 2024-01-08 PROCEDURE — 99203 OFFICE O/P NEW LOW 30 MIN: CPT | Performed by: INTERNAL MEDICINE

## 2024-01-08 NOTE — PROGRESS NOTES
Cely Osborne is a 63 y.o. female    Chief Complaint   Patient presents with    Follow-up     Breast Cancer       1. Have you been to the ER, urgent care clinic since your last visit?  Hospitalized since your last visit? Yes, [facility] [place]     2. Have you seen or consulted any other health care providers outside of the [facility] since your last visit?  Include any pap smears or colon screening. Yes. [place], and [facility]      
(36.9 °C)   Resp 18   Wt 111.1 kg (245 lb)   SpO2 97%   BMI 39.54 kg/m²   ECOG PS: 0  General: No distress  Eyes: Anicteric sclerae  HENT: Atraumatic  Neck: Supple  Respiratory: CTAB, normal respiratory effort  CV: Normal rate, regular rhythm, no murmurs, no peripheral edema  GI: Soft, nontender, obese, distended   MS: Normal gait and station. Digits without clubbing or cyanosis.  Skin: No rashes, ecchymoses, or petechiae. Normal temperature, turgor, and texture.  Psych: Alert, oriented, appropriate affect, normal judgment/insight  Breast: Bilateral mastectomy/no reconstruction, LE present in bilateral axilla; right axilla with mass ?chronic seroma vs new  Neuro: Non focal     Results:     Lab Results   Component Value Date/Time    WBC 8.3 02/24/2022 12:59 PM    HGB 13.3 02/24/2022 12:59 PM    HCT 44.8 02/24/2022 12:59 PM     02/24/2022 12:59 PM    MCV 96.1 02/24/2022 12:59 PM     Lab Results   Component Value Date/Time     09/15/2023 11:09 AM    K 4.4 09/15/2023 11:09 AM     09/15/2023 11:09 AM    CO2 32 09/15/2023 11:09 AM    BUN 28 09/15/2023 11:09 AM    GFRAA >60 05/26/2022 12:56 PM     Lab Results   Component Value Date/Time    ALT 63 05/26/2022 12:56 PM    GLOB 3.6 05/26/2022 12:56 PM     Records reviewed and summarized above.  Pathology report(s) reviewed above.  Radiology report(s) reviewed above.    Assessment:/PLAN   1) History of Breast Cancer ER+ HER2 negative 2012 and 2015 treated at [facility] with Hx of Bilateral Mastectomy in 2015  Oncotype low risk both times. No chemo done.   She could not tolerate adjuvant hormonal therapy.     Last seen by us 12/20.  Last CTs in 1/21 were negative for cancer.    Patient seen today in office for follow up.  She requested visit today due to bilateral axilla fullness and hard knot in right axilla since 9/23.  Patient has chronic seromas present for many years but states this area in right axilla is new.  Otherwise, she is clinically stable and doing

## 2024-01-09 ENCOUNTER — TELEPHONE (OUTPATIENT)
Age: 64
End: 2024-01-09

## 2024-01-09 DIAGNOSIS — I89.0 LYMPHEDEMA: ICD-10-CM

## 2024-01-09 DIAGNOSIS — Z90.13 HISTORY OF BILATERAL MASTECTOMY: ICD-10-CM

## 2024-01-09 DIAGNOSIS — G89.29 CHRONIC CHEST WALL PAIN: ICD-10-CM

## 2024-01-09 DIAGNOSIS — Z85.3 PERSONAL HISTORY OF BREAST CANCER: Primary | ICD-10-CM

## 2024-01-09 DIAGNOSIS — R07.89 CHRONIC CHEST WALL PAIN: ICD-10-CM

## 2024-01-09 RX ORDER — MELOXICAM 15 MG/1
15 TABLET ORAL DAILY
Qty: 30 TABLET | Refills: 0 | Status: SHIPPED | OUTPATIENT
Start: 2024-01-09

## 2024-01-09 NOTE — TELEPHONE ENCOUNTER
[id number]  Call to #[phone]. Supplied LPN contact info, will try again at a later time.

## 2024-01-09 NOTE — TELEPHONE ENCOUNTER
1025  Call to pt, ID verified x2, updated pt lymphedema referral sent to [facility] lymphedema clinic, pt replied she has seen [facility] lymphedema clinic in the past and wants to try [facility] instead.    Discussed PT referral sent to [facility], supplied contact number #[phone], advised pt to call if not contacted in one week, will forward to provider to change lymphedema referral to external and will call with update, understanding verbalized, pt appreciative of call.

## 2024-01-22 ENCOUNTER — OFFICE VISIT (OUTPATIENT)
Age: 64
End: 2024-01-22
Payer: COMMERCIAL

## 2024-01-22 ENCOUNTER — TELEPHONE (OUTPATIENT)
Age: 64
End: 2024-01-22

## 2024-01-22 VITALS
BODY MASS INDEX: 38.57 KG/M2 | TEMPERATURE: 97.8 F | SYSTOLIC BLOOD PRESSURE: 131 MMHG | WEIGHT: 240 LBS | OXYGEN SATURATION: 98 % | DIASTOLIC BLOOD PRESSURE: 75 MMHG | HEIGHT: 66 IN | RESPIRATION RATE: 24 BRPM | HEART RATE: 86 BPM

## 2024-01-22 DIAGNOSIS — I50.32 CHRONIC DIASTOLIC CONGESTIVE HEART FAILURE (HCC): ICD-10-CM

## 2024-01-22 DIAGNOSIS — Z85.3 HISTORY OF BREAST CANCER: Primary | ICD-10-CM

## 2024-01-22 DIAGNOSIS — M10.09 ACUTE IDIOPATHIC GOUT OF MULTIPLE SITES: Primary | ICD-10-CM

## 2024-01-22 DIAGNOSIS — E11.9 TYPE 2 DIABETES MELLITUS WITHOUT COMPLICATION, WITHOUT LONG-TERM CURRENT USE OF INSULIN (HCC): ICD-10-CM

## 2024-01-22 LAB
GLUCOSE, POC: 124 MG/DL
HBA1C MFR BLD: 9.9 %

## 2024-01-22 PROCEDURE — 99214 OFFICE O/P EST MOD 30 MIN: CPT | Performed by: INTERNAL MEDICINE

## 2024-01-22 PROCEDURE — 99214 OFFICE O/P EST MOD 30 MIN: CPT | Performed by: SURGERY

## 2024-01-22 PROCEDURE — 82962 GLUCOSE BLOOD TEST: CPT | Performed by: INTERNAL MEDICINE

## 2024-01-22 PROCEDURE — PBSHW AMB POC HEMOGLOBIN A1C: Performed by: INTERNAL MEDICINE

## 2024-01-22 PROCEDURE — PBSHW AMB POC GLUCOSE BLOOD, BY GLUCOSE MONITORING DEVICE: Performed by: INTERNAL MEDICINE

## 2024-01-22 PROCEDURE — 76642 ULTRASOUND BREAST LIMITED: CPT | Performed by: SURGERY

## 2024-01-22 PROCEDURE — 83036 HEMOGLOBIN GLYCOSYLATED A1C: CPT | Performed by: INTERNAL MEDICINE

## 2024-01-22 RX ORDER — GLUCOSAMINE HCL/CHONDROITIN SU 500-400 MG
CAPSULE ORAL
Qty: 100 STRIP | Refills: 0 | Status: SHIPPED | OUTPATIENT
Start: 2024-01-22

## 2024-01-22 RX ORDER — PREDNISONE 20 MG/1
TABLET ORAL
Qty: 12 TABLET | Refills: 0 | Status: SHIPPED | OUTPATIENT
Start: 2024-01-22

## 2024-01-22 RX ORDER — METFORMIN HYDROCHLORIDE 500 MG/1
1000 TABLET, EXTENDED RELEASE ORAL
Qty: 60 TABLET | Refills: 0 | Status: SHIPPED | OUTPATIENT
Start: 2024-01-22

## 2024-01-22 SDOH — ECONOMIC STABILITY: INCOME INSECURITY: HOW HARD IS IT FOR YOU TO PAY FOR THE VERY BASICS LIKE FOOD, HOUSING, MEDICAL CARE, AND HEATING?: NOT HARD AT ALL

## 2024-01-22 SDOH — ECONOMIC STABILITY: FOOD INSECURITY: WITHIN THE PAST 12 MONTHS, THE FOOD YOU BOUGHT JUST DIDN'T LAST AND YOU DIDN'T HAVE MONEY TO GET MORE.: NEVER TRUE

## 2024-01-22 SDOH — ECONOMIC STABILITY: FOOD INSECURITY: WITHIN THE PAST 12 MONTHS, YOU WORRIED THAT YOUR FOOD WOULD RUN OUT BEFORE YOU GOT MONEY TO BUY MORE.: NEVER TRUE

## 2024-01-22 ASSESSMENT — PATIENT HEALTH QUESTIONNAIRE - PHQ9
1. LITTLE INTEREST OR PLEASURE IN DOING THINGS: 0
SUM OF ALL RESPONSES TO PHQ9 QUESTIONS 1 & 2: 0
SUM OF ALL RESPONSES TO PHQ QUESTIONS 1-9: 0
2. FEELING DOWN, DEPRESSED OR HOPELESS: 0
SUM OF ALL RESPONSES TO PHQ QUESTIONS 1-9: 0

## 2024-01-22 ASSESSMENT — ENCOUNTER SYMPTOMS: SHORTNESS OF BREATH: 0

## 2024-01-22 NOTE — PROGRESS NOTES
\"Have you been to the ER, urgent care clinic since your last visit?  Hospitalized since your last visit?\"    NO    “Have you seen or consulted any other health care providers outside of [facility] since your last visit?”    NO    “Have you had a colorectal cancer screening such as a colonoscopy/FIT/Cologuard?    NO      “Have you had a pap smear?”    NO        
(LASIX) 40 MG tablet Take 1 tablet by mouth daily      montelukast (SINGULAIR) 10 MG tablet TAKE 1 TABLET BY MOUTH AT BEDTIME      Omega-3 Fatty Acids (FISH OIL) 1000 MG capsule Take 2 capsules by mouth 2 times daily       No current facility-administered medications for this visit.     Past Surgical History:   Procedure Laterality Date    BREAST LUMPECTOMY Right 2012    GYN      hysterectomy; TBL    HAND SURGERY Right 11/2023    [facility] Dolores Lundy    HEALANA      tonsillectomy    MASTECTOMY Bilateral 2015    ORTHOPEDIC SURGERY      right knee ACL    OTHER SURGICAL HISTORY      lumptectomy right breast         Lab Results   Component Value Date    WBC 8.3 02/24/2022    HGB 13.3 02/24/2022    HCT 44.8 02/24/2022    MCV 96.1 02/24/2022     02/24/2022     Lab Results   Component Value Date    CHOL 558 (H) 02/24/2022    TRIG 561 (H) 02/24/2022    HDL 55 02/24/2022    LDLCALC Not calculated due to elevated triglyceride level 02/24/2022     Lab Results   Component Value Date    CREATININE 1.11 (H) 09/15/2023    BUN 28 (H) 09/15/2023     09/15/2023    K 4.4 09/15/2023     09/15/2023    CO2 32 09/15/2023       Review of Systems   Respiratory:  Negative for shortness of breath.    Cardiovascular:  Negative for chest pain.         Physical Exam  Constitutional:       General: She is not in acute distress.     Appearance: She is not ill-appearing, toxic-appearing or diaphoretic.   HENT:      Head: Normocephalic and atraumatic.   Eyes:      General:         Right eye: No discharge.         Left eye: No discharge.      Extraocular Movements: Extraocular movements intact.   Cardiovascular:      Rate and Rhythm: Normal rate and regular rhythm.      Heart sounds: No murmur heard.  Pulmonary:      Effort: Pulmonary effort is normal. No respiratory distress.      Breath sounds: Normal breath sounds. No wheezing.   Musculoskeletal:         General: No swelling, tenderness or deformity.      Cervical back:

## 2024-01-22 NOTE — TELEPHONE ENCOUNTER
Pt called on call Brad night w gout flare    Please schedule appointment for pt eval today in clinic with me or any available provider

## 2024-01-22 NOTE — PROGRESS NOTES
HISTORY OF PRESENT ILLNESS  Cely Osborne is a 63 y.o. female     HPI ESTABLISHED patient here for RIGHT lump.  She reports that is has been their since September.  She says that it is uncomfortable but not painful, denies redness or swelling.     Breast history -   Referring - Dr. Gaspar  History of RIGHT breast cancer in 2012 - lumpectomy and xrt, took antihormonal pill for 3 years.  RIGHT breast cancer recurred in 2015 - bilateral mastectomies. No reconstruction. Dr. Medina was her breast surgeon and Dr. Trivedi was her med onc.  Switched to Dr. Gaspar as she needed care through [facility]  STAGE: T2N0 ER+ HER2 negative/Oncotype low risk x 2  Seeing lymphedema clinic    Review of Systems   All other systems reviewed and are negative.        Physical Exam  Vitals and nursing note reviewed.   Chest:          Comments: No chest wall masses or adenopathy       AXILLARY ULTRASOUND  Indication:preop planning  bilateral axillary swelling   Technique:  The area was scanned using a high-frequency linear-array near-field transducer  Findings: normal tissue  Impression: normal axillae  Disposition:  rtc in 1 year      ASSESSMENT and PLAN   Diagnosis Orders   1. History of breast cancer        - no evidence local recurrence  30 minutes was spent with patient on counseling and coordination of care.

## 2024-02-04 NOTE — PROGRESS NOTES
Cely Osborne is a 63 y.o. female who presents for follow-up.  Seen by Dr. Montague on  with acute gout in right foot.  Taking ibuprofen.  Supposed to be on allopurinol 200 mg daily.  Last uric acid remained elevated.  Wearing left foot boot today.  No known injury, walking a lot for work.      Diabetic.  HbA1c 9.9% .  Not consistent with diet, better now.  On Jardiance.  Prior metformin not tolerated, but given other medication options patient wanted to retry metformin and this was restarted 500 mg 2 tablets daily,  so far tolerating.  Does not want to try any injection.            Past Medical History:   Diagnosis Date    Arthritis     Asthma     Cancer (HCC)     breast cancer    H/O total mastectomy 2015    Hypercholesterolemia     Hypothyroidism     Other ill-defined conditions(799.89)     completed radiation     Sleep apnea     Thyroid disease     hypothyroidism       Family History   Problem Relation Age of Onset    Cancer Father     Cancer Mother         brain cancer    Breast Cancer Sister 55    Breast Cancer Maternal Aunt         60-70's    Mult Sclerosis Sister     Thyroid Disease Maternal Grandmother     No Known Problems Brother         Social History     Socioeconomic History    Marital status: Single     Spouse name: Not on file    Number of children: Not on file    Years of education: Not on file    Highest education level: Not on file   Occupational History    Not on file   Tobacco Use    Smoking status: Former     Current packs/day: 0.00     Types: Cigarettes     Quit date: 2015     Years since quittin.0    Smokeless tobacco: Never   Substance and Sexual Activity    Alcohol use: Yes    Drug use: No    Sexual activity: Not on file   Other Topics Concern    Not on file   Social History Narrative    Not on file     Social Determinants of Health     Financial Resource Strain: Low Risk  (2024)    Overall Financial Resource Strain (CARDIA)     Difficulty of Paying Living

## 2024-02-05 ENCOUNTER — HOSPITAL ENCOUNTER (OUTPATIENT)
Facility: HOSPITAL | Age: 64
Discharge: HOME | End: 2024-02-08
Payer: COMMERCIAL

## 2024-02-05 ENCOUNTER — OFFICE VISIT (OUTPATIENT)
Age: 64
End: 2024-02-05
Payer: COMMERCIAL

## 2024-02-05 VITALS
HEIGHT: 66 IN | OXYGEN SATURATION: 97 % | TEMPERATURE: 98.2 F | HEART RATE: 82 BPM | BODY MASS INDEX: 39.12 KG/M2 | WEIGHT: 243.4 LBS | DIASTOLIC BLOOD PRESSURE: 84 MMHG | SYSTOLIC BLOOD PRESSURE: 131 MMHG | RESPIRATION RATE: 16 BRPM

## 2024-02-05 DIAGNOSIS — M10.9 GOUT, UNSPECIFIED CAUSE, UNSPECIFIED CHRONICITY, UNSPECIFIED SITE: ICD-10-CM

## 2024-02-05 DIAGNOSIS — E03.4 HYPOTHYROIDISM DUE TO ACQUIRED ATROPHY OF THYROID: ICD-10-CM

## 2024-02-05 DIAGNOSIS — M79.672 ACUTE FOOT PAIN, LEFT: Primary | ICD-10-CM

## 2024-02-05 DIAGNOSIS — E11.65 UNCONTROLLED TYPE 2 DIABETES MELLITUS WITH HYPERGLYCEMIA (HCC): ICD-10-CM

## 2024-02-05 DIAGNOSIS — M79.672 ACUTE FOOT PAIN, LEFT: ICD-10-CM

## 2024-02-05 PROCEDURE — 99214 OFFICE O/P EST MOD 30 MIN: CPT | Performed by: FAMILY MEDICINE

## 2024-02-05 PROCEDURE — 73620 X-RAY EXAM OF FOOT: CPT

## 2024-02-05 RX ORDER — COLCHICINE 0.6 MG/1
0.6 TABLET ORAL 2 TIMES DAILY
Qty: 30 TABLET | Refills: 1 | Status: SHIPPED | OUTPATIENT
Start: 2024-02-05

## 2024-02-05 RX ORDER — INDOMETHACIN 50 MG/1
50 CAPSULE ORAL
Qty: 30 CAPSULE | Refills: 0 | Status: SHIPPED | OUTPATIENT
Start: 2024-02-05

## 2024-02-05 NOTE — PROGRESS NOTES
\"Have you been to the ER, urgent care clinic since your last visit?  Hospitalized since your last visit?\"    NO    “Have you seen or consulted any other health care providers outside of [facility] since your last visit?”    NO    “Have you had a colorectal cancer screening such as a colonoscopy/FIT/Cologuard?    NO      “Have you had a pap smear?”    NO

## 2024-02-14 NOTE — TELEPHONE ENCOUNTER
PCP: Amirah Newton MD    Last appt: 2/5/2024  Future Appointments   Date Time Provider Department Center   4/8/2024  3:00 PM Amirah Newton MD [facility]   1/6/2025  3:00 PM Sharmaine Gaspar [facility] BS [facility]       Requested Prescriptions     Pending Prescriptions Disp Refills    metFORMIN (GLUCOPHAGE-XR) 500 MG extended release tablet 60 tablet 0     Sig: Take 2 tablets by mouth daily (with breakfast)

## 2024-02-15 RX ORDER — METFORMIN HYDROCHLORIDE 500 MG/1
1000 TABLET, EXTENDED RELEASE ORAL
Qty: 180 TABLET | Refills: 1 | Status: SHIPPED | OUTPATIENT
Start: 2024-02-15

## 2024-02-16 DIAGNOSIS — M10.9 GOUT, UNSPECIFIED CAUSE, UNSPECIFIED CHRONICITY, UNSPECIFIED SITE: ICD-10-CM

## 2024-02-16 DIAGNOSIS — M79.672 ACUTE FOOT PAIN, LEFT: ICD-10-CM

## 2024-02-16 NOTE — TELEPHONE ENCOUNTER
Regarding medication:  colchicine (COLCRYS) 0.6 MG tablet   indomethacin (INDOCIN) 50 MG capsule       Problem:  Sent by this office on 2/5  States pharm has not filled, states needs some kind of approval from doctor.  States she thinks the Pharm is trying to get a 3 month supply approved    Suggested options:  Please reach out to pharmacy to find out what they need exactly  [id number] - [place], [place] - [address] - P [phone] - F [phone]  [address] [place] [id number]  Phone: [phone]  Fax: [phone]                 Caller confirms readback of documented phone/fax number(s) as correct.

## 2024-02-18 RX ORDER — INDOMETHACIN 50 MG/1
50 CAPSULE ORAL
Qty: 30 CAPSULE | Refills: 0 | OUTPATIENT
Start: 2024-02-18

## 2024-02-18 RX ORDER — COLCHICINE 0.6 MG/1
0.6 TABLET ORAL 2 TIMES DAILY
Qty: 30 TABLET | Refills: 1 | OUTPATIENT
Start: 2024-02-18

## 2024-05-24 ENCOUNTER — OFFICE VISIT (OUTPATIENT)
Age: 64
End: 2024-05-24
Payer: MEDICAID

## 2024-05-24 VITALS
SYSTOLIC BLOOD PRESSURE: 152 MMHG | OXYGEN SATURATION: 97 % | RESPIRATION RATE: 18 BRPM | WEIGHT: 247 LBS | TEMPERATURE: 97.8 F | DIASTOLIC BLOOD PRESSURE: 101 MMHG | BODY MASS INDEX: 39.7 KG/M2 | HEIGHT: 66 IN | HEART RATE: 65 BPM

## 2024-05-24 DIAGNOSIS — E03.4 HYPOTHYROIDISM DUE TO ACQUIRED ATROPHY OF THYROID: ICD-10-CM

## 2024-05-24 DIAGNOSIS — M10.9 GOUT, UNSPECIFIED CAUSE, UNSPECIFIED CHRONICITY, UNSPECIFIED SITE: ICD-10-CM

## 2024-05-24 DIAGNOSIS — M54.2 NECK PAIN: Primary | ICD-10-CM

## 2024-05-24 DIAGNOSIS — E11.65 UNCONTROLLED TYPE 2 DIABETES MELLITUS WITH HYPERGLYCEMIA (HCC): ICD-10-CM

## 2024-05-24 LAB
ALBUMIN SERPL-MCNC: 4 G/DL (ref 3.5–5)
ALBUMIN/GLOB SERPL: 1.1 (ref 1.1–2.2)
ALP SERPL-CCNC: 61 U/L (ref 45–117)
ALT SERPL-CCNC: 37 U/L (ref 12–78)
ANION GAP SERPL CALC-SCNC: 5 MMOL/L (ref 5–15)
AST SERPL-CCNC: 26 U/L (ref 15–37)
BILIRUB SERPL-MCNC: 0.5 MG/DL (ref 0.2–1)
BUN SERPL-MCNC: 29 MG/DL (ref 6–20)
BUN/CREAT SERPL: 23 (ref 12–20)
CALCIUM SERPL-MCNC: 9.9 MG/DL (ref 8.5–10.1)
CHLORIDE SERPL-SCNC: 101 MMOL/L (ref 97–108)
CHOLEST SERPL-MCNC: 556 MG/DL
CO2 SERPL-SCNC: 29 MMOL/L (ref 21–32)
CREAT SERPL-MCNC: 1.24 MG/DL (ref 0.55–1.02)
EST. AVERAGE GLUCOSE BLD GHB EST-MCNC: 217 MG/DL
GLOBULIN SER CALC-MCNC: 3.6 G/DL (ref 2–4)
GLUCOSE SERPL-MCNC: 218 MG/DL (ref 65–100)
HBA1C MFR BLD: 9.2 % (ref 4–5.6)
HDLC SERPL-MCNC: 70 MG/DL
HDLC SERPL: 7.9 (ref 0–5)
LDLC SERPL CALC-MCNC: 425 MG/DL (ref 0–100)
POTASSIUM SERPL-SCNC: 4.6 MMOL/L (ref 3.5–5.1)
PROT SERPL-MCNC: 7.6 G/DL (ref 6.4–8.2)
SODIUM SERPL-SCNC: 135 MMOL/L (ref 136–145)
TRIGL SERPL-MCNC: 305 MG/DL
TSH SERPL DL<=0.05 MIU/L-ACNC: 20.7 UIU/ML (ref 0.36–3.74)
URATE SERPL-MCNC: 14 MG/DL (ref 2.6–6)
VLDLC SERPL CALC-MCNC: 61 MG/DL

## 2024-05-24 PROCEDURE — 99213 OFFICE O/P EST LOW 20 MIN: CPT | Performed by: INTERNAL MEDICINE

## 2024-05-24 NOTE — PROGRESS NOTES
PROGRESS NOTE  Name: Cely Osborne   : 1960       ASSESSMENT/ PLAN:     Cely was seen today for other.    Neck pain: Muscle strain. Aleve and tizanidine is okay. Massage, stretching, etc. (The hand tingling is probably carpal tunnel syndrome).   -     [facility] Therapy at [facility] [place]    Return in about 1 month (around 2024) for Dr. Newton for routine follow up of hypothyroidism.     I have reviewed the patient's medications and risks/side effects/benefits were discussed. Diagnosis(-es) explained to patient and questions answered. Literature provided where appropriate.                       SUBJECTIVE  Ms. Cely Osborne is a patient of Amirah Newton MD and presents today acutely for     Chief Complaint   Patient presents with    Other     Nerve in neck, pt went to        She went to [facility] ER on Wednesday (2 days ago) due to \"splitting headache\" not relieved by aleve and aspirin. She also had tingling in R hand and pain in R neck, \"feels like I slept the wrong way.\"  She was told it was a \"pinched nerve\" in the neck, and given muscle relaxer, tizanidine.     The tingling is now in L hand, middle three digits.     \"I'm concerned about my blood sugar, and would like to do blood work to see what's going on with A1C.\"      OBJECTIVE  BP (!) 152/101 (Site: Right Upper Arm, Position: Sitting, Cuff Size: Large Adult)   Pulse 65   Temp 97.8 °F (36.6 °C) (Temporal)   Resp 18   Ht 1.676 m (5' 6\")   Wt 112 kg (247 lb)   SpO2 97%   BMI 39.87 kg/m²   Gen: Pleasant 63 y.o.  female in NAD.   HEENT: PERRLA. EOMI. OP moist and pink.  Neck: Supple.  No LAD.  HEART: RRR, No M/G/R.   LUNGS: CTAB No W/R.   ABDOMEN: S, NT, ND, BS+.   EXTREMITIES: Warm. No C/C/E. MUSCULOSKELETAL: she has some tenderness and palpable spasm in R posterior neck. NEURO: Alert and oriented x 3.  Cranial nerves grossly intact.  No focal sensory or motor deficits noted. SKIN: Warm. Dry. No rashes or

## 2024-05-24 NOTE — PROGRESS NOTES
\"Have you been to the ER, urgent care clinic since your last visit?  Hospitalized since your last visit?\"    [facility]    “Have you seen or consulted any other health care providers outside of [facility] since your last visit?”    NO     “Have you had a pap smear?”        Date of last Cervical Cancer screen (HPV or PAP): 3/28/2012         “Have you had a colorectal cancer screening such as a colonoscopy/FIT/Cologuard?        No colonoscopy on file  No cologuard on file  No FIT/FOBT on file   No flexible sigmoidoscopy on file         Click Here for Release of Records Request

## 2024-05-25 DIAGNOSIS — R60.9 EDEMA, UNSPECIFIED TYPE: ICD-10-CM

## 2024-05-26 RX ORDER — POTASSIUM CHLORIDE 1500 MG/1
TABLET, EXTENDED RELEASE ORAL
Qty: 30 TABLET | Refills: 2 | Status: SHIPPED | OUTPATIENT
Start: 2024-05-26

## 2024-07-01 NOTE — PROGRESS NOTES
Discharge teaching and instructions completed with patient using teachback method. AVS reviewed. prescriptions sent to patient's pharmacy. IV removed. Patient voiced understanding regarding prescriptions, follow up appointments, and care of self at home. Discharged home with spouse. Follow-up with pcp tomorrow. All questions answered.    LYMPHEDEMA PT DAILY TREATMENT NOTE - [facility] 2-15    Patient Name: Teresa Fiore  Date:10/27/2021  : 1960  [x]  Patient  Verified  Payor: 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 Patient reports anterior chest tightness. Any medication changes, allergies to medications, adverse drug reactions, diagnosis change, or new procedure performed?: [] No    [x] Yes (see summary sheet for update) - patient reports having an appointment with Cardiology for persistent fatigue and shortness of breath post COVID 19 virus. A stress test was ordered and completed and patient has a follow up appointment Cardiology tomorrow. Subjective functional status/changes:   [] No changes reported  Patient reports continual swelling in the axillas bilaterally and anterior chest tightness. She has difficulty reaching behind her back. OBJECTIVE    25 min Therapeutic Exercise:  [] Virgle Goldstein Exercises [] Remedial Lymphedema Exercise Program [x] Axillary Web Exercise Program - sidelying pectoralis stretch, shoulder flexion, neural tension stretch, butterfly stretch     [x] Shoulder ROM Exercises - active assisted exercise/stretching and over the door pulley exercises. Rationale: Activate muscle pump to improve lymphatic fluid movement and decrease swelling to improve the patients ability to perform ADL and IADL skills and prevent worsening of swelling over time. 50 min Manual Therapy    Kinesiotaping: Deferred today       Manual Lymphatic Drainage (MLD):  Area to decongest: B UE and trunk      Sequence used and effectiveness: Secondary sequence for upper extremity with trunk involvement.  Cervical techniques and AAA pathway deferred. Focus on AI pathway and deep abdominal breathing techniques. Myofascial and fibrotic techniques completed to anterior chest. Scar mobilization within tolerance. Skin/wound care/debridement: Reviewed skin care principles:   Performed skin care with low pH lotion following manual lymph principles. Skin care products  Prevention of cellulitis   Upper/Lower Extremity Compression: Provided patient with samples of compression products for management of truncal swelling and she voiced interest in obtaining one compression bra at this time. Measured for the following compression products:    Brand: [employer] [place] Bra size XL in the color black    Vendor: [employer]    Education: Educated patient in compression garment donning and doffing. Daily wear schedule. Daily laundering. Garment lifespan. Return and reordering process (by bringing prior garments into the clinic). Educated patient to monitor for redness or pressure points on the skin. If new pain occurs they should contact their therapist.    Patient/family demonstrated donning and doffing with minimal assistance. Provided patient with a chip bag and instructed in use of the product. Patient prefers to defer ordering any additional compression products at this time. She may be interested in ordering a new night time foam product as she reports that her current bilateral mastectomy pad is too tight and uncomfortable. Rationale: increase ROM, increase tissue extensibility and decrease edema  to improve the patients ability to manage truncal swelling and tightness during the restorative phase of care. 0   Vasopneumatic Device:   Deferred at this time due to persistent shortness of breath and fatigue following COVID 19 virus. Patient will need to be cleared by Cardiology prior to resuming use of the pump. Rationale:  To improve lymphatic fluid movement and decrease swelling to improve the patients ability to perform ADL and IADL skills and prevent worsening of swelling over time. With   [x] TE   [] TA   [x] MT   [] SC   [] other: Patient Education: [x] Review HEP/stretches   [x] MLD Patient Education - self MLD technique with bathing and skin care. Educated patient in soft tissue mobilization for scarring/adhesions  [] Progressed/Changed HEP based on:   [x] positioning   [] Kinesiotape   [x] Skin care   [] wound care   [x] other: available compression products  Patient / caregiver re-demonstrated bandaging. [] Yes  [] No  Compression bandaging/garment precautions reviewed: [x] Yes  [] No     Other Objective/Functional Measures: Girth measurements of the trunk taken today for sizing of compression products  Axilla: 43.8 cm  Xiphoid: 41.0 cm    Pain Level (0-10 scale) post treatment: Patient reports improvement in anterior chest tightness and comfort post treatment      ASSESSMENT/Changes in Function:   Patient is returning to our clinic for the first visit since evaluation. Patient presents with continual swelling in the lateral trunk/axillary regions and tightness in the anterior chest. Patient has been educated in use of compression products and a home program in the past to address these issues but is inconsistent with compression, exercises, and stretches once discharged from therapy. Patient tolerated stretching and fibrotic/myofascial techniques without difficulty during the visit today and reports feeling an improvement in chest tightness and comfort post treatment. Patient will continue with her home program and return in two weeks.      Patient met short term goal 1 today and will continue to benefit from skilled PT services to  address ROM deficits, address swelling, analyze and address soft tissue restrictions, analyze and cue movement patterns, analyze compression product fit and use, instruct in home lymphedema management program, measure for compression products and modify and progress therapeutic interventions to attain remaining goals. []  See Plan of Care  []  See progress note/recertification  []  See Discharge Summary         Progress towards goals / Updated goals:  Short term goals  Time frame: to be met by 11/17/2021  1. Patient will demonstrate knowledge of signs/symptoms of infections/cellulitis and be independent in skin care to prevent cellulitis. Goal met 10/27/21  2. Patient will demonstrate independence in lymphedema home program of therapeutic exercises to improve circulation and decongest limb to improve ADLs. Continued education in performing exercises and stretches daily for better management of swelling and tightness. Goal in progress. 3.  Patient will participate in the selection process to allow ordering of home compression system (daytime, nighttime garments and pump as needed). Pump deferred at this time. Compression bra ordered and will continue to assess additional needs. Goal in progress.      Long term goals  Time frame: to be met by 1/8/21  1. Patient will be independent with don/doff of compression system and use in order to prevent reaccumulation of fluid at discharge. Minimal assistance with management of compression bra. Goal in progress. 2.  Pt will be independent in self-MLD and show stable limb volumes and improvement in girth measurements of the trunk showing decongestion and pt. ready for transition to independent restorative phase of lymphedema therapy. Goal in progress.      PLAN  [x]  Upgrade activities as tolerated     [x]  Continue plan of care  []  Update interventions per flow sheet       []  Discharge due to:_  []  Other:_      Joe Kay, PT, CLT 10/27/2021

## 2024-07-03 ENCOUNTER — TELEPHONE (OUTPATIENT)
Age: 64
End: 2024-07-03

## 2024-07-08 ENCOUNTER — TELEPHONE (OUTPATIENT)
Age: 64
End: 2024-07-08

## 2024-07-08 NOTE — TELEPHONE ENCOUNTER
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 Please call as patient states she has a deadline to get this done. Thank you

## 2024-07-15 ENCOUNTER — LAB (OUTPATIENT)
Age: 64
End: 2024-07-15

## 2024-07-15 ENCOUNTER — TELEPHONE (OUTPATIENT)
Age: 64
End: 2024-07-15

## 2024-07-15 DIAGNOSIS — E03.4 HYPOTHYROIDISM DUE TO ACQUIRED ATROPHY OF THYROID: ICD-10-CM

## 2024-07-15 DIAGNOSIS — E11.65 UNCONTROLLED TYPE 2 DIABETES MELLITUS WITH HYPERGLYCEMIA (HCC): ICD-10-CM

## 2024-07-15 DIAGNOSIS — E78.2 MIXED HYPERLIPIDEMIA: ICD-10-CM

## 2024-07-15 DIAGNOSIS — Z11.9 SCREENING EXAMINATION FOR INFECTIOUS DISEASE: ICD-10-CM

## 2024-07-15 DIAGNOSIS — Z11.9 SCREENING EXAMINATION FOR INFECTIOUS DISEASE: Primary | ICD-10-CM

## 2024-07-16 ENCOUNTER — TELEPHONE (OUTPATIENT)
Age: 64
End: 2024-07-16

## 2024-07-16 LAB
CHOLEST SERPL-MCNC: 599 MG/DL
EST. AVERAGE GLUCOSE BLD GHB EST-MCNC: 189 MG/DL
HBA1C MFR BLD: 8.2 % (ref 4–5.6)
HBV SURFACE AB SER QL: NONREACTIVE
HBV SURFACE AB SER-ACNC: <3.1 MIU/ML
HDLC SERPL-MCNC: 61 MG/DL
HDLC SERPL: 9.8 (ref 0–5)
LDLC SERPL CALC-MCNC: 475.4 MG/DL (ref 0–100)
RUBV IGG SERPL IA-ACNC: NORMAL IU/ML
TRIGL SERPL-MCNC: 313 MG/DL
TSH SERPL DL<=0.05 MIU/L-ACNC: 21.2 UIU/ML (ref 0.36–3.74)
VLDLC SERPL CALC-MCNC: 62.6 MG/DL

## 2024-07-17 LAB
MEV IGG SER IA-ACNC: 37.7 AU/ML
MUV IGG SER IA-ACNC: 18.8 AU/ML
VZV IGG SER IA-ACNC: 1180 INDEX

## 2024-07-20 LAB
M TB IFN-G BLD-IMP: NEGATIVE
M TB IFN-G CD4+ T-CELLS BLD-ACNC: 0.09 IU/ML
M TBIFN-G CD4+ CD8+T-CELLS BLD-ACNC: 0.09 IU/ML
QUANTIFERON CRITERIA: NORMAL
QUANTIFERON MITOGEN VALUE: >10 IU/ML
QUANTIFERON NIL VALUE: 0.09 IU/ML

## 2024-07-22 ENCOUNTER — TELEPHONE (OUTPATIENT)
Age: 64
End: 2024-07-22

## 2024-07-22 NOTE — TELEPHONE ENCOUNTER
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 Patient would like a copy of forms and will  on Friday.

## 2024-07-25 NOTE — PROGRESS NOTES
Cely Osborne is a 64 y.o. female who presents for overdue follow-up.  Patient has a history of medication noncompliance.  Reports lost insurance.  Patient has acquired insurance.      Reviewed recent labs.  Many parameters out of range.      Treated for hypothyroidism.  TSH 21.2. weight gain, swelling.  Fatigue.      Treated for diabetes.  HbA1c 8.2% not consistent with diet.  Not checking glucose. Off metformin    Treated for hyperlipidemia.  Total cholesterol 599, triglycerides 313, .    Prior bilateral mastectomy, breast cancer, sees specialists annual.  Lymphedema of chest wall.      Using albuterol infrequently. Off singulair.  On breo inhaler.  Saw pulmonary.        Past Medical History:   Diagnosis Date    Arthritis     Asthma     Cancer (HCC)     breast cancer    H/O total mastectomy 2015    Hypercholesterolemia     Hypothyroidism     Other ill-defined conditions(799.89)     completed radiation     Sleep apnea     Thyroid disease     hypothyroidism       Family History   Problem Relation Age of Onset    Cancer Father     Cancer Mother         brain cancer    Breast Cancer Sister 55    Breast Cancer Maternal Aunt         60-70's    Mult Sclerosis Sister     Thyroid Disease Maternal Grandmother     No Known Problems Brother         Social History     Socioeconomic History    Marital status: Single     Spouse name: Not on file    Number of children: Not on file    Years of education: Not on file    Highest education level: Not on file   Occupational History    Not on file   Tobacco Use    Smoking status: Former     Current packs/day: 0.00     Types: Cigarettes     Quit date: 2015     Years since quittin.4    Smokeless tobacco: Never   Substance and Sexual Activity    Alcohol use: Yes    Drug use: No    Sexual activity: Not on file   Other Topics Concern    Not on file   Social History Narrative    Not on file     Social Determinants of Health     Financial Resource Strain: Low Risk

## 2024-07-26 ENCOUNTER — OFFICE VISIT (OUTPATIENT)
Age: 64
End: 2024-07-26
Payer: COMMERCIAL

## 2024-07-26 VITALS
SYSTOLIC BLOOD PRESSURE: 118 MMHG | TEMPERATURE: 97.2 F | OXYGEN SATURATION: 95 % | HEIGHT: 66 IN | RESPIRATION RATE: 16 BRPM | DIASTOLIC BLOOD PRESSURE: 73 MMHG | BODY MASS INDEX: 39.21 KG/M2 | HEART RATE: 82 BPM | WEIGHT: 244 LBS

## 2024-07-26 DIAGNOSIS — M10.9 GOUT, UNSPECIFIED CAUSE, UNSPECIFIED CHRONICITY, UNSPECIFIED SITE: ICD-10-CM

## 2024-07-26 DIAGNOSIS — R07.89 CHRONIC CHEST WALL PAIN: ICD-10-CM

## 2024-07-26 DIAGNOSIS — Z85.3 PERSONAL HISTORY OF BREAST CANCER: ICD-10-CM

## 2024-07-26 DIAGNOSIS — E03.4 HYPOTHYROIDISM DUE TO ACQUIRED ATROPHY OF THYROID: ICD-10-CM

## 2024-07-26 DIAGNOSIS — Z90.13 HISTORY OF BILATERAL MASTECTOMY: ICD-10-CM

## 2024-07-26 DIAGNOSIS — E78.2 MIXED HYPERLIPIDEMIA: ICD-10-CM

## 2024-07-26 DIAGNOSIS — G89.29 CHRONIC CHEST WALL PAIN: ICD-10-CM

## 2024-07-26 DIAGNOSIS — E11.65 UNCONTROLLED TYPE 2 DIABETES MELLITUS WITH HYPERGLYCEMIA (HCC): Primary | ICD-10-CM

## 2024-07-26 PROCEDURE — 99214 OFFICE O/P EST MOD 30 MIN: CPT | Performed by: FAMILY MEDICINE

## 2024-07-26 PROCEDURE — 3052F HG A1C>EQUAL 8.0%<EQUAL 9.0%: CPT | Performed by: FAMILY MEDICINE

## 2024-07-26 RX ORDER — ATORVASTATIN CALCIUM 80 MG/1
80 TABLET, FILM COATED ORAL DAILY
Qty: 30 TABLET | Refills: 5 | Status: SHIPPED | OUTPATIENT
Start: 2024-07-26

## 2024-07-26 RX ORDER — COLCHICINE 0.6 MG/1
0.6 TABLET ORAL 2 TIMES DAILY
Qty: 60 TABLET | Refills: 5 | Status: SHIPPED | OUTPATIENT
Start: 2024-07-26

## 2024-07-26 RX ORDER — ALBUTEROL SULFATE 90 UG/1
2 AEROSOL, METERED RESPIRATORY (INHALATION) EVERY 6 HOURS PRN
Qty: 18 G | Refills: 2 | Status: SHIPPED | OUTPATIENT
Start: 2024-07-26

## 2024-07-26 RX ORDER — LEVOTHYROXINE SODIUM 0.12 MG/1
TABLET ORAL
Qty: 60 TABLET | Refills: 5 | Status: SHIPPED | OUTPATIENT
Start: 2024-07-26

## 2024-07-26 RX ORDER — METFORMIN HYDROCHLORIDE 500 MG/1
1000 TABLET, EXTENDED RELEASE ORAL
Qty: 60 TABLET | Refills: 5 | Status: SHIPPED | OUTPATIENT
Start: 2024-07-26

## 2024-07-26 RX ORDER — FLUTICASONE PROPIONATE AND SALMETEROL 250; 50 UG/1; UG/1
1 POWDER RESPIRATORY (INHALATION) EVERY 12 HOURS
COMMUNITY

## 2024-07-26 RX ORDER — ALLOPURINOL 100 MG/1
200 TABLET ORAL
Qty: 60 TABLET | Refills: 5 | Status: SHIPPED | OUTPATIENT
Start: 2024-07-26

## 2024-07-26 NOTE — PROGRESS NOTES
\"Have you been to the ER, urgent care clinic since your last visit?  Hospitalized since your last visit?\"    NO    “Have you seen or consulted any other health care providers outside of [facility] since your last visit?”    NO     “Have you had a pap smear?”    NO    Date of last Cervical Cancer screen (HPV or PAP): 3/28/2012         “Have you had a colorectal cancer screening such as a colonoscopy/FIT/Cologuard?    NO    No colonoscopy on file  No cologuard on file  No FIT/FOBT on file   No flexible sigmoidoscopy on file         Click Here for Release of Records Request

## 2024-08-16 ENCOUNTER — APPOINTMENT (OUTPATIENT)
Facility: HOSPITAL | Age: 64
End: 2024-08-16
Payer: COMMERCIAL

## 2024-08-16 ENCOUNTER — HOSPITAL ENCOUNTER (EMERGENCY)
Facility: HOSPITAL | Age: 64
Discharge: HOME | End: 2024-08-16
Payer: COMMERCIAL

## 2024-08-16 VITALS
RESPIRATION RATE: 16 BRPM | DIASTOLIC BLOOD PRESSURE: 81 MMHG | SYSTOLIC BLOOD PRESSURE: 126 MMHG | WEIGHT: 232.81 LBS | HEART RATE: 78 BPM | TEMPERATURE: 98 F | BODY MASS INDEX: 37.58 KG/M2 | OXYGEN SATURATION: 96 %

## 2024-08-16 DIAGNOSIS — M10.9 ACUTE GOUT OF RIGHT HAND, UNSPECIFIED CAUSE: Primary | ICD-10-CM

## 2024-08-16 DIAGNOSIS — M70.31 BURSITIS OF RIGHT ELBOW, UNSPECIFIED BURSA: ICD-10-CM

## 2024-08-16 PROCEDURE — 96372 THER/PROPH/DIAG INJ SC/IM: CPT

## 2024-08-16 PROCEDURE — 6360000002 HC RX W HCPCS

## 2024-08-16 PROCEDURE — 99284 EMERGENCY DEPT VISIT MOD MDM: CPT

## 2024-08-16 PROCEDURE — 6370000000 HC RX 637 (ALT 250 FOR IP)

## 2024-08-16 PROCEDURE — 73080 X-RAY EXAM OF ELBOW: CPT

## 2024-08-16 RX ORDER — COLCHICINE 0.6 MG/1
1.2 TABLET ORAL ONCE
Status: COMPLETED | OUTPATIENT
Start: 2024-08-16 | End: 2024-08-16

## 2024-08-16 RX ORDER — COLCHICINE 0.6 MG/1
0.6 TABLET ORAL DAILY
Qty: 7 TABLET | Refills: 3 | Status: SHIPPED | OUTPATIENT
Start: 2024-08-16

## 2024-08-16 RX ORDER — KETOROLAC TROMETHAMINE 30 MG/ML
30 INJECTION, SOLUTION INTRAMUSCULAR; INTRAVENOUS
Status: COMPLETED | OUTPATIENT
Start: 2024-08-16 | End: 2024-08-16

## 2024-08-16 RX ADMIN — COLCHICINE 1.2 MG: 0.6 TABLET, FILM COATED ORAL at 14:36

## 2024-08-16 RX ADMIN — KETOROLAC TROMETHAMINE 30 MG: 30 INJECTION, SOLUTION INTRAMUSCULAR at 14:36

## 2024-08-16 ASSESSMENT — ENCOUNTER SYMPTOMS
GASTROINTESTINAL NEGATIVE: 1
EYES NEGATIVE: 1
ALLERGIC/IMMUNOLOGIC NEGATIVE: 1
RESPIRATORY NEGATIVE: 1

## 2024-08-16 ASSESSMENT — PAIN SCALES - GENERAL: PAINLEVEL_OUTOF10: 10

## 2024-08-16 NOTE — ED PROVIDER NOTES
\[facility]\"" EMERGENCY DEPT  EMERGENCY DEPARTMENT ENCOUNTER         Pt Name: Cely Osborne  MRN: [id number]  Birthdate 1960  Date of evaluation: 8/16/2024  Provider: Naa Ward PA-C   PCP: Amirah Newton MD  Note Started: 2:46 PM EDT 8/16/24     CHIEF COMPLAINT       Chief Complaint   Patient presents with    Arm Pain     Ambulatory to triage c/o pain to R shoulder, elbow, and wrist as well as swelling to R 5th and 4th digits. Denies new injury/fall; states pain and swelling have been gradual in onset over last week. Reports hx gout.        HISTORY OF PRESENT ILLNESS: 1 or more elements      History From: Patient  HPI Limitations: None     Cely Osborne is a 64 y.o. female who presents with right elbow pain that extends into her fingers.  Patient has history of gout and this feels like a flare.     Nursing Notes were all reviewed and agreed with or any disagreements were addressed in the HPI.  Please see MDM for additional details of HPI and ROS     REVIEW OF SYSTEMS      Review of Systems   Constitutional: Negative.    HENT: Negative.     Eyes: Negative.    Respiratory: Negative.     Cardiovascular: Negative.    Gastrointestinal: Negative.    Endocrine: Negative.    Genitourinary: Negative.    Musculoskeletal:  Positive for arthralgias, joint swelling and myalgias.   Skin: Negative.    Allergic/Immunologic: Negative.    Neurological: Negative.    Hematological: Negative.    Psychiatric/Behavioral: Negative.          Positives and Pertinent negatives as per HPI.    PAST HISTORY     Past Medical History:  Past Medical History:   Diagnosis Date    Arthritis     Asthma     Cancer (HCC)     breast cancer    H/O total mastectomy 11/30/2015    Hypercholesterolemia     Hypothyroidism     Other ill-defined conditions(799.89)     completed radiation 9/12    Sleep apnea     Thyroid disease     hypothyroidism       Past Surgical History:  Past Surgical History:   Procedure Laterality Date    BREAST LUMPECTOMY Right 2012

## 2024-09-01 ENCOUNTER — APPOINTMENT (OUTPATIENT)
Facility: HOSPITAL | Age: 64
DRG: 149 | End: 2024-09-01
Payer: COMMERCIAL

## 2024-09-01 ENCOUNTER — HOSPITAL ENCOUNTER (INPATIENT)
Facility: HOSPITAL | Age: 64
LOS: 9 days | Discharge: HOME | DRG: 149 | End: 2024-09-10
Attending: EMERGENCY MEDICINE | Admitting: INTERNAL MEDICINE
Payer: COMMERCIAL

## 2024-09-01 DIAGNOSIS — R42 DYSEQUILIBRIUM: ICD-10-CM

## 2024-09-01 DIAGNOSIS — R42 DIZZINESS: Primary | ICD-10-CM

## 2024-09-01 DIAGNOSIS — I63.9 CEREBROVASCULAR ACCIDENT (CVA), UNSPECIFIED MECHANISM (HCC): ICD-10-CM

## 2024-09-01 LAB
ALBUMIN SERPL-MCNC: 4 G/DL (ref 3.5–5)
ALBUMIN/GLOB SERPL: 0.9 (ref 1.1–2.2)
ALP SERPL-CCNC: 87 U/L (ref 45–117)
ALT SERPL-CCNC: 37 U/L (ref 12–78)
ANION GAP SERPL CALC-SCNC: 7 MMOL/L (ref 5–15)
AST SERPL-CCNC: 26 U/L (ref 15–37)
BASOPHILS # BLD: 0.1 K/UL (ref 0–0.1)
BASOPHILS NFR BLD: 1 % (ref 0–1)
BILIRUB SERPL-MCNC: 0.6 MG/DL (ref 0.2–1)
BUN SERPL-MCNC: 19 MG/DL (ref 6–20)
BUN/CREAT SERPL: 22 (ref 12–20)
CALCIUM SERPL-MCNC: 10.3 MG/DL (ref 8.5–10.1)
CHLORIDE SERPL-SCNC: 103 MMOL/L (ref 97–108)
CO2 SERPL-SCNC: 26 MMOL/L (ref 21–32)
CREAT SERPL-MCNC: 0.88 MG/DL (ref 0.55–1.02)
DIFFERENTIAL METHOD BLD: ABNORMAL
EOSINOPHIL # BLD: 0 K/UL (ref 0–0.4)
EOSINOPHIL NFR BLD: 0 % (ref 0–7)
ERYTHROCYTE [DISTWIDTH] IN BLOOD BY AUTOMATED COUNT: 13.5 % (ref 11.5–14.5)
EST. AVERAGE GLUCOSE BLD GHB EST-MCNC: 160 MG/DL
GLOBULIN SER CALC-MCNC: 4.4 G/DL (ref 2–4)
GLUCOSE BLD STRIP.AUTO-MCNC: 118 MG/DL (ref 65–117)
GLUCOSE SERPL-MCNC: 167 MG/DL (ref 65–100)
HBA1C MFR BLD: 7.2 % (ref 4–5.6)
HCT VFR BLD AUTO: 41 % (ref 35–47)
HGB BLD-MCNC: 13.1 G/DL (ref 11.5–16)
IMM GRANULOCYTES # BLD AUTO: 0 K/UL (ref 0–0.04)
IMM GRANULOCYTES NFR BLD AUTO: 0 % (ref 0–0.5)
LYMPHOCYTES # BLD: 1.4 K/UL (ref 0.8–3.5)
LYMPHOCYTES NFR BLD: 15 % (ref 12–49)
MCH RBC QN AUTO: 29.3 PG (ref 26–34)
MCHC RBC AUTO-ENTMCNC: 32 G/DL (ref 30–36.5)
MCV RBC AUTO: 91.7 FL (ref 80–99)
MONOCYTES # BLD: 0.6 K/UL (ref 0–1)
MONOCYTES NFR BLD: 6 % (ref 5–13)
NEUTS SEG # BLD: 7.2 K/UL (ref 1.8–8)
NEUTS SEG NFR BLD: 77 % (ref 32–75)
NRBC # BLD: 0 K/UL (ref 0–0.01)
NRBC BLD-RTO: 0 PER 100 WBC
PLATELET # BLD AUTO: 446 K/UL (ref 150–400)
PMV BLD AUTO: 11 FL (ref 8.9–12.9)
POTASSIUM SERPL-SCNC: 4.1 MMOL/L (ref 3.5–5.1)
PROT SERPL-MCNC: 8.4 G/DL (ref 6.4–8.2)
RBC # BLD AUTO: 4.47 M/UL (ref 3.8–5.2)
SERVICE CMNT-IMP: ABNORMAL
SODIUM SERPL-SCNC: 136 MMOL/L (ref 136–145)
WBC # BLD AUTO: 9.3 K/UL (ref 3.6–11)

## 2024-09-01 PROCEDURE — 6370000000 HC RX 637 (ALT 250 FOR IP): Performed by: INTERNAL MEDICINE

## 2024-09-01 PROCEDURE — 93005 ELECTROCARDIOGRAM TRACING: CPT | Performed by: EMERGENCY MEDICINE

## 2024-09-01 PROCEDURE — 82962 GLUCOSE BLOOD TEST: CPT

## 2024-09-01 PROCEDURE — 83036 HEMOGLOBIN GLYCOSYLATED A1C: CPT

## 2024-09-01 PROCEDURE — 1100000003 HC PRIVATE W/ TELEMETRY

## 2024-09-01 PROCEDURE — 85025 COMPLETE CBC W/AUTO DIFF WBC: CPT

## 2024-09-01 PROCEDURE — 6370000000 HC RX 637 (ALT 250 FOR IP): Performed by: EMERGENCY MEDICINE

## 2024-09-01 PROCEDURE — 6360000002 HC RX W HCPCS: Performed by: EMERGENCY MEDICINE

## 2024-09-01 PROCEDURE — 99285 EMERGENCY DEPT VISIT HI MDM: CPT

## 2024-09-01 PROCEDURE — 6360000002 HC RX W HCPCS: Performed by: INTERNAL MEDICINE

## 2024-09-01 PROCEDURE — 80053 COMPREHEN METABOLIC PANEL: CPT

## 2024-09-01 PROCEDURE — 2580000003 HC RX 258: Performed by: EMERGENCY MEDICINE

## 2024-09-01 PROCEDURE — 36415 COLL VENOUS BLD VENIPUNCTURE: CPT

## 2024-09-01 PROCEDURE — 2580000003 HC RX 258: Performed by: INTERNAL MEDICINE

## 2024-09-01 PROCEDURE — 70551 MRI BRAIN STEM W/O DYE: CPT

## 2024-09-01 PROCEDURE — 96374 THER/PROPH/DIAG INJ IV PUSH: CPT

## 2024-09-01 PROCEDURE — 70450 CT HEAD/BRAIN W/O DYE: CPT

## 2024-09-01 RX ORDER — SODIUM CHLORIDE 9 MG/ML
INJECTION, SOLUTION INTRAVENOUS PRN
Status: DISCONTINUED | OUTPATIENT
Start: 2024-09-01 | End: 2024-09-10 | Stop reason: HOSPADM

## 2024-09-01 RX ORDER — ENOXAPARIN SODIUM 100 MG/ML
30 INJECTION SUBCUTANEOUS 2 TIMES DAILY
Status: DISCONTINUED | OUTPATIENT
Start: 2024-09-01 | End: 2024-09-10 | Stop reason: HOSPADM

## 2024-09-01 RX ORDER — ALLOPURINOL 100 MG/1
200 TABLET ORAL
Status: DISCONTINUED | OUTPATIENT
Start: 2024-09-01 | End: 2024-09-10 | Stop reason: HOSPADM

## 2024-09-01 RX ORDER — INSULIN LISPRO 100 [IU]/ML
0-4 INJECTION, SOLUTION INTRAVENOUS; SUBCUTANEOUS
Status: DISCONTINUED | OUTPATIENT
Start: 2024-09-01 | End: 2024-09-10 | Stop reason: HOSPADM

## 2024-09-01 RX ORDER — ASPIRIN 300 MG/1
300 SUPPOSITORY RECTAL DAILY
Status: DISCONTINUED | OUTPATIENT
Start: 2024-09-01 | End: 2024-09-10 | Stop reason: HOSPADM

## 2024-09-01 RX ORDER — MECLIZINE HCL 12.5 MG 12.5 MG/1
25 TABLET ORAL
Status: COMPLETED | OUTPATIENT
Start: 2024-09-01 | End: 2024-09-01

## 2024-09-01 RX ORDER — INSULIN LISPRO 100 [IU]/ML
0-4 INJECTION, SOLUTION INTRAVENOUS; SUBCUTANEOUS NIGHTLY
Status: DISCONTINUED | OUTPATIENT
Start: 2024-09-01 | End: 2024-09-10 | Stop reason: HOSPADM

## 2024-09-01 RX ORDER — ALBUTEROL SULFATE 90 UG/1
2 INHALANT RESPIRATORY (INHALATION) EVERY 6 HOURS PRN
Status: DISCONTINUED | OUTPATIENT
Start: 2024-09-01 | End: 2024-09-01

## 2024-09-01 RX ORDER — MECLIZINE HCL 12.5 MG 12.5 MG/1
25 TABLET ORAL 3 TIMES DAILY PRN
Status: DISCONTINUED | OUTPATIENT
Start: 2024-09-01 | End: 2024-09-10 | Stop reason: HOSPADM

## 2024-09-01 RX ORDER — ATORVASTATIN CALCIUM 40 MG/1
80 TABLET, FILM COATED ORAL NIGHTLY
Status: DISCONTINUED | OUTPATIENT
Start: 2024-09-01 | End: 2024-09-10 | Stop reason: HOSPADM

## 2024-09-01 RX ORDER — GLUCAGON 1 MG/ML
1 KIT INJECTION PRN
Status: DISCONTINUED | OUTPATIENT
Start: 2024-09-01 | End: 2024-09-10 | Stop reason: HOSPADM

## 2024-09-01 RX ORDER — SODIUM CHLORIDE, SODIUM LACTATE, POTASSIUM CHLORIDE, AND CALCIUM CHLORIDE .6; .31; .03; .02 G/100ML; G/100ML; G/100ML; G/100ML
1000 INJECTION, SOLUTION INTRAVENOUS
Status: COMPLETED | OUTPATIENT
Start: 2024-09-01 | End: 2024-09-01

## 2024-09-01 RX ORDER — ONDANSETRON 2 MG/ML
4 INJECTION INTRAMUSCULAR; INTRAVENOUS EVERY 6 HOURS PRN
Status: DISCONTINUED | OUTPATIENT
Start: 2024-09-01 | End: 2024-09-10 | Stop reason: HOSPADM

## 2024-09-01 RX ORDER — POLYETHYLENE GLYCOL 3350 17 G/17G
17 POWDER, FOR SOLUTION ORAL DAILY PRN
Status: DISCONTINUED | OUTPATIENT
Start: 2024-09-01 | End: 2024-09-06

## 2024-09-01 RX ORDER — ALBUTEROL SULFATE 0.83 MG/ML
2.5 SOLUTION RESPIRATORY (INHALATION) EVERY 6 HOURS PRN
Status: DISCONTINUED | OUTPATIENT
Start: 2024-09-01 | End: 2024-09-10 | Stop reason: HOSPADM

## 2024-09-01 RX ORDER — ONDANSETRON 2 MG/ML
4 INJECTION INTRAMUSCULAR; INTRAVENOUS EVERY 6 HOURS PRN
Status: DISCONTINUED | OUTPATIENT
Start: 2024-09-01 | End: 2024-09-04 | Stop reason: SDUPTHER

## 2024-09-01 RX ORDER — LORAZEPAM 2 MG/ML
1 INJECTION INTRAMUSCULAR ONCE
Status: COMPLETED | OUTPATIENT
Start: 2024-09-01 | End: 2024-09-01

## 2024-09-01 RX ORDER — ONDANSETRON 4 MG/1
4 TABLET, ORALLY DISINTEGRATING ORAL EVERY 8 HOURS PRN
Status: DISCONTINUED | OUTPATIENT
Start: 2024-09-01 | End: 2024-09-10 | Stop reason: HOSPADM

## 2024-09-01 RX ORDER — SODIUM CHLORIDE 0.9 % (FLUSH) 0.9 %
5-40 SYRINGE (ML) INJECTION PRN
Status: DISCONTINUED | OUTPATIENT
Start: 2024-09-01 | End: 2024-09-10 | Stop reason: HOSPADM

## 2024-09-01 RX ORDER — DEXTROSE MONOHYDRATE 100 MG/ML
INJECTION, SOLUTION INTRAVENOUS CONTINUOUS PRN
Status: DISCONTINUED | OUTPATIENT
Start: 2024-09-01 | End: 2024-09-10 | Stop reason: HOSPADM

## 2024-09-01 RX ORDER — ASPIRIN 81 MG/1
81 TABLET, CHEWABLE ORAL DAILY
Status: DISCONTINUED | OUTPATIENT
Start: 2024-09-01 | End: 2024-09-10 | Stop reason: HOSPADM

## 2024-09-01 RX ORDER — SODIUM CHLORIDE 0.9 % (FLUSH) 0.9 %
5-40 SYRINGE (ML) INJECTION EVERY 12 HOURS SCHEDULED
Status: DISCONTINUED | OUTPATIENT
Start: 2024-09-01 | End: 2024-09-10 | Stop reason: HOSPADM

## 2024-09-01 RX ADMIN — LORAZEPAM 1 MG: 2 INJECTION INTRAMUSCULAR; INTRAVENOUS at 14:18

## 2024-09-01 RX ADMIN — MECLIZINE 25 MG: 12.5 TABLET ORAL at 14:18

## 2024-09-01 RX ADMIN — ALLOPURINOL 200 MG: 100 TABLET ORAL at 21:26

## 2024-09-01 RX ADMIN — SODIUM CHLORIDE, POTASSIUM CHLORIDE, SODIUM LACTATE AND CALCIUM CHLORIDE 1000 ML: 600; 310; 30; 20 INJECTION, SOLUTION INTRAVENOUS at 14:19

## 2024-09-01 RX ADMIN — ATORVASTATIN CALCIUM 80 MG: 40 TABLET, FILM COATED ORAL at 21:26

## 2024-09-01 RX ADMIN — ENOXAPARIN SODIUM 30 MG: 100 INJECTION SUBCUTANEOUS at 21:27

## 2024-09-01 RX ADMIN — SODIUM CHLORIDE, PRESERVATIVE FREE 10 ML: 5 INJECTION INTRAVENOUS at 21:27

## 2024-09-01 RX ADMIN — ASPIRIN 81 MG: 81 TABLET, CHEWABLE ORAL at 21:27

## 2024-09-01 ASSESSMENT — PAIN - FUNCTIONAL ASSESSMENT: PAIN_FUNCTIONAL_ASSESSMENT: NONE - DENIES PAIN

## 2024-09-01 ASSESSMENT — PAIN SCALES - GENERAL: PAINLEVEL_OUTOF10: 0

## 2024-09-02 ENCOUNTER — APPOINTMENT (OUTPATIENT)
Facility: HOSPITAL | Age: 64
DRG: 149 | End: 2024-09-02
Payer: COMMERCIAL

## 2024-09-02 LAB
CHOLEST SERPL-MCNC: 233 MG/DL
ERYTHROCYTE [DISTWIDTH] IN BLOOD BY AUTOMATED COUNT: 13.4 % (ref 11.5–14.5)
EST. AVERAGE GLUCOSE BLD GHB EST-MCNC: 166 MG/DL
GLUCOSE BLD STRIP.AUTO-MCNC: 122 MG/DL (ref 65–117)
GLUCOSE BLD STRIP.AUTO-MCNC: 129 MG/DL (ref 65–117)
GLUCOSE BLD STRIP.AUTO-MCNC: 135 MG/DL (ref 65–117)
GLUCOSE BLD STRIP.AUTO-MCNC: 154 MG/DL (ref 65–117)
HBA1C MFR BLD: 7.4 % (ref 4–5.6)
HCT VFR BLD AUTO: 38.8 % (ref 35–47)
HDLC SERPL-MCNC: 32 MG/DL
HDLC SERPL: 7.3 (ref 0–5)
HGB BLD-MCNC: 12.2 G/DL (ref 11.5–16)
LDLC SERPL CALC-MCNC: 155.4 MG/DL (ref 0–100)
MCH RBC QN AUTO: 29 PG (ref 26–34)
MCHC RBC AUTO-ENTMCNC: 31.4 G/DL (ref 30–36.5)
MCV RBC AUTO: 92.2 FL (ref 80–99)
NRBC # BLD: 0 K/UL (ref 0–0.01)
NRBC BLD-RTO: 0 PER 100 WBC
PLATELET # BLD AUTO: 380 K/UL (ref 150–400)
PMV BLD AUTO: 11 FL (ref 8.9–12.9)
RBC # BLD AUTO: 4.21 M/UL (ref 3.8–5.2)
SERVICE CMNT-IMP: ABNORMAL
TRIGL SERPL-MCNC: 228 MG/DL
TSH SERPL DL<=0.05 MIU/L-ACNC: 1.72 UIU/ML (ref 0.36–3.74)
VLDLC SERPL CALC-MCNC: 45.6 MG/DL
WBC # BLD AUTO: 8 K/UL (ref 3.6–11)

## 2024-09-02 PROCEDURE — 97165 OT EVAL LOW COMPLEX 30 MIN: CPT | Performed by: OCCUPATIONAL THERAPIST

## 2024-09-02 PROCEDURE — 97116 GAIT TRAINING THERAPY: CPT

## 2024-09-02 PROCEDURE — 83036 HEMOGLOBIN GLYCOSYLATED A1C: CPT

## 2024-09-02 PROCEDURE — 85027 COMPLETE CBC AUTOMATED: CPT

## 2024-09-02 PROCEDURE — 80061 LIPID PANEL: CPT

## 2024-09-02 PROCEDURE — 84443 ASSAY THYROID STIM HORMONE: CPT

## 2024-09-02 PROCEDURE — 6370000000 HC RX 637 (ALT 250 FOR IP): Performed by: INTERNAL MEDICINE

## 2024-09-02 PROCEDURE — 6360000004 HC RX CONTRAST MEDICATION: Performed by: INTERNAL MEDICINE

## 2024-09-02 PROCEDURE — 97162 PT EVAL MOD COMPLEX 30 MIN: CPT

## 2024-09-02 PROCEDURE — 70496 CT ANGIOGRAPHY HEAD: CPT

## 2024-09-02 PROCEDURE — 82962 GLUCOSE BLOOD TEST: CPT

## 2024-09-02 PROCEDURE — 97530 THERAPEUTIC ACTIVITIES: CPT | Performed by: OCCUPATIONAL THERAPIST

## 2024-09-02 PROCEDURE — 36415 COLL VENOUS BLD VENIPUNCTURE: CPT

## 2024-09-02 PROCEDURE — 99222 1ST HOSP IP/OBS MODERATE 55: CPT | Performed by: PSYCHIATRY & NEUROLOGY

## 2024-09-02 PROCEDURE — 1100000003 HC PRIVATE W/ TELEMETRY

## 2024-09-02 PROCEDURE — 6360000002 HC RX W HCPCS: Performed by: INTERNAL MEDICINE

## 2024-09-02 PROCEDURE — 97535 SELF CARE MNGMENT TRAINING: CPT | Performed by: OCCUPATIONAL THERAPIST

## 2024-09-02 PROCEDURE — 2580000003 HC RX 258: Performed by: INTERNAL MEDICINE

## 2024-09-02 RX ORDER — IOPAMIDOL 755 MG/ML
100 INJECTION, SOLUTION INTRAVASCULAR
Status: COMPLETED | OUTPATIENT
Start: 2024-09-02 | End: 2024-09-02

## 2024-09-02 RX ADMIN — ENOXAPARIN SODIUM 30 MG: 100 INJECTION SUBCUTANEOUS at 09:00

## 2024-09-02 RX ADMIN — LEVOTHYROXINE SODIUM 200 MCG: 0.11 TABLET ORAL at 05:38

## 2024-09-02 RX ADMIN — ASPIRIN 81 MG: 81 TABLET, CHEWABLE ORAL at 09:01

## 2024-09-02 RX ADMIN — SODIUM CHLORIDE, PRESERVATIVE FREE 10 ML: 5 INJECTION INTRAVENOUS at 20:30

## 2024-09-02 RX ADMIN — ONDANSETRON 4 MG: 2 INJECTION INTRAMUSCULAR; INTRAVENOUS at 01:11

## 2024-09-02 RX ADMIN — MECLIZINE 25 MG: 12.5 TABLET ORAL at 16:43

## 2024-09-02 RX ADMIN — MECLIZINE 25 MG: 12.5 TABLET ORAL at 11:03

## 2024-09-02 RX ADMIN — ATORVASTATIN CALCIUM 80 MG: 40 TABLET, FILM COATED ORAL at 20:30

## 2024-09-02 RX ADMIN — IOPAMIDOL 100 ML: 755 INJECTION, SOLUTION INTRAVENOUS at 12:47

## 2024-09-02 RX ADMIN — ENOXAPARIN SODIUM 30 MG: 100 INJECTION SUBCUTANEOUS at 20:33

## 2024-09-02 RX ADMIN — SODIUM CHLORIDE, PRESERVATIVE FREE 10 ML: 5 INJECTION INTRAVENOUS at 09:05

## 2024-09-02 RX ADMIN — ONDANSETRON 4 MG: 2 INJECTION INTRAMUSCULAR; INTRAVENOUS at 09:01

## 2024-09-02 RX ADMIN — ALLOPURINOL 200 MG: 100 TABLET ORAL at 20:30

## 2024-09-02 ASSESSMENT — PAIN SCALES - GENERAL
PAINLEVEL_OUTOF10: 1
PAINLEVEL_OUTOF10: 0
PAINLEVEL_OUTOF10: 0

## 2024-09-03 ENCOUNTER — APPOINTMENT (OUTPATIENT)
Facility: HOSPITAL | Age: 64
DRG: 149 | End: 2024-09-03
Attending: INTERNAL MEDICINE
Payer: COMMERCIAL

## 2024-09-03 LAB
ALBUMIN SERPL-MCNC: 3.5 G/DL (ref 3.5–5)
ALBUMIN/GLOB SERPL: 0.9 (ref 1.1–2.2)
ALP SERPL-CCNC: 80 U/L (ref 45–117)
ALT SERPL-CCNC: 36 U/L (ref 12–78)
ANION GAP SERPL CALC-SCNC: 6 MMOL/L (ref 5–15)
AST SERPL-CCNC: 27 U/L (ref 15–37)
BASOPHILS # BLD: 0.1 K/UL (ref 0–0.1)
BASOPHILS NFR BLD: 1 % (ref 0–1)
BILIRUB SERPL-MCNC: 0.5 MG/DL (ref 0.2–1)
BUN SERPL-MCNC: 20 MG/DL (ref 6–20)
BUN/CREAT SERPL: 21 (ref 12–20)
CALCIUM SERPL-MCNC: 9 MG/DL (ref 8.5–10.1)
CHLORIDE SERPL-SCNC: 105 MMOL/L (ref 97–108)
CO2 SERPL-SCNC: 26 MMOL/L (ref 21–32)
CREAT SERPL-MCNC: 0.96 MG/DL (ref 0.55–1.02)
DIFFERENTIAL METHOD BLD: ABNORMAL
ECHO AV MEAN GRADIENT: 4 MMHG
ECHO AV MEAN VELOCITY: 0.9 M/S
ECHO AV PEAK GRADIENT: 7 MMHG
ECHO AV PEAK VELOCITY: 1.3 M/S
ECHO AV VTI: 22.4 CM
ECHO BSA: 2.2 M2
ECHO LA DIAMETER INDEX: 1.37 CM/M2
ECHO LA DIAMETER: 2.9 CM
ECHO LV EDV A4C: 75 ML
ECHO LV EDV INDEX A4C: 35 ML/M2
ECHO LV EF PHYSICIAN: 60 %
ECHO LV EJECTION FRACTION A4C: 68 %
ECHO LV ESV A4C: 24 ML
ECHO LV ESV INDEX A4C: 11 ML/M2
ECHO LV FRACTIONAL SHORTENING: 33 % (ref 28–44)
ECHO LV INTERNAL DIMENSION DIASTOLE INDEX: 2.12 CM/M2
ECHO LV INTERNAL DIMENSION DIASTOLIC: 4.5 CM (ref 3.9–5.3)
ECHO LV INTERNAL DIMENSION SYSTOLIC INDEX: 1.42 CM/M2
ECHO LV INTERNAL DIMENSION SYSTOLIC: 3 CM
ECHO LV IVSD: 1.2 CM (ref 0.6–0.9)
ECHO LV MASS 2D: 186.4 G (ref 67–162)
ECHO LV MASS INDEX 2D: 87.9 G/M2 (ref 43–95)
ECHO LV POSTERIOR WALL DIASTOLIC: 1.1 CM (ref 0.6–0.9)
ECHO LV RELATIVE WALL THICKNESS RATIO: 0.49
ECHO LVOT AREA: 3.5 CM2
ECHO LVOT DIAM: 2.1 CM
ECHO MV MAX VELOCITY: 1.1 M/S
ECHO MV MEAN GRADIENT: 3 MMHG
ECHO MV MEAN VELOCITY: 0.8 M/S
ECHO MV PEAK GRADIENT: 5 MMHG
ECHO MV VTI: 19.1 CM
ECHO TV REGURGITANT MAX VELOCITY: 2.26 M/S
ECHO TV REGURGITANT PEAK GRADIENT: 21 MMHG
EOSINOPHIL # BLD: 0.1 K/UL (ref 0–0.4)
EOSINOPHIL NFR BLD: 1 % (ref 0–7)
ERYTHROCYTE [DISTWIDTH] IN BLOOD BY AUTOMATED COUNT: 13.3 % (ref 11.5–14.5)
GLOBULIN SER CALC-MCNC: 4.1 G/DL (ref 2–4)
GLUCOSE BLD STRIP.AUTO-MCNC: 107 MG/DL (ref 65–117)
GLUCOSE BLD STRIP.AUTO-MCNC: 122 MG/DL (ref 65–117)
GLUCOSE BLD STRIP.AUTO-MCNC: 124 MG/DL (ref 65–117)
GLUCOSE BLD STRIP.AUTO-MCNC: 142 MG/DL (ref 65–117)
GLUCOSE SERPL-MCNC: 133 MG/DL (ref 65–100)
HCT VFR BLD AUTO: 40.4 % (ref 35–47)
HGB BLD-MCNC: 12.5 G/DL (ref 11.5–16)
IMM GRANULOCYTES # BLD AUTO: 0 K/UL (ref 0–0.04)
IMM GRANULOCYTES NFR BLD AUTO: 0 % (ref 0–0.5)
LYMPHOCYTES # BLD: 2.3 K/UL (ref 0.8–3.5)
LYMPHOCYTES NFR BLD: 31 % (ref 12–49)
MCH RBC QN AUTO: 28.5 PG (ref 26–34)
MCHC RBC AUTO-ENTMCNC: 30.9 G/DL (ref 30–36.5)
MCV RBC AUTO: 92.2 FL (ref 80–99)
MONOCYTES # BLD: 0.6 K/UL (ref 0–1)
MONOCYTES NFR BLD: 9 % (ref 5–13)
NEUTS SEG # BLD: 4.3 K/UL (ref 1.8–8)
NEUTS SEG NFR BLD: 58 % (ref 32–75)
NRBC # BLD: 0 K/UL (ref 0–0.01)
NRBC BLD-RTO: 0 PER 100 WBC
PLATELET # BLD AUTO: 405 K/UL (ref 150–400)
PMV BLD AUTO: 10.8 FL (ref 8.9–12.9)
POTASSIUM SERPL-SCNC: 4.2 MMOL/L (ref 3.5–5.1)
PROT SERPL-MCNC: 7.6 G/DL (ref 6.4–8.2)
RBC # BLD AUTO: 4.38 M/UL (ref 3.8–5.2)
SERVICE CMNT-IMP: ABNORMAL
SERVICE CMNT-IMP: NORMAL
SODIUM SERPL-SCNC: 137 MMOL/L (ref 136–145)
WBC # BLD AUTO: 7.3 K/UL (ref 3.6–11)

## 2024-09-03 PROCEDURE — 97116 GAIT TRAINING THERAPY: CPT

## 2024-09-03 PROCEDURE — 36415 COLL VENOUS BLD VENIPUNCTURE: CPT

## 2024-09-03 PROCEDURE — 82962 GLUCOSE BLOOD TEST: CPT

## 2024-09-03 PROCEDURE — 1100000003 HC PRIVATE W/ TELEMETRY

## 2024-09-03 PROCEDURE — 6360000002 HC RX W HCPCS: Performed by: INTERNAL MEDICINE

## 2024-09-03 PROCEDURE — C8924 2D TTE W OR W/O FOL W/CON,FU: HCPCS

## 2024-09-03 PROCEDURE — 6370000000 HC RX 637 (ALT 250 FOR IP): Performed by: INTERNAL MEDICINE

## 2024-09-03 PROCEDURE — 85025 COMPLETE CBC W/AUTO DIFF WBC: CPT

## 2024-09-03 PROCEDURE — 80053 COMPREHEN METABOLIC PANEL: CPT

## 2024-09-03 PROCEDURE — 97535 SELF CARE MNGMENT TRAINING: CPT

## 2024-09-03 PROCEDURE — 6360000004 HC RX CONTRAST MEDICATION: Performed by: INTERNAL MEDICINE

## 2024-09-03 PROCEDURE — 2580000003 HC RX 258: Performed by: INTERNAL MEDICINE

## 2024-09-03 RX ADMIN — PERFLUTREN 1.5 ML: 6.52 INJECTION, SUSPENSION INTRAVENOUS at 14:23

## 2024-09-03 RX ADMIN — ALLOPURINOL 200 MG: 100 TABLET ORAL at 20:06

## 2024-09-03 RX ADMIN — LEVOTHYROXINE SODIUM 200 MCG: 0.11 TABLET ORAL at 05:27

## 2024-09-03 RX ADMIN — ENOXAPARIN SODIUM 30 MG: 100 INJECTION SUBCUTANEOUS at 09:12

## 2024-09-03 RX ADMIN — SODIUM CHLORIDE, PRESERVATIVE FREE 10 ML: 5 INJECTION INTRAVENOUS at 09:13

## 2024-09-03 RX ADMIN — ATORVASTATIN CALCIUM 80 MG: 40 TABLET, FILM COATED ORAL at 20:06

## 2024-09-03 RX ADMIN — ASPIRIN 81 MG: 81 TABLET, CHEWABLE ORAL at 09:12

## 2024-09-03 RX ADMIN — SODIUM CHLORIDE, PRESERVATIVE FREE 10 ML: 5 INJECTION INTRAVENOUS at 20:06

## 2024-09-03 RX ADMIN — ENOXAPARIN SODIUM 30 MG: 100 INJECTION SUBCUTANEOUS at 20:06

## 2024-09-03 ASSESSMENT — PAIN SCALES - GENERAL
PAINLEVEL_OUTOF10: 0
PAINLEVEL_OUTOF10: 0

## 2024-09-04 LAB
EKG ATRIAL RATE: 78 BPM
EKG DIAGNOSIS: NORMAL
EKG P AXIS: 63 DEGREES
EKG P-R INTERVAL: 236 MS
EKG Q-T INTERVAL: 404 MS
EKG QRS DURATION: 88 MS
EKG QTC CALCULATION (BAZETT): 460 MS
EKG R AXIS: 12 DEGREES
EKG T AXIS: 31 DEGREES
EKG VENTRICULAR RATE: 78 BPM
GLUCOSE BLD STRIP.AUTO-MCNC: 123 MG/DL (ref 65–117)
GLUCOSE BLD STRIP.AUTO-MCNC: 131 MG/DL (ref 65–117)
GLUCOSE BLD STRIP.AUTO-MCNC: 133 MG/DL (ref 65–117)
GLUCOSE BLD STRIP.AUTO-MCNC: 146 MG/DL (ref 65–117)
SERVICE CMNT-IMP: ABNORMAL

## 2024-09-04 PROCEDURE — 97110 THERAPEUTIC EXERCISES: CPT

## 2024-09-04 PROCEDURE — 82962 GLUCOSE BLOOD TEST: CPT

## 2024-09-04 PROCEDURE — 2580000003 HC RX 258: Performed by: INTERNAL MEDICINE

## 2024-09-04 PROCEDURE — 6360000002 HC RX W HCPCS: Performed by: INTERNAL MEDICINE

## 2024-09-04 PROCEDURE — 97535 SELF CARE MNGMENT TRAINING: CPT | Performed by: OCCUPATIONAL THERAPIST

## 2024-09-04 PROCEDURE — 6370000000 HC RX 637 (ALT 250 FOR IP): Performed by: INTERNAL MEDICINE

## 2024-09-04 PROCEDURE — 97116 GAIT TRAINING THERAPY: CPT

## 2024-09-04 PROCEDURE — 1100000003 HC PRIVATE W/ TELEMETRY

## 2024-09-04 RX ADMIN — ALLOPURINOL 200 MG: 100 TABLET ORAL at 20:14

## 2024-09-04 RX ADMIN — ATORVASTATIN CALCIUM 80 MG: 40 TABLET, FILM COATED ORAL at 20:14

## 2024-09-04 RX ADMIN — SODIUM CHLORIDE, PRESERVATIVE FREE 10 ML: 5 INJECTION INTRAVENOUS at 08:30

## 2024-09-04 RX ADMIN — ASPIRIN 81 MG: 81 TABLET, CHEWABLE ORAL at 08:30

## 2024-09-04 RX ADMIN — LEVOTHYROXINE SODIUM 200 MCG: 0.11 TABLET ORAL at 06:01

## 2024-09-04 RX ADMIN — ENOXAPARIN SODIUM 30 MG: 100 INJECTION SUBCUTANEOUS at 20:14

## 2024-09-04 RX ADMIN — ENOXAPARIN SODIUM 30 MG: 100 INJECTION SUBCUTANEOUS at 08:29

## 2024-09-04 RX ADMIN — SODIUM CHLORIDE, PRESERVATIVE FREE 10 ML: 5 INJECTION INTRAVENOUS at 20:20

## 2024-09-04 ASSESSMENT — PAIN SCALES - GENERAL: PAINLEVEL_OUTOF10: 0

## 2024-09-05 LAB
GLUCOSE BLD STRIP.AUTO-MCNC: 124 MG/DL (ref 65–117)
GLUCOSE BLD STRIP.AUTO-MCNC: 136 MG/DL (ref 65–117)
GLUCOSE BLD STRIP.AUTO-MCNC: 172 MG/DL (ref 65–117)
GLUCOSE BLD STRIP.AUTO-MCNC: 189 MG/DL (ref 65–117)
SERVICE CMNT-IMP: ABNORMAL

## 2024-09-05 PROCEDURE — 1100000003 HC PRIVATE W/ TELEMETRY

## 2024-09-05 PROCEDURE — 82962 GLUCOSE BLOOD TEST: CPT

## 2024-09-05 PROCEDURE — 6370000000 HC RX 637 (ALT 250 FOR IP): Performed by: INTERNAL MEDICINE

## 2024-09-05 PROCEDURE — 6370000000 HC RX 637 (ALT 250 FOR IP): Performed by: STUDENT IN AN ORGANIZED HEALTH CARE EDUCATION/TRAINING PROGRAM

## 2024-09-05 PROCEDURE — 2580000003 HC RX 258: Performed by: INTERNAL MEDICINE

## 2024-09-05 PROCEDURE — 6360000002 HC RX W HCPCS: Performed by: INTERNAL MEDICINE

## 2024-09-05 RX ORDER — COLCHICINE 0.6 MG/1
1.2 TABLET ORAL ONCE
Status: COMPLETED | OUTPATIENT
Start: 2024-09-05 | End: 2024-09-05

## 2024-09-05 RX ORDER — OXYCODONE HYDROCHLORIDE 5 MG/1
10 TABLET ORAL EVERY 4 HOURS PRN
Status: DISCONTINUED | OUTPATIENT
Start: 2024-09-05 | End: 2024-09-10 | Stop reason: HOSPADM

## 2024-09-05 RX ORDER — OXYCODONE HYDROCHLORIDE 5 MG/1
5 TABLET ORAL EVERY 4 HOURS PRN
Status: DISCONTINUED | OUTPATIENT
Start: 2024-09-05 | End: 2024-09-10 | Stop reason: HOSPADM

## 2024-09-05 RX ADMIN — ENOXAPARIN SODIUM 30 MG: 100 INJECTION SUBCUTANEOUS at 20:33

## 2024-09-05 RX ADMIN — ENOXAPARIN SODIUM 30 MG: 100 INJECTION SUBCUTANEOUS at 08:47

## 2024-09-05 RX ADMIN — OXYCODONE HYDROCHLORIDE 10 MG: 5 TABLET ORAL at 23:04

## 2024-09-05 RX ADMIN — OXYCODONE HYDROCHLORIDE 10 MG: 5 TABLET ORAL at 13:30

## 2024-09-05 RX ADMIN — SODIUM CHLORIDE, PRESERVATIVE FREE 10 ML: 5 INJECTION INTRAVENOUS at 20:30

## 2024-09-05 RX ADMIN — ONDANSETRON 4 MG: 4 TABLET, ORALLY DISINTEGRATING ORAL at 10:33

## 2024-09-05 RX ADMIN — ALLOPURINOL 200 MG: 100 TABLET ORAL at 20:28

## 2024-09-05 RX ADMIN — SODIUM CHLORIDE, PRESERVATIVE FREE 10 ML: 5 INJECTION INTRAVENOUS at 08:51

## 2024-09-05 RX ADMIN — COLCHICINE 1.2 MG: 0.6 TABLET, FILM COATED ORAL at 11:57

## 2024-09-05 RX ADMIN — ATORVASTATIN CALCIUM 80 MG: 40 TABLET, FILM COATED ORAL at 20:28

## 2024-09-05 RX ADMIN — OXYCODONE HYDROCHLORIDE 10 MG: 5 TABLET ORAL at 02:17

## 2024-09-05 RX ADMIN — ASPIRIN 81 MG: 81 TABLET, CHEWABLE ORAL at 08:47

## 2024-09-05 RX ADMIN — LEVOTHYROXINE SODIUM 200 MCG: 0.11 TABLET ORAL at 05:44

## 2024-09-05 ASSESSMENT — PAIN DESCRIPTION - LOCATION
LOCATION: ANKLE
LOCATION: ANKLE
LOCATION: LEG
LOCATION: KNEE;ANKLE

## 2024-09-05 ASSESSMENT — PAIN DESCRIPTION - ORIENTATION
ORIENTATION: RIGHT

## 2024-09-05 ASSESSMENT — PAIN DESCRIPTION - DESCRIPTORS
DESCRIPTORS: ACHING

## 2024-09-05 ASSESSMENT — PAIN SCALES - GENERAL
PAINLEVEL_OUTOF10: 10
PAINLEVEL_OUTOF10: 3
PAINLEVEL_OUTOF10: 8
PAINLEVEL_OUTOF10: 3
PAINLEVEL_OUTOF10: 8
PAINLEVEL_OUTOF10: 8
PAINLEVEL_OUTOF10: 10

## 2024-09-06 LAB
GLUCOSE BLD STRIP.AUTO-MCNC: 148 MG/DL (ref 65–117)
GLUCOSE BLD STRIP.AUTO-MCNC: 150 MG/DL (ref 65–117)
GLUCOSE BLD STRIP.AUTO-MCNC: 194 MG/DL (ref 65–117)
GLUCOSE BLD STRIP.AUTO-MCNC: 199 MG/DL (ref 65–117)
SERVICE CMNT-IMP: ABNORMAL

## 2024-09-06 PROCEDURE — 97530 THERAPEUTIC ACTIVITIES: CPT

## 2024-09-06 PROCEDURE — 6370000000 HC RX 637 (ALT 250 FOR IP): Performed by: INTERNAL MEDICINE

## 2024-09-06 PROCEDURE — 97116 GAIT TRAINING THERAPY: CPT

## 2024-09-06 PROCEDURE — 6360000002 HC RX W HCPCS: Performed by: INTERNAL MEDICINE

## 2024-09-06 PROCEDURE — 2580000003 HC RX 258: Performed by: INTERNAL MEDICINE

## 2024-09-06 PROCEDURE — 82962 GLUCOSE BLOOD TEST: CPT

## 2024-09-06 PROCEDURE — 6370000000 HC RX 637 (ALT 250 FOR IP): Performed by: STUDENT IN AN ORGANIZED HEALTH CARE EDUCATION/TRAINING PROGRAM

## 2024-09-06 PROCEDURE — 1100000003 HC PRIVATE W/ TELEMETRY

## 2024-09-06 RX ORDER — SENNA AND DOCUSATE SODIUM 50; 8.6 MG/1; MG/1
1 TABLET, FILM COATED ORAL 2 TIMES DAILY
Status: DISCONTINUED | OUTPATIENT
Start: 2024-09-06 | End: 2024-09-10 | Stop reason: HOSPADM

## 2024-09-06 RX ORDER — COLCHICINE 0.6 MG/1
0.6 TABLET ORAL DAILY
Status: DISCONTINUED | OUTPATIENT
Start: 2024-09-06 | End: 2024-09-10 | Stop reason: HOSPADM

## 2024-09-06 RX ORDER — POLYETHYLENE GLYCOL 3350 17 G/17G
17 POWDER, FOR SOLUTION ORAL DAILY
Status: DISCONTINUED | OUTPATIENT
Start: 2024-09-06 | End: 2024-09-10 | Stop reason: HOSPADM

## 2024-09-06 RX ADMIN — SODIUM CHLORIDE, PRESERVATIVE FREE 5 ML: 5 INJECTION INTRAVENOUS at 20:09

## 2024-09-06 RX ADMIN — ASPIRIN 81 MG: 81 TABLET, CHEWABLE ORAL at 08:57

## 2024-09-06 RX ADMIN — ONDANSETRON 4 MG: 2 INJECTION INTRAMUSCULAR; INTRAVENOUS at 09:40

## 2024-09-06 RX ADMIN — ENOXAPARIN SODIUM 30 MG: 100 INJECTION SUBCUTANEOUS at 08:59

## 2024-09-06 RX ADMIN — ALLOPURINOL 200 MG: 100 TABLET ORAL at 20:05

## 2024-09-06 RX ADMIN — COLCHICINE 0.6 MG: 0.6 TABLET, FILM COATED ORAL at 08:57

## 2024-09-06 RX ADMIN — OXYCODONE HYDROCHLORIDE 10 MG: 5 TABLET ORAL at 08:57

## 2024-09-06 RX ADMIN — SODIUM CHLORIDE, PRESERVATIVE FREE 10 ML: 5 INJECTION INTRAVENOUS at 09:03

## 2024-09-06 RX ADMIN — SENNOSIDES AND DOCUSATE SODIUM 1 TABLET: 50; 8.6 TABLET ORAL at 08:58

## 2024-09-06 RX ADMIN — POLYETHYLENE GLYCOL 3350 17 G: 17 POWDER, FOR SOLUTION ORAL at 08:58

## 2024-09-06 RX ADMIN — ATORVASTATIN CALCIUM 80 MG: 40 TABLET, FILM COATED ORAL at 20:05

## 2024-09-06 RX ADMIN — SODIUM CHLORIDE, PRESERVATIVE FREE 5 ML: 5 INJECTION INTRAVENOUS at 20:05

## 2024-09-06 RX ADMIN — SENNOSIDES AND DOCUSATE SODIUM 1 TABLET: 50; 8.6 TABLET ORAL at 20:05

## 2024-09-06 RX ADMIN — OXYCODONE HYDROCHLORIDE 5 MG: 5 TABLET ORAL at 20:07

## 2024-09-06 RX ADMIN — ENOXAPARIN SODIUM 30 MG: 100 INJECTION SUBCUTANEOUS at 20:09

## 2024-09-06 RX ADMIN — LEVOTHYROXINE SODIUM 200 MCG: 0.11 TABLET ORAL at 05:28

## 2024-09-06 ASSESSMENT — PAIN SCALES - GENERAL
PAINLEVEL_OUTOF10: 4
PAINLEVEL_OUTOF10: 0
PAINLEVEL_OUTOF10: 2
PAINLEVEL_OUTOF10: 7
PAINLEVEL_OUTOF10: 9

## 2024-09-06 ASSESSMENT — PAIN DESCRIPTION - LOCATION
LOCATION: ANKLE;LEG
LOCATION: ANKLE;KNEE

## 2024-09-06 ASSESSMENT — PAIN DESCRIPTION - ORIENTATION
ORIENTATION: RIGHT
ORIENTATION: RIGHT

## 2024-09-06 ASSESSMENT — PAIN DESCRIPTION - ONSET: ONSET: GRADUAL

## 2024-09-06 ASSESSMENT — PAIN DESCRIPTION - PAIN TYPE: TYPE: ACUTE PAIN

## 2024-09-06 ASSESSMENT — PAIN - FUNCTIONAL ASSESSMENT: PAIN_FUNCTIONAL_ASSESSMENT: PREVENTS OR INTERFERES SOME ACTIVE ACTIVITIES AND ADLS

## 2024-09-06 ASSESSMENT — PAIN DESCRIPTION - DESCRIPTORS
DESCRIPTORS: THROBBING
DESCRIPTORS: THROBBING

## 2024-09-06 ASSESSMENT — PAIN DESCRIPTION - FREQUENCY: FREQUENCY: INTERMITTENT

## 2024-09-07 LAB
GLUCOSE BLD STRIP.AUTO-MCNC: 122 MG/DL (ref 65–117)
GLUCOSE BLD STRIP.AUTO-MCNC: 144 MG/DL (ref 65–117)
GLUCOSE BLD STRIP.AUTO-MCNC: 145 MG/DL (ref 65–117)
GLUCOSE BLD STRIP.AUTO-MCNC: 182 MG/DL (ref 65–117)
SERVICE CMNT-IMP: ABNORMAL

## 2024-09-07 PROCEDURE — 6370000000 HC RX 637 (ALT 250 FOR IP): Performed by: INTERNAL MEDICINE

## 2024-09-07 PROCEDURE — 1100000003 HC PRIVATE W/ TELEMETRY

## 2024-09-07 PROCEDURE — 6360000002 HC RX W HCPCS: Performed by: INTERNAL MEDICINE

## 2024-09-07 PROCEDURE — 2580000003 HC RX 258: Performed by: INTERNAL MEDICINE

## 2024-09-07 PROCEDURE — 6370000000 HC RX 637 (ALT 250 FOR IP): Performed by: STUDENT IN AN ORGANIZED HEALTH CARE EDUCATION/TRAINING PROGRAM

## 2024-09-07 PROCEDURE — 82962 GLUCOSE BLOOD TEST: CPT

## 2024-09-07 RX ADMIN — ENOXAPARIN SODIUM 30 MG: 100 INJECTION SUBCUTANEOUS at 20:52

## 2024-09-07 RX ADMIN — COLCHICINE 0.6 MG: 0.6 TABLET, FILM COATED ORAL at 09:41

## 2024-09-07 RX ADMIN — SENNOSIDES AND DOCUSATE SODIUM 1 TABLET: 50; 8.6 TABLET ORAL at 09:41

## 2024-09-07 RX ADMIN — OXYCODONE HYDROCHLORIDE 5 MG: 5 TABLET ORAL at 03:46

## 2024-09-07 RX ADMIN — ATORVASTATIN CALCIUM 80 MG: 40 TABLET, FILM COATED ORAL at 20:52

## 2024-09-07 RX ADMIN — SODIUM CHLORIDE, PRESERVATIVE FREE 10 ML: 5 INJECTION INTRAVENOUS at 12:29

## 2024-09-07 RX ADMIN — OXYCODONE HYDROCHLORIDE 10 MG: 5 TABLET ORAL at 20:52

## 2024-09-07 RX ADMIN — SODIUM CHLORIDE, PRESERVATIVE FREE 10 ML: 5 INJECTION INTRAVENOUS at 20:54

## 2024-09-07 RX ADMIN — ENOXAPARIN SODIUM 30 MG: 100 INJECTION SUBCUTANEOUS at 09:42

## 2024-09-07 RX ADMIN — ASPIRIN 81 MG: 81 TABLET, CHEWABLE ORAL at 09:41

## 2024-09-07 RX ADMIN — LEVOTHYROXINE SODIUM 200 MCG: 0.11 TABLET ORAL at 07:16

## 2024-09-07 RX ADMIN — OXYCODONE HYDROCHLORIDE 5 MG: 5 TABLET ORAL at 15:38

## 2024-09-07 ASSESSMENT — PAIN DESCRIPTION - DESCRIPTORS
DESCRIPTORS: THROBBING
DESCRIPTORS: PRESSURE;ACHING;THROBBING
DESCRIPTORS: ACHING;PRESSURE;THROBBING
DESCRIPTORS: ACHING
DESCRIPTORS: ACHING

## 2024-09-07 ASSESSMENT — PAIN DESCRIPTION - LOCATION
LOCATION: KNEE
LOCATION: FOOT;LEG
LOCATION: LEG
LOCATION: KNEE;ANKLE
LOCATION: KNEE;ANKLE

## 2024-09-07 ASSESSMENT — PAIN SCALES - GENERAL
PAINLEVEL_OUTOF10: 3
PAINLEVEL_OUTOF10: 5
PAINLEVEL_OUTOF10: 4
PAINLEVEL_OUTOF10: 6
PAINLEVEL_OUTOF10: 7
PAINLEVEL_OUTOF10: 2
PAINLEVEL_OUTOF10: 2
PAINLEVEL_OUTOF10: 5

## 2024-09-07 ASSESSMENT — PAIN DESCRIPTION - ORIENTATION
ORIENTATION: RIGHT

## 2024-09-07 ASSESSMENT — PAIN DESCRIPTION - FREQUENCY
FREQUENCY: INTERMITTENT

## 2024-09-07 ASSESSMENT — PAIN DESCRIPTION - ONSET
ONSET: ON-GOING
ONSET: ON-GOING
ONSET: GRADUAL

## 2024-09-07 ASSESSMENT — PAIN - FUNCTIONAL ASSESSMENT
PAIN_FUNCTIONAL_ASSESSMENT: ACTIVITIES ARE NOT PREVENTED
PAIN_FUNCTIONAL_ASSESSMENT: ACTIVITIES ARE NOT PREVENTED
PAIN_FUNCTIONAL_ASSESSMENT: PREVENTS OR INTERFERES SOME ACTIVE ACTIVITIES AND ADLS

## 2024-09-07 ASSESSMENT — PAIN DESCRIPTION - PAIN TYPE
TYPE: CHRONIC PAIN
TYPE: ACUTE PAIN
TYPE: CHRONIC PAIN

## 2024-09-08 LAB
ANION GAP SERPL CALC-SCNC: 6 MMOL/L (ref 2–12)
BUN SERPL-MCNC: 18 MG/DL (ref 6–20)
BUN/CREAT SERPL: 21 (ref 12–20)
CALCIUM SERPL-MCNC: 9.5 MG/DL (ref 8.5–10.1)
CHLORIDE SERPL-SCNC: 103 MMOL/L (ref 97–108)
CO2 SERPL-SCNC: 27 MMOL/L (ref 21–32)
CREAT SERPL-MCNC: 0.84 MG/DL (ref 0.55–1.02)
GLUCOSE BLD STRIP.AUTO-MCNC: 116 MG/DL (ref 65–117)
GLUCOSE BLD STRIP.AUTO-MCNC: 125 MG/DL (ref 65–117)
GLUCOSE BLD STRIP.AUTO-MCNC: 160 MG/DL (ref 65–117)
GLUCOSE BLD STRIP.AUTO-MCNC: 162 MG/DL (ref 65–117)
GLUCOSE SERPL-MCNC: 125 MG/DL (ref 65–100)
POTASSIUM SERPL-SCNC: 4.1 MMOL/L (ref 3.5–5.1)
SERVICE CMNT-IMP: ABNORMAL
SERVICE CMNT-IMP: NORMAL
SODIUM SERPL-SCNC: 136 MMOL/L (ref 136–145)

## 2024-09-08 PROCEDURE — 82962 GLUCOSE BLOOD TEST: CPT

## 2024-09-08 PROCEDURE — 6370000000 HC RX 637 (ALT 250 FOR IP): Performed by: INTERNAL MEDICINE

## 2024-09-08 PROCEDURE — 36415 COLL VENOUS BLD VENIPUNCTURE: CPT

## 2024-09-08 PROCEDURE — 6370000000 HC RX 637 (ALT 250 FOR IP): Performed by: STUDENT IN AN ORGANIZED HEALTH CARE EDUCATION/TRAINING PROGRAM

## 2024-09-08 PROCEDURE — 1100000003 HC PRIVATE W/ TELEMETRY

## 2024-09-08 PROCEDURE — 80048 BASIC METABOLIC PNL TOTAL CA: CPT

## 2024-09-08 PROCEDURE — 6360000002 HC RX W HCPCS: Performed by: INTERNAL MEDICINE

## 2024-09-08 PROCEDURE — 2580000003 HC RX 258: Performed by: INTERNAL MEDICINE

## 2024-09-08 RX ADMIN — ATORVASTATIN CALCIUM 80 MG: 40 TABLET, FILM COATED ORAL at 20:16

## 2024-09-08 RX ADMIN — OXYCODONE HYDROCHLORIDE 5 MG: 5 TABLET ORAL at 20:19

## 2024-09-08 RX ADMIN — COLCHICINE 0.6 MG: 0.6 TABLET, FILM COATED ORAL at 09:08

## 2024-09-08 RX ADMIN — SENNOSIDES AND DOCUSATE SODIUM 1 TABLET: 50; 8.6 TABLET ORAL at 09:08

## 2024-09-08 RX ADMIN — SODIUM CHLORIDE, PRESERVATIVE FREE 10 ML: 5 INJECTION INTRAVENOUS at 09:10

## 2024-09-08 RX ADMIN — ENOXAPARIN SODIUM 30 MG: 100 INJECTION SUBCUTANEOUS at 09:10

## 2024-09-08 RX ADMIN — OXYCODONE HYDROCHLORIDE 10 MG: 5 TABLET ORAL at 23:53

## 2024-09-08 RX ADMIN — POLYETHYLENE GLYCOL 3350 17 G: 17 POWDER, FOR SOLUTION ORAL at 09:08

## 2024-09-08 RX ADMIN — ASPIRIN 81 MG: 81 TABLET, CHEWABLE ORAL at 09:08

## 2024-09-08 RX ADMIN — OXYCODONE HYDROCHLORIDE 10 MG: 5 TABLET ORAL at 02:09

## 2024-09-08 RX ADMIN — ENOXAPARIN SODIUM 30 MG: 100 INJECTION SUBCUTANEOUS at 20:16

## 2024-09-08 RX ADMIN — LEVOTHYROXINE SODIUM 200 MCG: 0.11 TABLET ORAL at 06:16

## 2024-09-08 RX ADMIN — SODIUM CHLORIDE, PRESERVATIVE FREE 10 ML: 5 INJECTION INTRAVENOUS at 20:15

## 2024-09-08 ASSESSMENT — PAIN DESCRIPTION - PAIN TYPE
TYPE: CHRONIC PAIN

## 2024-09-08 ASSESSMENT — PAIN DESCRIPTION - DESCRIPTORS
DESCRIPTORS: THROBBING;STABBING
DESCRIPTORS: ACHING;SHARP;STABBING
DESCRIPTORS: ACHING;SHARP;STABBING
DESCRIPTORS: THROBBING
DESCRIPTORS: THROBBING

## 2024-09-08 ASSESSMENT — PAIN DESCRIPTION - LOCATION
LOCATION: ANKLE;KNEE
LOCATION: KNEE
LOCATION: ANKLE;KNEE

## 2024-09-08 ASSESSMENT — PAIN DESCRIPTION - ORIENTATION
ORIENTATION: RIGHT

## 2024-09-08 ASSESSMENT — PAIN DESCRIPTION - ONSET
ONSET: ON-GOING
ONSET: GRADUAL

## 2024-09-08 ASSESSMENT — PAIN DESCRIPTION - FREQUENCY
FREQUENCY: INTERMITTENT

## 2024-09-08 ASSESSMENT — PAIN SCALES - GENERAL
PAINLEVEL_OUTOF10: 4
PAINLEVEL_OUTOF10: 7
PAINLEVEL_OUTOF10: 3
PAINLEVEL_OUTOF10: 7
PAINLEVEL_OUTOF10: 5
PAINLEVEL_OUTOF10: 5

## 2024-09-08 ASSESSMENT — PAIN - FUNCTIONAL ASSESSMENT
PAIN_FUNCTIONAL_ASSESSMENT: ACTIVITIES ARE NOT PREVENTED

## 2024-09-09 LAB
GLUCOSE BLD STRIP.AUTO-MCNC: 122 MG/DL (ref 65–117)
GLUCOSE BLD STRIP.AUTO-MCNC: 139 MG/DL (ref 65–117)
GLUCOSE BLD STRIP.AUTO-MCNC: 144 MG/DL (ref 65–117)
GLUCOSE BLD STRIP.AUTO-MCNC: 147 MG/DL (ref 65–117)
SERVICE CMNT-IMP: ABNORMAL

## 2024-09-09 PROCEDURE — 1100000003 HC PRIVATE W/ TELEMETRY

## 2024-09-09 PROCEDURE — 6370000000 HC RX 637 (ALT 250 FOR IP): Performed by: INTERNAL MEDICINE

## 2024-09-09 PROCEDURE — 6360000002 HC RX W HCPCS: Performed by: INTERNAL MEDICINE

## 2024-09-09 PROCEDURE — 97535 SELF CARE MNGMENT TRAINING: CPT | Performed by: OCCUPATIONAL THERAPIST

## 2024-09-09 PROCEDURE — 97116 GAIT TRAINING THERAPY: CPT

## 2024-09-09 PROCEDURE — 82962 GLUCOSE BLOOD TEST: CPT

## 2024-09-09 PROCEDURE — 2580000003 HC RX 258: Performed by: INTERNAL MEDICINE

## 2024-09-09 PROCEDURE — 6370000000 HC RX 637 (ALT 250 FOR IP): Performed by: STUDENT IN AN ORGANIZED HEALTH CARE EDUCATION/TRAINING PROGRAM

## 2024-09-09 RX ORDER — ASPIRIN 300 MG/1
300 SUPPOSITORY RECTAL DAILY
Qty: 30 SUPPOSITORY | Refills: 3 | Status: SHIPPED | OUTPATIENT
Start: 2024-09-10

## 2024-09-09 RX ORDER — POLYETHYLENE GLYCOL 3350 17 G/17G
17 POWDER, FOR SOLUTION ORAL DAILY
Qty: 527 G | Refills: 0 | Status: SHIPPED | OUTPATIENT
Start: 2024-09-10 | End: 2024-10-10

## 2024-09-09 RX ORDER — ASPIRIN 81 MG/1
81 TABLET, CHEWABLE ORAL DAILY
Qty: 30 TABLET | Refills: 0 | Status: SHIPPED | OUTPATIENT
Start: 2024-09-10

## 2024-09-09 RX ORDER — CLOPIDOGREL BISULFATE 75 MG/1
75 TABLET ORAL DAILY
Qty: 21 TABLET | Refills: 0 | Status: SHIPPED | OUTPATIENT
Start: 2024-09-09

## 2024-09-09 RX ADMIN — SODIUM CHLORIDE, PRESERVATIVE FREE 10 ML: 5 INJECTION INTRAVENOUS at 21:34

## 2024-09-09 RX ADMIN — SENNOSIDES AND DOCUSATE SODIUM 1 TABLET: 50; 8.6 TABLET ORAL at 08:20

## 2024-09-09 RX ADMIN — ENOXAPARIN SODIUM 30 MG: 100 INJECTION SUBCUTANEOUS at 21:33

## 2024-09-09 RX ADMIN — OXYCODONE HYDROCHLORIDE 10 MG: 5 TABLET ORAL at 21:31

## 2024-09-09 RX ADMIN — SODIUM CHLORIDE, PRESERVATIVE FREE 10 ML: 5 INJECTION INTRAVENOUS at 08:21

## 2024-09-09 RX ADMIN — COLCHICINE 0.6 MG: 0.6 TABLET, FILM COATED ORAL at 08:19

## 2024-09-09 RX ADMIN — OXYCODONE HYDROCHLORIDE 10 MG: 5 TABLET ORAL at 08:20

## 2024-09-09 RX ADMIN — ASPIRIN 81 MG: 81 TABLET, CHEWABLE ORAL at 08:20

## 2024-09-09 RX ADMIN — SENNOSIDES AND DOCUSATE SODIUM 1 TABLET: 50; 8.6 TABLET ORAL at 21:33

## 2024-09-09 RX ADMIN — ENOXAPARIN SODIUM 30 MG: 100 INJECTION SUBCUTANEOUS at 08:20

## 2024-09-09 RX ADMIN — LEVOTHYROXINE SODIUM 200 MCG: 0.11 TABLET ORAL at 06:54

## 2024-09-09 RX ADMIN — POLYETHYLENE GLYCOL 3350 17 G: 17 POWDER, FOR SOLUTION ORAL at 08:19

## 2024-09-09 RX ADMIN — ATORVASTATIN CALCIUM 80 MG: 40 TABLET, FILM COATED ORAL at 21:32

## 2024-09-09 ASSESSMENT — PAIN DESCRIPTION - LOCATION
LOCATION: ANKLE;HIP;FOOT
LOCATION: KNEE
LOCATION: KNEE

## 2024-09-09 ASSESSMENT — PAIN DESCRIPTION - DESCRIPTORS
DESCRIPTORS: ACHING
DESCRIPTORS: ACHING
DESCRIPTORS: THROBBING;STABBING

## 2024-09-09 ASSESSMENT — PAIN DESCRIPTION - FREQUENCY
FREQUENCY: INTERMITTENT
FREQUENCY: INTERMITTENT

## 2024-09-09 ASSESSMENT — PAIN SCALES - GENERAL
PAINLEVEL_OUTOF10: 1
PAINLEVEL_OUTOF10: 3
PAINLEVEL_OUTOF10: 5
PAINLEVEL_OUTOF10: 9
PAINLEVEL_OUTOF10: 7

## 2024-09-09 ASSESSMENT — PAIN DESCRIPTION - PAIN TYPE
TYPE: CHRONIC PAIN
TYPE: CHRONIC PAIN

## 2024-09-09 ASSESSMENT — PAIN - FUNCTIONAL ASSESSMENT
PAIN_FUNCTIONAL_ASSESSMENT: ACTIVITIES ARE NOT PREVENTED
PAIN_FUNCTIONAL_ASSESSMENT: ACTIVITIES ARE NOT PREVENTED

## 2024-09-09 ASSESSMENT — PAIN DESCRIPTION - ORIENTATION
ORIENTATION: RIGHT;LEFT
ORIENTATION: RIGHT
ORIENTATION: RIGHT

## 2024-09-09 ASSESSMENT — PAIN DESCRIPTION - ONSET
ONSET: ON-GOING
ONSET: GRADUAL

## 2024-09-10 VITALS
HEIGHT: 66 IN | SYSTOLIC BLOOD PRESSURE: 138 MMHG | BODY MASS INDEX: 36.81 KG/M2 | TEMPERATURE: 98.2 F | DIASTOLIC BLOOD PRESSURE: 88 MMHG | HEART RATE: 85 BPM | OXYGEN SATURATION: 98 % | RESPIRATION RATE: 18 BRPM | WEIGHT: 229.06 LBS

## 2024-09-10 LAB
ALBUMIN SERPL-MCNC: 3.1 G/DL (ref 3.5–5)
ALBUMIN/GLOB SERPL: 0.8 (ref 1.1–2.2)
ALP SERPL-CCNC: 139 U/L (ref 45–117)
ALT SERPL-CCNC: 42 U/L (ref 12–78)
ANION GAP SERPL CALC-SCNC: 8 MMOL/L (ref 2–12)
AST SERPL-CCNC: 28 U/L (ref 15–37)
BASOPHILS # BLD: 0.1 K/UL (ref 0–0.1)
BASOPHILS NFR BLD: 1 % (ref 0–1)
BILIRUB SERPL-MCNC: 0.9 MG/DL (ref 0.2–1)
BUN SERPL-MCNC: 12 MG/DL (ref 6–20)
BUN/CREAT SERPL: 16 (ref 12–20)
CALCIUM SERPL-MCNC: 10.1 MG/DL (ref 8.5–10.1)
CHLORIDE SERPL-SCNC: 101 MMOL/L (ref 97–108)
CO2 SERPL-SCNC: 27 MMOL/L (ref 21–32)
CREAT SERPL-MCNC: 0.76 MG/DL (ref 0.55–1.02)
DIFFERENTIAL METHOD BLD: ABNORMAL
EOSINOPHIL # BLD: 0.1 K/UL (ref 0–0.4)
EOSINOPHIL NFR BLD: 1 % (ref 0–7)
ERYTHROCYTE [DISTWIDTH] IN BLOOD BY AUTOMATED COUNT: 13.1 % (ref 11.5–14.5)
GLOBULIN SER CALC-MCNC: 3.8 G/DL (ref 2–4)
GLUCOSE BLD STRIP.AUTO-MCNC: 134 MG/DL (ref 65–117)
GLUCOSE BLD STRIP.AUTO-MCNC: 145 MG/DL (ref 65–117)
GLUCOSE SERPL-MCNC: 143 MG/DL (ref 65–100)
HCT VFR BLD AUTO: 35 % (ref 35–47)
HGB BLD-MCNC: 11 G/DL (ref 11.5–16)
IMM GRANULOCYTES # BLD AUTO: 0 K/UL (ref 0–0.04)
IMM GRANULOCYTES NFR BLD AUTO: 0 % (ref 0–0.5)
LYMPHOCYTES # BLD: 1.9 K/UL (ref 0.8–3.5)
LYMPHOCYTES NFR BLD: 21 % (ref 12–49)
MCH RBC QN AUTO: 28.6 PG (ref 26–34)
MCHC RBC AUTO-ENTMCNC: 31.4 G/DL (ref 30–36.5)
MCV RBC AUTO: 91.1 FL (ref 80–99)
MONOCYTES # BLD: 1.1 K/UL (ref 0–1)
MONOCYTES NFR BLD: 12 % (ref 5–13)
NEUTS SEG # BLD: 6.1 K/UL (ref 1.8–8)
NEUTS SEG NFR BLD: 65 % (ref 32–75)
NRBC # BLD: 0 K/UL (ref 0–0.01)
NRBC BLD-RTO: 0 PER 100 WBC
PLATELET # BLD AUTO: 403 K/UL (ref 150–400)
PMV BLD AUTO: 11 FL (ref 8.9–12.9)
POTASSIUM SERPL-SCNC: 4.7 MMOL/L (ref 3.5–5.1)
PROT SERPL-MCNC: 6.9 G/DL (ref 6.4–8.2)
RBC # BLD AUTO: 3.84 M/UL (ref 3.8–5.2)
SERVICE CMNT-IMP: ABNORMAL
SERVICE CMNT-IMP: ABNORMAL
SODIUM SERPL-SCNC: 136 MMOL/L (ref 136–145)
WBC # BLD AUTO: 9.3 K/UL (ref 3.6–11)

## 2024-09-10 PROCEDURE — 97116 GAIT TRAINING THERAPY: CPT

## 2024-09-10 PROCEDURE — 82962 GLUCOSE BLOOD TEST: CPT

## 2024-09-10 PROCEDURE — 97535 SELF CARE MNGMENT TRAINING: CPT

## 2024-09-10 PROCEDURE — 36415 COLL VENOUS BLD VENIPUNCTURE: CPT

## 2024-09-10 PROCEDURE — 6360000002 HC RX W HCPCS: Performed by: INTERNAL MEDICINE

## 2024-09-10 PROCEDURE — 6370000000 HC RX 637 (ALT 250 FOR IP): Performed by: STUDENT IN AN ORGANIZED HEALTH CARE EDUCATION/TRAINING PROGRAM

## 2024-09-10 PROCEDURE — 85025 COMPLETE CBC W/AUTO DIFF WBC: CPT

## 2024-09-10 PROCEDURE — 80053 COMPREHEN METABOLIC PANEL: CPT

## 2024-09-10 PROCEDURE — 6370000000 HC RX 637 (ALT 250 FOR IP): Performed by: INTERNAL MEDICINE

## 2024-09-10 PROCEDURE — 2580000003 HC RX 258: Performed by: INTERNAL MEDICINE

## 2024-09-10 RX ADMIN — SODIUM CHLORIDE, PRESERVATIVE FREE 10 ML: 5 INJECTION INTRAVENOUS at 10:49

## 2024-09-10 RX ADMIN — OXYCODONE HYDROCHLORIDE 10 MG: 5 TABLET ORAL at 16:23

## 2024-09-10 RX ADMIN — OXYCODONE HYDROCHLORIDE 5 MG: 5 TABLET ORAL at 09:19

## 2024-09-10 RX ADMIN — ENOXAPARIN SODIUM 30 MG: 100 INJECTION SUBCUTANEOUS at 10:48

## 2024-09-10 RX ADMIN — LEVOTHYROXINE SODIUM 200 MCG: 0.11 TABLET ORAL at 10:48

## 2024-09-10 RX ADMIN — OXYCODONE HYDROCHLORIDE 5 MG: 5 TABLET ORAL at 03:25

## 2024-09-10 RX ADMIN — ASPIRIN 81 MG: 81 TABLET, CHEWABLE ORAL at 10:48

## 2024-09-10 RX ADMIN — COLCHICINE 0.6 MG: 0.6 TABLET, FILM COATED ORAL at 10:48

## 2024-09-10 ASSESSMENT — PAIN SCALES - GENERAL
PAINLEVEL_OUTOF10: 5
PAINLEVEL_OUTOF10: 6
PAINLEVEL_OUTOF10: 7
PAINLEVEL_OUTOF10: 3
PAINLEVEL_OUTOF10: 8

## 2024-09-10 ASSESSMENT — PAIN DESCRIPTION - DESCRIPTORS
DESCRIPTORS: ACHING

## 2024-09-10 ASSESSMENT — PAIN DESCRIPTION - ORIENTATION
ORIENTATION: RIGHT;LEFT
ORIENTATION: RIGHT
ORIENTATION: RIGHT;LEFT

## 2024-09-10 ASSESSMENT — PAIN DESCRIPTION - LOCATION
LOCATION: FOOT
LOCATION: KNEE
LOCATION: FOOT

## 2024-09-11 ENCOUNTER — TELEPHONE (OUTPATIENT)
Facility: CLINIC | Age: 64
End: 2024-09-11

## 2024-09-17 ENCOUNTER — TELEPHONE (OUTPATIENT)
Age: 64
End: 2024-09-17

## 2024-09-18 ENCOUNTER — OFFICE VISIT (OUTPATIENT)
Age: 64
End: 2024-09-18

## 2024-09-18 DIAGNOSIS — E78.2 MIXED HYPERLIPIDEMIA: ICD-10-CM

## 2024-09-18 DIAGNOSIS — E03.4 HYPOTHYROIDISM DUE TO ACQUIRED ATROPHY OF THYROID: ICD-10-CM

## 2024-09-18 DIAGNOSIS — E11.9 TYPE 2 DIABETES MELLITUS WITHOUT COMPLICATION, WITHOUT LONG-TERM CURRENT USE OF INSULIN (HCC): ICD-10-CM

## 2024-09-18 DIAGNOSIS — G89.29 CHRONIC CHEST WALL PAIN: ICD-10-CM

## 2024-09-18 DIAGNOSIS — R26.81 UNSTEADY GAIT: ICD-10-CM

## 2024-09-18 DIAGNOSIS — R07.89 CHRONIC CHEST WALL PAIN: ICD-10-CM

## 2024-09-18 DIAGNOSIS — G45.9 TIA (TRANSIENT ISCHEMIC ATTACK): Primary | ICD-10-CM

## 2024-09-18 DIAGNOSIS — M25.561 ACUTE PAIN OF RIGHT KNEE: ICD-10-CM

## 2024-09-18 DIAGNOSIS — Z91.148 NONCOMPLIANCE WITH MEDICATION REGIMEN: ICD-10-CM

## 2024-09-18 DIAGNOSIS — E66.01 CLASS 2 SEVERE OBESITY WITH SERIOUS COMORBIDITY AND BODY MASS INDEX (BMI) OF 36.0 TO 36.9 IN ADULT, UNSPECIFIED OBESITY TYPE (HCC): ICD-10-CM

## 2024-09-18 DIAGNOSIS — M25.571 ACUTE RIGHT ANKLE PAIN: ICD-10-CM

## 2024-09-18 DIAGNOSIS — M10.9 GOUT, UNSPECIFIED CAUSE, UNSPECIFIED CHRONICITY, UNSPECIFIED SITE: ICD-10-CM

## 2024-09-19 ENCOUNTER — TELEPHONE (OUTPATIENT)
Age: 64
End: 2024-09-19

## 2024-09-19 VITALS
HEART RATE: 78 BPM | SYSTOLIC BLOOD PRESSURE: 138 MMHG | BODY MASS INDEX: 36.48 KG/M2 | DIASTOLIC BLOOD PRESSURE: 87 MMHG | WEIGHT: 226 LBS | TEMPERATURE: 98.1 F | RESPIRATION RATE: 16 BRPM

## 2024-09-19 RX ORDER — GABAPENTIN 400 MG/1
400 CAPSULE ORAL 2 TIMES DAILY
Qty: 60 CAPSULE | Refills: 0 | Status: SHIPPED | OUTPATIENT
Start: 2024-09-19 | End: 2024-10-19

## 2024-09-19 RX ORDER — COLCHICINE 0.6 MG/1
0.6 TABLET ORAL 2 TIMES DAILY PRN
Qty: 30 TABLET | Refills: 3 | Status: SHIPPED | OUTPATIENT
Start: 2024-09-19

## 2024-09-20 ENCOUNTER — TELEPHONE (OUTPATIENT)
Age: 64
End: 2024-09-20

## 2024-09-20 ENCOUNTER — HOSPITAL ENCOUNTER (OUTPATIENT)
Facility: HOSPITAL | Age: 64
Discharge: HOME | End: 2024-09-23
Payer: COMMERCIAL

## 2024-09-20 DIAGNOSIS — M25.571 ACUTE RIGHT ANKLE PAIN: ICD-10-CM

## 2024-09-20 DIAGNOSIS — M25.561 ACUTE PAIN OF RIGHT KNEE: ICD-10-CM

## 2024-09-20 PROCEDURE — 73610 X-RAY EXAM OF ANKLE: CPT

## 2024-09-20 PROCEDURE — 73562 X-RAY EXAM OF KNEE 3: CPT

## 2024-09-20 NOTE — TELEPHONE ENCOUNTER
Warm on September 1.  Return to work date is listed as October 14.  Patient will require follow-up on or before October 14 if she is unable to return to work at that time.

## 2024-09-20 NOTE — TELEPHONE ENCOUNTER
Pt is asking if the FMLA forms were faxed?    She would like to  originals please.        Please call to let pt know. Thanks.

## 2024-09-24 ENCOUNTER — TELEPHONE (OUTPATIENT)
Age: 64
End: 2024-09-24

## 2024-09-25 ENCOUNTER — TELEPHONE (OUTPATIENT)
Age: 64
End: 2024-09-25

## 2024-09-25 DIAGNOSIS — M25.561 ACUTE PAIN OF RIGHT KNEE: Primary | ICD-10-CM

## 2024-10-03 ENCOUNTER — TELEPHONE (OUTPATIENT)
Age: 64
End: 2024-10-03

## 2024-10-03 NOTE — TELEPHONE ENCOUNTER
----- Message from Goldie SARAH sent at 10/3/2024 10:15 AM EDT -----  Regarding: ECC Appointment Request  ECC Appointment Request    Patient needs appointment for ECC Appointment Type: Existing Condition Follow Up.    Patient Requested Dates(s): before 14th of October.  Patient Requested Time:   Any time of the day  Provider Name: Amirah Newton MD    Reason for Appointment Request: Other   Patient want to set an appointment with her pcp for her FMLA and for re evaluation before the 14th of this month.  --------------------------------------------------------------------------------------------------------------------------    Relationship to Patient: Self     Call Back Information: OK to leave message on [employer]  Preferred Call Back Number: Phone [phone]

## 2024-10-04 NOTE — TELEPHONE ENCOUNTER
Spoke to pt using two pt identifiers.  Pt states she is in PT and she wants to discuss going back to work.

## 2024-10-06 NOTE — PROGRESS NOTES
Cely Osborne is a 64 y.o. female who presents for follow-up.  Patient last seen  after hospitalization -9/10 for TIA.  patient had report of unsteady gait.  When seen on , referred to physical therapy.  Has been out of work since  with anticipated RTW 10/14.  Out under FMLA.    Going to PT 2 days a week and doing exercises at home daily.  Patient reports she is stronger. No assist device. No falls.  Is able to go up and down steps. Some right knee pain, but feeling more stable.      Patient reports at work is active, frequent sitting and standing.  Working three 8 hour shift, T,W, Th.  On M and W, half day shifts.   Is concerned about her stamina.  She reports after physical therapy, one hour session, feeling fatigued.       Past Medical History:   Diagnosis Date    Arthritis     Asthma     Cancer (HCC)     breast cancer    H/O total mastectomy 2015    Hypercholesterolemia     Hypothyroidism     Other ill-defined conditions(799.89)     completed radiation     Sleep apnea     Thyroid disease     hypothyroidism       Family History   Problem Relation Age of Onset    Cancer Father     Cancer Mother         brain cancer    Breast Cancer Sister 55    Breast Cancer Maternal Aunt         60-70's    Mult Sclerosis Sister     Thyroid Disease Maternal Grandmother     No Known Problems Brother         Social History     Socioeconomic History    Marital status: Single     Spouse name: Not on file    Number of children: Not on file    Years of education: Not on file    Highest education level: Not on file   Occupational History    Not on file   Tobacco Use    Smoking status: Former     Current packs/day: 0.00     Types: Cigarettes     Quit date: 2015     Years since quittin.7    Smokeless tobacco: Never   Vaping Use    Vaping status: Never Used   Substance and Sexual Activity    Alcohol use: Yes    Drug use: No    Sexual activity: Not Currently     Birth control/protection: None   Other

## 2024-10-07 ENCOUNTER — OFFICE VISIT (OUTPATIENT)
Age: 64
End: 2024-10-07
Payer: COMMERCIAL

## 2024-10-07 VITALS
DIASTOLIC BLOOD PRESSURE: 79 MMHG | RESPIRATION RATE: 18 BRPM | BODY MASS INDEX: 35.79 KG/M2 | HEIGHT: 67 IN | TEMPERATURE: 97.5 F | WEIGHT: 228 LBS | HEART RATE: 76 BPM | SYSTOLIC BLOOD PRESSURE: 135 MMHG | OXYGEN SATURATION: 99 %

## 2024-10-07 DIAGNOSIS — R26.81 UNSTEADY GAIT: ICD-10-CM

## 2024-10-07 DIAGNOSIS — G45.9 TIA (TRANSIENT ISCHEMIC ATTACK): Primary | ICD-10-CM

## 2024-10-07 DIAGNOSIS — H81.90 VESTIBULAR DIZZINESS: ICD-10-CM

## 2024-10-07 DIAGNOSIS — M25.561 ACUTE PAIN OF RIGHT KNEE: ICD-10-CM

## 2024-10-07 PROCEDURE — 99214 OFFICE O/P EST MOD 30 MIN: CPT | Performed by: FAMILY MEDICINE

## 2024-10-07 SDOH — ECONOMIC STABILITY: FOOD INSECURITY: WITHIN THE PAST 12 MONTHS, THE FOOD YOU BOUGHT JUST DIDN'T LAST AND YOU DIDN'T HAVE MONEY TO GET MORE.: NEVER TRUE

## 2024-10-07 SDOH — ECONOMIC STABILITY: FOOD INSECURITY: WITHIN THE PAST 12 MONTHS, YOU WORRIED THAT YOUR FOOD WOULD RUN OUT BEFORE YOU GOT MONEY TO BUY MORE.: SOMETIMES TRUE

## 2024-10-07 SDOH — ECONOMIC STABILITY: INCOME INSECURITY: HOW HARD IS IT FOR YOU TO PAY FOR THE VERY BASICS LIKE FOOD, HOUSING, MEDICAL CARE, AND HEATING?: SOMEWHAT HARD

## 2024-10-07 ASSESSMENT — PATIENT HEALTH QUESTIONNAIRE - PHQ9
SUM OF ALL RESPONSES TO PHQ QUESTIONS 1-9: 2
2. FEELING DOWN, DEPRESSED OR HOPELESS: SEVERAL DAYS
SUM OF ALL RESPONSES TO PHQ9 QUESTIONS 1 & 2: 2
SUM OF ALL RESPONSES TO PHQ QUESTIONS 1-9: 2
1. LITTLE INTEREST OR PLEASURE IN DOING THINGS: SEVERAL DAYS

## 2024-10-07 NOTE — PATIENT INSTRUCTIONS
[email or web address]       [facility] [employer] [facility]   What they offer: free coaching services in the areas of Employment, Financial, and Benefits Access   Phone Number: [phone]   Email: [email or web address]   Address: [address] [place] [address]

## 2025-01-02 PROBLEM — Z85.3 HISTORY OF RIGHT BREAST CANCER: Status: ACTIVE | Noted: 2025-01-02

## 2025-01-06 ENCOUNTER — TELEPHONE (OUTPATIENT)
Age: 65
End: 2025-01-06

## 2025-01-06 NOTE — TELEPHONE ENCOUNTER
LVM that we would like to move her appt up earlier or reschedule due to weather.  Ask to send a MCM or call the office back.

## 2025-01-06 NOTE — TELEPHONE ENCOUNTER
Phoned pt to r/s 1/6/25 appt; appt r/s to 2/4/25 at 1pm with NP; pt stated she has a small boil under arm. Advised message would be sent to team for advisement. Pt expressed gratitude.

## 2025-01-06 NOTE — TELEPHONE ENCOUNTER
[phone]  Call to #[phone], [facility], requested cb, supplied LPN contact info. Stated office will close today at 3pm due to weather, MCM sent with update, no HIPAA disclosed.

## 2025-01-07 NOTE — TELEPHONE ENCOUNTER
[phone]  Call to #[phone], [facility], requested cb, supplied LPN contact info. Will try again at a later time.

## 2025-01-22 NOTE — PROGRESS NOTES
Joe Kaiser is a 66 y.o. female who presents for physical. Is enrolling in nursing school. No prior hepatitis B series. Up to date on Tdap. No documentation of MMR, varicella, TB or hepatitis B. She has a history of bilateral mastectomy for prior breast cancer. Has had lymphedema of chest wall. Resuming lymphedema therapy and is to have lymphedema massage. Is able to reach above head without difficulty. Is able to do compressions with CPR, just had class. Taking statin. Following low fat diet. No myalgias on statin. Off thyroid medication for 2-3 weeks. Back on it 2 weeks. Up to date on eye exam, early cataract, not ready for surgery. Due for dental.     Vitamin D was very low, 6.5. Took loading dose of vitamin, off vitamin D now. Increased dietary vitamin D.     Prior colonoscopy, less than 10 years. normal BM.  No family history of colon cancer.          Past Medical History:   Diagnosis Date    Arthritis     Cancer ([facility] [address]     breast cancer    H/O total mastectomy 11/30/2015    Hypercholesterolemia     Other ill-defined conditions(799.89)     completed radiation 9/12    Thyroid disease     hypothyroidism       Family History   Problem Relation Age of Onset    Cancer Mother         brain cancer    No Known Problems Father     Breast Cancer Sister 54    No Known Problems Brother     Thyroid Disease Maternal Grandmother     MS Sister     Breast Cancer Maternal Aunt         60-70's       Social History     Socioeconomic History    Marital status: SINGLE     Spouse name: Not on file    Number of children: Not on file    Years of education: Not on file    Highest education level: Not on file   Social Needs    Financial resource strain: Not on file    Food insecurity - worry: Not on file    Food insecurity - inability: Not on file   [employer] needs - medical: Not on file   [employer] needs - non-medical: Not on file   Occupational History    Not on file Tobacco Use    Smoking status: Former Smoker     Packs/day: 0.50     Last attempt to quit: 1/28/2015     Years since quitting: 3.7    Smokeless tobacco: Never Used   Substance and Sexual Activity    Alcohol use: Yes     Comment: occasional    Drug use: No    Sexual activity: Not Currently     Partners: Male     Birth control/protection: None   Other Topics Concern    Not on file   Social History Narrative    Not on file       Current Outpatient Medications on File Prior to Visit   Medication Sig Dispense Refill    ergocalciferol (ERGOCALCIFEROL) 50,000 unit capsule Take one capsule twice a week 8 Cap 2     No current facility-administered medications on file prior to visit. Review of Systems  Pertinent items are noted in HPI. Objective:     Visit Vitals  /71 (BP 1 Location: Left arm, BP Patient Position: Sitting)   Pulse 70   Temp 97.8 °F (36.6 °C) (Oral)   Resp 16   Ht 5' 7\" (1.702 m)   Wt 216 lb 6.4 oz (98.2 kg)   SpO2 100%   BMI 33.89 kg/m²     Gen: well appearing female  HEENT:   PERRL,normal conjunctiva. External ear and canals normal, TMs no opacification or erythema,  OP no erythema, no exudates, MMM  Neck:  Supple. Thyroid normal size, nontender, without nodules. No masses or LAD  Resp:  No wheezing, no rhonchi, no rales. Chest: bilateral mastectomy  CV:  RRR, normal S1S2, no murmur. GI: soft, nontender, without masses. No hepatosplenomegaly. Extrem:  +2 pulses, no edema, warm distally      Assessment/Plan:       ICD-10-CM ICD-9-CM    1. Routine medical exam O69.21 P06.0 METABOLIC PANEL, COMPREHENSIVE      CBC W/O DIFF      LIPID PANEL      HEMOGLOBIN A1C W/O EAG      VITAMIN D, 25 HYDROXY      URINALYSIS W/ RFLX MICROSCOPIC      TSH 3RD GENERATION      METABOLIC PANEL, COMPREHENSIVE      CBC W/O DIFF      HEMOGLOBIN A1C W/O EAG      LIPID PANEL      VITAMIN D, 25 HYDROXY      URINALYSIS W/ RFLX MICROSCOPIC   2.  Hypothyroidism due to acquired atrophy of thyroid E03.4 244.8 levothyroxine (SYNTHROID) 125 mcg tablet     246.8 TSH 3RD GENERATION   3. Vitamin D deficiency E55.9 268.9 VITAMIN D, 25 HYDROXY      VITAMIN D, 25 HYDROXY   4. Mixed hyperlipidemia E78.2 272.2 atorvastatin (LIPITOR) 20 mg tablet      LIPID PANEL   5. BMI 33.0-33.9,adult Z68.33 V85.33    6. Acute rhinitis J00 460 fluticasone (FLONASE) 50 mcg/actuation nasal spray   7. Screening for tuberculosis Z11.1 V74.1 QUANTIFERON-TB GOLD PLUS      QUANTIFERON-TB GOLD PLUS   8. Screening examination for infectious disease Z11.9 V75.9 VZV AB, IGG      RUBEOLA AB, IGG      MUMPS AB, IGG      RUBELLA AB, IGG      RUBEOLA AB, IGG      RUBELLA AB, IGG      VZV AB, IGG      MUMPS AB, IGG      CANCELED: HEP B SURFACE AB   9. Encounter for immunization Z23 V03.89 HEPATITIS B VACCINE, ADULT DOSAGE, IM   Recommend heart healthy diet and regular cardiovascular exercise. Follow-up Disposition:  Return for nurse visit hep B #2 in 4 weeks.   Nina Cortes MD normal/ROM intact/normal gait/strength 5/5 bilateral upper extremities/strength 5/5 bilateral lower extremities

## 2025-02-04 ENCOUNTER — TELEPHONE (OUTPATIENT)
Age: 65
End: 2025-02-04

## 2025-02-21 NOTE — PROGRESS NOTES
[facility]  [address], [address] [place] [address]  W: [phone]  F: [phone]    Reason for Visit:   Cely Osborne is a 64 y.o. female seen today in office for follow up of Right Breast Cancer.    Treatment History:   Prior patient of [facility]  Hx of b/l mastectomy 2015  No chemo   Adjuvant hormonal therapy stopped due to intolerance      STAGE: T2N0 ER+ HER2 negative/Oncotype low risk x 2    History of Present Illness:   Cely Osborne is a 64 y.o. female seen today in office for 13 month follow up of ER+ Her2 Negative Right Breast Cancer with Hx of Bilateral Mastectomy in 2015. She is not on any therapy from here. She reports that she feels well overall today, has occasional VIEYRA. Her appetite and energy levels are stable to low overall. She denies fever, chills, mouth sores, cough, SOB, CP, nausea, vomiting, diarrhea, and constipation. She denies pain today. She is here alone today.     Past Medical History:   Diagnosis Date    Arthritis     Asthma     Cancer (HCC)     breast cancer    H/O total mastectomy 11/30/2015    Hypercholesterolemia     Hypothyroidism     Other ill-defined conditions(799.89)     completed radiation 9/12    Sleep apnea     Thyroid disease     hypothyroidism      Past Surgical History:   Procedure Laterality Date    BREAST LUMPECTOMY Right 2012    GYN      hysterectomy; TBL    HAND SURGERY Right 11/2023    [facility]- Dr. Dolores KIRKLAND      tonsillectomy    MASTECTOMY Bilateral 2015    ORTHOPEDIC SURGERY      right knee ACL    OTHER SURGICAL HISTORY      lumptectomy right breast    TONSILLECTOMY        Social History     Tobacco Use    Smoking status: Former     Current packs/day: 0.00     Types: Cigarettes     Quit date: 1/28/2015     Years since quitting: 10.0    Smokeless tobacco: Never   Substance Use Topics    Alcohol use: Yes      Family History   Problem Relation Age of Onset    Cancer Father     Cancer Mother         brain

## 2025-02-25 ENCOUNTER — OFFICE VISIT (OUTPATIENT)
Age: 65
End: 2025-02-25
Payer: MEDICAID

## 2025-02-25 VITALS
RESPIRATION RATE: 20 BRPM | BODY MASS INDEX: 37.28 KG/M2 | OXYGEN SATURATION: 93 % | TEMPERATURE: 98.4 F | DIASTOLIC BLOOD PRESSURE: 76 MMHG | SYSTOLIC BLOOD PRESSURE: 110 MMHG | HEART RATE: 85 BPM | WEIGHT: 238 LBS

## 2025-02-25 DIAGNOSIS — Z90.13 HISTORY OF BILATERAL MASTECTOMY: ICD-10-CM

## 2025-02-25 DIAGNOSIS — I89.0 LYMPHEDEMA: ICD-10-CM

## 2025-02-25 DIAGNOSIS — Z85.3 HISTORY OF RIGHT BREAST CANCER: Primary | ICD-10-CM

## 2025-02-25 DIAGNOSIS — G89.29 CHRONIC CHEST WALL PAIN: ICD-10-CM

## 2025-02-25 DIAGNOSIS — R07.89 CHRONIC CHEST WALL PAIN: ICD-10-CM

## 2025-02-25 DIAGNOSIS — R06.09 DOE (DYSPNEA ON EXERTION): ICD-10-CM

## 2025-02-25 PROCEDURE — 99212 OFFICE O/P EST SF 10 MIN: CPT | Performed by: NURSE PRACTITIONER

## 2025-02-25 NOTE — PROGRESS NOTES
Cely Osborne is a 64 y.o. female    Chief Complaint   Patient presents with    Follow-up     Right Breast Cancer       1. Have you been to the ER, urgent care clinic since your last visit?  Hospitalized since your last visit? Yes, [facility] ER admitted 10 days 9/1/24    2. Have you seen or consulted any other health care providers outside of the [facility] since your last visit?  Include any pap smears or colon screening. Yes, [facility] pulm Dr. Meyers

## 2025-04-22 ENCOUNTER — TELEPHONE (OUTPATIENT)
Facility: CLINIC | Age: 65
End: 2025-04-22

## 2025-04-22 ENCOUNTER — OFFICE VISIT (OUTPATIENT)
Facility: CLINIC | Age: 65
End: 2025-04-22
Payer: COMMERCIAL

## 2025-04-22 VITALS
HEIGHT: 66 IN | DIASTOLIC BLOOD PRESSURE: 88 MMHG | BODY MASS INDEX: 38.25 KG/M2 | HEART RATE: 65 BPM | OXYGEN SATURATION: 94 % | WEIGHT: 238 LBS | SYSTOLIC BLOOD PRESSURE: 138 MMHG

## 2025-04-22 DIAGNOSIS — G47.33 OSA (OBSTRUCTIVE SLEEP APNEA): ICD-10-CM

## 2025-04-22 DIAGNOSIS — E78.2 MIXED HYPERLIPIDEMIA: Primary | ICD-10-CM

## 2025-04-22 DIAGNOSIS — I89.0 LYMPHEDEMA: ICD-10-CM

## 2025-04-22 DIAGNOSIS — E03.4 HYPOTHYROIDISM DUE TO ACQUIRED ATROPHY OF THYROID: ICD-10-CM

## 2025-04-22 DIAGNOSIS — J45.20 MILD INTERMITTENT ASTHMA WITHOUT COMPLICATION: ICD-10-CM

## 2025-04-22 DIAGNOSIS — Z12.11 COLON CANCER SCREENING: ICD-10-CM

## 2025-04-22 DIAGNOSIS — E11.9 TYPE 2 DIABETES MELLITUS WITHOUT COMPLICATION, WITHOUT LONG-TERM CURRENT USE OF INSULIN (HCC): ICD-10-CM

## 2025-04-22 DIAGNOSIS — Z91.81 AT MODERATE RISK FOR FALL: ICD-10-CM

## 2025-04-22 DIAGNOSIS — Z85.3 PERSONAL HISTORY OF BREAST CANCER: ICD-10-CM

## 2025-04-22 DIAGNOSIS — M10.9 GOUT, UNSPECIFIED CAUSE, UNSPECIFIED CHRONICITY, UNSPECIFIED SITE: ICD-10-CM

## 2025-04-22 DIAGNOSIS — E66.9 OBESITY (BMI 30-39.9): ICD-10-CM

## 2025-04-22 PROCEDURE — 99204 OFFICE O/P NEW MOD 45 MIN: CPT | Performed by: STUDENT IN AN ORGANIZED HEALTH CARE EDUCATION/TRAINING PROGRAM

## 2025-04-22 PROCEDURE — 3051F HG A1C>EQUAL 7.0%<8.0%: CPT | Performed by: STUDENT IN AN ORGANIZED HEALTH CARE EDUCATION/TRAINING PROGRAM

## 2025-04-22 ASSESSMENT — PATIENT HEALTH QUESTIONNAIRE - PHQ9
SUM OF ALL RESPONSES TO PHQ QUESTIONS 1-9: 0
1. LITTLE INTEREST OR PLEASURE IN DOING THINGS: NOT AT ALL
2. FEELING DOWN, DEPRESSED OR HOPELESS: NOT AT ALL
SUM OF ALL RESPONSES TO PHQ QUESTIONS 1-9: 0

## 2025-04-22 ASSESSMENT — ANXIETY QUESTIONNAIRES
IF YOU CHECKED OFF ANY PROBLEMS ON THIS QUESTIONNAIRE, HOW DIFFICULT HAVE THESE PROBLEMS MADE IT FOR YOU TO DO YOUR WORK, TAKE CARE OF THINGS AT HOME, OR GET ALONG WITH OTHER PEOPLE: NOT DIFFICULT AT ALL
4. TROUBLE RELAXING: NOT AT ALL
2. NOT BEING ABLE TO STOP OR CONTROL WORRYING: NOT AT ALL
6. BECOMING EASILY ANNOYED OR IRRITABLE: NOT AT ALL
3. WORRYING TOO MUCH ABOUT DIFFERENT THINGS: NOT AT ALL
7. FEELING AFRAID AS IF SOMETHING AWFUL MIGHT HAPPEN: NOT AT ALL
GAD7 TOTAL SCORE: 0
1. FEELING NERVOUS, ANXIOUS, OR ON EDGE: NOT AT ALL
5. BEING SO RESTLESS THAT IT IS HARD TO SIT STILL: NOT AT ALL

## 2025-04-22 NOTE — PATIENT INSTRUCTIONS
You have been referred to: GI for colonoscopy, lymphedema clinic, physical therapy   **Please allow up to 2 weeks for their office to call you and schedule. If you have not heard from them beyond 2 weeks, contact their office directly to schedule an appointment.      Follow up with your pulmonologist (ask about your nebulizer and CPAP machine)    Take your atorvastatin daily (cholesterol medication).

## 2025-04-22 NOTE — PROGRESS NOTES
Chief Complaint   Patient presents with    New Patient         Health Maintenance Due   Topic Date Due    Diabetic foot exam  Never done    HIV screen  Never done    Diabetic retinal exam  Never done    Pneumococcal 50+ years Vaccine (1 of 2 - PCV) Never done    Colorectal Cancer Screen  Never done    Shingles vaccine (1 of 2) Never done    Cervical cancer screen  03/28/2015    Hepatitis B vaccine (2 of 3 - 19+ 3-dose series) 11/22/2018    Respiratory Syncytial Virus (RSV) Pregnant or age 60 yrs+ (1 - Risk 60-74 years 1-dose series) Never done    Diabetic Alb to Cr ratio (uACR) test  02/24/2023    COVID-19 Vaccine (3 - 2024-25 season) 09/01/2024         \"Have you been to the ER, urgent care clinic since your last visit?  Hospitalized since your last visit?\"    NO    “Have you seen or consulted any other health care providers outside of [facility] since your last visit?”    NO    “Have you had a colorectal cancer screening such as a colonoscopy/FIT/Cologuard?    NO    No colonoscopy on file  No cologuard on file  No FIT/FOBT on file   No flexible sigmoidoscopy on file         “Have you had a pap smear?”    NO    Date of last Cervical Cancer screen (HPV or PAP): 3/28/2012

## 2025-04-22 NOTE — TELEPHONE ENCOUNTER
Patient states she forgot to tell you about her vertigo at her visit today.  It happens when she goes to lay down and it is on her left side.   Patient also states she discussed getting a refill on All of her medications. Pharmacy- [facility] (on file)

## 2025-04-22 NOTE — PROGRESS NOTES
Cely Osborne a 64 y.o. female  has a past medical history of Arthritis, Asthma, Cancer (HCC), H/O total mastectomy, Hypercholesterolemia, Hypothyroidism, Other ill-defined conditions(799.66), Sleep apnea, and Thyroid disease. presents to office today to establish care.    Subjective:    History of Present Illness    Lymphedema in the setting of breast cancer hx  She is a 15-year cancer survivor who experiences occasional lymphedema under her arms. She has requested a referral for lymphedema treatment due to recurrent swelling and the presence of nodules on both sides of her surgical site. She continues to follow up with oncology annually. She reports a sensation of heaviness in her legs, which she attributes to her lymphedema. She has not experienced any recent falls and has previously undergone physical therapy. She underwent surgery for breast cancer in 2012, followed by a bilateral mastectomy 3 years later due to disease recurrence.    Asthma  She was hospitalized for 16 days due to COVID-19, which led to the development of asthma. She uses Advair as needed and carries albuterol with her at all times. She reports symptoms of sneezing and shortness of breath. She has not received a nebulizer machine but is under the care of a pulmonologist.    Gout  She takes allopurinol for gout, which primarily affects her big toes and occasionally her hands. She has not been diagnosed with tophi. Her last gout flare occurred during a hospital stay in 09/2024. She takes allopurinol twice daily and colchicine as needed.    HLD  She takes Lipitor as needed for cholesterol management. She has been off this medication for approximately one month due to an insurance change.    DM  She was prescribed metformin for elevated A1c levels but has since discontinued it. She has never required insulin injections. She monitors her blood sugar levels at home and maintains a healthy diet rich in vegetables.    Hypothyroidism  She has a

## 2025-04-23 LAB
ANION GAP SERPL CALC-SCNC: 6 MMOL/L (ref 2–12)
BASOPHILS # BLD: 0.08 K/UL (ref 0–0.1)
BASOPHILS NFR BLD: 1.1 % (ref 0–1)
BUN SERPL-MCNC: 20 MG/DL (ref 6–20)
BUN/CREAT SERPL: 16 (ref 12–20)
CALCIUM SERPL-MCNC: 10.2 MG/DL (ref 8.5–10.1)
CHLORIDE SERPL-SCNC: 101 MMOL/L (ref 97–108)
CHOLEST SERPL-MCNC: 476 MG/DL
CO2 SERPL-SCNC: 30 MMOL/L (ref 21–32)
CREAT SERPL-MCNC: 1.24 MG/DL (ref 0.55–1.02)
CREAT UR-MCNC: 123 MG/DL
DIFFERENTIAL METHOD BLD: ABNORMAL
EOSINOPHIL # BLD: 0.04 K/UL (ref 0–0.4)
EOSINOPHIL NFR BLD: 0.6 % (ref 0–7)
ERYTHROCYTE [DISTWIDTH] IN BLOOD BY AUTOMATED COUNT: 15.2 % (ref 11.5–14.5)
EST. AVERAGE GLUCOSE BLD GHB EST-MCNC: 160 MG/DL
GLUCOSE SERPL-MCNC: 101 MG/DL (ref 65–100)
HBA1C MFR BLD: 7.2 % (ref 4–5.6)
HCT VFR BLD AUTO: 38.9 % (ref 35–47)
HDLC SERPL-MCNC: 61 MG/DL
HDLC SERPL: 7.8 (ref 0–5)
HGB BLD-MCNC: 12 G/DL (ref 11.5–16)
IMM GRANULOCYTES # BLD AUTO: 0.01 K/UL (ref 0–0.04)
IMM GRANULOCYTES NFR BLD AUTO: 0.1 % (ref 0–0.5)
LDLC SERPL CALC-MCNC: 360.4 MG/DL (ref 0–100)
LYMPHOCYTES # BLD: 2.43 K/UL (ref 0.8–3.5)
LYMPHOCYTES NFR BLD: 34.2 % (ref 12–49)
MCH RBC QN AUTO: 29.1 PG (ref 26–34)
MCHC RBC AUTO-ENTMCNC: 30.8 G/DL (ref 30–36.5)
MCV RBC AUTO: 94.2 FL (ref 80–99)
MICROALBUMIN UR-MCNC: 78.2 MG/DL
MICROALBUMIN/CREAT UR-RTO: 636 MG/G (ref 0–30)
MONOCYTES # BLD: 0.4 K/UL (ref 0–1)
MONOCYTES NFR BLD: 5.6 % (ref 5–13)
NEUTS SEG # BLD: 4.15 K/UL (ref 1.8–8)
NEUTS SEG NFR BLD: 58.4 % (ref 32–75)
NRBC # BLD: 0 K/UL (ref 0–0.01)
NRBC BLD-RTO: 0 PER 100 WBC
PLATELET # BLD AUTO: 315 K/UL (ref 150–400)
PMV BLD AUTO: 11.7 FL (ref 8.9–12.9)
POTASSIUM SERPL-SCNC: 4.2 MMOL/L (ref 3.5–5.1)
RBC # BLD AUTO: 4.13 M/UL (ref 3.8–5.2)
SODIUM SERPL-SCNC: 137 MMOL/L (ref 136–145)
TRIGL SERPL-MCNC: 273 MG/DL
TSH SERPL DL<=0.05 MIU/L-ACNC: 22.3 UIU/ML (ref 0.36–3.74)
VLDLC SERPL CALC-MCNC: 54.6 MG/DL
WBC # BLD AUTO: 7.1 K/UL (ref 3.6–11)

## 2025-04-24 PROBLEM — J45.20 MILD INTERMITTENT ASTHMA WITHOUT COMPLICATION: Status: ACTIVE | Noted: 2025-04-24

## 2025-04-24 PROBLEM — Z91.81 AT MODERATE RISK FOR FALL: Status: ACTIVE | Noted: 2025-04-24

## 2025-04-24 PROBLEM — R06.09 DOE (DYSPNEA ON EXERTION): Status: RESOLVED | Noted: 2020-09-01 | Resolved: 2025-04-24

## 2025-04-24 PROBLEM — G89.18 CHEST WALL PAIN FOLLOWING SURGERY: Status: RESOLVED | Noted: 2018-02-09 | Resolved: 2025-04-24

## 2025-04-24 PROBLEM — I63.9 ACUTE STROKE DUE TO ISCHEMIA (HCC): Status: RESOLVED | Noted: 2024-09-01 | Resolved: 2025-04-24

## 2025-04-24 PROBLEM — M1A.09X0 CHRONIC GOUT OF MULTIPLE SITES: Status: ACTIVE | Noted: 2025-04-24

## 2025-04-24 PROBLEM — E66.9 OBESITY (BMI 30-39.9): Status: ACTIVE | Noted: 2018-09-14

## 2025-04-24 PROBLEM — G47.33 OSA (OBSTRUCTIVE SLEEP APNEA): Status: ACTIVE | Noted: 2025-04-24

## 2025-04-24 PROBLEM — R09.02 HYPOXIA: Status: RESOLVED | Noted: 2020-05-25 | Resolved: 2025-04-24

## 2025-04-24 PROBLEM — E11.9 TYPE 2 DIABETES MELLITUS WITHOUT COMPLICATION, WITHOUT LONG-TERM CURRENT USE OF INSULIN (HCC): Status: ACTIVE | Noted: 2025-04-24

## 2025-04-24 PROBLEM — R07.89 CHEST WALL PAIN, CHRONIC: Status: RESOLVED | Noted: 2018-02-09 | Resolved: 2025-04-24

## 2025-04-24 PROBLEM — G89.29 CHEST WALL PAIN, CHRONIC: Status: RESOLVED | Noted: 2018-02-09 | Resolved: 2025-04-24

## 2025-04-24 PROBLEM — M94.0 COSTOCHONDRITIS: Status: RESOLVED | Noted: 2018-10-09 | Resolved: 2025-04-24

## 2025-04-24 PROBLEM — R07.89 CHEST WALL PAIN FOLLOWING SURGERY: Status: RESOLVED | Noted: 2018-02-09 | Resolved: 2025-04-24

## 2025-04-24 PROBLEM — Z12.11 COLON CANCER SCREENING: Status: ACTIVE | Noted: 2025-04-24

## 2025-04-24 PROBLEM — U07.1 COVID-19: Status: RESOLVED | Noted: 2020-05-25 | Resolved: 2025-04-24

## 2025-04-24 RX ORDER — LEVOTHYROXINE SODIUM 125 UG/1
TABLET ORAL
Qty: 180 TABLET | Refills: 1 | Status: SHIPPED | OUTPATIENT
Start: 2025-04-24

## 2025-04-24 RX ORDER — ALLOPURINOL 100 MG/1
200 TABLET ORAL
Qty: 180 TABLET | Refills: 1 | Status: SHIPPED | OUTPATIENT
Start: 2025-04-24

## 2025-04-24 RX ORDER — FLUTICASONE PROPIONATE AND SALMETEROL 250; 50 UG/1; UG/1
1 POWDER RESPIRATORY (INHALATION) EVERY 12 HOURS
Qty: 60 EACH | Refills: 3 | Status: SHIPPED | OUTPATIENT
Start: 2025-04-24

## 2025-04-24 RX ORDER — ALBUTEROL SULFATE 90 UG/1
2 INHALANT RESPIRATORY (INHALATION) EVERY 6 HOURS PRN
Qty: 18 G | Refills: 2 | Status: SHIPPED | OUTPATIENT
Start: 2025-04-24

## 2025-04-24 RX ORDER — METFORMIN HYDROCHLORIDE 500 MG/1
1000 TABLET, EXTENDED RELEASE ORAL
Qty: 180 TABLET | Refills: 1 | Status: SHIPPED | OUTPATIENT
Start: 2025-04-24

## 2025-04-24 RX ORDER — ATORVASTATIN CALCIUM 80 MG/1
80 TABLET, FILM COATED ORAL DAILY
Qty: 180 TABLET | Refills: 1 | Status: SHIPPED | OUTPATIENT
Start: 2025-04-24

## 2025-05-06 ENCOUNTER — TELEPHONE (OUTPATIENT)
Facility: CLINIC | Age: 65
End: 2025-05-06

## 2025-05-06 ENCOUNTER — OFFICE VISIT (OUTPATIENT)
Facility: CLINIC | Age: 65
End: 2025-05-06
Payer: COMMERCIAL

## 2025-05-06 VITALS
BODY MASS INDEX: 37.45 KG/M2 | WEIGHT: 232 LBS | OXYGEN SATURATION: 93 % | DIASTOLIC BLOOD PRESSURE: 87 MMHG | HEART RATE: 83 BPM | SYSTOLIC BLOOD PRESSURE: 131 MMHG

## 2025-05-06 DIAGNOSIS — M43.02 CERVICAL SPONDYLOLYSIS: ICD-10-CM

## 2025-05-06 DIAGNOSIS — M79.89 BILATERAL HAND SWELLING: ICD-10-CM

## 2025-05-06 DIAGNOSIS — D22.9 ATYPICAL NEVUS: Primary | ICD-10-CM

## 2025-05-06 DIAGNOSIS — R29.898 BILATERAL LEG WEAKNESS: ICD-10-CM

## 2025-05-06 PROCEDURE — 99214 OFFICE O/P EST MOD 30 MIN: CPT | Performed by: STUDENT IN AN ORGANIZED HEALTH CARE EDUCATION/TRAINING PROGRAM

## 2025-05-06 RX ORDER — MELOXICAM 7.5 MG/1
TABLET ORAL
COMMUNITY
Start: 2025-05-05

## 2025-05-06 NOTE — PROGRESS NOTES
Chief Complaint   Patient presents with    Extremity Weakness     In legs         Health Maintenance Due   Topic Date Due    Diabetic foot exam  Never done    HIV screen  Never done    Diabetic retinal exam  Never done    Pneumococcal 50+ years Vaccine (1 of 2 - PCV) Never done    Colorectal Cancer Screen  Never done    Shingles vaccine (1 of 2) Never done    Cervical cancer screen  03/28/2015    Hepatitis B vaccine (2 of 3 - 19+ 3-dose series) 11/22/2018    Respiratory Syncytial Virus (RSV) Pregnant or age 60 yrs+ (1 - Risk 60-74 years 1-dose series) Never done    COVID-19 Vaccine (3 - 2024-25 season) 09/01/2024         \"Have you been to the ER, urgent care clinic since your last visit?  Hospitalized since your last visit?\"    YES - When: approximately 1 days ago.  Where and Why: [facility] ER swelling/hard to walk.    “Have you seen or consulted any other health care providers outside of [facility] since your last visit?”    NO    “Have you had a colorectal cancer screening such as a colonoscopy/FIT/Cologuard?    NO    No colonoscopy on file  No cologuard on file  No FIT/FOBT on file   No flexible sigmoidoscopy on file         “Have you had a pap smear?”    NO    Date of last Cervical Cancer screen (HPV or PAP): 3/28/2012

## 2025-05-06 NOTE — PATIENT INSTRUCTIONS
Have physical therapy focus on your leg strengthening and neck    You have been referred to: dermatology  **Please allow up to 2 weeks for their office to call you and schedule. If you have not heard from them beyond 2 weeks, contact their office directly to schedule an appointment.      Schedule your appointment with the lymphedema clinic.

## 2025-05-06 NOTE — PROGRESS NOTES
Cely Osborne a 64 y.o. female  has a past medical history of Arthritis, Asthma, Cancer (HCC), H/O total mastectomy, Hypercholesterolemia, Hypothyroidism, Other ill-defined conditions(799.89), Sleep apnea, and Thyroid disease. presents to office today for multiple concerns.     Subjective:    History of Present Illness    She experienced an episode of bilateral hand swelling yesterday, which was more pronounced in one hand. She sought medical attention at the [facility] [address], where radiographic imaging revealed arthritis. She has not yet received a call from the lymphedema clinic but has been contacted by physical therapy. She attended her first physical therapy session last Friday at [facility], where the focus was on both upper and lower extremities. She is scheduled for weekly sessions every Friday. She has been utilizing compressive sleeves for her hands and legs. She also reports a history of leg and foot swelling, for which she wore compression hose to work yesterday. Despite the pain, she managed to complete her 4-hour shift. She reports a reduction in leg swelling compared to the previous day. She maintains a low-salt diet and elevates her legs in the evening.    She has noticed small specks on her legs, which she attributes to aging. These specks are non-removable and do not cause any discomfort or itching. She also reports a raised knot on her right leg, which is not itchy. Additionally, she has a spot on her left upper back that causes itching.    She was referred to physical therapy by Dr. Xiong (former PCP) in May 2024 due to neck pain. She underwent a CT scan of her neck yesterday, unsure what the results we're. In September 2024, a CTA of her head and neck showed mild to moderate multilevel cervical spondylosis.    She was prescribed meloxicam yesterday, which she took with cereal and coconut oil. However, she reports that the medication has been causing stomach

## 2025-05-07 DIAGNOSIS — G47.33 OSA (OBSTRUCTIVE SLEEP APNEA): Primary | ICD-10-CM

## 2025-05-07 DIAGNOSIS — M43.02 CERVICAL SPONDYLOLYSIS: ICD-10-CM

## 2025-05-07 DIAGNOSIS — R20.0 BILATERAL HAND NUMBNESS: ICD-10-CM

## 2025-05-07 RX ORDER — DULOXETIN HYDROCHLORIDE 30 MG/1
30 CAPSULE, DELAYED RELEASE ORAL DAILY
Qty: 30 CAPSULE | Refills: 3 | Status: SHIPPED | OUTPATIENT
Start: 2025-05-07

## 2025-05-07 NOTE — PROGRESS NOTES
Patient.  Distally prescribed gabapentin to help with her nerve pain associated with her bilateral mastectomy.  Since that time her pain has resolved, and she continues to take gabapentin for numbness and tingling in her fingers.  We had an in-depth conversation today going over alternative treatment options including Cymbalta.  Patient willing to try medication which will also help with her musculoskeletal chronic pain symptoms.  She recently visited the hospital and had multiple x-ray imaging done which showed osteoarthritis located in her left foot left knee and C-spine.  She also had a CT T scan completed of her cervical spine which showed mild osteoarthritis changes.  Patient currently receiving physical therapy.  In the event the medication and physical therapy do not help to improve her pain symptoms, we discussed low threshold for Ortho referral.  Keep scheduled follow-up appointment 7/22/2025, in the event she has worsening of symptoms prior to that time, I encouraged the patient to schedule a sooner appointment.

## 2025-05-07 NOTE — TELEPHONE ENCOUNTER
Patient has not had medication filled since 9/2024. Contacted patient today to further clarify her regimen, no answer, left non-urgent VM.

## 2025-05-19 ENCOUNTER — TELEPHONE (OUTPATIENT)
Facility: CLINIC | Age: 65
End: 2025-05-19

## 2025-05-19 NOTE — TELEPHONE ENCOUNTER
Patient states she is in pain from arthritis . She states her hands and wrist are swollen. She states she went to [facility] and they prescribed a steroid and it is not helping. Patient would like a prescription. Please advise.

## 2025-05-20 ENCOUNTER — TELEPHONE (OUTPATIENT)
Facility: CLINIC | Age: 65
End: 2025-05-20

## 2025-05-20 NOTE — TELEPHONE ENCOUNTER
Patient states she is a lot of pain in her hand and would like something called in for the pain. Please advise

## 2025-05-22 ENCOUNTER — TELEMEDICINE (OUTPATIENT)
Facility: CLINIC | Age: 65
End: 2025-05-22
Payer: COMMERCIAL

## 2025-05-22 DIAGNOSIS — M19.241 OTHER SECONDARY OSTEOARTHRITIS OF BOTH HANDS: Primary | ICD-10-CM

## 2025-05-22 DIAGNOSIS — M19.242 OTHER SECONDARY OSTEOARTHRITIS OF BOTH HANDS: Primary | ICD-10-CM

## 2025-05-22 PROBLEM — M19.041 OSTEOARTHRITIS OF HANDS, BILATERAL: Status: ACTIVE | Noted: 2025-05-22

## 2025-05-22 PROBLEM — M19.042 OSTEOARTHRITIS OF HANDS, BILATERAL: Status: ACTIVE | Noted: 2025-05-22

## 2025-05-22 PROCEDURE — 99213 OFFICE O/P EST LOW 20 MIN: CPT | Performed by: STUDENT IN AN ORGANIZED HEALTH CARE EDUCATION/TRAINING PROGRAM

## 2025-05-22 RX ORDER — COLCHICINE 0.6 MG/1
TABLET ORAL
Qty: 14 TABLET | Refills: 0 | Status: SHIPPED | OUTPATIENT
Start: 2025-05-22

## 2025-05-22 NOTE — PROGRESS NOTES
Chief Complaint   Patient presents with    Hand Pain         Health Maintenance Due   Topic Date Due    Diabetic foot exam  Never done    HIV screen  Never done    Diabetic retinal exam  Never done    Pneumococcal 50+ years Vaccine (1 of 2 - PCV) Never done    Colorectal Cancer Screen  Never done    Shingles vaccine (1 of 2) Never done    Cervical cancer screen  03/28/2015    Hepatitis B vaccine (2 of 3 - 19+ 3-dose series) 11/22/2018    Respiratory Syncytial Virus (RSV) Pregnant or age 60 yrs+ (1 - Risk 60-74 years 1-dose series) Never done    COVID-19 Vaccine (3 - 2024-25 season) 09/01/2024         \"Have you been to the ER, urgent care clinic since your last visit?  Hospitalized since your last visit?\"    YES - When: approximately 1  weeks ago.  Where and Why: [facility] call/ Hand pain.    “Have you seen or consulted any other health care providers outside of [facility] since your last visit?”    NO    “Have you had a colorectal cancer screening such as a colonoscopy/FIT/Cologuard?    NO    No colonoscopy on file  No cologuard on file  No FIT/FOBT on file   No flexible sigmoidoscopy on file         “Have you had a pap smear?”    NO    Date of last Cervical Cancer screen (HPV or PAP): 3/28/2012          
dyspnea,wheezing  CV:                   negative for chest pain, palpitations, lower extremity edema  GI:                    negative for nausea, vomiting, diarrhea, abdominal pain,melena  Endo:               negative for polyuria,polydipsia,polyphagia,heat intolerance  Genitourinary : negative for frequency, dysuria and hematuria  Integumentary: negative for rash and pruritus  Hematologic:   negative for easy bruising and gum/nose bleeding  Musculoskel:   negative for myalgias, arthralgias, back pain, muscle weakness  Neurological:   negative for headaches, dizziness, vertigo, memory problems and gait   Behavl/Psych:  negative for feelings of anxiety, depression, mood changes  ROS otherwise negative    Objective:  There were no vitals taken for this visit.  Physical Exam:   Video visit: patient appears well, NAD, voice strong and clear         4/22/2025     1:59 PM   PHQ-9    Little interest or pleasure in doing things 0   Feeling down, depressed, or hopeless 0   PHQ-2 Score 0   PHQ-9 Total Score 0        Assessment/Plan:       1. Other secondary osteoarthritis of both hands  Assessment & Plan:  Patient recently diagnosed with osteoarthritis in her hands bilaterally, in addition to carpal tunnel syndrome on her left hand.,  She recently completed a Medrol Dosepak.  She had follow-up with Ortho today, concerns for gout flare.  Given severity of ongoing pain symptoms, will start colchicine to take twice daily for 7 days.  Keep scheduled follow-up with rheumatology.  Continue Tylenol, Voltaren gel as needed.         Follow-up and Dispositions:  Return if symptoms worsen or fail to improve.       I have reviewed the patient's medical history in detail and updated the computerized patient record.      We had a prolonged discussion about these complex clinical issues and went over the various important aspects to consider. All questions were answered.      Advised the patient to call back or return to office if symptoms

## 2025-05-22 NOTE — ASSESSMENT & PLAN NOTE
Patient recently diagnosed with osteoarthritis in her hands bilaterally, in addition to carpal tunnel syndrome on her left hand.,  She recently completed a Medrol Dosepak.  She had follow-up with Ortho today, concerns for gout flare.  Given severity of ongoing pain symptoms, will start colchicine to take twice daily for 7 days.  Keep scheduled follow-up with rheumatology.  Continue Tylenol, Voltaren gel as needed.

## 2025-05-24 PROBLEM — Z12.11 COLON CANCER SCREENING: Status: RESOLVED | Noted: 2025-04-24 | Resolved: 2025-05-24

## 2025-07-22 ENCOUNTER — OFFICE VISIT (OUTPATIENT)
Facility: CLINIC | Age: 65
End: 2025-07-22
Payer: COMMERCIAL

## 2025-07-22 VITALS
SYSTOLIC BLOOD PRESSURE: 124 MMHG | WEIGHT: 240 LBS | OXYGEN SATURATION: 94 % | HEART RATE: 82 BPM | DIASTOLIC BLOOD PRESSURE: 84 MMHG | BODY MASS INDEX: 38.74 KG/M2

## 2025-07-22 DIAGNOSIS — E55.9 VITAMIN D DEFICIENCY: ICD-10-CM

## 2025-07-22 DIAGNOSIS — M10.9 GOUT, UNSPECIFIED CAUSE, UNSPECIFIED CHRONICITY, UNSPECIFIED SITE: ICD-10-CM

## 2025-07-22 DIAGNOSIS — G47.33 OSA (OBSTRUCTIVE SLEEP APNEA): ICD-10-CM

## 2025-07-22 DIAGNOSIS — E66.9 OBESITY (BMI 30-39.9): ICD-10-CM

## 2025-07-22 DIAGNOSIS — E03.4 HYPOTHYROIDISM DUE TO ACQUIRED ATROPHY OF THYROID: Primary | ICD-10-CM

## 2025-07-22 DIAGNOSIS — R80.9 MICROALBUMINURIA: ICD-10-CM

## 2025-07-22 DIAGNOSIS — E11.9 TYPE 2 DIABETES MELLITUS WITHOUT COMPLICATION, WITHOUT LONG-TERM CURRENT USE OF INSULIN (HCC): ICD-10-CM

## 2025-07-22 DIAGNOSIS — I89.0 LYMPHEDEMA: ICD-10-CM

## 2025-07-22 PROBLEM — Z85.3 PERSONAL HISTORY OF BREAST CANCER: Status: RESOLVED | Noted: 2024-01-08 | Resolved: 2025-07-22

## 2025-07-22 PROBLEM — Z90.13 H/O MASTECTOMY, BILATERAL: Status: RESOLVED | Noted: 2018-02-09 | Resolved: 2025-07-22

## 2025-07-22 PROCEDURE — 99214 OFFICE O/P EST MOD 30 MIN: CPT | Performed by: STUDENT IN AN ORGANIZED HEALTH CARE EDUCATION/TRAINING PROGRAM

## 2025-07-22 PROCEDURE — 3051F HG A1C>EQUAL 7.0%<8.0%: CPT | Performed by: STUDENT IN AN ORGANIZED HEALTH CARE EDUCATION/TRAINING PROGRAM

## 2025-07-22 RX ORDER — ALLOPURINOL 300 MG/1
TABLET ORAL
COMMUNITY
Start: 2025-07-14

## 2025-07-22 RX ORDER — PREDNISONE 5 MG/1
TABLET ORAL
COMMUNITY
Start: 2025-07-14

## 2025-07-22 NOTE — PATIENT INSTRUCTIONS
You have been referred to: nutritionist   **Please allow up to 2 weeks for their office to call you and schedule. If you have not heard from them beyond 2 weeks, contact their office directly to schedule an appointment.      [facility]  Shelly Rohit Serge, MS, RDN, CSSD  Virtual visits available   [address], [place] [address] [address], [place] [address]

## 2025-07-22 NOTE — PROGRESS NOTES
Chief Complaint   Patient presents with    3 Month Follow-Up         Health Maintenance Due   Topic Date Due    Diabetic foot exam  Never done    HIV screen  Never done    Diabetic retinal exam  Never done    Pneumococcal 50+ years Vaccine (1 of 2 - PCV) Never done    Colorectal Cancer Screen  Never done    Shingles vaccine (1 of 2) Never done    Cervical cancer screen  03/28/2015    Hepatitis B vaccine (2 of 3 - 19+ 3-dose series) 11/22/2018    Respiratory Syncytial Virus (RSV) Pregnant or age 60 yrs+ (1 - Risk 60-74 years 1-dose series) Never done    COVID-19 Vaccine (3 - 2024-25 season) 09/01/2024         \"Have you been to the ER, urgent care clinic since your last visit?  Hospitalized since your last visit?\"    NO    “Have you seen or consulted any other health care providers outside of [facility] since your last visit?”    NO    “Have you had a colorectal cancer screening such as a colonoscopy/FIT/Cologuard?    NO    No colonoscopy on file  No cologuard on file  No FIT/FOBT on file   No flexible sigmoidoscopy on file         “Have you had a pap smear?”    NO    Date of last Cervical Cancer screen (HPV or PAP): 3/28/2012          
referral to behavioral health for scores 10 or greater.    Assessment and Plan:    1. Hypothyroidism due to acquired atrophy of thyroid  Overview:  Lab Results   Component Value Date    TSH 22.30 (H) 04/22/2025       Assessment & Plan:  Patient currently on levothyroxine 125 mcg 2 tablets by mouth daily before breakfast.  Her most recent TSH did show elevation.  Will repeat thyroid cascade today to reevaluate.  Orders:  -     TSH; Future  -     T4, Free; Future  2. Type 2 diabetes mellitus without complication, without long-term current use of insulin (HCC)  Assessment & Plan:   Most recent A1c 7.2, 4/22/2025.  Will reassess today.  Continue metformin 500 mg 2 tablets by mouth daily.  Orders:  -     Hemoglobin A1C; Future  3. Obesity (BMI 30-39.9)  Assessment & Plan:  BMI in office today 38.74, lifestyle modification encouraged.  Will reassess during next office visit.  Orders:  -     Comprehensive Metabolic Panel; Future  -     CBC with Auto Differential; Future  4. Microalbuminuria  Overview:  Lab Results   Component Value Date    ALBCREAT 636 (H) 04/22/2025      Assessment & Plan:  History of microalbuminuria.  Repeat urine sample collected today.  Tight blood pressure and blood glucose control encouraged.  Orders:  -     Albumin/Creatinine Ratio, Urine; Future  5. Vitamin D deficiency  Overview:  Lab Results   Component Value Date/Time    VITD25 19.3 02/24/2022 12:59 PM     Assessment & Plan:  History of vitamin D deficiency, will check level today.  Orders:  -     Vitamin D 25 Hydroxy; Future  6. Lymphedema  Assessment & Plan:  History of bilateral mastectomy.  At baseline, patient does experience lymphedema.  She was previously referred to our lymphedema clinic, provided her with telephone number again to call and schedule.  7. Gout, unspecified cause, unspecified chronicity, unspecified site  Assessment & Plan:  Longstanding history of gout, per patient she had her uric acid levels evaluated a few months ago

## 2025-07-23 PROBLEM — M10.9 GOUT: Status: ACTIVE | Noted: 2025-07-23

## 2025-07-23 LAB
25(OH)D3 SERPL-MCNC: <9 NG/ML (ref 30–100)
ALBUMIN SERPL-MCNC: 4.1 G/DL (ref 3.5–5)
ALBUMIN/GLOB SERPL: 1.1 (ref 1.1–2.2)
ALP SERPL-CCNC: 61 U/L (ref 45–117)
ALT SERPL-CCNC: 34 U/L (ref 12–78)
ANION GAP SERPL CALC-SCNC: 8 MMOL/L (ref 2–12)
AST SERPL-CCNC: 25 U/L (ref 15–37)
BASOPHILS # BLD: 0.09 K/UL (ref 0–0.1)
BASOPHILS NFR BLD: 0.8 % (ref 0–1)
BILIRUB SERPL-MCNC: 0.4 MG/DL (ref 0.2–1)
BUN SERPL-MCNC: 18 MG/DL (ref 6–20)
BUN/CREAT SERPL: 17 (ref 12–20)
CALCIUM SERPL-MCNC: 10.2 MG/DL (ref 8.5–10.1)
CHLORIDE SERPL-SCNC: 102 MMOL/L (ref 97–108)
CO2 SERPL-SCNC: 30 MMOL/L (ref 21–32)
CREAT SERPL-MCNC: 1.08 MG/DL (ref 0.55–1.02)
CREAT UR-MCNC: 111 MG/DL
DIFFERENTIAL METHOD BLD: ABNORMAL
EOSINOPHIL # BLD: 0.04 K/UL (ref 0–0.4)
EOSINOPHIL NFR BLD: 0.3 % (ref 0–7)
ERYTHROCYTE [DISTWIDTH] IN BLOOD BY AUTOMATED COUNT: 15.4 % (ref 11.5–14.5)
EST. AVERAGE GLUCOSE BLD GHB EST-MCNC: 189 MG/DL
GLOBULIN SER CALC-MCNC: 3.7 G/DL (ref 2–4)
GLUCOSE SERPL-MCNC: 134 MG/DL (ref 65–100)
HBA1C MFR BLD: 8.2 % (ref 4–5.6)
HCT VFR BLD AUTO: 41.3 % (ref 35–47)
HGB BLD-MCNC: 12.5 G/DL (ref 11.5–16)
IMM GRANULOCYTES # BLD AUTO: 0.05 K/UL (ref 0–0.04)
IMM GRANULOCYTES NFR BLD AUTO: 0.4 % (ref 0–0.5)
LYMPHOCYTES # BLD: 2.79 K/UL (ref 0.8–3.5)
LYMPHOCYTES NFR BLD: 24.1 % (ref 12–49)
MCH RBC QN AUTO: 28.7 PG (ref 26–34)
MCHC RBC AUTO-ENTMCNC: 30.3 G/DL (ref 30–36.5)
MCV RBC AUTO: 94.7 FL (ref 80–99)
MICROALBUMIN UR-MCNC: 67.2 MG/DL
MICROALBUMIN/CREAT UR-RTO: 605 MG/G (ref 0–30)
MONOCYTES # BLD: 0.65 K/UL (ref 0–1)
MONOCYTES NFR BLD: 5.6 % (ref 5–13)
NEUTS SEG # BLD: 7.98 K/UL (ref 1.8–8)
NEUTS SEG NFR BLD: 68.8 % (ref 32–75)
NRBC # BLD: 0 K/UL (ref 0–0.01)
NRBC BLD-RTO: 0 PER 100 WBC
PLATELET # BLD AUTO: 329 K/UL (ref 150–400)
PMV BLD AUTO: 11.7 FL (ref 8.9–12.9)
POTASSIUM SERPL-SCNC: 4 MMOL/L (ref 3.5–5.1)
PROT SERPL-MCNC: 7.8 G/DL (ref 6.4–8.2)
RBC # BLD AUTO: 4.36 M/UL (ref 3.8–5.2)
SODIUM SERPL-SCNC: 140 MMOL/L (ref 136–145)
T4 FREE SERPL-MCNC: 0.1 NG/DL (ref 0.8–1.5)
TSH SERPL DL<=0.05 MIU/L-ACNC: 18.8 UIU/ML (ref 0.36–3.74)
URATE SERPL-MCNC: 11.4 MG/DL (ref 2.6–6)
WBC # BLD AUTO: 11.6 K/UL (ref 3.6–11)

## 2025-07-23 NOTE — ASSESSMENT & PLAN NOTE
Longstanding history of gout, per patient she had her uric acid levels evaluated a few months ago which did show slight elevation.  Will reassess today.  Currently asymptomatic.

## 2025-07-23 NOTE — ASSESSMENT & PLAN NOTE
Most recent A1c 7.2, 4/22/2025.  Will reassess today.  Continue metformin 500 mg 2 tablets by mouth daily.

## 2025-07-23 NOTE — ASSESSMENT & PLAN NOTE
Patient currently on levothyroxine 125 mcg 2 tablets by mouth daily before breakfast.  Her most recent TSH did show elevation.  Will repeat thyroid cascade today to reevaluate.

## 2025-07-23 NOTE — ASSESSMENT & PLAN NOTE
History of bilateral mastectomy.  At baseline, patient does experience lymphedema.  She was previously referred to our lymphedema clinic, provided her with telephone number again to call and schedule.

## 2025-07-23 NOTE — ASSESSMENT & PLAN NOTE
History of microalbuminuria.  Repeat urine sample collected today.  Tight blood pressure and blood glucose control encouraged.

## 2025-07-23 NOTE — ASSESSMENT & PLAN NOTE
BMI in office today 38.74, lifestyle modification encouraged.  Will reassess during next office visit.

## 2025-07-23 NOTE — ASSESSMENT & PLAN NOTE
Ongoing feelings of fatigue, patient previously underwent sleep study test through [facility] which showed she had obstructive sleep apnea, however she never received her CPAP.  She was previously referred to our sleep medicine team, provided her with telephone number to call and schedule.

## 2025-07-28 DIAGNOSIS — E55.9 VITAMIN D DEFICIENCY: Primary | ICD-10-CM

## 2025-07-28 RX ORDER — ERGOCALCIFEROL 1.25 MG/1
50000 CAPSULE, LIQUID FILLED ORAL WEEKLY
Qty: 12 CAPSULE | Refills: 1 | Status: SHIPPED | OUTPATIENT
Start: 2025-07-28